# Patient Record
Sex: FEMALE | Race: WHITE | NOT HISPANIC OR LATINO | Employment: FULL TIME | URBAN - METROPOLITAN AREA
[De-identification: names, ages, dates, MRNs, and addresses within clinical notes are randomized per-mention and may not be internally consistent; named-entity substitution may affect disease eponyms.]

---

## 2017-01-03 ENCOUNTER — TRANSCRIBE ORDERS (OUTPATIENT)
Dept: ADMINISTRATIVE | Facility: HOSPITAL | Age: 57
End: 2017-01-03

## 2017-01-03 ENCOUNTER — HOSPITAL ENCOUNTER (OUTPATIENT)
Dept: ULTRASOUND IMAGING | Facility: HOSPITAL | Age: 57
Discharge: HOME | End: 2017-01-03
Attending: ORTHOPAEDIC SURGERY
Payer: COMMERCIAL

## 2017-01-03 DIAGNOSIS — M79.661 BILATERAL CALF PAIN: ICD-10-CM

## 2017-01-03 DIAGNOSIS — M79.662 PAIN OF LEFT CALF: ICD-10-CM

## 2017-01-03 DIAGNOSIS — M79.662 BILATERAL CALF PAIN: ICD-10-CM

## 2017-01-03 DIAGNOSIS — M79.661 BILATERAL CALF PAIN: Primary | ICD-10-CM

## 2017-01-03 DIAGNOSIS — M79.662 BILATERAL CALF PAIN: Primary | ICD-10-CM

## 2017-01-03 PROCEDURE — 93971 EXTREMITY STUDY: CPT

## 2017-01-04 ENCOUNTER — GENERIC CONVERSION - ENCOUNTER (OUTPATIENT)
Dept: OTHER | Facility: OTHER | Age: 57
End: 2017-01-04

## 2017-01-04 ENCOUNTER — TRANSCRIBE ORDERS (OUTPATIENT)
Dept: ADMINISTRATIVE | Facility: HOSPITAL | Age: 57
End: 2017-01-04

## 2017-01-04 ENCOUNTER — APPOINTMENT (OUTPATIENT)
Dept: LAB | Facility: HOSPITAL | Age: 57
End: 2017-01-04
Attending: FAMILY MEDICINE
Payer: COMMERCIAL

## 2017-01-04 DIAGNOSIS — R68.89 OTHER GENERAL SYMPTOMS AND SIGNS: ICD-10-CM

## 2017-01-04 LAB
FLUAV AG SPEC QL IA: NEGATIVE
FLUAV AG SPEC QL: NORMAL
FLUBV AG SPEC QL IA: NEGATIVE
FLUBV AG SPEC QL: NORMAL
RSV B RNA SPEC QL NAA+PROBE: NORMAL

## 2017-01-04 PROCEDURE — G0145 SCR C/V CYTO,THINLAYER,RESCR: HCPCS | Performed by: SPECIALIST

## 2017-01-04 PROCEDURE — 87798 DETECT AGENT NOS DNA AMP: CPT

## 2017-01-04 PROCEDURE — 87400 INFLUENZA A/B EACH AG IA: CPT

## 2017-01-05 ENCOUNTER — GENERIC CONVERSION - ENCOUNTER (OUTPATIENT)
Dept: OTHER | Facility: OTHER | Age: 57
End: 2017-01-05

## 2017-01-06 ENCOUNTER — LAB REQUISITION (OUTPATIENT)
Dept: LAB | Facility: HOSPITAL | Age: 57
End: 2017-01-06
Payer: COMMERCIAL

## 2017-01-06 DIAGNOSIS — Z01.419 ENCOUNTER FOR GYNECOLOGICAL EXAMINATION WITHOUT ABNORMAL FINDING: ICD-10-CM

## 2017-01-11 LAB
LAB AP GYN PRIMARY INTERPRETATION: NORMAL
Lab: NORMAL

## 2017-01-13 DIAGNOSIS — Z12.11 ENCOUNTER FOR SCREENING FOR MALIGNANT NEOPLASM OF COLON: ICD-10-CM

## 2017-01-13 DIAGNOSIS — M51.36 OTHER INTERVERTEBRAL DISC DEGENERATION, LUMBAR REGION: ICD-10-CM

## 2017-01-13 DIAGNOSIS — M54.50 LOW BACK PAIN: ICD-10-CM

## 2017-01-13 DIAGNOSIS — Z12.12 ENCOUNTER FOR SCREENING FOR MALIGNANT NEOPLASM OF RECTUM: ICD-10-CM

## 2017-01-13 RX ORDER — LEVOTHYROXINE SODIUM 0.07 MG/1
75 TABLET ORAL DAILY
COMMUNITY
End: 2019-02-19 | Stop reason: SDUPTHER

## 2017-01-13 RX ORDER — IBUPROFEN 400 MG/1
400 TABLET ORAL EVERY 6 HOURS PRN
COMMUNITY
End: 2018-10-19 | Stop reason: ALTCHOICE

## 2017-01-13 NOTE — DISCHARGE INSTRUCTIONS
KNEE ARTHROSCOPY  Post-operative instructions  Dr Santana July    Bandage/Physical Therapy:  Your first physical therapy visit should be 1-2 days after your surgery  Leave bandage on until seen at first PT visit  They will change your dressing, then you can shower and wash over stitches  Stitches need to be kept clean until removed approximately 7-10 days after surgery by physical therapy  If physical therapy will not remove the stitches, they will be removed at your first post-operative visit approximately 2 weeks after surgery  Pain medication:  Regardless of if you have sensation in your knee or not, we recommend taking a pain pill around 4-5 hours after surgery so you can be comfortable overnight  Then continue taking the pain pills as directed every 4-6 hours  You may take no more than 2 pain pills at a time  In between doses of pain medication, it is okay to use anti-inflammatories such as Aleve or Advil  Pain medication can cause constipation  Medications that are over the counter such as Colace or Dulcolax are recommended if you experience this side effect  Restrictions:  No driving while taking pain medication  Avoid deep bending, twisting, squatting, kneeling as those will generally be painful until 2+ weeks after surgery  Use of crutches is not mandatory, however some patients feel better using them for several days after surgery  No athletic activities until discussed at 2 week follow up visit in the office

## 2017-01-14 ENCOUNTER — ANESTHESIA EVENT (OUTPATIENT)
Dept: PERIOP | Facility: HOSPITAL | Age: 57
End: 2017-01-14
Payer: COMMERCIAL

## 2017-01-16 ENCOUNTER — ANESTHESIA (OUTPATIENT)
Dept: PERIOP | Facility: HOSPITAL | Age: 57
End: 2017-01-16
Payer: COMMERCIAL

## 2017-01-16 ENCOUNTER — HOSPITAL ENCOUNTER (OUTPATIENT)
Facility: HOSPITAL | Age: 57
Setting detail: OUTPATIENT SURGERY
Discharge: HOME | End: 2017-01-16
Attending: ORTHOPAEDIC SURGERY | Admitting: ORTHOPAEDIC SURGERY
Payer: COMMERCIAL

## 2017-01-16 VITALS
WEIGHT: 156 LBS | RESPIRATION RATE: 18 BRPM | TEMPERATURE: 97.5 F | HEIGHT: 65 IN | BODY MASS INDEX: 25.99 KG/M2 | DIASTOLIC BLOOD PRESSURE: 71 MMHG | SYSTOLIC BLOOD PRESSURE: 130 MMHG | OXYGEN SATURATION: 98 % | HEART RATE: 69 BPM

## 2017-01-16 RX ORDER — KETOROLAC TROMETHAMINE 30 MG/ML
30 INJECTION, SOLUTION INTRAMUSCULAR; INTRAVENOUS ONCE
Status: COMPLETED | OUTPATIENT
Start: 2017-01-16 | End: 2017-01-16

## 2017-01-16 RX ORDER — ONDANSETRON 2 MG/ML
INJECTION INTRAMUSCULAR; INTRAVENOUS AS NEEDED
Status: DISCONTINUED | OUTPATIENT
Start: 2017-01-16 | End: 2017-01-16 | Stop reason: SURG

## 2017-01-16 RX ORDER — PROPOFOL 10 MG/ML
INJECTION, EMULSION INTRAVENOUS CONTINUOUS PRN
Status: DISCONTINUED | OUTPATIENT
Start: 2017-01-16 | End: 2017-01-16 | Stop reason: SURG

## 2017-01-16 RX ORDER — OXYCODONE HYDROCHLORIDE 5 MG/1
5 TABLET ORAL EVERY 4 HOURS PRN
Status: DISCONTINUED | OUTPATIENT
Start: 2017-01-16 | End: 2017-01-16 | Stop reason: HOSPADM

## 2017-01-16 RX ORDER — ONDANSETRON 2 MG/ML
4 INJECTION INTRAMUSCULAR; INTRAVENOUS ONCE
Status: DISCONTINUED | OUTPATIENT
Start: 2017-01-16 | End: 2017-01-16 | Stop reason: HOSPADM

## 2017-01-16 RX ORDER — SCOLOPAMINE TRANSDERMAL SYSTEM 1 MG/1
1 PATCH, EXTENDED RELEASE TRANSDERMAL
Status: DISCONTINUED | OUTPATIENT
Start: 2017-01-16 | End: 2017-01-16 | Stop reason: HOSPADM

## 2017-01-16 RX ORDER — MORPHINE SULFATE 2 MG/ML
2 INJECTION, SOLUTION INTRAMUSCULAR; INTRAVENOUS ONCE
Status: DISCONTINUED | OUTPATIENT
Start: 2017-01-16 | End: 2017-01-16 | Stop reason: HOSPADM

## 2017-01-16 RX ORDER — MAGNESIUM HYDROXIDE 1200 MG/15ML
LIQUID ORAL AS NEEDED
Status: DISCONTINUED | OUTPATIENT
Start: 2017-01-16 | End: 2017-01-16 | Stop reason: HOSPADM

## 2017-01-16 RX ORDER — MIDAZOLAM HYDROCHLORIDE 1 MG/ML
INJECTION INTRAMUSCULAR; INTRAVENOUS AS NEEDED
Status: DISCONTINUED | OUTPATIENT
Start: 2017-01-16 | End: 2017-01-16 | Stop reason: SURG

## 2017-01-16 RX ORDER — LIDOCAINE HYDROCHLORIDE 10 MG/ML
INJECTION, SOLUTION INFILTRATION; PERINEURAL AS NEEDED
Status: DISCONTINUED | OUTPATIENT
Start: 2017-01-16 | End: 2017-01-16 | Stop reason: HOSPADM

## 2017-01-16 RX ORDER — LIDOCAINE HYDROCHLORIDE AND EPINEPHRINE 20; 5 MG/ML; UG/ML
INJECTION, SOLUTION EPIDURAL; INFILTRATION; INTRACAUDAL; PERINEURAL AS NEEDED
Status: DISCONTINUED | OUTPATIENT
Start: 2017-01-16 | End: 2017-01-16 | Stop reason: SURG

## 2017-01-16 RX ORDER — FENTANYL CITRATE/PF 50 MCG/ML
50 SYRINGE (ML) INJECTION
Status: DISCONTINUED | OUTPATIENT
Start: 2017-01-16 | End: 2017-01-16 | Stop reason: HOSPADM

## 2017-01-16 RX ORDER — ROPIVACAINE HYDROCHLORIDE 5 MG/ML
INJECTION, SOLUTION EPIDURAL; INFILTRATION; PERINEURAL AS NEEDED
Status: DISCONTINUED | OUTPATIENT
Start: 2017-01-16 | End: 2017-01-16 | Stop reason: SURG

## 2017-01-16 RX ORDER — FENTANYL CITRATE 50 UG/ML
INJECTION, SOLUTION INTRAMUSCULAR; INTRAVENOUS AS NEEDED
Status: DISCONTINUED | OUTPATIENT
Start: 2017-01-16 | End: 2017-01-16 | Stop reason: SURG

## 2017-01-16 RX ORDER — DEXAMETHASONE SODIUM PHOSPHATE 4 MG/ML
INJECTION, SOLUTION INTRA-ARTICULAR; INTRALESIONAL; INTRAMUSCULAR; INTRAVENOUS; SOFT TISSUE AS NEEDED
Status: DISCONTINUED | OUTPATIENT
Start: 2017-01-16 | End: 2017-01-16 | Stop reason: SURG

## 2017-01-16 RX ORDER — BUPIVACAINE HYDROCHLORIDE 5 MG/ML
INJECTION, SOLUTION PERINEURAL AS NEEDED
Status: DISCONTINUED | OUTPATIENT
Start: 2017-01-16 | End: 2017-01-16 | Stop reason: HOSPADM

## 2017-01-16 RX ORDER — SODIUM CHLORIDE 9 MG/ML
125 INJECTION, SOLUTION INTRAVENOUS CONTINUOUS
Status: DISCONTINUED | OUTPATIENT
Start: 2017-01-16 | End: 2017-01-16 | Stop reason: HOSPADM

## 2017-01-16 RX ADMIN — KETOROLAC TROMETHAMINE 30 MG: 30 INJECTION, SOLUTION INTRAMUSCULAR at 17:12

## 2017-01-16 RX ADMIN — FENTANYL CITRATE 50 MCG: 50 INJECTION INTRAMUSCULAR; INTRAVENOUS at 15:36

## 2017-01-16 RX ADMIN — SODIUM CHLORIDE: 0.9 INJECTION, SOLUTION INTRAVENOUS at 15:07

## 2017-01-16 RX ADMIN — SODIUM CHLORIDE 125 ML/HR: 0.9 INJECTION, SOLUTION INTRAVENOUS at 12:17

## 2017-01-16 RX ADMIN — CEFAZOLIN SODIUM 1000 MG: 1 SOLUTION INTRAVENOUS at 14:42

## 2017-01-16 RX ADMIN — DEXAMETHASONE SODIUM PHOSPHATE 8 MG: 4 INJECTION, SOLUTION INTRAMUSCULAR; INTRAVENOUS at 14:22

## 2017-01-16 RX ADMIN — MIDAZOLAM HYDROCHLORIDE 2 MG: 1 INJECTION, SOLUTION INTRAMUSCULAR; INTRAVENOUS at 14:09

## 2017-01-16 RX ADMIN — FENTANYL CITRATE 100 MCG: 50 INJECTION, SOLUTION INTRAMUSCULAR; INTRAVENOUS at 14:09

## 2017-01-16 RX ADMIN — SODIUM CHLORIDE 125 ML/HR: 0.9 INJECTION, SOLUTION INTRAVENOUS at 15:47

## 2017-01-16 RX ADMIN — PROPOFOL 120 MCG/KG/MIN: 10 INJECTION, EMULSION INTRAVENOUS at 14:35

## 2017-01-16 RX ADMIN — SCOPALAMINE 1 PATCH: 1 PATCH, EXTENDED RELEASE TRANSDERMAL at 13:09

## 2017-01-16 RX ADMIN — LIDOCAINE HYDROCHLORIDE AND EPINEPHRINE 20 ML: 20; 5 INJECTION, SOLUTION EPIDURAL; INFILTRATION; INTRACAUDAL; PERINEURAL at 14:13

## 2017-01-16 RX ADMIN — ROPIVACAINE HYDROCHLORIDE 30 ML: 5 INJECTION, SOLUTION EPIDURAL; INFILTRATION; PERINEURAL at 14:12

## 2017-01-16 RX ADMIN — ONDANSETRON HYDROCHLORIDE 8 MG: 2 INJECTION, SOLUTION INTRAVENOUS at 14:22

## 2017-01-17 ENCOUNTER — APPOINTMENT (OUTPATIENT)
Dept: PHYSICAL THERAPY | Facility: CLINIC | Age: 57
End: 2017-01-17
Payer: COMMERCIAL

## 2017-01-17 PROCEDURE — 97110 THERAPEUTIC EXERCISES: CPT

## 2017-01-17 PROCEDURE — 97116 GAIT TRAINING THERAPY: CPT

## 2017-01-17 PROCEDURE — 97014 ELECTRIC STIMULATION THERAPY: CPT

## 2017-01-17 PROCEDURE — 97161 PT EVAL LOW COMPLEX 20 MIN: CPT

## 2017-01-19 ENCOUNTER — APPOINTMENT (OUTPATIENT)
Dept: PHYSICAL THERAPY | Facility: CLINIC | Age: 57
End: 2017-01-19
Payer: COMMERCIAL

## 2017-01-19 PROCEDURE — 97014 ELECTRIC STIMULATION THERAPY: CPT

## 2017-01-19 PROCEDURE — 97110 THERAPEUTIC EXERCISES: CPT

## 2017-01-23 ENCOUNTER — APPOINTMENT (OUTPATIENT)
Dept: PHYSICAL THERAPY | Facility: CLINIC | Age: 57
End: 2017-01-23
Payer: COMMERCIAL

## 2017-01-23 ENCOUNTER — HOSPITAL ENCOUNTER (EMERGENCY)
Facility: HOSPITAL | Age: 57
Discharge: HOME | End: 2017-01-23
Attending: EMERGENCY MEDICINE | Admitting: EMERGENCY MEDICINE
Payer: COMMERCIAL

## 2017-01-23 ENCOUNTER — APPOINTMENT (EMERGENCY)
Dept: CT IMAGING | Facility: HOSPITAL | Age: 57
End: 2017-01-23
Payer: COMMERCIAL

## 2017-01-23 VITALS
WEIGHT: 155 LBS | DIASTOLIC BLOOD PRESSURE: 62 MMHG | SYSTOLIC BLOOD PRESSURE: 122 MMHG | BODY MASS INDEX: 25.79 KG/M2 | TEMPERATURE: 97 F | OXYGEN SATURATION: 98 % | RESPIRATION RATE: 16 BRPM | HEART RATE: 75 BPM

## 2017-01-23 DIAGNOSIS — B34.9 VIRAL SYNDROME: ICD-10-CM

## 2017-01-23 DIAGNOSIS — R07.9 CHEST PAIN WITH LOW RISK OF ACUTE CORONARY SYNDROME: Primary | ICD-10-CM

## 2017-01-23 LAB
ANION GAP SERPL CALCULATED.3IONS-SCNC: 12 MMOL/L (ref 4–13)
BASOPHILS # BLD AUTO: 0.04 THOUSANDS/ΜL (ref 0–0.1)
BASOPHILS NFR BLD AUTO: 1 % (ref 0–1)
BUN SERPL-MCNC: 15 MG/DL (ref 5–25)
CALCIUM SERPL-MCNC: 9.4 MG/DL (ref 8.3–10.1)
CHLORIDE SERPL-SCNC: 104 MMOL/L (ref 100–108)
CO2 SERPL-SCNC: 27 MMOL/L (ref 21–32)
CREAT SERPL-MCNC: 0.71 MG/DL (ref 0.6–1.3)
EOSINOPHIL # BLD AUTO: 0.15 THOUSAND/ΜL (ref 0–0.61)
EOSINOPHIL NFR BLD AUTO: 2 % (ref 0–6)
ERYTHROCYTE [DISTWIDTH] IN BLOOD BY AUTOMATED COUNT: 11.5 % (ref 11.6–15.1)
GFR SERPL CREATININE-BSD FRML MDRD: >60 ML/MIN/1.73SQ M
GLUCOSE SERPL-MCNC: 83 MG/DL (ref 65–140)
HCT VFR BLD AUTO: 39.9 % (ref 34.8–46.1)
HGB BLD-MCNC: 13.7 G/DL (ref 11.5–15.4)
HOLD SPECIMEN: NORMAL
HOLD SPECIMEN: YES
LYMPHOCYTES # BLD AUTO: 2.36 THOUSANDS/ΜL (ref 0.6–4.47)
LYMPHOCYTES NFR BLD AUTO: 38 % (ref 14–44)
MCH RBC QN AUTO: 31.4 PG (ref 26.8–34.3)
MCHC RBC AUTO-ENTMCNC: 34.3 G/DL (ref 31.4–37.4)
MCV RBC AUTO: 92 FL (ref 82–98)
MONOCYTES # BLD AUTO: 0.53 THOUSAND/ΜL (ref 0.17–1.22)
MONOCYTES NFR BLD AUTO: 9 % (ref 4–12)
NEUTROPHILS # BLD AUTO: 3.15 THOUSANDS/ΜL (ref 1.85–7.62)
NEUTS SEG NFR BLD AUTO: 50 % (ref 43–75)
PLATELET # BLD AUTO: 381 THOUSANDS/UL (ref 149–390)
PMV BLD AUTO: 9.3 FL (ref 8.9–12.7)
POTASSIUM SERPL-SCNC: 3.6 MMOL/L (ref 3.5–5.3)
RBC # BLD AUTO: 4.36 MILLION/UL (ref 3.81–5.12)
SODIUM SERPL-SCNC: 143 MMOL/L (ref 136–145)
SPECIMEN SOURCE: NORMAL
TROPONIN I BLD-MCNC: 0 NG/ML (ref 0–0.08)
TROPONIN I SERPL-MCNC: <0.02 NG/ML
WBC # BLD AUTO: 6.23 THOUSAND/UL (ref 4.31–10.16)

## 2017-01-23 PROCEDURE — 96374 THER/PROPH/DIAG INJ IV PUSH: CPT

## 2017-01-23 PROCEDURE — 36415 COLL VENOUS BLD VENIPUNCTURE: CPT | Performed by: EMERGENCY MEDICINE

## 2017-01-23 PROCEDURE — 84484 ASSAY OF TROPONIN QUANT: CPT

## 2017-01-23 PROCEDURE — 85025 COMPLETE CBC W/AUTO DIFF WBC: CPT | Performed by: EMERGENCY MEDICINE

## 2017-01-23 PROCEDURE — 93005 ELECTROCARDIOGRAM TRACING: CPT | Performed by: EMERGENCY MEDICINE

## 2017-01-23 PROCEDURE — 99285 EMERGENCY DEPT VISIT HI MDM: CPT

## 2017-01-23 PROCEDURE — 71275 CT ANGIOGRAPHY CHEST: CPT

## 2017-01-23 PROCEDURE — 80048 BASIC METABOLIC PNL TOTAL CA: CPT | Performed by: EMERGENCY MEDICINE

## 2017-01-23 PROCEDURE — 84484 ASSAY OF TROPONIN QUANT: CPT | Performed by: EMERGENCY MEDICINE

## 2017-01-23 RX ORDER — KETOROLAC TROMETHAMINE 30 MG/ML
15 INJECTION, SOLUTION INTRAMUSCULAR; INTRAVENOUS ONCE
Status: COMPLETED | OUTPATIENT
Start: 2017-01-23 | End: 2017-01-23

## 2017-01-23 RX ADMIN — KETOROLAC TROMETHAMINE 15 MG: 30 INJECTION, SOLUTION INTRAMUSCULAR at 20:11

## 2017-01-23 RX ADMIN — IOHEXOL 85 ML: 350 INJECTION, SOLUTION INTRAVENOUS at 20:26

## 2017-01-24 LAB
ATRIAL RATE: 76 BPM
P AXIS: 47 DEGREES
PR INTERVAL: 132 MS
QRS AXIS: 69 DEGREES
QRSD INTERVAL: 86 MS
QT INTERVAL: 402 MS
QTC INTERVAL: 452 MS
T WAVE AXIS: 59 DEGREES
VENTRICULAR RATE: 76 BPM

## 2017-01-26 ENCOUNTER — APPOINTMENT (OUTPATIENT)
Dept: PHYSICAL THERAPY | Facility: CLINIC | Age: 57
End: 2017-01-26
Payer: COMMERCIAL

## 2017-01-26 PROCEDURE — 97014 ELECTRIC STIMULATION THERAPY: CPT

## 2017-01-26 PROCEDURE — 97110 THERAPEUTIC EXERCISES: CPT

## 2017-01-30 ENCOUNTER — ALLSCRIPTS OFFICE VISIT (OUTPATIENT)
Dept: OTHER | Facility: OTHER | Age: 57
End: 2017-01-30

## 2017-01-30 ENCOUNTER — APPOINTMENT (OUTPATIENT)
Dept: PHYSICAL THERAPY | Facility: CLINIC | Age: 57
End: 2017-01-30
Payer: COMMERCIAL

## 2017-01-30 DIAGNOSIS — M51.36 OTHER INTERVERTEBRAL DISC DEGENERATION, LUMBAR REGION: ICD-10-CM

## 2017-01-30 PROCEDURE — 97110 THERAPEUTIC EXERCISES: CPT

## 2017-01-30 PROCEDURE — 97112 NEUROMUSCULAR REEDUCATION: CPT

## 2017-01-30 PROCEDURE — 97162 PT EVAL MOD COMPLEX 30 MIN: CPT

## 2017-01-30 PROCEDURE — 97014 ELECTRIC STIMULATION THERAPY: CPT

## 2017-01-31 ENCOUNTER — APPOINTMENT (OUTPATIENT)
Dept: PHYSICAL THERAPY | Facility: CLINIC | Age: 57
End: 2017-01-31
Payer: COMMERCIAL

## 2017-01-31 PROCEDURE — 97140 MANUAL THERAPY 1/> REGIONS: CPT

## 2017-01-31 PROCEDURE — 97110 THERAPEUTIC EXERCISES: CPT

## 2017-01-31 PROCEDURE — 97014 ELECTRIC STIMULATION THERAPY: CPT

## 2017-02-01 ENCOUNTER — HOSPITAL ENCOUNTER (OUTPATIENT)
Dept: RADIOLOGY | Facility: MEDICAL CENTER | Age: 57
Discharge: HOME | End: 2017-02-01
Payer: COMMERCIAL

## 2017-02-01 ENCOUNTER — TRANSCRIBE ORDERS (OUTPATIENT)
Dept: LAB | Facility: MEDICAL CENTER | Age: 57
End: 2017-02-01

## 2017-02-01 ENCOUNTER — APPOINTMENT (OUTPATIENT)
Dept: PHYSICAL THERAPY | Facility: CLINIC | Age: 57
End: 2017-02-01
Payer: COMMERCIAL

## 2017-02-01 ENCOUNTER — GENERIC CONVERSION - ENCOUNTER (OUTPATIENT)
Dept: OTHER | Facility: OTHER | Age: 57
End: 2017-02-01

## 2017-02-01 DIAGNOSIS — M54.50 LOW BACK PAIN: ICD-10-CM

## 2017-02-01 PROCEDURE — 97110 THERAPEUTIC EXERCISES: CPT

## 2017-02-01 PROCEDURE — 97140 MANUAL THERAPY 1/> REGIONS: CPT

## 2017-02-01 PROCEDURE — 97014 ELECTRIC STIMULATION THERAPY: CPT

## 2017-02-01 PROCEDURE — 72110 X-RAY EXAM L-2 SPINE 4/>VWS: CPT

## 2017-02-02 ENCOUNTER — GENERIC CONVERSION - ENCOUNTER (OUTPATIENT)
Dept: OTHER | Facility: OTHER | Age: 57
End: 2017-02-02

## 2017-02-02 ENCOUNTER — HOSPITAL ENCOUNTER (OUTPATIENT)
Dept: MRI IMAGING | Facility: HOSPITAL | Age: 57
Discharge: HOME | End: 2017-02-02
Attending: FAMILY MEDICINE
Payer: COMMERCIAL

## 2017-02-02 DIAGNOSIS — M54.50 LOW BACK PAIN: ICD-10-CM

## 2017-02-02 PROCEDURE — 72148 MRI LUMBAR SPINE W/O DYE: CPT

## 2017-02-23 ENCOUNTER — GENERIC CONVERSION - ENCOUNTER (OUTPATIENT)
Dept: OTHER | Facility: OTHER | Age: 57
End: 2017-02-23

## 2017-02-28 ENCOUNTER — GENERIC CONVERSION - ENCOUNTER (OUTPATIENT)
Dept: OTHER | Facility: OTHER | Age: 57
End: 2017-02-28

## 2017-05-16 ENCOUNTER — GENERIC CONVERSION - ENCOUNTER (OUTPATIENT)
Dept: OTHER | Facility: OTHER | Age: 57
End: 2017-05-16

## 2017-05-16 ENCOUNTER — TRANSCRIBE ORDERS (OUTPATIENT)
Dept: RADIOLOGY | Facility: MEDICAL CENTER | Age: 57
End: 2017-05-16

## 2017-05-16 ENCOUNTER — HOSPITAL ENCOUNTER (OUTPATIENT)
Dept: RADIOLOGY | Facility: MEDICAL CENTER | Age: 57
Discharge: HOME | End: 2017-05-16
Payer: COMMERCIAL
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
## 2017-05-16 DIAGNOSIS — M79.641 PAIN OF RIGHT HAND: ICD-10-CM

## 2017-05-16 PROCEDURE — 73130 X-RAY EXAM OF HAND: CPT

## 2017-06-27 ENCOUNTER — APPOINTMENT (OUTPATIENT)
Dept: LAB | Facility: MEDICAL CENTER | Age: 57
End: 2017-06-27
Payer: COMMERCIAL

## 2017-06-27 ENCOUNTER — TRANSCRIBE ORDERS (OUTPATIENT)
Dept: LAB | Facility: MEDICAL CENTER | Age: 57
End: 2017-06-27

## 2017-06-27 DIAGNOSIS — E55.9 VITAMIN D DEFICIENCY: ICD-10-CM

## 2017-06-27 DIAGNOSIS — R89.9 UNSPECIFIED ABNORMAL FINDING IN SPECIMENS FROM OTHER ORGANS, SYSTEMS AND TISSUES: ICD-10-CM

## 2017-06-27 DIAGNOSIS — Z13.820 ENCOUNTER FOR SCREENING FOR OSTEOPOROSIS: ICD-10-CM

## 2017-06-27 DIAGNOSIS — R53.83 OTHER FATIGUE: ICD-10-CM

## 2017-06-27 DIAGNOSIS — S83.251A: ICD-10-CM

## 2017-06-27 DIAGNOSIS — L65.9 NONSCARRING HAIR LOSS: ICD-10-CM
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
## 2017-06-27 LAB
25(OH)D3 SERPL-MCNC: 19.7 NG/ML (ref 30–100)
BASOPHILS # BLD AUTO: 0.04 THOUSANDS/ΜL (ref 0–0.1)
BASOPHILS NFR BLD AUTO: 1 % (ref 0–1)
EOSINOPHIL # BLD AUTO: 0.14 THOUSAND/ΜL (ref 0–0.61)
EOSINOPHIL NFR BLD AUTO: 2 % (ref 0–6)
ERYTHROCYTE [DISTWIDTH] IN BLOOD BY AUTOMATED COUNT: 11.9 % (ref 11.6–15.1)
HCT VFR BLD AUTO: 40.9 % (ref 34.8–46.1)
HGB BLD-MCNC: 13.4 G/DL (ref 11.5–15.4)
LYMPHOCYTES # BLD AUTO: 2.41 THOUSANDS/ΜL (ref 0.6–4.47)
LYMPHOCYTES NFR BLD AUTO: 37 % (ref 14–44)
MCH RBC QN AUTO: 30.9 PG (ref 26.8–34.3)
MCHC RBC AUTO-ENTMCNC: 32.8 G/DL (ref 31.4–37.4)
MCV RBC AUTO: 94 FL (ref 82–98)
MONOCYTES # BLD AUTO: 0.42 THOUSAND/ΜL (ref 0.17–1.22)
MONOCYTES NFR BLD AUTO: 6 % (ref 4–12)
NEUTROPHILS # BLD AUTO: 3.51 THOUSANDS/ΜL (ref 1.85–7.62)
NEUTS SEG NFR BLD AUTO: 54 % (ref 43–75)
NRBC BLD AUTO-RTO: 0 /100 WBCS
PLATELET # BLD AUTO: 378 THOUSANDS/UL (ref 149–390)
PMV BLD AUTO: 9.9 FL (ref 8.9–12.7)
RBC # BLD AUTO: 4.34 MILLION/UL (ref 3.81–5.12)
TSH SERPL DL<=0.05 MIU/L-ACNC: 2.77 UIU/ML (ref 0.36–3.74)
WBC # BLD AUTO: 6.54 THOUSAND/UL (ref 4.31–10.16)

## 2017-06-27 PROCEDURE — 36415 COLL VENOUS BLD VENIPUNCTURE: CPT

## 2017-06-27 PROCEDURE — 84443 ASSAY THYROID STIM HORMONE: CPT

## 2017-06-27 PROCEDURE — 85025 COMPLETE CBC W/AUTO DIFF WBC: CPT

## 2017-06-27 PROCEDURE — 82306 VITAMIN D 25 HYDROXY: CPT

## 2017-06-27 PROCEDURE — 86618 LYME DISEASE ANTIBODY: CPT

## 2017-06-28 ENCOUNTER — GENERIC CONVERSION - ENCOUNTER (OUTPATIENT)
Dept: OTHER | Facility: OTHER | Age: 57
End: 2017-06-28

## 2017-06-28 LAB
B BURGDOR IGG SER IA-ACNC: 0.1
B BURGDOR IGM SER IA-ACNC: 0.23

## 2017-06-29 ENCOUNTER — ALLSCRIPTS OFFICE VISIT (OUTPATIENT)
Dept: OTHER | Facility: OTHER | Age: 57
End: 2017-06-29

## 2017-07-21 ENCOUNTER — HOSPITAL ENCOUNTER (OUTPATIENT)
Dept: MRI IMAGING | Facility: HOSPITAL | Age: 57
Discharge: HOME | End: 2017-07-21
Attending: FAMILY MEDICINE
Payer: COMMERCIAL

## 2017-07-21 ENCOUNTER — GENERIC CONVERSION - ENCOUNTER (OUTPATIENT)
Dept: OTHER | Facility: OTHER | Age: 57
End: 2017-07-21

## 2017-07-21 DIAGNOSIS — S83.251A: ICD-10-CM

## 2017-07-21 PROCEDURE — 73721 MRI JNT OF LWR EXTRE W/O DYE: CPT

## 2017-08-02 DIAGNOSIS — R25.2 CRAMP AND SPASM: ICD-10-CM

## 2017-08-02 DIAGNOSIS — Z02.89 ENCOUNTER FOR OTHER ADMINISTRATIVE EXAMINATIONS: ICD-10-CM

## 2017-08-05 ENCOUNTER — APPOINTMENT (OUTPATIENT)
Dept: LAB | Facility: HOSPITAL | Age: 57
End: 2017-08-05
Payer: COMMERCIAL

## 2017-08-05 ENCOUNTER — TRANSCRIBE ORDERS (OUTPATIENT)
Dept: ADMINISTRATIVE | Facility: HOSPITAL | Age: 57
End: 2017-08-05

## 2017-08-05 DIAGNOSIS — Z00.8 HEALTH EXAMINATION IN POPULATION SURVEY: ICD-10-CM

## 2017-08-05 DIAGNOSIS — Z00.8 HEALTH EXAMINATION IN POPULATION SURVEY: Primary | ICD-10-CM

## 2017-08-05 LAB
CHOLEST SERPL-MCNC: 205 MG/DL (ref 50–200)
EST. AVERAGE GLUCOSE BLD GHB EST-MCNC: 134 MG/DL
HBA1C MFR BLD: 6.3 % (ref 4.2–6.3)
HDLC SERPL-MCNC: 72 MG/DL (ref 40–60)
LDLC SERPL CALC-MCNC: 116 MG/DL (ref 0–100)
TRIGL SERPL-MCNC: 84 MG/DL

## 2017-08-05 PROCEDURE — 83036 HEMOGLOBIN GLYCOSYLATED A1C: CPT

## 2017-08-05 PROCEDURE — 36415 COLL VENOUS BLD VENIPUNCTURE: CPT

## 2017-08-05 PROCEDURE — 80061 LIPID PANEL: CPT

## 2017-08-08 ENCOUNTER — GENERIC CONVERSION - ENCOUNTER (OUTPATIENT)
Dept: OTHER | Facility: OTHER | Age: 57
End: 2017-08-08

## 2017-08-16 ENCOUNTER — GENERIC CONVERSION - ENCOUNTER (OUTPATIENT)
Dept: OTHER | Facility: OTHER | Age: 57
End: 2017-08-16

## 2017-10-03 ENCOUNTER — APPOINTMENT (OUTPATIENT)
Dept: RADIOLOGY | Facility: MEDICAL CENTER | Age: 57
End: 2017-10-03
Payer: COMMERCIAL

## 2017-10-03 ENCOUNTER — TRANSCRIBE ORDERS (OUTPATIENT)
Dept: RADIOLOGY | Facility: MEDICAL CENTER | Age: 57
End: 2017-10-03

## 2017-10-03 DIAGNOSIS — R05.9 COUGH: ICD-10-CM

## 2017-10-03 DIAGNOSIS — R68.89 OTHER GENERAL SYMPTOMS AND SIGNS: ICD-10-CM

## 2017-10-03 DIAGNOSIS — M25.50 PAIN IN JOINT: ICD-10-CM

## 2017-10-03 PROCEDURE — 71020 HB CHEST X-RAY 2VW FRONTAL&LATL: CPT

## 2017-10-04 ENCOUNTER — GENERIC CONVERSION - ENCOUNTER (OUTPATIENT)
Dept: OTHER | Facility: OTHER | Age: 57
End: 2017-10-04

## 2017-10-05 ENCOUNTER — APPOINTMENT (OUTPATIENT)
Dept: LAB | Facility: MEDICAL CENTER | Age: 57
End: 2017-10-05
Payer: COMMERCIAL

## 2017-10-05 ENCOUNTER — TRANSCRIBE ORDERS (OUTPATIENT)
Dept: RADIOLOGY | Facility: MEDICAL CENTER | Age: 57
End: 2017-10-05

## 2017-10-05 DIAGNOSIS — R05.9 COUGH: ICD-10-CM

## 2017-10-05 LAB
BASOPHILS # BLD AUTO: 0.03 THOUSANDS/ΜL (ref 0–0.1)
BASOPHILS NFR BLD AUTO: 0 % (ref 0–1)
EOSINOPHIL # BLD AUTO: 0.21 THOUSAND/ΜL (ref 0–0.61)
EOSINOPHIL NFR BLD AUTO: 3 % (ref 0–6)
ERYTHROCYTE [DISTWIDTH] IN BLOOD BY AUTOMATED COUNT: 11.9 % (ref 11.6–15.1)
HCT VFR BLD AUTO: 35.9 % (ref 34.8–46.1)
HGB BLD-MCNC: 12.4 G/DL (ref 11.5–15.4)
LYMPHOCYTES # BLD AUTO: 3.15 THOUSANDS/ΜL (ref 0.6–4.47)
LYMPHOCYTES NFR BLD AUTO: 41 % (ref 14–44)
MCH RBC QN AUTO: 31.5 PG (ref 26.8–34.3)
MCHC RBC AUTO-ENTMCNC: 34.5 G/DL (ref 31.4–37.4)
MCV RBC AUTO: 91 FL (ref 82–98)
MONOCYTES # BLD AUTO: 0.47 THOUSAND/ΜL (ref 0.17–1.22)
MONOCYTES NFR BLD AUTO: 6 % (ref 4–12)
NEUTROPHILS # BLD AUTO: 3.74 THOUSANDS/ΜL (ref 1.85–7.62)
NEUTS SEG NFR BLD AUTO: 50 % (ref 43–75)
NRBC BLD AUTO-RTO: 0 /100 WBCS
PLATELET # BLD AUTO: 399 THOUSANDS/UL (ref 149–390)
PMV BLD AUTO: 9.8 FL (ref 8.9–12.7)
RBC # BLD AUTO: 3.94 MILLION/UL (ref 3.81–5.12)
WBC # BLD AUTO: 7.63 THOUSAND/UL (ref 4.31–10.16)

## 2017-10-05 PROCEDURE — 87070 CULTURE OTHR SPECIMN AEROBIC: CPT

## 2017-10-05 PROCEDURE — 36415 COLL VENOUS BLD VENIPUNCTURE: CPT

## 2017-10-05 PROCEDURE — 86738 MYCOPLASMA ANTIBODY: CPT

## 2017-10-05 PROCEDURE — 85025 COMPLETE CBC W/AUTO DIFF WBC: CPT

## 2017-10-06 ENCOUNTER — GENERIC CONVERSION - ENCOUNTER (OUTPATIENT)
Dept: OTHER | Facility: OTHER | Age: 57
End: 2017-10-06

## 2017-10-06 ENCOUNTER — APPOINTMENT (OUTPATIENT)
Dept: LAB | Facility: MEDICAL CENTER | Age: 57
End: 2017-10-06
Payer: COMMERCIAL

## 2017-10-06 ENCOUNTER — TRANSCRIBE ORDERS (OUTPATIENT)
Dept: LAB | Facility: MEDICAL CENTER | Age: 57
End: 2017-10-06

## 2017-10-06 DIAGNOSIS — R05.9 COUGH: Primary | ICD-10-CM

## 2017-10-06 DIAGNOSIS — R05.9 COUGH: ICD-10-CM

## 2017-10-06 LAB
M PNEUMO IGG SER IA-ACNC: 238 U/ML (ref 0–99)
M PNEUMO IGM SER IA-ACNC: <770 U/ML (ref 0–769)

## 2017-10-06 PROCEDURE — 87801 DETECT AGNT MULT DNA AMPLI: CPT

## 2017-10-07 LAB
B PARAPERT DNA SPEC QL NAA+PROBE: NOT DETECTED
B PERT DNA SPEC QL NAA+PROBE: NOT DETECTED
BACTERIA THROAT CULT: NORMAL

## 2017-10-09 ENCOUNTER — GENERIC CONVERSION - ENCOUNTER (OUTPATIENT)
Dept: OTHER | Facility: OTHER | Age: 57
End: 2017-10-09

## 2017-10-13 ENCOUNTER — GENERIC CONVERSION - ENCOUNTER (OUTPATIENT)
Dept: OTHER | Facility: OTHER | Age: 57
End: 2017-10-13

## 2017-10-19 ENCOUNTER — GENERIC CONVERSION - ENCOUNTER (OUTPATIENT)
Dept: OTHER | Facility: OTHER | Age: 57
End: 2017-10-19

## 2017-10-20 ENCOUNTER — ALLSCRIPTS OFFICE VISIT (OUTPATIENT)
Dept: OTHER | Facility: OTHER | Age: 57
End: 2017-10-20

## 2017-10-28 DIAGNOSIS — E55.9 VITAMIN D DEFICIENCY: ICD-10-CM

## 2018-01-10 NOTE — RESULT NOTES
Verified Results  * MRI KNEE LEFT  WO CONTRAST [id number] 10:51AM Arabella Commander Order Number: [id number]    - Patient Instructions: To schedule this appointment, please contact Central Scheduling at [phone]   Order Number: [id number]    - Patient Instructions: To schedule this appointment, please contact Central Scheduling at [phone]  Test Name Result Flag Reference   MRI KNEE LEFT  WO CONTRAST (Report)     This is a summary report  The complete report is available in the patient's medical record  If you cannot access the medical record, please contact the sending organization for a detailed fax or copy  MRI LEFT KNEE     INDICATION: Left knee pain  COMPARISON: Left knee plain films from 10/27/2016  TECHNIQUE:  MR sequences were obtained of the left knee including: Localizer, axial T2 fat sat, coronal T1/T2 fat sat, sagittal PD/T2 fat sat  Images were acquired on a 1 5 Michaela unit  Gadolinium was not used  FINDINGS:     SUBCUTANEOUS TISSUES: Normal     JOINT EFFUSION: None  BAKER'S CYST: None  MENISCI: There is a moderate size complex tear of the medial meniscus posterior horn and posterior meniscal root (series 3 images 6 through 9 ) The lateral meniscus is intact  CRUCIATE LIGAMENTS: Intact  EXTENSOR APPARATUS: Intact  COLLATERAL LIGAMENTS: Intact  ARTICULAR SURFACES: There is mild patellofemoral compartment osteoarthritis with diffuse partial thickness cartilage loss  BONE MARROW: Normal signal without fracture  MUSCULATURE: Intact  IMPRESSION:     There is a moderate size complex tear of the medial meniscus posterior horn and posterior meniscal root (series 3 images 6 through 9 )     Mild patellofemoral compartment osteoarthritis         Workstation performed: [id number]     Signed by:   Brenda Mcfarland MD   11/10/16

## 2018-01-10 NOTE — MISCELLANEOUS
Signatures   Electronically signed by :  Jeri Hay, ; Feb 28 2017  3:19PM EST                       (Author)

## 2018-01-11 NOTE — RESULT NOTES
Verified Results  (1) B  PERTUSSIS/ PARAPERTUSSIS BY PCR [id number] 07:23AM Valentino Somerset     Test Name Result Flag Reference   B  Parapertussis, PCR Not Detected  Not Detected   B parapertussis-Detection Limit 20 CFU/mL  A Negative result does not rule out the presence of PCR inhibitors in the specimen or Bordetella DNA concentrations below the level of detection of the assay  Test performed at [place], [place]  This test was developed and its performance characteristics determined by [facility] (\[facility]\"")  It has not been cleared by the [place]  Food and Drug Administration (FDA), however, the FDA has determined that such clearance or approval is not necessary  This test is used for clinical purposes and it should not be regarded as investigational or research  \[facility]\"" is certified under Clinical Laboratory Improvement Act of 1988 (CLIA-88) as qualified to perform high complexity clinical laboratory testing  B  Pertussis,PCR Not Detected  Not Detected   B  pertussis - Detection limit 2 CFU/mL  A Negative result does not rule out the presence of PCR inhibitors in the specimen or Bordetella DNA concentrations below the level of detection of the assay       (1) THROAT CULTURE (CULTURE, UPPER RESPIRATORY) 05Oct2017 02:30PM Ariela Basurto Order Number: [id number]     Test Name Result Flag Reference   CLINICAL REPORT (Report)     Test:        Throat culture  Specimen Type:   Throat  Specimen Date:   10/5/2017 2:30 PM  Result Date:    10/7/2017 8:31 AM  Result Status:   Final result  Resulting Lab:   [place] [id number]            Tel: [phone]      CULTURE                                       ------------------                                   Negative for beta-hemolytic Streptococcus     (1) MYCOPLASMA PNEUMONIAE AB, IGG/IGM [id number] 02:14PM Valentino Somerset    Order Number: [id number] Test Name Result Flag Reference   MYC PNEU AB,  U/mL H 0 - 99   Negative:           <100                               Indeterminate: 100 - 320                               Positive:           >320  The reference interval established is intended as a  baseline only  Values >100 may indicate a recent  infection with Mycoplasma pneumoniae and need to be  confirmed either by a positive IgM result and/or an  additional specimen drawn 2-4 weeks later showing a  significant increase in antibody levels  MYC PNEU AB, IGM <770 U/mL  0 - 769   Negative            <770  Clinically significant amount of M  pneumoniae antibody  not detected  Low Positive   770 - 12  M  pneumoniae specific IgM presumptively detected  It  is recommended that another sample be collected 1-2  weeks later to assure reactivity  Positive            >950  Highly significant amount of M  pneumoniae specific  IgM antibody detected      Performed at:  [facility] [employer] [facility] [address]  [id number]  : Pau Gaines MD, Phone:  [phone]

## 2018-01-12 VITALS
SYSTOLIC BLOOD PRESSURE: 118 MMHG | BODY MASS INDEX: 27.49 KG/M2 | HEIGHT: 65 IN | DIASTOLIC BLOOD PRESSURE: 68 MMHG | WEIGHT: 165 LBS

## 2018-01-12 VITALS
WEIGHT: 169 LBS | HEIGHT: 65 IN | BODY MASS INDEX: 28.16 KG/M2 | DIASTOLIC BLOOD PRESSURE: 78 MMHG | SYSTOLIC BLOOD PRESSURE: 132 MMHG

## 2018-01-12 NOTE — RESULT NOTES
Verified Results  * MRI KNEE RIGHT  WO CONTRAST [id number] 07:48AM Arabella Commander Order Number: [id number]    - Patient Instructions: To schedule this appointment, please contact Central Scheduling at [phone]  Test Name Result Flag Reference   MRI KNEE RIGHT  WO CONTRAST (Report)     This is a summary report  The complete report is available in the patient's medical record  If you cannot access the medical record, please contact the sending organization for a detailed fax or copy  MRI RIGHT KNEE     INDICATION: [id number]: Bucket-handle tear of lateral meniscus, current injury, right knee, initial encounter  History taken directly from the electronic ordering system  Posterior and lateral right knee pain after injury  Decreased range of motion  COMPARISON: MRI dated 7/6/2016 and plain film dated 7/6/2016  TECHNIQUE:  MR sequences were obtained of the right knee including: Localizer, axial T2 fat sat, coronal T1/T2 fat sat, sagittal PD/T2 fat sat  Images were acquired on a 1 5 Michaela unit  Gadolinium was not used  FINDINGS:     SUBCUTANEOUS TISSUES: Normal     JOINT EFFUSION: None  BAKER'S CYST: None  MENISCI: Redemonstration of nondisplaced undersurface posterior horn medial meniscus tear best seen on series 3 images 7 and 8  Further increase in T2 signal abnormality suggests worsening of the tear  There is small para labral cyst formation noted    dorsally best seen on series 3 image 8  The lateral meniscus remains intact  CRUCIATE LIGAMENTS: Intact  EXTENSOR APPARATUS: Intact  COLLATERAL LIGAMENTS: Intact  ARTICULAR SURFACES: Grade III chondromalacia patella at the median ridge  There is also patella alisia configuration and increased lateral patellar tilt with grade 2 lateral facet cartilage loss  Findings suggest tracking abnormality  BONE MARROW: Normal      MUSCULATURE: Intact  IMPRESSION:   1   Posterior horn medial meniscus tear with para meniscal cyst formation  No flipped fragment as above  2  Patellar chondromalacia, increased lateral patellar tilt and patella alisia configuration suggestive of chronic patellofemoral tracking abnormality         Workstation performed: [id number]     Signed by:   Mireya Baker MD   7/21/17

## 2018-01-12 NOTE — RESULT NOTES
Verified Results  * XR CHEST PA & LATERAL [id number] 12:15PM Jeana Argueta Order Number: [id number]     Test Name Result Flag Reference   XR CHEST PA & LATERAL (Report)     CHEST      INDICATION: Cough, fever, sore throat  COMPARISON: Chest radiographs dated 7/24/2014  VIEWS: Frontal and lateral projections     IMAGES: 2     FINDINGS:        Cardiomediastinal silhouette appears unremarkable  The lungs are clear  No pneumothorax or pleural effusion  Prior cholecystectomy  Bones are normal        IMPRESSION:     No acute cardiopulmonary findings  Specifically, no pneumonic consolidation         Workstation performed: [id number]     Signed by:   Munira Quintanilla MD   10/4/17

## 2018-01-12 NOTE — CONSULTS
Chief Complaint  Chief Complaint Free Text Note Form: VOICE/REF BY DR Debora Barbour      History of Present Illness  HPI: Natasha Lanier is a 56 YOF presents for evaluation of hoarsness  Reports 3 weeks ago she became ill with sore throat, fever and cough  She was evaluated by PCP  CXR with no significant findings  Mycoplasma pneumoniae tested positive  Treated with abx, steroid taper and inhaler  States ongoing dysphonia  Smokes 2 cigarettes per day  Denies hemoptysis, otalgia, and any neck lesions or masses  Review of Systems  Complete ENT ROS [facility]:   Eyes: No complaints of itching, excessive tearing or vision changes  Ears: No complaints of hearing loss, discharge, imbalance, recent ear infections, or tinnitus  Nose: No nasal obstruction, no discharge or runniness, no bleeding, no dryness, no sneezing and no loss of smell  Mouth: No sores in mouth, no altered taste, no dental problems  Throat: hoarseness  Neck: No neck soreness, no neck pain, no neck lumps or swelling  Genitourinary: No complaints of dysuria, flank pain or frequent urination  Cardiovascular: No complaints of chest pain or palpitations  Respiratory: No complaints of shortness of breath, cough or wheezing  Gastrointestinal: No complaints of heartburn, nausea/vomiting, or constipation  Neurological: No complaints of headache, convulsions or memory loss  ROS Reviewed:   ROS reviewed  Active Problems    1  Abnormal laboratory test (796 4) (R89 9)   2  Ache in joint (719 40) (M25 50)   3  Acute bronchitis (466 0) (J20 9)   4  Acute midline low back pain without sciatica (724 2) (M54 5)   5  Arm pain (729 5) (M79 603)   6  Arthritis (716 90) (M19 90)   7  Bucket-handle tear of lateral meniscus of right knee as current injury, initial encounter   (836 1) (S83 251A)   8  Cervical cancer screening (V76 2) (Z12 4)   9  Cough (786 2) (R05)   10   Degeneration of lumbosacral intervertebral disc with acute herniation (722 52,722 10) (M51 36,M51 27)   11  Dental abscess (522 5) (K04 7)   12  Depression screening (V79 0) (Z13 89)   13  Exogenous obesity (278 00) (E66 9)   14  Fatigue (780 79) (R53 83)   15  Flu-like symptoms (780 99) (R68 89)   16  Hair loss (704 00) (L65 9)   17  Hypertension (401 9) (I10)   18  Hypothyroidism (244 9) (E03 9)   19  Hypothyroidism (244 9) (E03 9)   20  Internal derangement of shoulder (719 81) (M24 819)   21  Left knee pain (719 46) (M25 562)   22  Leg cramping (729 82) (R25 2)   23  Low vitamin D level (268 9) (E55 9)   24  Lumbar pain with radiation down right leg (724 2) (M54 5)   25  Lump, breast (611 72) (N63 0)   26  Meniscus degeneration, right (717 5) (M23 306)   27  Multiple joint pain (719 49) (M25 50)   28  Need for prophylactic vaccination and inoculation against influenza (V04 81) (Z23)   29  Pain of right hand (729 5) (M79 641)   30  Physical examination of employee (V70 5) (Z02 89)   31  Rupture of hamstring tendon, right, subsequent encounter (V58 89,843 8) (S76 311D)   32  Screening for colorectal cancer (V76 51) (Z12 11,Z12 12)   33  Screening for osteoporosis (V82 81) (Z13 820)   34  Tear of lateral meniscus of left knee, current, unspecified tear type, initial encounter    (836 1) (S83 282A)   35  Thrombocytosis (238 71) (D47 3)   36  Tick bite (919 4,E906 4) (W57 XXXA)   37  Vitamin D deficiency (268 9) (E55 9)   38  Wrist pain (719 43) (M25 539)    Past Medical History    1  History of Acute upper respiratory infection (465 9) (J06 9)   2  History of Adult general medical exam (V70 9) (Z00 00)   3  History of Back pain (724 5) (M54 9)   4  History of Chest pain (786 50) (R07 9)   5  History of Conjunctivitis (372 30) (H10 9)   6  History of Dentalgia (525 9) (K08 89)   7  History of Dizziness (780 4) (R42)   8  History of Dysfunctional uterine bleeding (626 8) (N93 8)   9  History of Encounter for routine pelvic examination (V72 31) (Z01 419)   10   History of Encounter for screening mammogram for malignant neoplasm of breast    (V76 12) (Z12 31)   11  History of Endometriosis (617 9) (N80 9)   12  History of fever (V13 89) (Z87 898)   13  History of migraine (V12 49) (Z86 69)   14  History of Influenza (487 1) (J11 1)   15  History of Lower back pain (724 2) (M54 5)   16  History of Migraine headache (346 90) (G43 909)   17  History of Pain, joint, shoulder (719 41) (M25 519)   18  History of Post-traumatic headache (339 20) (G44 309)   19  History of Shoulder Pain Elicited By Motion   20  History of Tension type headache (339 10) (G44 209)   21  History of Urinary tract infection (599 0) (N39 0)   22  History of Vaginitis (616 10) (N76 0)   23  History of Wound Infection (958 3)  Past Medical History Reviewed: The past medical history was reviewed and updated today  Surgical History    1  History of Appendectomy   2  History of Breast Surgery Lumpectomy   3  History of  Section   4  History of Cholecystectomy   5  History of Dilation And Curettage   6  History of Drainage Of Ovarian Cyst(S)   7  History of Hernia Repair   8  History of Hysteroscopy With Endometrial Ablation   9  History of Neuroplasty Decompression Median Nerve At Carpal Tunnel   10  History of Shoulder Surgery   11  History of Tubal Ligation  Surgical History Reviewed: The surgical history was reviewed and updated today  Family History    1  Family history of Rheumatoid Arthritis    2  Family history of Asthma (V17 5)   3  Family history of Heart Disease (V17 49)    4  Family history of Stroke Syndrome (V17 1)  Family History Reviewed: The family history was reviewed and updated today  Social History    · Current some day smoker (305 1) (F17 200)   · No living will   · Sun Protection   · Uses Safety Equipment - Seatbelts  Social History Reviewed: The social history was reviewed and updated today  The social history was reviewed and is unchanged  Current Meds   1   LevoFLOXacin 750 MG Oral Tablet; take 1 tablet daily for 5 days MDD:5 TDD:5;   Therapy: 04WOU7957 to (Last Rx:03Oct2017)  Requested for: 74EEP5866 Ordered   2  Levothyroxine Sodium 75 MCG Oral Tablet; Take 1 tablet daily; Therapy: 13HHL7666 to (Last Rx:09Lsn8199)  Requested for: 19STW0431 Ordered   3  Medrol 4 MG Oral Tablet Therapy Pack; take as directed; Therapy: 60HLQ1394 to (Last Rx:05Oct2017)  Requested for: 69MTM4585 Ordered   4  RaNITidine HCl - 150 MG Oral Tablet; take one tablet by mouth twice daily; Therapy: 63WPC3112 to (Evaluate:37Rws3744)  Requested for: 75KYJ4424; Last   Rx:30Jan2017 Ordered   5  Zithromax Z-Donta 250 MG Oral Tablet; take as directed; Therapy: 18LSS6619 to (Last Rx:09Oct2017)  Requested for: 09Oct2017 Ordered    Allergies    1  Betadine SOLN    Vitals  Signs   Recorded: 38RQJ0226 11:05AM   Heart Rate: 69  Systolic: 628, LUE, Sitting  Diastolic: 78, LUE, Sitting  Height: 5 ft 5 in  Weight: 171 lb 1 oz  BMI Calculated: 28 47  BSA Calculated: 1 85  O2 Saturation: 98    Physical Exam    Constitutional:   General appearance: Well developed, well nourished  Ability to communicate: Voice normal  Speech normal    Head and Face:   Head and face: Head normocephalic, atraumatic with no lesions or palpable masses  Palpation of the face for sinus tenderness: No sinus tenderness  Submandibular glands and parotid glands: non tender, no masses  Eyes:   Test of Ocular Motility: Gaze normal  No nystagmus  Ears:   Otoscopic Examination: Tympanic membranes intact and normal in appearance, no retraction of tympanic membranes observed, no serous effusion observed, no evidence of tympanosclerosis  Hearing: Normal    Nose:   External auditory canals: No cerumen impaction noted, no drainage observed, no edema noted in EAC, no exostoses present, no osteoma present, no tenderness noted  External Inspection of Nose: No deformities observed, no deviation of bone structure, no skin lesion present, no swelling present  Nares are symmetric, no deviation of caudal portion of septum  Mouth: Inspection of Lips, Teeth, Gums: Lips normal in color, moist, no cracks or lesions  No loose teeth, no missing teeth  Gingiva: no bleeding observed, no inflammation present  Hard Palate: no asymmetry observed, no torus present  Soft palate normal with no ulcers noted  Throat:   Examination of Oropharynx: Oral Mucosa: no masses, lesions, leukoplakia, or scarring  Normal Lidya's ducts, pink and moist, no discoloration noted  Floor of mouth: normal Warthin's ducts, no lesions, ulcerations, leukoplakia or torus mandibularis  Tonsils: no hypertrophy or ulcerations noted  Tongue: normal mobility, surfaces without fissures, leukoplakia, ulceration or masses, not enlarged, no pallor noted, no white patches present  Inspect Pharyngeal Walls/Pyriform Sinus: No edema of posterior pharyngeal walls observed  Neck:   Examination of Neck: No decreased range of motion, trachea midline  Examination of Thyroid: Normal size, non-tender, no palpable masses  Pulmonary:   Respiratory effort: Normal respiratory rate and rhythm, no increased work of breathing  Cardiovascular:   Inspection of Peripheral vascular system by observation: Normal    Lymphatic:   Palpation of Lymph Nodes: Neck: No generalized lymphadenopathy  Neurological/Psychiatric:   Cranial nerves II-VII grossly intact  Oriented to person, place, and time  Cooperative, in no acute distress  Procedure  Procedure: Flexible fiberoptic laryngoscopy    Indications: Evaluation of the upper aerodigestive tract    Procedure in detail: After informed verbal consent was obtained the nose was anesthetized with topical lidocaine and phenylephrine  After adequate time for anesthesia the upper aerodigestive tract was evaluated using the endoscope including the nasopharynx, oropharynx, hypopharynx, and larynx with the below listed findings   Scope was placed in the right and left nasal cavity  The nasal cavity was evaluated as well  The scope was removed  The patient tolerated the procedure well and discharged in stable condition  Findings: No significant mucopurulence or polyposis in bilateral nasal cavities  No lesions or masses of the nasopharynx, oropharynx, hypopharynx, or subglottis  Bilateral vocal cords are full mobile  Moderate erythema of true and false vocal cords  Cobblestoning present at posterior hypopharynx  White patches on larynx  No significant lesions or masses of the base of tongue, epiglottis, piriform sinuses, pharyngeal walls, or glossopharyngeal folds  Assessment    1  History of Cholecystectomy   2  History of Shoulder Surgery   3  History of Appendectomy   4  History of  Section   5  Current some day smoker (305 1) (F17 200)   6  LPRD (laryngopharyngeal reflux disease) (478 79) (K21 9)   7  Candida laryngitis (112 89) (B37 89)    Plan    1  Fluconazole 100 MG Oral Tablet; TAKE 2 TABLETS ON THE FIRST DAY, THEN 1   TABLET DAILY ON DAYS 2 THRU 10   2  Omeprazole 40 MG Oral Capsule Delayed Release; TAKE 40 MG Daily Please take   30 minutes prior to largest meal of day    Discussion/Summary  Discussion Summary:   Laryngopharyngeal reflux: We discussed the ongoing findings of laryngopharyngeal reflux  We discussed the management of laryngopharyngeal reflux with PPI taken 30 minutes before the evening meal, elevation the head of bed, diet modifications, weight loss, and other lifestyle changes to assist with decreasing laryngopharyngeal reflux  Patient will return in 2-3 months for reevaluation with further fiberoptic laryngoscopy  Cigarette smoking: We discussed extensively the ongoing cigarette smoking and discussed quit date  We discussed options for quitting including medications and support structures  Greater than 3 minutes was taken to discuss this  We discussed ongoing management of candida laryngitis   Prescribe Diflucan re eval in 2-3 months or sooner if no improvement  Follow up in 2 weeks or sooner with any concerns        Signatures   Electronically signed by : FANNY Seals ; Oct 20 2017  4:36PM EST                       (Author)

## 2018-01-13 VITALS
BODY MASS INDEX: 28.5 KG/M2 | SYSTOLIC BLOOD PRESSURE: 126 MMHG | DIASTOLIC BLOOD PRESSURE: 78 MMHG | OXYGEN SATURATION: 98 % | HEART RATE: 69 BPM | HEIGHT: 65 IN | WEIGHT: 171.06 LBS

## 2018-01-13 NOTE — RESULT NOTES
Verified Results  (1) CBC/PLT/DIFF 05Oct2017 02:14PM Stephania Collazo Order Number: [id number]     Test Name Result Flag Reference   WBC COUNT 7 63 Thousand/uL  4 31-10 16   RBC COUNT 3 94 Million/uL  3 81-5 12   HEMOGLOBIN 12 4 g/dL  11 5-15 4   HEMATOCRIT 35 9 %  34 8-46  1   MCV 91 fL  82-98   MCH 31 5 pg  26 8-34 3   MCHC 34 5 g/dL  31 4-37 4   RDW 11 9 %  11 6-15 1   MPV 9 8 fL  8 9-12 7   PLATELET COUNT 725 Thousands/uL H 149-390   nRBC AUTOMATED 0 /100 WBCs     NEUTROPHILS RELATIVE PERCENT 50 %  43-75   LYMPHOCYTES RELATIVE PERCENT 41 %  14-44   MONOCYTES RELATIVE PERCENT 6 %  4-12   EOSINOPHILS RELATIVE PERCENT 3 %  0-6   BASOPHILS RELATIVE PERCENT 0 %  0-1   NEUTROPHILS ABSOLUTE COUNT 3 74 Thousands/? ??L  1 85-7 62   LYMPHOCYTES ABSOLUTE COUNT 3 15 Thousands/? ??L  0 60-4 47   MONOCYTES ABSOLUTE COUNT 0 47 Thousand/? ??L  0 17-1 22   EOSINOPHILS ABSOLUTE COUNT 0 21 Thousand/? ??L  0 00-0 61   BASOPHILS ABSOLUTE COUNT 0 03 Thousands/? ??L  0 00-0 10

## 2018-01-13 NOTE — RESULT NOTES
Verified Results  (1) TSH [id number] 09:57AM [facility] Order Number: [id number]     Test Name Result Flag Reference   TSH 2 770 uIU/mL  0 358-3 740   Patients undergoing fluorescein dye angiography may retain small amounts of fluorescein in the body for 48-72 hours post procedure  Samples containing fluorescein can produce falsely depressed TSH values  If the patient had this procedure,a specimen should be resubmitted post fluorescein clearance  The recommended reference ranges for TSH during pregnancy are as follows:  First trimester 0 1 to 2 5 uIU/mL  Second trimester  0 2 to 3 0 uIU/mL  Third trimester 0 3 to 3 0 uIU/m     (1) CBC/PLT/DIFF 27Jun2017 09:57AM [facility] Order Number: [id number]     Test Name Result Flag Reference   WBC COUNT 6 54 Thousand/uL  4 31-10 16   RBC COUNT 4 34 Million/uL  3 81-5 12   HEMOGLOBIN 13 4 g/dL  11 5-15 4   HEMATOCRIT 40 9 %  34 8-46  1   MCV 94 fL  82-98   MCH 30 9 pg  26 8-34 3   MCHC 32 8 g/dL  31 4-37 4   RDW 11 9 %  11 6-15 1   MPV 9 9 fL  8 9-12 7   PLATELET COUNT 001 Thousands/uL  149-390   nRBC AUTOMATED 0 /100 WBCs     NEUTROPHILS RELATIVE PERCENT 54 %  43-75   LYMPHOCYTES RELATIVE PERCENT 37 %  14-44   MONOCYTES RELATIVE PERCENT 6 %  4-12   EOSINOPHILS RELATIVE PERCENT 2 %  0-6   BASOPHILS RELATIVE PERCENT 1 %  0-1   NEUTROPHILS ABSOLUTE COUNT 3 51 Thousands/? ??L  1 85-7 62   LYMPHOCYTES ABSOLUTE COUNT 2 41 Thousands/? ??L  0 60-4 47   MONOCYTES ABSOLUTE COUNT 0 42 Thousand/? ??L  0 17-1 22   EOSINOPHILS ABSOLUTE COUNT 0 14 Thousand/? ??L  0 00-0 61   BASOPHILS ABSOLUTE COUNT 0 04 Thousands/? ??L  0 00-0 10     (1) VITAMIN D 25-HYDROXY 27Jun2017 09:57AM [facility] Order Number: [id number]     Test Name Result Flag Reference   VIT D 25-HYDROX 19 7 ng/mL L 30 0-100 0   This assay is a certified procedure of the CDC Vitamin D Standardization Certification Program (VDSCP)     Deficiency <20ng/ml   Insufficiency 20-30ng/ml   Sufficient  ng/ml     *Patients undergoing fluorescein dye angiography may retain small amounts of fluorescein in the body for 48-72 hours post procedure  Samples containing fluorescein can produce falsely elevated Vitamin D values  If the patient had this procedure, a specimen should be resubmitted post fluorescein clearance

## 2018-01-14 VITALS — SYSTOLIC BLOOD PRESSURE: 130 MMHG | DIASTOLIC BLOOD PRESSURE: 78 MMHG

## 2018-01-14 NOTE — RESULT NOTES
Verified Results  * XR SPINE LUMBAR MINIMUM 4 VIEWS NON INJURY [id number] 10:55AM [facility] Order Number: [id number]     Test Name Result Flag Reference   XR SPINE LUMBAR MINIMUM 4 VIEWS (Report)     LUMBAR SPINE     INDICATION: Lower back pain  COMPARISON: February 9, 2013     VIEWS: AP, lateral and bilateral oblique projections; 4 images     FINDINGS:     Mild thoracolumbar levoscoliosis  There is no radiographic evidence of acute fracture or destructive osseous lesion  Small anterolateral marginal osteophyte formation is noted throughout the visualized thoracolumbar spine  Surgical clips are noted in the right upper quadrant of the abdomen  IMPRESSION:     Mild degenerative changes  Workstation performed: [id number]     Signed by:   Rhona Severance Gerldine Gemma, MD   2/1/17

## 2018-01-14 NOTE — RESULT NOTES
Verified Results  (1) CBC/PLT/DIFF 07Oct2016 09:53AM Akbar Crawford     Test Name Result Flag Reference   WBC COUNT 6 79 Thousand/uL  4 31-10 16   RBC COUNT 4 16 Million/uL  3 81-5 12   HEMOGLOBIN 13 0 g/dL  11 5-15 4   HEMATOCRIT 38 7 %  34 8-46  1   MCV 93 fL  82-98   MCH 31 3 pg  26 8-34 3   MCHC 33 6 g/dL  31 4-37 4   RDW 12 1 %  11 6-15 1   MPV 9 8 fL  8 9-12 7   PLATELET COUNT 024 Thousands/uL H 149-390   nRBC AUTOMATED 0 /100 WBCs     NEUTROPHILS RELATIVE PERCENT 59 %  43-75   LYMPHOCYTES RELATIVE PERCENT 33 %  14-44   MONOCYTES RELATIVE PERCENT 6 %  4-12   EOSINOPHILS RELATIVE PERCENT 2 %  0-6   BASOPHILS RELATIVE PERCENT 0 %  0-1   NEUTROPHILS ABSOLUTE COUNT 3 97 Thousands/?L  1 85-7 62   LYMPHOCYTES ABSOLUTE COUNT 2 24 Thousands/?L  0 60-4 47   MONOCYTES ABSOLUTE COUNT 0 43 Thousand/?L  0 17-1 22   EOSINOPHILS ABSOLUTE COUNT 0 11 Thousand/?L  0 00-0 61   BASOPHILS ABSOLUTE COUNT 0 03 Thousands/?L  0 00-0 10   - Patient Instructions: This bloodwork is non-fasting  Please drink two glasses of water morning of bloodwork  - Patient Instructions: This bloodwork is non-fasting  Please drink two glasses of water morning of bloodwork  (1) COMPREHENSIVE METABOLIC PANEL [id number] 14:40XT Akbar Crawford     Test Name Result Flag Reference   GLUCOSE,RANDM 96 mg/dL     If the patient is fasting, the ADA then defines impaired fasting glucose as > 100 mg/dL and diabetes as > or equal to 123 mg/dL     SODIUM 137 mmol/L  136-145   POTASSIUM 4 0 mmol/L  3 5-5 3   CHLORIDE 102 mmol/L  100-108   CARBON DIOXIDE 30 mmol/L  21-32   ANION GAP (CALC) 5 mmol/L  4-13   BLOOD UREA NITROGEN 14 mg/dL  5-25   CREATININE 0 74 mg/dL  0 60-1 30   Standardized to IDMS reference method   CALCIUM 9 1 mg/dL  8 3-10 1   BILI, TOTAL 0 21 mg/dL  0 20-1 00   ALK PHOSPHATAS 76 U/L     ALT (SGPT) 24 U/L  12-78   AST(SGOT) 9 U/L  5-45   ALBUMIN 4 2 g/dL  3 5-5 0   TOTAL PROTEIN 7 4 g/dL  6 4-8 2   eGFR Non-      >60 0 ml/min/1 73sq m   - Patient Instructions: This bloodwork is non-fasting  Please drink two glasses of water morning of bloodwork  National Kidney Disease Education Program recommendations are as follows:  GFR calculation is accurate only with a steady state creatinine  Chronic Kidney disease less than 60 ml/min/1 73 sq  meters  Kidney failure less than 15 ml/min/1 73 sq  meters  (1) SED RATE 07Oct2016 09:53AM [facility]     Test Name Result Flag Reference   SED RATE 13 mm/hour  0-20     (1) TSH 07Oct2016 09:53AM [facility]     Test Name Result Flag Reference   TSH 1 540 uIU/mL  0 358-3 740   - Patient Instructions: This bloodwork is non-fasting  Please drink two glasses of water morning of bloodwork  - Patient Instructions: This bloodwork is non-fasting  Please drink two glasses of water morning of bloodwork  Patients undergoing fluorescein dye angiography may retain small amounts of fluorescein in the body for 48-72 hours post procedure  Samples containing fluorescein can produce falsely depressed TSH values  If the patient had this procedure,a specimen should be resubmitted post fluorescein clearance  The recommended reference ranges for TSH during pregnancy are as follows:  First trimester 0 1 to 2 5 uIU/mL  Second trimester  0 2 to 3 0 uIU/mL  Third trimester 0 3 to 3 0 uIU/m     (1) URIC ACID 07Oct2016 09:53AM [facility]     Test Name Result Flag Reference   URIC ACID 2 7 mg/dL  2 0-6 8   Specimen collection should occur prior to Metamizole administration due to the potential for falsely depressed results  - Patient Instructions: This bloodwork is non-fasting  Please drink two glasses of water morning of bloodwork       (1) URINALYSIS w URINE C/S REFLEX (will reflex a microscopy if leukocytes, occult blood, or nitrites are not within normal limits) 07Oct2016 09:53AM [facility]     Test Name Result Flag Reference   COLOR Yellow     CLARITY Clear     PH UA 7 0  4 5-8 0   LEUKOCYTE ESTERASE UA Negative  Negative   NITRITE UA Negative  Negative   PROTEIN UA Negative mg/dl  Negative   GLUCOSE UA Negative mg/dl  Negative   KETONES UA Negative mg/dl  Negative   UROBILINOGEN UA 0 2 E U /dl  0 2, 1 0 E U /dl   BILIRUBIN UA Negative  Negative   BLOOD UA Negative  Negative   SPECIFIC GRAVITY UA 1 017  1 003-1 030     (1) RHEUMATOID FACTOR SCREEN 07Oct2016 09:53AM [facility] Order Number: [id number]     Test Name Result Flag Reference   RHEUMATOID FACTOR Negative  Negative     (1) BETTY SCREEN W/REFLEX TO TITER/PATTERN 07Oct2016 09:53AM [facility] Order Number: [id number]     Test Name Result Flag Reference   BETTY SCREEN   Negative  Negative

## 2018-01-15 NOTE — RESULT NOTES
Verified Results  MAMMO DIAGNOSTIC BILATERAL W CAD [id number] 09:03AM Karina Sep     Test Name Result Flag Reference   MAMMO DIAGNOSTIC BILATERAL W CAD (Report)     Patient History:   No known family history of cancer  Benign lumpectomy of the right breast, February 19, 2009  Patient is a former smoker  Patient's BMI is 29 1  Reason for exam: clinical finding  Mammo Diagnostic Bilateral W CAD: April 6, 2016 - Check In #:    [de-identified]   Bilateral MLO, CC, and XCCL view(s) were taken  ML, spot    compression CC, and spot compression MLO view(s) were taken of    the right breast      Technologist: CHUYITA Kurtz (CHUYITA)(M)   Prior study comparison: May 28, 2014, bilateral digital    diagnostic mammogram, performed at [facility]  February 9, 2013, bilateral digital screening    mammogram, performed at [facility]     August 24, 2010, bilateral digital screening mammogram, performed   at [facility]  January 8, 2009, right    breast DIGTL UNILAT MAMM/CAD, performed at [facility]      There are scattered fibroglandular densities  Parenchymal    pattern is stable  No masses or architectural distortion    identified  No suspicious microcalcifications are seen  Skin    and nipple structures are unremarkable  US Breast Right Limited: April 6, 2016 - Check In #: [de-identified]   Technologist: Fanta Dennis RDMS   Targeted ultrasound of the right breast demonstrates no evidence    of hypoechoic mass or architectural distortion to suggest    malignancy  ASSESSMENT: BiRad:1 - Negative (Overall)     Recommendation:   Routine screening mammogram in 1 year     Analyzed by CAD     Transcription Location: [address]   Signing Station: [id number]     Risk Value(s):   Tyrer-Cuzick 10 Year: 4 669%, Tyrer-Cuzick Lifetime: 15 937%,    Myriad Table: 1 5%, TERRY 5 Year: 1 1%, NCI Lifetime: 8 1%

## 2018-01-15 NOTE — RESULT NOTES
Verified Results  XR WRIST 2 VIEW RIGHT [id number] 03:28PM Rosalina Lincoln     Test Name Result Flag Reference   XR WRIST 2 VW RIGHT (Report)     RIGHT WRIST     INDICATION:  Right wrist pain  COMPARISON: None     VIEWS: PA and lateral; 2 images     FINDINGS:     There is no acute fracture or dislocation  No degenerative changes  No lytic or blastic lesions are seen  Soft tissues are unremarkable  IMPRESSION:     No acute osseous abnormality         Workstation performed: [id number]     Signed by:   Summer Hernandez MD   3/2/16

## 2018-01-16 NOTE — RESULT NOTES
Verified Results  (1) LYME ANTIBODY PROFILE [facility] TO WESTERN BLOT [id number] 09:36AM Arch Emperor   TW Order Number: [id number]     Test Name Result Flag Reference   LYME IGG 0 10  0 00-0 79   NEGATIVE(0 00-0 79)-Absence of detectable Borrelia IgG Antibodies  A negative result does not exclude the possibility of Borrelia infection  If early Lyme disease is suspected,a second sample should be collected & tested 4 weeks after initial testing  LYME IGM 0 23  0 00-0 79   NEGATIVE (0 00-0 79)-Absence of detectable Borrelia IgM antibodies  A negative result does not exclude the possibility of Borrelia infection  If early lyme disease is suspected, a second sample should be collected & tested 4 weeks after initial testing  Plan  Low vitamin D level    · Vitamin D (Ergocalciferol) 87585 UNIT Oral Capsule; ONE TABLET weekly and  then start vitamin d3 2,000 units daily   · (1) VITAMIN D 25-HYDROXY; Status:Active;  Requested [facility]:[id number];

## 2018-01-16 NOTE — RESULT NOTES
Verified Results  (1) LYME ANTIBODY PROFILE [facility] TO WESTERN BLOT [id number] 07:42AM Aguila Degree   TW Order Number: [id number]     Test Name Result Flag Reference   LYME IGG 0 08  0 00-0 79   NEGATIVE(0 00-0 79)-Absence of detectable Borrelia IgG Antibodies  A negative result does not exclude the possibility of Borrelia infection  If early Lyme disease is suspected,a second sample should be collected & tested 4 weeks after initial testing  LYME IGM 0 30  0 00-0 79   NEGATIVE (0 00-0 79)-Absence of detectable Borrelia IgM antibodies  A negative result does not exclude the possibility of Borrelia infection  If early lyme disease is suspected, a second sample should be collected & tested 4 weeks after initial testing

## 2018-01-16 NOTE — RESULT NOTES
Verified Results  * MRI LUMBAR SPINE WO CONTRAST [id number] 09:59AM Antoine Mcfarland   TW Order Number: [id number]    - Patient Instructions: To schedule this appointment, please contact Central Scheduling at [phone]  Test Name Result Flag Reference   MRI LUMBAR SPINE WO CONTRAST (Report)     MRI LUMBAR SPINE WITHOUT CONTRAST     INDICATION: Severe low back pain  COMPARISON: MRI performed on 7/2/2009  TECHNIQUE: Sagittal T1, sagittal T2, sagittal inversion recovery, axial T1 and axial T2, coronal T2       IMAGE QUALITY: Diagnostic     FINDINGS:     ALIGNMENT: The alignment is normal without significant listhesis  MARROW SIGNAL: Marrow signal is within normal limits without signs of an infiltrative process  No signs of acute fracture or significant marrow edema pattern  Vertebral body heights are maintained  DISTAL CORD AND CONUS: Normal size and signal within the distal cord and conus  The conus ends at the L1 level  PARASPINAL SOFT TISSUES: Paraspinal soft tissues are unremarkable  SACRUM: Normal signal within the sacrum  No evidence of insufficiency or stress fracture  LOWER THORACIC DISC SPACES:    T12-L1: Circumferential disc bulge  No significant spinal canal stenosis  No right and no left neural foraminal stenosis  LUMBAR DISC SPACES:        L1-L2: No significant spinal canal stenosis  No right and no left neural foraminal stenosis  L2-L3: Circumferential disc bulge eccentric to the left  Bilateral facet arthropathy  Mild spinal canal stenosis  No right and no left neural foraminal stenosis  L3-L4: Circumferential disc bulge eccentric to the left  Suggestion of a left foraminal protrusion  Mild spinal canal stenosis  No right and mild left neural foraminal stenosis  L4-L5: Circumferential disc bulge and bilateral facet arthropathy and ligamentum flavum thickening  No significant spinal canal stenosis  No right and no left neural foraminal stenosis  L5-S1: Bilateral facet arthropathy  No significant spinal canal stenosis  No right and no left neural foraminal stenosis  IMPRESSION:     Lumbar spondylosis most significant at L3-L4 with mild spinal canal stenosis and mild left-sided neural foraminal stenosis  Degenerative changes mildly progressed when compared to 7/2/2009         Workstation performed: [id number]     Signed by:   Rod Kang MD   2/2/17

## 2018-01-17 NOTE — RESULT NOTES
Verified Results  (1) LYME ANTIBODY PROFILE W/REFLEX TO WESTERN BLOT [id number] 09:53AM Jefferson Law    Order Number: [id number]     Test Name Result Flag Reference   LYME IGG 0 08  0 00-0 79   NEGATIVE(0 00-0 79)-Absence of detectable Borrelia IgG Antibodies  A negative result does not exclude the possibility of Borrelia infection  If early Lyme disease is suspected,a second sample should be collected & tested 4 weeks after initial testing  LYME IGM 0 23  0 00-0 79   NEGATIVE (0 00-0 79)-Absence of detectable Borrelia IgM antibodies  A negative result does not exclude the possibility of Borrelia infection  If early lyme disease is suspected, a second sample should be collected & tested 4 weeks after initial testing

## 2018-01-17 NOTE — RESULT NOTES
Verified Results  (1) RAPID INFLUENZA SCREEN with reflex to PCR [id number] 09:53AM [facility] Order Number: [id number]     Test Name Result Flag Reference   RAPID INFLUENZA A AGN Negative  Negative, Indeterminate   RAPID INFLUENZA B AGN Negative  Negative, Indeterminate   RAPID INFLUENZA A AGN Negative  Negative, Indeterminate   RAPID INFLUENZA B AGN Negative  Negative, Indeterminate

## 2018-01-17 NOTE — RESULT NOTES
Verified Results  * XR HAND 3+ VIEW RIGHT [id number] 10:06AM Leata Lennox Order Number: [id number]   Performing Comments: RIGHT HAND     Test Name Result Flag Reference   XR HAND 3+ VW RIGHT (Report)     RIGHT HAND     INDICATION: Right hand injury while raking  Pain and swelling right hand between the 1st and 2nd metacarpal      COMPARISON: Right wrist March 2, 2016  Right thumb February 27, 2007     VIEWS: PA, lateral and oblique      IMAGES: 3     For the purposes of institution wide universal language the following terms will apply: (thumb=1st digit/finger, index finger=2nd digit/finger, long finger=3rd digit/finger, ring=4th digit/finger and small finger=5th digit/finger)     FINDINGS:     There is no acute fracture or dislocation  No degenerative changes  No lytic or blastic lesions are seen  Soft tissues are unremarkable  IMPRESSION:     No acute osseous abnormality  Workstation performed: [id number]     Signed by:   Marie Wagoner MD   5/16/17

## 2018-01-17 NOTE — RESULT NOTES
Verified Results  * MRI KNEE RIGHT  WO CONTRAST [id number] 01:49PM Emily Proper Order Number: [id number]    Order Number: [id number]   TW Order Number: [id number]     Test Name Result Flag Reference   MRI KNEE RIGHT  WO CONTRAST (Report)     This is a summary report  The complete report is available in the patient's medical record  If you cannot access the medical record, please contact the sending organization for a detailed fax or copy  MRI RIGHT KNEE     INDICATION: Knee pain and swelling following twisting injury  COMPARISON: Right knee radiograph 7/6/2016     TECHNIQUE:  MR sequences were obtained of the right knee including: Localizer, axial T2 fat sat, coronal T1/T2 fat sat, sagittal PD/T2 fat sat  Images were acquired on a 1 5 Michaela unit  Gadolinium was not used  FINDINGS:     SUBCUTANEOUS TISSUES: Normal     JOINT EFFUSION: There is a small joint effusion  BAKER'S CYST: None  MENISCI: There is intermediate proton density signal in the posterior horn of the medial meniscus contacting the undersurface (series 4 image 12 through 14)  No flipped or displaced meniscal fragment  The lateral meniscus is intact  CRUCIATE LIGAMENTS: Intact  EXTENSOR APPARATUS: Intact  COLLATERAL LIGAMENTS: Intact  ARTICULAR SURFACES: Intact  BONE MARROW: Normal signal without fracture  MUSCULATURE: Intact  IMPRESSION:   1  Horizontal tear of the posterior horn medial meniscus contacting the undersurface (series 4 image 12 through 14)  No evidence of flipped or displaced meniscal fragment  2  No ligamentous injury  3  Small joint effusion  Workstation performed: [id number]     Signed by:   Ruben Ferguson MD   7/6/16     * XR KNEE 4+ VIEW RIGHT [id number] 09:27AM Emily Proper Order Number: [id number]     Test Name Result Flag Reference   XR KNEE 4+ VW RIGHT (Report)     RIGHT KNEE     INDICATION: Right knee pain  Injured while exercising  COMPARISON: April 21, 2010     VIEWS: AP, lateral and obliques ; 4 images     FINDINGS:     There is no acute fracture or dislocation  There is no joint effusion  No degenerative changes  No lytic or blastic lesions are seen  Soft tissues are unremarkable  IMPRESSION:     No acute osseous abnormality  Workstation performed: [id number]     Signed by:   Austin Christian MD   7/6/16

## 2018-01-19 ENCOUNTER — GENERIC CONVERSION - ENCOUNTER (OUTPATIENT)
Dept: OTHER | Facility: OTHER | Age: 58
End: 2018-01-19

## 2018-01-19 ENCOUNTER — APPOINTMENT (OUTPATIENT)
Dept: LAB | Facility: HOSPITAL | Age: 58
End: 2018-01-19
Attending: FAMILY MEDICINE
Payer: COMMERCIAL

## 2018-01-19 ENCOUNTER — TRANSCRIBE ORDERS (OUTPATIENT)
Dept: ADMINISTRATIVE | Facility: HOSPITAL | Age: 58
End: 2018-01-19

## 2018-01-19 ENCOUNTER — HOSPITAL ENCOUNTER (OUTPATIENT)
Dept: CT IMAGING | Facility: HOSPITAL | Age: 58
Discharge: HOME | End: 2018-01-19
Attending: FAMILY MEDICINE
Payer: COMMERCIAL

## 2018-01-19 DIAGNOSIS — R10.31 RIGHT LOWER QUADRANT PAIN: ICD-10-CM

## 2018-01-19 DIAGNOSIS — R53.83 OTHER FATIGUE: ICD-10-CM

## 2018-01-19 DIAGNOSIS — M25.50 PAIN IN JOINT: ICD-10-CM

## 2018-01-19 LAB
BACTERIA UR QL AUTO: ABNORMAL /HPF
BASOPHILS # BLD AUTO: 0.02 THOUSANDS/ΜL (ref 0–0.1)
BASOPHILS NFR BLD AUTO: 0 % (ref 0–1)
BILIRUB UR QL STRIP: NEGATIVE
CLARITY UR: NORMAL
COLOR UR: YELLOW
EOSINOPHIL # BLD AUTO: 0.1 THOUSAND/ΜL (ref 0–0.61)
EOSINOPHIL NFR BLD AUTO: 1 % (ref 0–6)
ERYTHROCYTE [DISTWIDTH] IN BLOOD BY AUTOMATED COUNT: 11.7 % (ref 11.6–15.1)
GLUCOSE UR STRIP-MCNC: NEGATIVE MG/DL
HCT VFR BLD AUTO: 40.3 % (ref 34.8–46.1)
HGB BLD-MCNC: 13.8 G/DL (ref 11.5–15.4)
HGB UR QL STRIP.AUTO: NORMAL
KETONES UR STRIP-MCNC: NEGATIVE MG/DL
LEUKOCYTE ESTERASE UR QL STRIP: NEGATIVE
LYMPHOCYTES # BLD AUTO: 2.45 THOUSANDS/ΜL (ref 0.6–4.47)
LYMPHOCYTES NFR BLD AUTO: 33 % (ref 14–44)
MCH RBC QN AUTO: 31.7 PG (ref 26.8–34.3)
MCHC RBC AUTO-ENTMCNC: 34.2 G/DL (ref 31.4–37.4)
MCV RBC AUTO: 92 FL (ref 82–98)
MONOCYTES # BLD AUTO: 0.39 THOUSAND/ΜL (ref 0.17–1.22)
MONOCYTES NFR BLD AUTO: 5 % (ref 4–12)
NEUTROPHILS # BLD AUTO: 4.56 THOUSANDS/ΜL (ref 1.85–7.62)
NEUTS SEG NFR BLD AUTO: 61 % (ref 43–75)
NITRITE UR QL STRIP: NEGATIVE
NON-SQ EPI CELLS URNS QL MICRO: ABNORMAL /HPF
PH UR STRIP.AUTO: 5.5 [PH] (ref 4.5–8)
PLATELET # BLD AUTO: 360 THOUSANDS/UL (ref 149–390)
PMV BLD AUTO: 9.3 FL (ref 8.9–12.7)
PROT UR STRIP-MCNC: NEGATIVE MG/DL
RBC # BLD AUTO: 4.36 MILLION/UL (ref 3.81–5.12)
RBC #/AREA URNS AUTO: ABNORMAL /HPF
SP GR UR STRIP.AUTO: 1.02 (ref 1–1.03)
UROBILINOGEN UR QL STRIP.AUTO: 0.2 E.U./DL
WBC # BLD AUTO: 7.52 THOUSAND/UL (ref 4.31–10.16)
WBC #/AREA URNS AUTO: ABNORMAL /HPF

## 2018-01-19 PROCEDURE — 74177 CT ABD & PELVIS W/CONTRAST: CPT

## 2018-01-19 PROCEDURE — 81001 URINALYSIS AUTO W/SCOPE: CPT

## 2018-01-19 PROCEDURE — 85025 COMPLETE CBC W/AUTO DIFF WBC: CPT

## 2018-01-19 PROCEDURE — 36415 COLL VENOUS BLD VENIPUNCTURE: CPT

## 2018-01-19 RX ADMIN — IOHEXOL 100 ML: 350 INJECTION, SOLUTION INTRAVENOUS at 12:16

## 2018-01-22 ENCOUNTER — ALLSCRIPTS OFFICE VISIT (OUTPATIENT)
Dept: OTHER | Facility: OTHER | Age: 58
End: 2018-01-22

## 2018-01-23 NOTE — CONSULTS
Assessment   1  Acute right lower quadrant pain (389 91,757 95) (R10 31)    Plan   Acute right lower quadrant pain    · Follow-up PRN Evaluation and Treatment  Follow-up  Status: Complete  Done:    70VQE1840 04:30PM   Ordered; For: Acute right lower quadrant pain; Ordered By: Mina Herrera Performed:  Due: 25HPT2671    Discussion/Summary   Discussion Summary:    I had a long discussion with the patient about the risks and benefits of potential surgical repair of the eventration her umbilicus  On physical exam I could not delineate a clear hernia orifice, and at this time I do not think surgical repair of the weakness of the abdominal wall will alleviate her symptoms  I did recommend that she see GI to rule out the possibility of a acute on chronic colitis  After that evaluation of the patient still continues to have abdominal pain began discussed the risks and benefits of surgical repair, but the repair would most likely require a plastic closure with mesh and possible skin flaps  Chief Complaint   Chief Complaint Free Text Note Form: consult per dr Osmin Moser, abdominal pain  History of Present Illness   HPI: Hx of abdominal pain in mid epigastrum  Associated with watery diarrhea  Better over the last 3 days with bland diet  Review of Systems   Complete-Female:      Constitutional: feeling poorly  Eyes: No complaints of eye pain, no red eyes, no eyesight problems, no discharge, no dry eyes, no itching of eyes  ENT: no complaints of earache, no loss of hearing, no nose bleeds, no nasal discharge, no sore throat, no hoarseness  Cardiovascular: No complaints of slow heart rate, no fast heart rate, no chest pain, no palpitations, no leg claudication, no lower extremity edema  Respiratory: No complaints of shortness of breath, no wheezing, no cough, no SOB on exertion, no orthopnea, no PND  Gastrointestinal: abdominal pain-- and-- diarrhea        Genitourinary: No complaints of dysuria, no incontinence, no pelvic pain, no dysmenorrhea, no vaginal discharge or bleeding  Musculoskeletal: No complaints of arthralgias, no myalgias, no joint swelling or stiffness, no limb pain or swelling  Integumentary: No complaints of skin rash or lesions, no itching, no skin wounds, no breast pain or lump  Neurological: No complaints of headache, no confusion, no convulsions, no numbness, no dizziness or fainting, no tingling, no limb weakness, no difficulty walking  Psychiatric: Not suicidal, no sleep disturbance, no anxiety or depression, no change in personality, no emotional problems  Active Problems   1  Abnormal laboratory test (796 4) (R89 9)   2  Ache in joint (719 40) (M25 50)   3  Acute bronchitis (466 0) (J20 9)   4  Acute midline low back pain without sciatica (724 2) (M54 5)   5  Acute right lower quadrant pain (789 03,338 19) (R10 31)   6  Arm pain (729 5) (M79 603)   7  Arthritis (716 90) (M19 90)   8  Bucket-handle tear of lateral meniscus of right knee as current injury, initial encounter     (836 1) (S83 251A)   9  Candida laryngitis (112 89) (B37 89)   10  Cervical cancer screening (V76 2) (Z12 4)   11  Cough (786 2) (R05)   12  Degeneration of lumbosacral intervertebral disc with acute herniation (722 52,722 10)      (M51 36,M51 27)   13  Dental abscess (522 5) (K04 7)   14  Depression screening (V79 0) (Z13 89)   15  Exogenous obesity (278 00) (E66 9)   16  Fatigue (780 79) (R53 83)   17  Flu-like symptoms (780 99) (R68 89)   18  Hair loss (704 00) (L65 9)   19  Hypertension (401 9) (I10)   20  Hypothyroidism (244 9) (E03 9)   21  Hypothyroidism (244 9) (E03 9)   22  Internal derangement of shoulder (719 81) (M24 819)   23  Left knee pain (719 46) (M25 562)   24  Leg cramping (729 82) (R25 2)   25  Low vitamin D level (268 9) (E55 9)   26  LPRD (laryngopharyngeal reflux disease) (478 79) (K21 9)   27   Lumbar pain with radiation down right leg (724 2) (M54 5) 28  Lump, breast (611 72) (N63 0)   29  Meniscus degeneration, right (717 5) (M23 306)   30  Multiple joint pain (719 49) (M25 50)   31  Need for influenza vaccination (V04 81) (Z23)   32  Need for prophylactic vaccination and inoculation against influenza (V04 81) (Z23)   33  Pain of right hand (729 5) (M79 641)   34  Physical examination of employee (V70 5) (Z02 89)   35  Rupture of hamstring tendon, right, subsequent encounter (V58 89,843 8) (S76 311D)   36  Screening for colorectal cancer (V76 51) (Z12 11,Z12 12)   37  Screening for osteoporosis (V82 81) (Z13 820)   38  Tear of lateral meniscus of left knee, current, unspecified tear type, initial encounter      (836 1) (S83 282A)   39  Thrombocytosis (238 71) (D47 3)   40  Tick bite (919 4,E906 4) (W57 XXXA)   41  Vitamin D deficiency (268 9) (E55 9)   42  Wrist pain (719 43) (M25 539)    Past Medical History   1  History of Acute upper respiratory infection (465 9) (J06 9)   2  History of Adult general medical exam (V70 9) (Z00 00)   3  History of Back pain (724 5) (M54 9)   4  History of Chest pain (786 50) (R07 9)   5  History of Conjunctivitis (372 30) (H10 9)   6  History of Dentalgia (525 9) (K08 89)   7  History of Dizziness (780 4) (R42)   8  History of Dysfunctional uterine bleeding (626 8) (N93 8)   9  History of Encounter for routine pelvic examination (V72 31) (Z01 419)   10  History of Encounter for screening mammogram for malignant neoplasm of breast      (V76 12) (Z12 31)   11  History of Endometriosis (617 9) (N80 9)   12  History of fever (V13 89) (Z87 898)   13  History of migraine (V12 49) (Z86 69)   14  History of Influenza (487 1) (J11 1)   15  History of Lower back pain (724 2) (M54 5)   16  History of Migraine headache (346 90) (G43 909)   17  History of Pain, joint, shoulder (719 41) (M25 519)   18  History of Post-traumatic headache (339 20) (G44 309)   19  History of Shoulder Pain Elicited By Motion   20   History of Tension type headache (339 10) (G44 209)   21  History of Urinary tract infection (599 0) (N39 0)   22  History of Vaginitis (616 10) (N76 0)   23  History of Wound Infection (958 3)  Active Problems And Past Medical History Reviewed: The active problems and past medical history were reviewed and updated today  Surgical History   1  History of Appendectomy   2  History of Breast Surgery Lumpectomy   3  History of  Section   4  History of Cholecystectomy   5  History of Dilation And Curettage   6  History of Drainage Of Ovarian Cyst(S)   7  History of Hernia Repair   8  History of Hysteroscopy With Endometrial Ablation   9  History of Neuroplasty Decompression Median Nerve At Carpal Tunnel   10  History of Shoulder Surgery   11  History of Tubal Ligation  Surgical History Reviewed: The surgical history was reviewed and updated today  Family History   Mother    1  Family history of Rheumatoid Arthritis  Father    2  Family history of Asthma (V17 5)   3  Family history of Heart Disease (V17 49)  Maternal Grandmother    4  Family history of Stroke Syndrome (V17 1)  Family History Reviewed: The family history was reviewed and updated today  Social History    · Current some day smoker (305 1) (F17 200)   · No living will   · Sun Protection   · Uses Safety Equipment - Seatbelts  Social History Reviewed: The social history was reviewed and updated today  The social history was reviewed and is unchanged  Current Meds    1  Levothyroxine Sodium 75 MCG Oral Tablet; Take 1 tablet daily; Therapy: 82WBD2568 to (Last Rx:41Vyq0871)  Requested for: 57LJB6263 Ordered   2  Omeprazole 40 MG Oral Capsule Delayed Release; TAKE 40 MG Daily Please take 30     minutes prior to largest meal of day; Therapy: 64JAE2417 to (Irma Spotted)  Requested for: 52TDI9631; Last     Rx:44Xuv3151 Ordered   3  RaNITidine HCl - 150 MG Oral Tablet; take one tablet by mouth twice daily;      Therapy: 87VCU9210 to (Evaluate:48Vtc5490)  Requested for: 49XTI8730; Last     Rx:30Jan2017 Ordered  Medication List Reviewed: The medication list was reviewed and updated today  Allergies   1  Betadine SOLN   2  Doxycycline Hyclate CAPS    Vitals   Vital Signs    Recorded: 43FIL1405 02:47PM   Temperature 98 6 F   Heart Rate 69   Systolic 464   Diastolic 70   Height 5 ft 5 in   Weight 172 lb    BMI Calculated 28 62   BSA Calculated 1 86   O2 Saturation 98     Physical Exam        Constitutional      General appearance: No acute distress, well appearing and well nourished  Ears, Nose, Mouth, and Throat      External inspection of ears and nose: Normal        Oropharynx: Normal with no erythema, edema, exudate or lesions  Abdomen Surgical scars around umbilicus  No appreciable abdominal wall defect, but abdominal wall is thin at area of previous surgical repair  Results/Data   Diagnostic Studies Reviewed:      CT Scan Review Eventration without herniation at umbilicus  Possible colitis        Signatures    Electronically signed by : FANNY Robertson ; Jan 22 2018  4:32PM EST                       (Review)

## 2018-02-20 ENCOUNTER — ANESTHESIA EVENT (OUTPATIENT)
Dept: PERIOP | Facility: HOSPITAL | Age: 58
End: 2018-02-20
Payer: COMMERCIAL

## 2018-02-20 NOTE — ANESTHESIA PREPROCEDURE EVALUATION
Review of Systems/Medical History  Patient summary reviewed  Chart reviewed  History of anesthetic complications PONV    Cardiovascular  EKG reviewed, Exercise tolerance: good,     Pulmonary  Smoker cigarette smoker  ,        GI/Hepatic            Endo/Other  History of thyroid disease , hypothyroidism,      GYN       Hematology   Musculoskeletal       Neurology   Psychology           Physical Exam    Airway    Mallampati score: I  TM Distance: >3 FB  Neck ROM: full     Dental   No notable dental hx     Cardiovascular  Cardiovascular exam normal    Pulmonary  Pulmonary exam normal     Other Findings        Anesthesia Plan  ASA Score- 2     Anesthesia Type- IV sedation with anesthesia with ASA Monitors  Additional Monitors:   Airway Plan: LMA  Plan Factors-Patient not instructed to abstain from smoking on day of procedure  Patient smoked on day of surgery  Induction- intravenous  Postoperative Plan-     Informed Consent- Anesthetic plan and risks discussed with patient  I personally reviewed this patient with the CRNA  Discussed and agreed on the Anesthesia Plan with the CRNA  Iveth Kraus

## 2018-02-21 ENCOUNTER — ANESTHESIA (OUTPATIENT)
Dept: PERIOP | Facility: HOSPITAL | Age: 58
End: 2018-02-21
Payer: COMMERCIAL

## 2018-02-21 ENCOUNTER — HOSPITAL ENCOUNTER (OUTPATIENT)
Facility: HOSPITAL | Age: 58
Setting detail: OUTPATIENT SURGERY
Discharge: HOME | End: 2018-02-21
Attending: ORTHOPAEDIC SURGERY | Admitting: ORTHOPAEDIC SURGERY
Payer: COMMERCIAL

## 2018-02-21 VITALS
DIASTOLIC BLOOD PRESSURE: 71 MMHG | HEART RATE: 65 BPM | RESPIRATION RATE: 20 BRPM | WEIGHT: 171 LBS | BODY MASS INDEX: 28.49 KG/M2 | SYSTOLIC BLOOD PRESSURE: 111 MMHG | TEMPERATURE: 98 F | OXYGEN SATURATION: 99 % | HEIGHT: 65 IN

## 2018-02-21 PROBLEM — S83.271A COMPLEX TEAR OF LATERAL MENISCUS OF RIGHT KNEE: Chronic | Status: ACTIVE | Noted: 2018-02-21

## 2018-02-21 RX ORDER — METOCLOPRAMIDE HYDROCHLORIDE 5 MG/ML
INJECTION INTRAMUSCULAR; INTRAVENOUS AS NEEDED
Status: DISCONTINUED | OUTPATIENT
Start: 2018-02-21 | End: 2018-02-21 | Stop reason: SURG

## 2018-02-21 RX ORDER — LIDOCAINE HYDROCHLORIDE 10 MG/ML
INJECTION, SOLUTION INFILTRATION; PERINEURAL AS NEEDED
Status: DISCONTINUED | OUTPATIENT
Start: 2018-02-21 | End: 2018-02-21 | Stop reason: HOSPADM

## 2018-02-21 RX ORDER — FENTANYL CITRATE 50 UG/ML
INJECTION, SOLUTION INTRAMUSCULAR; INTRAVENOUS AS NEEDED
Status: DISCONTINUED | OUTPATIENT
Start: 2018-02-21 | End: 2018-02-21 | Stop reason: SURG

## 2018-02-21 RX ORDER — BUPIVACAINE HYDROCHLORIDE 5 MG/ML
INJECTION, SOLUTION PERINEURAL AS NEEDED
Status: DISCONTINUED | OUTPATIENT
Start: 2018-02-21 | End: 2018-02-21 | Stop reason: HOSPADM

## 2018-02-21 RX ORDER — GLYCOPYRROLATE 0.2 MG/ML
INJECTION INTRAMUSCULAR; INTRAVENOUS AS NEEDED
Status: DISCONTINUED | OUTPATIENT
Start: 2018-02-21 | End: 2018-02-21 | Stop reason: SURG

## 2018-02-21 RX ORDER — MIDAZOLAM HYDROCHLORIDE 1 MG/ML
INJECTION INTRAMUSCULAR; INTRAVENOUS AS NEEDED
Status: DISCONTINUED | OUTPATIENT
Start: 2018-02-21 | End: 2018-02-21 | Stop reason: SURG

## 2018-02-21 RX ORDER — LIDOCAINE HYDROCHLORIDE 10 MG/ML
INJECTION, SOLUTION INFILTRATION; PERINEURAL AS NEEDED
Status: DISCONTINUED | OUTPATIENT
Start: 2018-02-21 | End: 2018-02-21 | Stop reason: SURG

## 2018-02-21 RX ORDER — PROPOFOL 10 MG/ML
INJECTION, EMULSION INTRAVENOUS AS NEEDED
Status: DISCONTINUED | OUTPATIENT
Start: 2018-02-21 | End: 2018-02-21 | Stop reason: SURG

## 2018-02-21 RX ORDER — FENTANYL CITRATE/PF 50 MCG/ML
25 SYRINGE (ML) INJECTION
Status: DISCONTINUED | OUTPATIENT
Start: 2018-02-21 | End: 2018-02-21 | Stop reason: HOSPADM

## 2018-02-21 RX ORDER — KETOROLAC TROMETHAMINE 30 MG/ML
INJECTION, SOLUTION INTRAMUSCULAR; INTRAVENOUS AS NEEDED
Status: DISCONTINUED | OUTPATIENT
Start: 2018-02-21 | End: 2018-02-21 | Stop reason: SURG

## 2018-02-21 RX ORDER — SODIUM CHLORIDE, SODIUM LACTATE, POTASSIUM CHLORIDE, CALCIUM CHLORIDE 600; 310; 30; 20 MG/100ML; MG/100ML; MG/100ML; MG/100ML
125 INJECTION, SOLUTION INTRAVENOUS CONTINUOUS
Status: DISCONTINUED | OUTPATIENT
Start: 2018-02-21 | End: 2018-02-21 | Stop reason: HOSPADM

## 2018-02-21 RX ORDER — ONDANSETRON 2 MG/ML
INJECTION INTRAMUSCULAR; INTRAVENOUS AS NEEDED
Status: DISCONTINUED | OUTPATIENT
Start: 2018-02-21 | End: 2018-02-21 | Stop reason: SURG

## 2018-02-21 RX ORDER — BUPIVACAINE HYDROCHLORIDE AND EPINEPHRINE 5; 5 MG/ML; UG/ML
INJECTION, SOLUTION PERINEURAL AS NEEDED
Status: DISCONTINUED | OUTPATIENT
Start: 2018-02-21 | End: 2018-02-21 | Stop reason: HOSPADM

## 2018-02-21 RX ORDER — ONDANSETRON 2 MG/ML
4 INJECTION INTRAMUSCULAR; INTRAVENOUS ONCE AS NEEDED
Status: DISCONTINUED | OUTPATIENT
Start: 2018-02-21 | End: 2018-02-21 | Stop reason: HOSPADM

## 2018-02-21 RX ORDER — MAGNESIUM HYDROXIDE 1200 MG/15ML
LIQUID ORAL AS NEEDED
Status: DISCONTINUED | OUTPATIENT
Start: 2018-02-21 | End: 2018-02-21 | Stop reason: HOSPADM

## 2018-02-21 RX ORDER — OXYCODONE HYDROCHLORIDE 5 MG/1
5 TABLET ORAL EVERY 4 HOURS PRN
Status: DISCONTINUED | OUTPATIENT
Start: 2018-02-21 | End: 2018-02-21 | Stop reason: HOSPADM

## 2018-02-21 RX ADMIN — DEXAMETHASONE SODIUM PHOSPHATE 10 MG: 10 INJECTION INTRAMUSCULAR; INTRAVENOUS at 13:34

## 2018-02-21 RX ADMIN — LIDOCAINE HYDROCHLORIDE 50 MG: 10 INJECTION, SOLUTION INFILTRATION; PERINEURAL at 13:28

## 2018-02-21 RX ADMIN — CEFAZOLIN SODIUM 2000 MG: 2 SOLUTION INTRAVENOUS at 13:28

## 2018-02-21 RX ADMIN — MIDAZOLAM HYDROCHLORIDE 2 MG: 1 INJECTION, SOLUTION INTRAMUSCULAR; INTRAVENOUS at 13:24

## 2018-02-21 RX ADMIN — METOCLOPRAMIDE HYDROCHLORIDE 10 MG: 5 INJECTION INTRAMUSCULAR; INTRAVENOUS at 13:20

## 2018-02-21 RX ADMIN — PROPOFOL 200 MG: 10 INJECTION, EMULSION INTRAVENOUS at 13:28

## 2018-02-21 RX ADMIN — GLYCOPYRROLATE 0.2 MG: 0.2 INJECTION, SOLUTION INTRAMUSCULAR; INTRAVENOUS at 13:20

## 2018-02-21 RX ADMIN — KETOROLAC TROMETHAMINE 30 MG: 30 INJECTION, SOLUTION INTRAMUSCULAR at 13:50

## 2018-02-21 RX ADMIN — FENTANYL CITRATE 25 MCG: 50 INJECTION, SOLUTION INTRAMUSCULAR; INTRAVENOUS at 13:45

## 2018-02-21 RX ADMIN — FENTANYL CITRATE 25 MCG: 50 INJECTION, SOLUTION INTRAMUSCULAR; INTRAVENOUS at 13:40

## 2018-02-21 RX ADMIN — ONDANSETRON HYDROCHLORIDE 4 MG: 2 INJECTION, SOLUTION INTRAVENOUS at 13:57

## 2018-02-21 RX ADMIN — SODIUM CHLORIDE, SODIUM LACTATE, POTASSIUM CHLORIDE, AND CALCIUM CHLORIDE: .6; .31; .03; .02 INJECTION, SOLUTION INTRAVENOUS at 13:03

## 2018-02-21 NOTE — DISCHARGE SUMMARY
Discharge Summary - Chico Claire 66 y o  female MRN: [id number]    Unit/Bed#: [facility] Encounter: [id number]    Admission Date: 2/21/2018     Admitting Diagnosis: Complex tear of lateral meniscus of right knee as current injury [S83 271A]    HPI: 65 yo female with recalcitrant right knee pain    Procedures Performed:RIGHT knee arthroscopy, partial medial menisectomy, partial lateral menisectomy, removal loose body, debridement patellar Cm  Hospital Course: uneventful    Significant Findings, Care, Treatment and Services Provided: As above  Complications: none    Discharge Diagnosis:RIGHT knee medial and lateral meniscus tearing, loose body  Resolved Problems  Date Reviewed: 1/23/2017    None          Condition at Discharge: good     Discharge instructions/Information to patient and family:   See after visit summary for information provided to patient and family  Provisions for Follow-Up Care:  See after visit summary for information related to follow-up care and any pertinent home health orders  Disposition: Home    Planned Readmission: No    Discharge Statement   I spent 10 minutes discharging the patient  This time was spent on the day of discharge  I had direct contact with the patient on the day of discharge  Additional documentation is required if more than 30 minutes were spent on discharge  Discharge Medications:  See after visit summary for reconciled discharge medications provided to patient and family

## 2018-02-21 NOTE — DISCHARGE INSTRUCTIONS
Meniscus Tear   WHAT YOU NEED TO KNOW:   A meniscus tear is a tear in the cartilage of your knee  The meniscus is a piece of cartilage (strong tissue) between your thighbone and shinbone  The meniscus helps to cushion your knee joint and keep it stable  DISCHARGE INSTRUCTIONS:   Call 911 for any of the following:   · Your arm or leg feels warm, tender, and painful  It may look swollen and red  Return to the emergency department if:   · You cannot move your knee at all  Contact your healthcare provider if:   · Your symptoms do not improve with treatment  · You have questions or concerns about your condition or care  Medicines:   · NSAIDs , such as ibuprofen, help decrease swelling, pain, and fever  This medicine is available with or without a doctor's order  NSAIDs can cause stomach bleeding or kidney problems in certain people  If you take blood thinner medicine, always ask if NSAIDs are safe for you  Always read the medicine label and follow directions  Do not give these medicines to children under 10months of age without direction from your child's healthcare provider  · Take your medicine as directed  Contact your healthcare provider if you think your medicine is not helping or if you have side effects  Tell him of her if you are allergic to any medicine  Keep a list of the medicines, vitamins, and herbs you take  Include the amounts, and when and why you take them  Bring the list or the pill bottles to follow-up visits  Carry your medicine list with you in case of an emergency  Follow up with your healthcare provider as directed:  Write down your questions so you remember to ask them during your visits  Self-care:   · Rest  your knee  Avoid activities that make the swelling or pain worse  You may need to avoid putting weight on your leg while you have pain  Your healthcare provider may recommend that you use crutches      · Apply ice  on your knee for 15 to 20 minutes every hour or as directed  Use an ice pack, or put crushed ice in a plastic bag  Cover it with a towel  Ice helps prevent tissue damage and decreases swelling and pain  · Compress  your knee with an elastic bandage, air cast, medical boot, or splint to reduce swelling  Ask your healthcare provider which compression device to use, and how tight it should be  · Elevate  your knee above the level of your heart as often as you can  This will help decrease swelling and pain  Prop your knee on pillows or blankets to keep it elevated comfortably  © 2017 [employer] Information is for End User's use only and may not be sold, redistributed or otherwise used for commercial purposes  All illustrations and images included in CareNotes® are the copyrighted property of A D A M [employer] Inc  or Alex Torre  The above information is an  only  It is not intended as medical advice for individual conditions or treatments  Talk to your doctor, nurse or pharmacist before following any medical regimen to see if it is safe and effective for you  Postop pain medication as prescribed preoperatively  WBAT operative leg  Icing PRN  Elevate RLE  PT POD #1 or in next several days  Dressing change at first PT visit

## 2018-02-21 NOTE — ANESTHESIA POSTPROCEDURE EVALUATION
Post-Op Assessment Note      CV Status:  Stable    Mental Status:  Somnolent    Hydration Status:  Stable    PONV Controlled:  None    Airway Patency:  Patent    Post Op Vitals Reviewed: Yes          Staff: CRNA           BP   114/61   Temp   98 3   Pulse  66   Resp   18   SpO2   100

## 2018-02-21 NOTE — INTERIM OP NOTE
ARTHROSCOPY KNEE; PARTIAL LATERAL MENISCECTOMY; PARTIAL MEDIAL MENISCECTOMY; POSSIBLE DEBRIDEMENT; removal of loose bodies  Postoperative Note  PATIENT NAME: Eve Richard  : 1960  MRN: [id number]  [facility]    Surgery Date: 2018    Preop Diagnosis:  Complex tear of lateral meniscus of right knee as current injury [S83 271A]    Post-Op Diagnosis Codes:     * Complex tear of lateral meniscus of right knee as current injury      * Complex tear of medial meniscus of right knee      * Chondromalacia, patella, right [M22 41]    Procedure(s) (LRB):  ARTHROSCOPY KNEE; PARTIAL LATERAL MENISCECTOMY; PARTIAL MEDIAL MENISCECTOMY; POSSIBLE DEBRIDEMENT; removal of loose bodies (Right)    Surgeon(s) and Role:     * Arabella Gregory MD - Primary    Specimens:  * No specimens in log *    Estimated Blood Loss:   Minimal    Anesthesia Type:   IV Sedation with Anesthesia     Findings:   Complex medial and lateral meniscal tears  Grade 3 chondromalacia patella  Grade 2 chondromalacia medial femoral condyle and medial tibial plateau  Intra-articular loose body    Complications:   None    SIGNATURE: Arabella Gregory MD   DATE: 2018   TIME: 2:05 PM

## 2018-02-21 NOTE — OP NOTE
OPERATIVE REPORT  PATIENT NAME: Gisell Bansal    :  1960  MRN: [id number]  Pt Location: [facility]OM 01    SURGERY DATE: 2018    Surgeon(s) and Role:     Jessenia Garcia MD - Primary      Preoperative diagnosis:  Right knee medial meniscus tear and lateral meniscus tear  Postoperative diagnosis:  Right knee complex medial meniscus tear  Complex lateral meniscus tear  Grade 3 chondromalacia patella  Grade 2 chondromalacia medial femoral condyle and medial tibial plateau  Intra-articular cartilage loose bodies  Procedure:  Right knee operative arthroscopy with partial medial and lateral meniscectomy  Debridement chondromalacia patella  Removal of intra-articular loose bodies  Surgeon:  Jessenia Garcia MD    Assistant:  Josy Soliz Pac     Anesthesia:  Local with LMA general    Complications:  None    History:  Right knee pain with mechanical symptoms  MRI showing meniscal tearing  Operative note:  Patient was identified in the preoperative area  The operative site was marked confirmed  She is brought to the operating room placed supine on the table  Placed under LMA general   Intravenous antibiotics were given  Well-padded tourniquet and leg perez were applied to the proximal aspect of the right thigh  Knee was superficially prepped  Portal sites were injected with 10 cc of lidocaine  40 cc of Marcaine with epinephrine was placed intra-articular  The bottom details and dropped to 90° in the right lower extremity was prepped and draped in the usual sterile fashion  Surgical time-out was performed  Utilizing a 6  Blade, a 3 portal arthroscopy technique was utilized  Superolateral outflow, anterolateral scope, and anteromedial working portals  Standard pump at 35 mm Hg was utilized  Ligamentum mucosa was resected for visualization  Cruciate ligaments were intact   Medial aspect showed grade 2 chondromalacia on the medial femoral condyle and medial tibial plateau, roughly 25% weight-bearing surface  Probing showed a large radial tear midbody to posterior horn medial meniscus  This was resected back to stable border using hand instruments and a shaver  Attention was turned laterally  Partial tear/fraying anterior horn lateral meniscus  This was trimmed with a shaver  No instability of the body and posterior horn  Mild grade 2 chondromalacia on the lateral tibial plateau  Scope was taken into the medial and lateral gutters  Several cartilage loose bodies noted laterally  These were removed using the shaver on suction  The arthroscope was then brought up into the patellofemoral joint  Grade 3 changes central to lateral patellar facet  This was debrided with shaver  The three compartments were swept and no further pathology was noted  Scope and instruments were removed  Portals were closed using simple nylon stitches  10 cc of Marcaine was infiltrated at the portal sites  Sterile dressing Xeroform 4x4s ABD Webril and Ace wrap was placed on the knee  Patient was taken out of the tourniquet and leg perez and taken to recovery room in stable condition  Throughout the case assisted by Ofe Draper  Her presence during case decreased the operative time and potential operative morbidity  She assisted in preop the patient's setup, postoperative patient transferring closure as well as leg stabilization  Postoperatively is for the patient, reviewed findings in the case for postop physical therapy office followup      SIGNATURE: Dary Carballo MD  DATE: February 21, 2018  TIME: 2:09 PM

## 2018-02-22 NOTE — PROGRESS NOTES
PT Evaluation     Today's date: 18  Patient name: Chico Claire  : 1960  MRN: [id number]  Referring provider: Alexandra Coleman MD  Dx: No diagnosis found  Assessment    Assessment details:   CURRENT FUNCTIONAL STATUS    Standing/ADL tolerance 15 minutes with crutches  Walking tolerance 100 feet NWB with crutches  Ascends stairs step by step/descends stairs step by step with crutches  Difficulty arising from sitting: Moderate  Unable to drive  Work Status: Off    SHORT TERM GOALS (2 WEEKS)    Increase knee AROM and PROM 10-20 degrees  Increase knee strength 3-5 lbs in all weak areas  Girth MP: 43 cm  Decrease pain to 0-2/10  Standing/ADL tolerance 60 minutes without an assistive device  Walking tolerance 1/4 mile without an assistive device  Ascends/descends reciprocally without an assistive device  Difficulty arising from sitting: None  Able to drive  Work Status: TBD    LONG TERM GOALS (DISCHARGE)    Knee AROM: 0-135 deg  Knee PROM: 0-140 deg  Knee Strength: E=62 lbs, F=46 lbs  Girth MP: 42 cm  Decrease pain to 0-1/10  Standing/ADL tolerance 2 hours without an assistive device  Walking tolerance 1 mile without an assistive device  Ascends/descends reciprocally without an assistive device  Difficulty arising from sitting: None  Able to drive  Work Status: Full Duty    Goals  See assessment details above  Plan  Planned modality interventions: cryotherapy and electrical stimulation/Russian stimulation  Planned therapy interventions: gait training, therapeutic exercise and manual therapy  Frequency: 2x week  Duration in weeks: 4  Plan details: Chico Claire is a 66y o  year old female presenting to PT with pain, decreased range of motion, decreased strength, and decreased tolerance to activity  This patient would benefit from skilled PT services to address these issues and to maximize function   A home exercise program was provided and all questions were answered  Thank you for the referral         Subjective Evaluation    History of Present Illness  Mechanism of injury: CC: Patient complains of right knee pain, swelling, stiffness, and weakness, and clicking  She also notes coldness of her right ankle, and redness in her right forearm where her IV was for surgery  HPI: The patient's right knee pain began one year ago  She feels that it  may have been due to falling and tearing her hamstring  Testing revealed a medial and lateral meniscal tear  She had a medial and lateral meniscectomy, and a loose body removed on 18  She is walking NWB with 2 crutches  She is off of work as a     Pain  Current pain ratin  At best pain ratin  At worst pain ratin    Social Support  Steps to enter house: yes  Stairs in house: no   Lives in: [facility]  Lives with: alone    Employment status: not working  Patient Goals  Patient goals for therapy: decreased pain, decreased edema, increased motion, increased strength and return to work        Objective     Observations     Additional Observation Details  Patient ambulates NWB with 2 crutches  Upon removal of the bandage 3 portal sites were identified without bleeding or discharge  The knee is swollen and warm to touch  The right ankle is cold to touch, but her dorsalis pedis and posterior tibial pulses were good  There is no calf redness, warmth, or tenderness  Negative Carlo's sign  Redness is noted in the right forearm where her IV was inserted  General Comments     Knee Comments  CURRENT OBJECTIVE MEASUREMENTS    Knee AROM: -30-90 deg  Knee PROM: -10-95 deg  Knee Strength: E=20 lbs, F=12 lbs  Girth MP: 43 5 cm               Precautions: None    Daily Treatment Diary     Manual          PROM RK        Hamstring Stretch RK        Calf Stretch RK                     Exercise Diary          QS 10x        SLR 10x        SAQ         Heel slides with Strap 10x Gluteal Sets 10x        Ankle Pumps 10x        T-Band Press         T-Band Hamstring         T-Band TKE         LAQ         Mini Squats         Steps Forward         Steps Lateral         PYR HAM: S  P , C         PYR QUAD: S   P , C         Leg Press: S         XO TKE         Hack Squat         SLS         NuStep: S , A         Bike: S         Treadmill         Gait training WBAT on levels and stairs with 2 crutches RK            Modalities          CP/IFC 15'

## 2018-02-23 ENCOUNTER — EVALUATION (OUTPATIENT)
Dept: PHYSICAL THERAPY | Facility: CLINIC | Age: 58
End: 2018-02-23
Payer: COMMERCIAL

## 2018-02-23 DIAGNOSIS — S83.271D COMPLEX TEAR OF LATERAL MENISCUS OF RIGHT KNEE AS CURRENT INJURY, SUBSEQUENT ENCOUNTER: Primary | ICD-10-CM

## 2018-02-23 PROCEDURE — 97116 GAIT TRAINING THERAPY: CPT | Performed by: PHYSICAL THERAPIST

## 2018-02-23 PROCEDURE — G8979 MOBILITY GOAL STATUS: HCPCS | Performed by: PHYSICAL THERAPIST

## 2018-02-23 PROCEDURE — G8978 MOBILITY CURRENT STATUS: HCPCS | Performed by: PHYSICAL THERAPIST

## 2018-02-23 PROCEDURE — 97014 ELECTRIC STIMULATION THERAPY: CPT | Performed by: PHYSICAL THERAPIST

## 2018-02-23 PROCEDURE — 97110 THERAPEUTIC EXERCISES: CPT | Performed by: PHYSICAL THERAPIST

## 2018-02-23 PROCEDURE — 97140 MANUAL THERAPY 1/> REGIONS: CPT | Performed by: PHYSICAL THERAPIST

## 2018-02-23 PROCEDURE — 97161 PT EVAL LOW COMPLEX 20 MIN: CPT | Performed by: PHYSICAL THERAPIST

## 2018-02-23 NOTE — LETTER
2018    [address] [id number]    Patient: Kitty Fernandez   YOB: 1960   Date of Visit: 2018     Encounter Diagnosis     ICD-10-CM    1  Complex tear of lateral meniscus of right knee as current injury, subsequent encounter S83 271D        Dear Dr Bliss Needs:    Please review the attached Plan of Care from [facility] recent visit  Please verify that you agree therapy should continue by signing the attached document and sending it back to our office  If you have any questions or concerns, please don't hesitate to call  Sincerely,    Colletta Colt, PT      Referring Provider:      I certify that I have read the below Plan of Care and certify the need for these services furnished under this plan of treatment while under my care  [address]  [facility]: [phone]          PT Evaluation     Today's date: 18  Patient name: Kitty Fernandez  : 1960  MRN: [id number]  Referring provider: Opal Fontana MD  Dx: No diagnosis found  Assessment    Assessment details:   CURRENT FUNCTIONAL STATUS    Standing/ADL tolerance 15 minutes with crutches  Walking tolerance 100 feet NWB with crutches  Ascends stairs step by step/descends stairs step by step with crutches  Difficulty arising from sitting: Moderate  Unable to drive  Work Status: Off    SHORT TERM GOALS (2 WEEKS)    Increase knee AROM and PROM 10-20 degrees  Increase knee strength 3-5 lbs in all weak areas  Girth MP: 43 cm  Decrease pain to 0-2/10  Standing/ADL tolerance 60 minutes without an assistive device  Walking tolerance 1/4 mile without an assistive device  Ascends/descends reciprocally without an assistive device  Difficulty arising from sitting: None  Able to drive    Work Status: TBD    LONG TERM GOALS (DISCHARGE)    Knee AROM: 0-135 deg   Knee PROM: 0-140 deg  Knee Strength: E=62 lbs, F=46 lbs  Girth MP: 42 cm  Decrease pain to 0-1/10  Standing/ADL tolerance 2 hours without an assistive device  Walking tolerance 1 mile without an assistive device  Ascends/descends reciprocally without an assistive device  Difficulty arising from sitting: None  Able to drive  Work Status: Full Duty    Goals  See assessment details above  Plan  Planned modality interventions: cryotherapy and electrical stimulation/Russian stimulation  Planned therapy interventions: gait training, therapeutic exercise and manual therapy  Frequency: 2x week  Duration in weeks: 4  Plan details: Willow Alonso is a 66y o  year old female presenting to PT with pain, decreased range of motion, decreased strength, and decreased tolerance to activity  This patient would benefit from skilled PT services to address these issues and to maximize function  A home exercise program was provided and all questions were answered  Thank you for the referral         Subjective Evaluation    History of Present Illness  Mechanism of injury: CC: Patient complains of right knee pain, swelling, stiffness, and weakness, and clicking  She also notes coldness of her right ankle, and redness in her right forearm where her IV was for surgery  HPI: The patient's right knee pain began one year ago  She feels that it  may have been due to falling and tearing her hamstring  Testing revealed a medial and lateral meniscal tear  She had a medial and lateral meniscectomy, and a loose body removed on 18  She is walking NWB with 2 crutches   She is off of work as a     Pain  Current pain ratin  At best pain ratin  At worst pain ratin    Social Support  Steps to enter house: yes  Stairs in house: no   L[facility] in: [facility]  Lives with: alone    Employment status: not working  Patient Goals  Patient goals for therapy: decreased pain, decreased edema, increased motion, increased strength and return to work        Objective     Observations     Additional Observation Details  Patient ambulates NWB with 2 crutches  Upon removal of the bandage 3 portal sites were identified without bleeding or discharge  The knee is swollen and warm to touch  The right ankle is cold to touch, but her dorsalis pedis and posterior tibial pulses were good  There is no calf redness, warmth, or tenderness  Negative Carlo's sign  Redness is noted in the right forearm where her IV was inserted  General Comments     Knee Comments  CURRENT OBJECTIVE MEASUREMENTS    Knee AROM: -30-90 deg  Knee PROM: -10-95 deg  Knee Strength: E=20 lbs, F=12 lbs  Girth MP: 43 5 cm               Precautions: None    Daily Treatment Diary     Manual  2/23        PROM RK        Hamstring Stretch RK        Calf Stretch RK                     Exercise Diary          QS 10x        SLR 10x        SAQ         Heel slides with Strap 10x        Gluteal Sets 10x        Ankle Pumps 10x        T-Band Press         T-Band Hamstring         T-Band TKE         LAQ         Mini Squats         Steps Forward         Steps Lateral         PYR HAM: S  P , C         PYR QUAD: S   P , C         Leg Press: S         XO TKE         Hack Squat         SLS         NuStep: S , A         Bike: S         Treadmill         Gait training WBAT on levels and stairs with 2 crutches RK            Modalities          CP/IFC 15'

## 2018-02-26 ENCOUNTER — APPOINTMENT (OUTPATIENT)
Dept: PHYSICAL THERAPY | Facility: CLINIC | Age: 58
End: 2018-02-26
Payer: COMMERCIAL

## 2018-02-26 ENCOUNTER — TRANSCRIBE ORDERS (OUTPATIENT)
Dept: PHYSICAL THERAPY | Facility: CLINIC | Age: 58
End: 2018-02-26

## 2018-02-26 DIAGNOSIS — S83.271D COMPLEX TEAR OF LATERAL MENISCUS OF RIGHT KNEE AS CURRENT INJURY, SUBSEQUENT ENCOUNTER: Primary | ICD-10-CM

## 2018-02-27 ENCOUNTER — OFFICE VISIT (OUTPATIENT)
Dept: PHYSICAL THERAPY | Facility: CLINIC | Age: 58
End: 2018-02-27
Payer: COMMERCIAL

## 2018-02-27 DIAGNOSIS — S83.271D COMPLEX TEAR OF LATERAL MENISCUS OF RIGHT KNEE AS CURRENT INJURY, SUBSEQUENT ENCOUNTER: ICD-10-CM

## 2018-02-27 PROCEDURE — 97140 MANUAL THERAPY 1/> REGIONS: CPT | Performed by: PHYSICAL THERAPIST

## 2018-02-27 PROCEDURE — 97014 ELECTRIC STIMULATION THERAPY: CPT | Performed by: PHYSICAL THERAPIST

## 2018-02-27 PROCEDURE — 97110 THERAPEUTIC EXERCISES: CPT | Performed by: PHYSICAL THERAPIST

## 2018-02-27 NOTE — PROGRESS NOTES
Daily Note     Today's date: 2018  Patient name: Alejandra Fong  : 1960  MRN: [id number]  Referring provider: Isidro Patricio MD  Dx: No diagnosis found  Subjective: Pain levels are between 2-4/10  The knee will pop at times  She saw the surgeon's nurse about the redness in her wrist and she was told to watch it  It has improved since then  Objective: Girht MP=41 5 cm  AROM=- degrees  Patient ambulates without an AD  Anterior knee mildly warm to palpation  Ankle mildly cool to touch  Assessment: Progressed exercises as per protocol  Inconsistent popping was noted during some exercises  Mil soreness was present afterward, relieved with CP/IFC  Plan: Progress treament per protocol         Precautions: None     Daily Treatment Diary      20xManual             PROM RK  RK           Hamstring Stretch RK             Calf Stretch RK                                   Exercise Diary                QS 10x  20x           SLR 10x  1# 20x           SAQ    1# 20x           Heel slides with Strap 10x  1# 20x           Gluteal Sets 10x             Ankle Pumps 10x             T-Band Press               T-Band Hamstring               T-Band TKE    L 3 2/10           LAQ               Mini Squats               Steps Forward               Steps Lateral               PYR HAM: S  P , C               PYR QUAD: S   P , C               Leg Press: S 8  P 2 2/10             XO TKE               Hack Squat               SLS               NuStep: S7 , A9  L 4 7'             Bike: S               Treadmill               Gait training WBAT on levels and stairs with 2 crutches RK                   Modalities                CP/IFC 13'  15'

## 2018-03-01 ENCOUNTER — APPOINTMENT (OUTPATIENT)
Dept: PHYSICAL THERAPY | Facility: CLINIC | Age: 58
End: 2018-03-01
Payer: COMMERCIAL

## 2018-03-02 ENCOUNTER — OFFICE VISIT (OUTPATIENT)
Dept: PHYSICAL THERAPY | Facility: CLINIC | Age: 58
End: 2018-03-02
Payer: COMMERCIAL

## 2018-03-02 DIAGNOSIS — S83.271D COMPLEX TEAR OF LATERAL MENISCUS OF RIGHT KNEE AS CURRENT INJURY, SUBSEQUENT ENCOUNTER: Primary | ICD-10-CM

## 2018-03-02 PROCEDURE — 97140 MANUAL THERAPY 1/> REGIONS: CPT | Performed by: PHYSICAL THERAPIST

## 2018-03-02 PROCEDURE — 97110 THERAPEUTIC EXERCISES: CPT | Performed by: PHYSICAL THERAPIST

## 2018-03-02 PROCEDURE — 97014 ELECTRIC STIMULATION THERAPY: CPT | Performed by: PHYSICAL THERAPIST

## 2018-03-02 NOTE — PROGRESS NOTES
Daily Note     Today's date: 3/2/2018  Patient name: Antionette Humphrey  : 1960  MRN: [id number]  Referring provider: Car Ray MD  Dx:   Encounter Diagnosis     ICD-10-CM    1  Complex tear of lateral meniscus of right knee as current injury, subsequent encounter S80 325D                   Subjective: Knee gets stiff if she sits too long  Has mild pain on the inside of the knee at times  Objective: See treatment diary below  Sutures removed without bleedng noted  Assessment: Tolerated treatment well  Patient would benefit from continued PT      Plan: Progress treatment as tolerated        Precautions: None     Daily Treatment Diary      20xManual    3/2         PROM RK  RK  LF         Hamstring Stretch RK             Calf Stretch RK                                   Exercise Diary                QS 10x  20x  heel 20x         SLR 10x  1# 20x  1# 20x         SAQ    1# 20x  1# 20x         Heel slides with Strap 10x  1# 20x  1# 20x         Gluteal Sets 10x             Ankle Pumps 10x             T-Band Press               T-Band Hamstring      L 2 2/10         T-Band TKE    L 3 2/10  L 2 2/10         LAQ      2/10         Mini Squats               Steps Forward               Steps Lateral               PYR HAM: S  P , C               PYR QUAD: S   P , C               Leg Press: S 8  P 2 2/10    P 2 2/10         XO TKE               Hack Squat               SLS               NuStep: S7 , A9    L 4 7'  L 4 7'         Bike: S               Treadmill               Gait training WBAT on levels and stairs with 2 crutches RK                   Modalities                CP/IFC 13'  15'  15'

## 2018-03-06 ENCOUNTER — OFFICE VISIT (OUTPATIENT)
Dept: PHYSICAL THERAPY | Facility: CLINIC | Age: 58
End: 2018-03-06
Payer: COMMERCIAL

## 2018-03-06 DIAGNOSIS — S83.271D COMPLEX TEAR OF LATERAL MENISCUS OF RIGHT KNEE AS CURRENT INJURY, SUBSEQUENT ENCOUNTER: Primary | ICD-10-CM

## 2018-03-06 PROCEDURE — 97140 MANUAL THERAPY 1/> REGIONS: CPT

## 2018-03-06 PROCEDURE — 97110 THERAPEUTIC EXERCISES: CPT

## 2018-03-06 PROCEDURE — 97014 ELECTRIC STIMULATION THERAPY: CPT

## 2018-03-06 NOTE — PROGRESS NOTES
Daily Note     Today's date: 3/6/2018  Patient name: Soni Quiñonez  : 1960  MRN: [id number]  Referring provider: Aura Sharp MD  Dx:   Encounter Diagnosis     ICD-10-CM    1  Complex tear of lateral meniscus of right knee as current injury, subsequent encounter Z06 684D                   Subjective: Patient reports increased soreness post last session  Objective: See treatment diary below      Assessment: Tolerated treatment well  Patient exhibited good technique with therapeutic exercises      Plan: Continue per plan of care       Precautions: None     Daily Treatment Diary      20xManual    3/2  3/6       PROM RK  RK  LF  LF       Hamstring Stretch RK             Calf Stretch RK                                   Exercise Diary                QS 10x  20x  heel 20x  heel 30X       SLR 10x  1# 20x  1# 20x  1# 20X       SAQ    1# 20x  1# 20x  1# 30X       Heel slides with Strap 10x  1# 20x  1# 20x  1# 30X       Gluteal Sets 10x             Ankle Pumps 10x             T-Band Press               T-Band Hamstring      L 2 2/10  L 2 30X       T-Band TKE    L 3 2/10  L 2 2/10  L 2 30X       LAQ      2/10  1# 10X       Mini Squats               Steps Forward               Steps Lateral        6" 10X       PYR HAM: S  P , C               PYR QUAD: S   P , C               Leg Press: S 8  P 2 2/10    P 2 2/10  P 2 3/10       XO TKE               Hack Squat               SLS               NuStep: S7 , A9    L 4 7'  L 4 7'  L 4 15'       Bike: S               Treadmill               Gait training WBAT on levels and stairs with 2 crutches RK                   Modalities                CP/IFC 13'  15'  15'  15'

## 2018-03-07 NOTE — CONSULTS
Plan    1  Fluconazole 100 MG Oral Tablet; TAKE 2 TABLETS ON THE FIRST DAY, THEN 1   TABLET DAILY ON DAYS 2 THRU 10   2  Omeprazole 40 MG Oral Capsule Delayed Release; TAKE 40 MG Daily Please take   30 minutes prior to largest meal of day    Assessment    1  History of Cholecystectomy   2  History of Shoulder Surgery   3  History of Appendectomy   4  History of  Section   5  Current some day smoker (305 1) (F17 200)   6  LPRD (laryngopharyngeal reflux disease) (478 79) (K21 9)   7  Candida laryngitis (112 89) (B37 89)    Chief Complaint  Chief Complaint Free Text Note Form: VOICE/REF BY DR Rhoda Vogel      History of Present Illness  HPI: Shahana Reyez is a 56 YOF presents for evaluation of hoarsness  Reports 3 weeks ago she became ill with sore throat, fever and cough  She was evaluated by PCP  CXR with no significant findings  Mycoplasma pneumoniae tested positive  Treated with abx, steroid taper and inhaler  States ongoing dysphonia  Smokes 2 cigarettes per day  Denies hemoptysis, otalgia, and any neck lesions or masses  Review of Systems  Complete ENT ROS [facility]:   Eyes: No complaints of itching, excessive tearing or vision changes  Ears: No complaints of hearing loss, discharge, imbalance, recent ear infections, or tinnitus  Nose: No nasal obstruction, no discharge or runniness, no bleeding, no dryness, no sneezing and no loss of smell  Mouth: No sores in mouth, no altered taste, no dental problems  Throat: hoarseness  Neck: No neck soreness, no neck pain, no neck lumps or swelling  Genitourinary: No complaints of dysuria, flank pain or frequent urination  Cardiovascular: No complaints of chest pain or palpitations  Respiratory: No complaints of shortness of breath, cough or wheezing  Gastrointestinal: No complaints of heartburn, nausea/vomiting, or constipation  Neurological: No complaints of headache, convulsions or memory loss  ROS Reviewed:   ROS reviewed        Active Problems 1  Abnormal laboratory test (796 4) (R89 9)   2  Ache in joint (719 40) (M25 50)   3  Acute bronchitis (466 0) (J20 9)   4  Acute midline low back pain without sciatica (724 2) (M54 5)   5  Arm pain (729 5) (M79 603)   6  Arthritis (716 90) (M19 90)   7  Bucket-handle tear of lateral meniscus of right knee as current injury, initial encounter   (836 1) (S83 251A)   8  Cervical cancer screening (V76 2) (Z12 4)   9  Cough (786 2) (R05)   10  Degeneration of lumbosacral intervertebral disc with acute herniation (722 52,722 10)    (M51 36,M51 27)   11  Dental abscess (522 5) (K04 7)   12  Depression screening (V79 0) (Z13 89)   13  Exogenous obesity (278 00) (E66 9)   14  Fatigue (780 79) (R53 83)   15  Flu-like symptoms (780 99) (R68 89)   16  Hair loss (704 00) (L65 9)   17  Hypertension (401 9) (I10)   18  Hypothyroidism (244 9) (E03 9)   19  Hypothyroidism (244 9) (E03 9)   20  Internal derangement of shoulder (719 81) (M24 819)   21  Left knee pain (719 46) (M25 562)   22  Leg cramping (729 82) (R25 2)   23  Low vitamin D level (268 9) (E55 9)   24  Lumbar pain with radiation down right leg (724 2) (M54 5)   25  Lump, breast (611 72) (N63 0)   26  Meniscus degeneration, right (717 5) (M23 306)   27  Multiple joint pain (719 49) (M25 50)   28  Need for prophylactic vaccination and inoculation against influenza (V04 81) (Z23)   29  Pain of right hand (729 5) (M79 641)   30  Physical examination of employee (V70 5) (Z02 89)   31  Rupture of hamstring tendon, right, subsequent encounter (V58 89,843 8) (S76 311D)   32  Screening for colorectal cancer (V76 51) (Z12 11,Z12 12)   33  Screening for osteoporosis (V82 81) (Z13 820)   34  Tear of lateral meniscus of left knee, current, unspecified tear type, initial encounter    (836 1) (S83 282A)   35  Thrombocytosis (238 71) (D47 3)   36  Tick bite (919 4,E906 4) (W57 XXXA)   37  Vitamin D deficiency (268 9) (E55 9)   38   Wrist pain (719 43) (M25 539)    Past Medical History    1  History of Acute upper respiratory infection (465 9) (J06 9)   2  History of Adult general medical exam (V70 9) (Z00 00)   3  History of Back pain (724 5) (M54 9)   4  History of Chest pain (786 50) (R07 9)   5  History of Conjunctivitis (372 30) (H10 9)   6  History of Dentalgia (525 9) (K08 89)   7  History of Dizziness (780 4) (R42)   8  History of Dysfunctional uterine bleeding (626 8) (N93 8)   9  History of Encounter for routine pelvic examination (V72 31) (Z01 419)   10  History of Encounter for screening mammogram for malignant neoplasm of breast    (V76 12) (Z12 31)   11  History of Endometriosis (617 9) (N80 9)   12  History of fever (V13 89) (Z87 898)   13  History of migraine (V12 49) (Z86 69)   14  History of Influenza (487 1) (J11 1)   15  History of Lower back pain (724 2) (M54 5)   16  History of Migraine headache (346 90) (G43 909)   17  History of Pain, joint, shoulder (719 41) (M25 519)   18  History of Post-traumatic headache (339 20) (G44 309)   19  History of Shoulder Pain Elicited By Motion   20  History of Tension type headache (339 10) (G44 209)   21  History of Urinary tract infection (599 0) (N39 0)   22  History of Vaginitis (616 10) (N76 0)   23  History of Wound Infection (958 3)  Past Medical History Reviewed: The past medical history was reviewed and updated today  Surgical History    1  History of Appendectomy   2  History of Breast Surgery Lumpectomy   3  History of  Section   4  History of Cholecystectomy   5  History of Dilation And Curettage   6  History of Drainage Of Ovarian Cyst(S)   7  History of Hernia Repair   8  History of Hysteroscopy With Endometrial Ablation   9  History of Neuroplasty Decompression Median Nerve At Carpal Tunnel   10  History of Shoulder Surgery   11  History of Tubal Ligation  Surgical History Reviewed: The surgical history was reviewed and updated today  Family History  Mother    1   Family history of Rheumatoid Arthritis  Father    2  Family history of Asthma (V17 5)   3  Family history of Heart Disease (V17 49)  Maternal Grandmother    4  Family history of Stroke Syndrome (V17 1)  Family History Reviewed: The family history was reviewed and updated today  Social History    · Current some day smoker (305 1) (F17 200)   · No living will   · Sun Protection   · Uses Safety Equipment - Seatbelts  Social History Reviewed: The social history was reviewed and updated today  The social history was reviewed and is unchanged  Current Meds   1  LevoFLOXacin 750 MG Oral Tablet; take 1 tablet daily for 5 days MDD:5 TDD:5;   Therapy: 33IQF3377 to (Last Rx:03Oct2017)  Requested for: 51OOU9847 Ordered   2  Levothyroxine Sodium 75 MCG Oral Tablet; Take 1 tablet daily; Therapy: 38MYX8919 to (Last Rx:58Cjb3122)  Requested for: 48CUU7606 Ordered   3  Medrol 4 MG Oral Tablet Therapy Pack; take as directed; Therapy: 31MAB1877 to (Last Rx:05Oct2017)  Requested for: 51HLH1119 Ordered   4  RaNITidine HCl - 150 MG Oral Tablet; take one tablet by mouth twice daily; Therapy: 93ZBS5518 to (Evaluate:17Xtr2513)  Requested for: 03PAS7623; Last   Rx:30Jan2017 Ordered   5  Zithromax Z-Donta 250 MG Oral Tablet; take as directed; Therapy: 58WCM9738 to (Last Rx:09Oct2017)  Requested for: 09Oct2017 Ordered    Allergies    1  Betadine SOLN    Vitals  Signs   Recorded: 90TNL9680 11:05AM   Heart Rate: 69  Systolic: 179, LUE, Sitting  Diastolic: 78, LUE, Sitting  Height: 5 ft 5 in  Weight: 171 lb 1 oz  BMI Calculated: 28 47  BSA Calculated: 1 85  O2 Saturation: 98    Physical Exam    Constitutional:   General appearance: Well developed, well nourished  Ability to communicate: Voice normal  Speech normal    Head and Face:   Head and face: Head normocephalic, atraumatic with no lesions or palpable masses  Palpation of the face for sinus tenderness: No sinus tenderness  Submandibular glands and parotid glands: non tender, no masses  Eyes:   Test of Ocular Motility: Gaze normal  No nystagmus  Ears:   Otoscopic Examination: Tympanic membranes intact and normal in appearance, no retraction of tympanic membranes observed, no serous effusion observed, no evidence of tympanosclerosis  Hearing: Normal    Nose:   External auditory canals: No cerumen impaction noted, no drainage observed, no edema noted in EAC, no exostoses present, no osteoma present, no tenderness noted  External Inspection of Nose: No deformities observed, no deviation of bone structure, no skin lesion present, no swelling present  Nares are symmetric, no deviation of caudal portion of septum  Mouth: Inspection of Lips, Teeth, Gums: Lips normal in color, moist, no cracks or lesions  No loose teeth, no missing teeth  Gingiva: no bleeding observed, no inflammation present  Hard Palate: no asymmetry observed, no torus present  Soft palate normal with no ulcers noted  Throat:   Examination of Oropharynx: Oral Mucosa: no masses, lesions, leukoplakia, or scarring  Normal Lidya's ducts, pink and moist, no discoloration noted  Floor of mouth: normal Warthin's ducts, no lesions, ulcerations, leukoplakia or torus mandibularis  Tonsils: no hypertrophy or ulcerations noted  Tongue: normal mobility, surfaces without fissures, leukoplakia, ulceration or masses, not enlarged, no pallor noted, no white patches present  Inspect Pharyngeal Walls/Pyriform Sinus: No edema of posterior pharyngeal walls observed  Neck:   Examination of Neck: No decreased range of motion, trachea midline  Examination of Thyroid: Normal size, non-tender, no palpable masses  Pulmonary:   Respiratory effort: Normal respiratory rate and rhythm, no increased work of breathing  Cardiovascular:   Inspection of Peripheral vascular system by observation: Normal    Lymphatic:   Palpation of Lymph Nodes: Neck: No generalized lymphadenopathy     Neurological/Psychiatric:   Cranial nerves II-VII grossly intact  Oriented to person, place, and time  Cooperative, in no acute distress  Procedure  Procedure: Flexible fiberoptic laryngoscopy    Indications: Evaluation of the upper aerodigestive tract    Procedure in detail: After informed verbal consent was obtained the nose was anesthetized with topical lidocaine and phenylephrine  After adequate time for anesthesia the upper aerodigestive tract was evaluated using the endoscope including the nasopharynx, oropharynx, hypopharynx, and larynx with the below listed findings  Scope was placed in the right and left nasal cavity  The nasal cavity was evaluated as well  The scope was removed  The patient tolerated the procedure well and discharged in stable condition  Findings: No significant mucopurulence or polyposis in bilateral nasal cavities  No lesions or masses of the nasopharynx, oropharynx, hypopharynx, or subglottis  Bilateral vocal cords are full mobile  Moderate erythema of true and false vocal cords  Cobblestoning present at posterior hypopharynx  White patches on larynx  No significant lesions or masses of the base of tongue, epiglottis, piriform sinuses, pharyngeal walls, or glossopharyngeal folds  Discussion/Summary  Discussion Summary:   Laryngopharyngeal reflux: We discussed the ongoing findings of laryngopharyngeal reflux  We discussed the management of laryngopharyngeal reflux with PPI taken 30 minutes before the evening meal, elevation the head of bed, diet modifications, weight loss, and other lifestyle changes to assist with decreasing laryngopharyngeal reflux  Patient will return in 2-3 months for reevaluation with further fiberoptic laryngoscopy  Cigarette smoking: We discussed extensively the ongoing cigarette smoking and discussed quit date  We discussed options for quitting including medications and support structures  Greater than 3 minutes was taken to discuss this  We discussed ongoing management of candida laryngitis  Prescribe Diflucan re eval in 2-3 months or sooner if no improvement  Follow up in 2 weeks or sooner with any concerns        Signatures   Electronically signed by : FANNY Castillo ; Oct 20 2017  4:36PM EST                       (Author)

## 2018-03-08 ENCOUNTER — OFFICE VISIT (OUTPATIENT)
Dept: PHYSICAL THERAPY | Facility: CLINIC | Age: 58
End: 2018-03-08
Payer: COMMERCIAL

## 2018-03-08 DIAGNOSIS — S83.271D COMPLEX TEAR OF LATERAL MENISCUS OF RIGHT KNEE AS CURRENT INJURY, SUBSEQUENT ENCOUNTER: Primary | ICD-10-CM

## 2018-03-08 PROCEDURE — 97014 ELECTRIC STIMULATION THERAPY: CPT

## 2018-03-08 PROCEDURE — 97110 THERAPEUTIC EXERCISES: CPT

## 2018-03-08 PROCEDURE — 97140 MANUAL THERAPY 1/> REGIONS: CPT

## 2018-03-08 NOTE — PROGRESS NOTES
Daily Note     Today's date: 3/8/2018  Patient name: Pat Byrd  : 1960  MRN: [id number]  Referring provider: Elda Alpers, MD  Dx:   Encounter Diagnosis     ICD-10-CM    1  Complex tear of lateral meniscus of right knee as current injury, subsequent encounter S86 130D                   Subjective: Patient reports having tightness with nodules of swelling above & below the knee  She has F/U with MD today  Objective: See treatment diary below      Assessment: Tolerated treatment well  Patient exhibited good technique with therapeutic exercises      Plan: Continue per plan of care       Precautions: None     Daily Treatment Diary      20xManual    3/2  3/6  3/8     PROM RK  RK  LF  LF  LF     Hamstring Stretch RK             Calf Stretch RK                                   Exercise Diary                QS 10x  20x  heel 20x  heel 30X  heel 30X     SLR 10x  1# 20x  1# 20x  1# 20X  1# 30X     SAQ    1# 20x  1# 20x  1# 30X  1# 30X     Heel slides with Strap 10x  1# 20x  1# 20x  1# 30X  1# 30X     Gluteal Sets 10x             Ankle Pumps 10x             T-Band Press               T-Band Hamstring      L 2 2/10  L 2 30X  L 3 3X10     T-Band TKE    L 3 2/10  L 2 2/10  L 2 30X  L 3 3X10     LAQ      2/10  1# 10X  1# 3X10     Mini Squats               Steps Forward               Steps Lateral        6" 10X  6" 10X     PYR HAM: S  P , C               PYR QUAD: S   P , C               Leg Press: S 8  P 2 2/10    P 2 2/10  P 2 3/10  P 2 3/10     XO TKE               Hack Squat               SLS               NuStep: S7 , A9    L 4 7'  L 4 7'  L 4 15'  L 4 15'     Bike: S               Treadmill               Gait training WBAT on levels and stairs with 2 crutches RK                   Modalities                CP/IFC 13'  15'  15'  15'  10'

## 2018-03-15 NOTE — PROGRESS NOTES
PT Discharge    Today's date: 3/15/2018  Patient name: Alexandria Hdz  : 1960  MRN: [id number]  Referring provider: Colten Cam MD  Dx:   Encounter Diagnosis     ICD-10-CM    1  Complex tear of lateral meniscus of right knee as current injury, subsequent encounter S83 271D        Start Time: 945  Stop Time: 1050  Total time in clinic (min): 65 minutes      Plan  Plan details: DC from PT at this time  Subjective Evaluation    History of Present Illness  Mechanism of injury: Subjective: The patient progressed steadily in rehab, reporting decreased pain and being able to walk without an assistive device  She was last seen on 3/8/15, and was to then see her surgeon and possibly return to work  She has not contacted our office for treatment since that time  Objective     General Comments     Knee Comments  Because of self discharge, updated findings are not available for this report

## 2018-05-09 DIAGNOSIS — B37.3 VAGINAL CANDIDIASIS: Primary | ICD-10-CM

## 2018-05-09 RX ORDER — FLUCONAZOLE 150 MG/1
150 TABLET ORAL ONCE
Qty: 2 TABLET | Refills: 0 | Status: SHIPPED | OUTPATIENT
Start: 2018-05-09 | End: 2018-05-09

## 2018-07-02 ENCOUNTER — OFFICE VISIT (OUTPATIENT)
Dept: URGENT CARE | Facility: CLINIC | Age: 58
End: 2018-07-02
Payer: COMMERCIAL

## 2018-07-02 VITALS
RESPIRATION RATE: 18 BRPM | OXYGEN SATURATION: 98 % | HEART RATE: 74 BPM | SYSTOLIC BLOOD PRESSURE: 140 MMHG | DIASTOLIC BLOOD PRESSURE: 81 MMHG | TEMPERATURE: 98.8 F

## 2018-07-02 DIAGNOSIS — L03.116 CELLULITIS OF LEFT LOWER LEG: Primary | ICD-10-CM

## 2018-07-02 PROCEDURE — 99213 OFFICE O/P EST LOW 20 MIN: CPT | Performed by: FAMILY MEDICINE

## 2018-07-02 PROCEDURE — S9088 SERVICES PROVIDED IN URGENT: HCPCS | Performed by: FAMILY MEDICINE

## 2018-07-02 RX ORDER — CEPHALEXIN 500 MG/1
500 CAPSULE ORAL 4 TIMES DAILY
Qty: 28 CAPSULE | Refills: 0 | Status: SHIPPED | OUTPATIENT
Start: 2018-07-02 | End: 2018-07-09

## 2018-07-03 NOTE — PROGRESS NOTES
[facility] - Patient Visit Note  Grupo Koenig 66 y o  female MRN: [id number]      Assessment / Plan:   Diagnosis ICD-10-CM Associated Orders   1  Cellulitis of left lower leg L03  116        Reason For Visit / Chief Complaint  Chief Complaint   Patient presents with    Rash     Pt c/o a rash on her lower left leg and also swelling in her leg    Maribell Hassan Discussion:  Patient feels that her tetanus may be up-to-date she was suggested to enroll in my chart and to discuss her tetanus immunization with her primary care tomorrow  She may need additional immunization  She is also supposed to be taking Synthroid and not taking it at this time  She will be seeking primary care review of the cellulitis tomorrow at work  HPI:  Grupo Koenig is a 66 y o  female Patient           This Patient Presents with her  stating that she was doing fine all day at work at 4 o'clock after work she came took her socks down and found that there was a reddened area on her left lateral lower leg  This became itchy and hurtful and has reddened up and is now swollen in the area without a central loculation with mild pruritus  Patient has been taking Benadryl with mild relief  She was in a tanning black earlier today  She is allergic to 2 antibiotics refer mycin and doxycycline along with Betadine  She is not allergic to penicillin  Problem List     Complex tear of lateral meniscus of right knee (Chronic)           ALLERGIES:  Allergies as of 07/02/2018 - Reviewed 02/23/2018   Allergen Reaction Noted    Erythromycin Chest Pain 07/02/2018    Betadine [povidone iodine] Other (See Comments) 01/13/2017    Doxycycline  11/08/2017       The following portions of the patient's history were reviewed and updated as appropriate:   Allergies, current medications, past family history, past medical history, past social history, past surgical history, and the problem list     Historical Information   Past Medical History:   Diagnosis Date    Disease of thyroid gland     hypothyroidism    PONV (postoperative nausea and vomiting)      Past Surgical History:   Procedure Laterality Date    APPENDECTOMY      ARTHROSCOPY KNEE Right 2018    Procedure: ARTHROSCOPY KNEE; PARTIAL LATERAL MENISCECTOMY; PARTIAL MEDIAL MENISCECTOMY; POSSIBLE DEBRIDEMENT; removal of loose bodies;  Surgeon: Robert Boyd MD;  Location: [facility];  Service: Orthopedics    BICEPS TENDON REPAIR Right     CARPAL TUNNEL RELEASE       SECTION      three     CHOLECYSTECTOMY      EXPLORATORY LAPAROTOMY      KNEE ARTHROSCOPY W/ MENISCAL REPAIR Left     MI KNEE SCOPE,MED/LAT MENISECTOMY Left 2017    Procedure: ARTHROSCOPY KNEE PARTIAL MEDIAL MENISECTOMY ;  Surgeon: Robert Boyd MD;  Location: [facility];  Service: Orthopedics    SHOULDER SURGERY Bilateral     TUBAL LIGATION      UMBILICAL HERNIA REPAIR      with mesh     Social History   History   Alcohol Use    Yes     Comment: occassional      History   Drug Use No     History   Smoking Status    Former Smoker   Smokeless Tobacco    Never Used     Comment: infrequently smokes     No family history on file            MEDS:    Current Outpatient Prescriptions:     ibuprofen (MOTRIN) 400 mg tablet, Take 400 mg by mouth every 6 (six) hours as needed for mild pain, Disp: , Rfl:     levothyroxine 75 mcg tablet, Take 75 mcg by mouth daily, Disp: , Rfl:     Nutritional Supplements (VITAMIN D BOOSTER PO), Take 1,000 Units by mouth daily, Disp: , Rfl:     FACILITY ADMINISTERED MEDS:        REVIEW OF SYSTEMS    GENERAL: NEGATIVE for:  Generalized Fatigue                             Chills                              Fever                             Myalgias     OPTHALMIC: NEGATIVE for:  Diplopia                            Scotomata                            Visual Changes                            Blurred Vision     ENT:  EARS NEGATIVE for:  Hearing Difficulty                            Tinnitus                            Vertigo                            Dizziness                            Ear Pain                            Ear Drainage               NOSE NEGATIVE for:  Nasal Congestion                            Nasal Discharge                            Sinus Pain / Pressure               THROAT NEGATIVE for:  Sore Throat / Throat Pain                            Difficulty Swallowing     RESPIRATORY: NEGATIVE for:  Cough                            Wheezing                            Sputum Production                            Sob / Tachypnea                            Hemoptysis     CARDIOVASCULAR: NEGATIVE for:  Chest Pain                             SOB (cardiac Related)                             Dyspnea on Exertion                             Orthopnea                             PND                             Leg Edema                             Palpitations                               Irregularities/rythym                       CURRENT VITALS:   Blood Pressure: 140/81 (07/02/18 1954)  Pulse: 74 (07/02/18 1954)  Temperature: 98 8 °F (37 1 °C) (07/02/18 1954)  Respirations: 18 (07/02/18 1954)  SpO2: 98 % (07/02/18 1954)  /81   Pulse 74   Temp 98 8 °F (37 1 °C)   Resp 18   SpO2 98%       PHYSICAL EXAM:         General Appearance:    Alert, cooperative, no apparent distress, appears stated age     Oriented x3    Head:    Normocephalic, without obvious abnormality, atraumatic   Eyes:      EOM's intact,      LEO,        conjunctiva/corneas clear,          fundi not visualized well   Ears:     Normal external ear canals     Tm right side  Normal     Tm left side    Normal       Nose:   Nares normal externally, septum midline,     mucosa normal,     No anterior drainage         Sinuses   with out   tenderness to palpation / percussion     Throat: Lips, mucosa, and tongue normal       Anterior pharynx   Normal      Posterior pharynx   Normal        No exudate obvious       Neck:   Supple, symmetrical, trachea midline and moveable    Normal thyroid click present    No carotid bruits appreciated        Lymphatics:     Adenopathy in anterior cervical chain  Normal      Adenopathy in posterior cervical chain   Normal     Lungs:     Clear to auscultation bilaterally    No rales    No ronchi    No wheeze     Heart[de-identified]    Regular rate and rhythm, S1 and S2 normal,     No S3, S4, audible    No murmurs, rubs          Skin:   Examination of the left lower leg shows a small reddened area out of which a large area of cellulitis is emanating without a target lesion per se on the left lateral calf  The area is warm  There is no Homans sign  There is no loculated area  No central necrosis is noted either  There is only mild edema of the left lower extremity at the ankle noted  Follow up at primary care in 2 or 3 days, or sooner if needed OR pesent to local Emergency Room if symptoms are worsening  Portions of the record may have been created with voice recognition software   Occasional wrong word or "sound a like" substitutions may have occurred due to the inherent limitations of voice recognition software   Read the chart carefully and recognize, using context, where substitutions have occurred

## 2018-07-09 ENCOUNTER — APPOINTMENT (OUTPATIENT)
Dept: RADIOLOGY | Facility: MEDICAL CENTER | Age: 58
End: 2018-07-09
Payer: COMMERCIAL

## 2018-07-09 ENCOUNTER — TRANSCRIBE ORDERS (OUTPATIENT)
Dept: RADIOLOGY | Facility: MEDICAL CENTER | Age: 58
End: 2018-07-09

## 2018-07-09 DIAGNOSIS — M25.522 LEFT ELBOW PAIN: ICD-10-CM

## 2018-07-09 DIAGNOSIS — M25.50 ARTHRALGIA, UNSPECIFIED JOINT: Primary | ICD-10-CM

## 2018-07-09 DIAGNOSIS — M79.671 RIGHT FOOT PAIN: ICD-10-CM

## 2018-07-09 DIAGNOSIS — M25.522 LEFT ELBOW PAIN: Primary | ICD-10-CM

## 2018-07-09 DIAGNOSIS — R79.89 ELEVATED PLATELET COUNT: Primary | ICD-10-CM

## 2018-07-09 PROCEDURE — 73630 X-RAY EXAM OF FOOT: CPT

## 2018-07-09 PROCEDURE — 73070 X-RAY EXAM OF ELBOW: CPT

## 2018-07-10 ENCOUNTER — TRANSCRIBE ORDERS (OUTPATIENT)
Dept: LAB | Facility: MEDICAL CENTER | Age: 58
End: 2018-07-10

## 2018-07-10 ENCOUNTER — LAB (OUTPATIENT)
Dept: LAB | Facility: MEDICAL CENTER | Age: 58
End: 2018-07-10
Payer: COMMERCIAL

## 2018-07-10 DIAGNOSIS — M25.50 ARTHRALGIA, UNSPECIFIED JOINT: ICD-10-CM

## 2018-07-10 DIAGNOSIS — E03.9 HYPOTHYROIDISM, UNSPECIFIED TYPE: ICD-10-CM

## 2018-07-10 DIAGNOSIS — E03.9 HYPOTHYROIDISM, UNSPECIFIED TYPE: Primary | ICD-10-CM

## 2018-07-10 DIAGNOSIS — R79.89 ELEVATED PLATELET COUNT: ICD-10-CM

## 2018-07-10 LAB
BASOPHILS # BLD AUTO: 0.05 THOUSANDS/ΜL (ref 0–0.1)
BASOPHILS NFR BLD AUTO: 1 % (ref 0–1)
EOSINOPHIL # BLD AUTO: 0.16 THOUSAND/ΜL (ref 0–0.61)
EOSINOPHIL NFR BLD AUTO: 3 % (ref 0–6)
ERYTHROCYTE [DISTWIDTH] IN BLOOD BY AUTOMATED COUNT: 11.8 % (ref 11.6–15.1)
ERYTHROCYTE [SEDIMENTATION RATE] IN BLOOD: 11 MM/HOUR (ref 0–20)
HCT VFR BLD AUTO: 40 % (ref 34.8–46.1)
HGB BLD-MCNC: 13 G/DL (ref 11.5–15.4)
IMM GRANULOCYTES # BLD AUTO: 0.02 THOUSAND/UL (ref 0–0.2)
IMM GRANULOCYTES NFR BLD AUTO: 0 % (ref 0–2)
LYMPHOCYTES # BLD AUTO: 2.38 THOUSANDS/ΜL (ref 0.6–4.47)
LYMPHOCYTES NFR BLD AUTO: 38 % (ref 14–44)
MCH RBC QN AUTO: 31.3 PG (ref 26.8–34.3)
MCHC RBC AUTO-ENTMCNC: 32.5 G/DL (ref 31.4–37.4)
MCV RBC AUTO: 96 FL (ref 82–98)
MONOCYTES # BLD AUTO: 0.44 THOUSAND/ΜL (ref 0.17–1.22)
MONOCYTES NFR BLD AUTO: 7 % (ref 4–12)
NEUTROPHILS # BLD AUTO: 3.21 THOUSANDS/ΜL (ref 1.85–7.62)
NEUTS SEG NFR BLD AUTO: 51 % (ref 43–75)
NRBC BLD AUTO-RTO: 0 /100 WBCS
PLATELET # BLD AUTO: 341 THOUSANDS/UL (ref 149–390)
PMV BLD AUTO: 11 FL (ref 8.9–12.7)
RBC # BLD AUTO: 4.16 MILLION/UL (ref 3.81–5.12)
TSH SERPL DL<=0.05 MIU/L-ACNC: 7.14 UIU/ML
WBC # BLD AUTO: 6.26 THOUSAND/UL (ref 4.31–10.16)

## 2018-07-10 PROCEDURE — 86430 RHEUMATOID FACTOR TEST QUAL: CPT

## 2018-07-10 PROCEDURE — 85652 RBC SED RATE AUTOMATED: CPT | Performed by: FAMILY MEDICINE

## 2018-07-10 PROCEDURE — 86038 ANTINUCLEAR ANTIBODIES: CPT

## 2018-07-10 PROCEDURE — 86618 LYME DISEASE ANTIBODY: CPT | Performed by: FAMILY MEDICINE

## 2018-07-10 PROCEDURE — 85025 COMPLETE CBC W/AUTO DIFF WBC: CPT

## 2018-07-10 PROCEDURE — 84443 ASSAY THYROID STIM HORMONE: CPT

## 2018-07-10 PROCEDURE — 36415 COLL VENOUS BLD VENIPUNCTURE: CPT | Performed by: FAMILY MEDICINE

## 2018-07-10 NOTE — PROGRESS NOTES
Please call the patient regarding her abnormal result   Heel spur in right foot no other fractures no fractures

## 2018-07-10 NOTE — PROGRESS NOTES
Please call the patient regarding her abnormal result   No fractures of elbow but there is some swelling of the olecranon bursa so she has olecranon bursitis probably from bumping elbow possibly from resting elbow on desk while working try not to rest the elbow on desk it could consider wearing a an elbow protective pad which can be purchased at the drug store

## 2018-07-11 DIAGNOSIS — R79.89 ABNORMAL THYROID BLOOD TEST: Primary | ICD-10-CM

## 2018-07-11 LAB
B BURGDOR IGG SER IA-ACNC: 0.12
B BURGDOR IGM SER IA-ACNC: 0.17
RHEUMATOID FACT SER QL LA: NEGATIVE

## 2018-07-11 NOTE — PROGRESS NOTES
Call patient to notify normal results  Rheumatoid arthritis test is negative it is possible that all of her arthritic complaints were due to the fact that her thyroid was low

## 2018-07-11 NOTE — PROGRESS NOTES
Please call the patient regarding her abnormal result   Thyroid level is low I do not believe it repeat the test I think it is a lab error

## 2018-07-13 LAB — RYE IGE QN: NEGATIVE

## 2018-08-18 ENCOUNTER — APPOINTMENT (OUTPATIENT)
Dept: LAB | Facility: MEDICAL CENTER | Age: 58
End: 2018-08-18
Payer: COMMERCIAL

## 2018-08-18 ENCOUNTER — TRANSCRIBE ORDERS (OUTPATIENT)
Dept: LAB | Facility: MEDICAL CENTER | Age: 58
End: 2018-08-18

## 2018-08-18 DIAGNOSIS — Z00.8 HEALTH EXAMINATION IN POPULATION SURVEY: Primary | ICD-10-CM

## 2018-08-18 DIAGNOSIS — Z00.8 HEALTH EXAMINATION IN POPULATION SURVEY: ICD-10-CM

## 2018-08-18 LAB
CHOLEST SERPL-MCNC: 181 MG/DL (ref 50–200)
EST. AVERAGE GLUCOSE BLD GHB EST-MCNC: 128 MG/DL
HBA1C MFR BLD: 6.1 % (ref 4.2–6.3)
HDLC SERPL-MCNC: 51 MG/DL (ref 40–60)
LDLC SERPL CALC-MCNC: 112 MG/DL (ref 0–100)
NONHDLC SERPL-MCNC: 130 MG/DL
TRIGL SERPL-MCNC: 89 MG/DL

## 2018-08-18 PROCEDURE — 36415 COLL VENOUS BLD VENIPUNCTURE: CPT

## 2018-08-18 PROCEDURE — 80061 LIPID PANEL: CPT

## 2018-08-18 PROCEDURE — 83036 HEMOGLOBIN GLYCOSYLATED A1C: CPT

## 2018-08-27 ENCOUNTER — TELEPHONE (OUTPATIENT)
Dept: FAMILY MEDICINE CLINIC | Facility: CLINIC | Age: 58
End: 2018-08-27

## 2018-08-27 VITALS — BODY MASS INDEX: 29.49 KG/M2 | WEIGHT: 177.2 LBS | SYSTOLIC BLOOD PRESSURE: 124 MMHG | DIASTOLIC BLOOD PRESSURE: 66 MMHG

## 2018-10-12 DIAGNOSIS — J20.9 ACUTE TRACHEOBRONCHITIS: Primary | ICD-10-CM

## 2018-10-12 RX ORDER — AZITHROMYCIN 250 MG/1
TABLET, FILM COATED ORAL
Qty: 6 TABLET | Refills: 0 | Status: SHIPPED | OUTPATIENT
Start: 2018-10-12 | End: 2018-10-16

## 2018-10-19 ENCOUNTER — LAB (OUTPATIENT)
Dept: LAB | Facility: MEDICAL CENTER | Age: 58
End: 2018-10-19
Payer: COMMERCIAL

## 2018-10-19 ENCOUNTER — OFFICE VISIT (OUTPATIENT)
Dept: FAMILY MEDICINE CLINIC | Facility: CLINIC | Age: 58
End: 2018-10-19
Payer: COMMERCIAL

## 2018-10-19 ENCOUNTER — APPOINTMENT (OUTPATIENT)
Dept: RADIOLOGY | Facility: MEDICAL CENTER | Age: 58
End: 2018-10-19
Payer: COMMERCIAL

## 2018-10-19 VITALS
SYSTOLIC BLOOD PRESSURE: 148 MMHG | HEIGHT: 65 IN | DIASTOLIC BLOOD PRESSURE: 86 MMHG | WEIGHT: 171 LBS | BODY MASS INDEX: 28.49 KG/M2

## 2018-10-19 DIAGNOSIS — R79.89 ABNORMAL THYROID BLOOD TEST: ICD-10-CM

## 2018-10-19 DIAGNOSIS — J18.9 PNEUMONIA DUE TO INFECTIOUS ORGANISM, UNSPECIFIED LATERALITY, UNSPECIFIED PART OF LUNG: Primary | ICD-10-CM

## 2018-10-19 DIAGNOSIS — J18.9 PNEUMONIA DUE TO INFECTIOUS ORGANISM, UNSPECIFIED LATERALITY, UNSPECIFIED PART OF LUNG: ICD-10-CM

## 2018-10-19 LAB
BASOPHILS # BLD AUTO: 0.05 THOUSANDS/ΜL (ref 0–0.1)
BASOPHILS NFR BLD AUTO: 1 % (ref 0–1)
EOSINOPHIL # BLD AUTO: 0.12 THOUSAND/ΜL (ref 0–0.61)
EOSINOPHIL NFR BLD AUTO: 2 % (ref 0–6)
ERYTHROCYTE [DISTWIDTH] IN BLOOD BY AUTOMATED COUNT: 11.5 % (ref 11.6–15.1)
HCT VFR BLD AUTO: 39.3 % (ref 34.8–46.1)
HGB BLD-MCNC: 12.8 G/DL (ref 11.5–15.4)
IMM GRANULOCYTES # BLD AUTO: 0.02 THOUSAND/UL (ref 0–0.2)
IMM GRANULOCYTES NFR BLD AUTO: 0 % (ref 0–2)
LYMPHOCYTES # BLD AUTO: 2.86 THOUSANDS/ΜL (ref 0.6–4.47)
LYMPHOCYTES NFR BLD AUTO: 38 % (ref 14–44)
MCH RBC QN AUTO: 30.9 PG (ref 26.8–34.3)
MCHC RBC AUTO-ENTMCNC: 32.6 G/DL (ref 31.4–37.4)
MCV RBC AUTO: 95 FL (ref 82–98)
MONOCYTES # BLD AUTO: 0.42 THOUSAND/ΜL (ref 0.17–1.22)
MONOCYTES NFR BLD AUTO: 6 % (ref 4–12)
NEUTROPHILS # BLD AUTO: 4.12 THOUSANDS/ΜL (ref 1.85–7.62)
NEUTS SEG NFR BLD AUTO: 53 % (ref 43–75)
NRBC BLD AUTO-RTO: 0 /100 WBCS
PLATELET # BLD AUTO: 383 THOUSANDS/UL (ref 149–390)
PMV BLD AUTO: 9.9 FL (ref 8.9–12.7)
RBC # BLD AUTO: 4.14 MILLION/UL (ref 3.81–5.12)
TSH SERPL DL<=0.05 MIU/L-ACNC: 2.78 UIU/ML
WBC # BLD AUTO: 7.59 THOUSAND/UL (ref 4.31–10.16)

## 2018-10-19 PROCEDURE — 71046 X-RAY EXAM CHEST 2 VIEWS: CPT

## 2018-10-19 PROCEDURE — 36415 COLL VENOUS BLD VENIPUNCTURE: CPT | Performed by: FAMILY MEDICINE

## 2018-10-19 PROCEDURE — 85025 COMPLETE CBC W/AUTO DIFF WBC: CPT | Performed by: FAMILY MEDICINE

## 2018-10-19 PROCEDURE — 86738 MYCOPLASMA ANTIBODY: CPT | Performed by: FAMILY MEDICINE

## 2018-10-19 PROCEDURE — 84443 ASSAY THYROID STIM HORMONE: CPT

## 2018-10-19 PROCEDURE — 1036F TOBACCO NON-USER: CPT | Performed by: FAMILY MEDICINE

## 2018-10-19 PROCEDURE — 99213 OFFICE O/P EST LOW 20 MIN: CPT | Performed by: FAMILY MEDICINE

## 2018-10-19 PROCEDURE — 3008F BODY MASS INDEX DOCD: CPT | Performed by: FAMILY MEDICINE

## 2018-10-19 RX ORDER — LEVOFLOXACIN 750 MG/1
750 TABLET ORAL EVERY 24 HOURS
Qty: 7 TABLET | Refills: 0 | Status: SHIPPED | OUTPATIENT
Start: 2018-10-19 | End: 2018-10-26

## 2018-10-19 RX ORDER — BENZONATATE 100 MG/1
100 CAPSULE ORAL 3 TIMES DAILY PRN
Qty: 20 CAPSULE | Refills: 0 | Status: SHIPPED | OUTPATIENT
Start: 2018-10-19 | End: 2019-01-03

## 2018-10-19 NOTE — PROGRESS NOTES
Assessment/Plan:  Chest x-ray ordered of mycoplasma ordered CBC ordered start Levaquin empirically    No problem-specific Assessment & Plan notes found for this encounter  Diagnoses and all orders for this visit:    Pneumonia due to infectious organism, unspecified laterality, unspecified part of lung          Subjective:      Patient ID: Soni Quiñonez is a 66 y o  female  One-week history cough congestion expectoration of mucus wheezing and tactile fremitus        The following portions of the patient's history were reviewed and updated as appropriate:   She  has a past medical history of Disease of thyroid gland and PONV (postoperative nausea and vomiting)  She   Patient Active Problem List    Diagnosis Date Noted    Complex tear of lateral meniscus of right knee 2018     She  has a past surgical history that includes Shoulder surgery (Bilateral); Biceps tendon repair (Right); Carpal tunnel release;  section; Exploratory laparotomy; Cholecystectomy; Appendectomy; Umbilical hernia repair; Tubal ligation; Knee arthroscopy w/ meniscal repair (Left); pr knee scope,med/lat menisectomy (Left, 2017); and ARTHROSCOPY KNEE (Right, 2018)  Her family history is not on file  She  reports that she has quit smoking  She has never used smokeless tobacco  She reports that she drinks alcohol  She reports that she does not use drugs  Current Outpatient Prescriptions   Medication Sig Dispense Refill    levothyroxine 75 mcg tablet Take 75 mcg by mouth daily       No current facility-administered medications for this visit        Current Outpatient Prescriptions on File Prior to Visit   Medication Sig    levothyroxine 75 mcg tablet Take 75 mcg by mouth daily    [DISCONTINUED] ibuprofen (MOTRIN) 400 mg tablet Take 400 mg by mouth every 6 (six) hours as needed for mild pain    [DISCONTINUED] Nutritional Supplements (VITAMIN D BOOSTER PO) Take 1,000 Units by mouth daily     No current facility-administered medications on file prior to visit  She is allergic to erythromycin; betadine [povidone iodine]; and doxycycline       Review of Systems   Constitutional: Positive for activity change and fatigue  Negative for appetite change, diaphoresis and fever  HENT: Negative  Eyes: Negative  Respiratory: Positive for cough and shortness of breath  Negative for apnea, chest tightness and wheezing  Cardiovascular: Negative for chest pain, palpitations and leg swelling  Gastrointestinal: Negative for abdominal distention, abdominal pain, anal bleeding, constipation, diarrhea, nausea and vomiting  Endocrine: Negative for cold intolerance, heat intolerance, polydipsia, polyphagia and polyuria  Genitourinary: Negative for difficulty urinating, dysuria, flank pain, hematuria and urgency  Musculoskeletal: Negative for arthralgias, back pain, gait problem, joint swelling and myalgias  Skin: Negative for color change, rash and wound  Allergic/Immunologic: Negative for environmental allergies, food allergies and immunocompromised state  Neurological: Negative for dizziness, seizures, syncope, speech difficulty, numbness and headaches  Hematological: Negative for adenopathy  Does not bruise/bleed easily  Psychiatric/Behavioral: Negative for agitation, behavioral problems, hallucinations, sleep disturbance and suicidal ideas  Objective:      /86 (BP Location: Left arm, Patient Position: Sitting, Cuff Size: Standard)   Ht 5' 5" (1 651 m)   Wt 77 6 kg (171 lb)   BMI 28 46 kg/m²          Physical Exam   Constitutional: She is oriented to person, place, and time  She appears well-developed and well-nourished  No distress  HENT:   Head: Normocephalic  Right Ear: External ear normal    Left Ear: External ear normal    Nose: Nose normal    Mouth/Throat: Oropharynx is clear and moist    Eyes: Pupils are equal, round, and reactive to light   Conjunctivae and EOM are normal  Right eye exhibits no discharge  Left eye exhibits no discharge  No scleral icterus  Neck: Normal range of motion  No tracheal deviation present  No thyromegaly present  Cardiovascular: Normal rate, regular rhythm and normal heart sounds  Exam reveals no gallop and no friction rub  No murmur heard  Pulmonary/Chest: Effort normal  No respiratory distress  She has wheezes  She has rales  Abdominal: Soft  Bowel sounds are normal  She exhibits no mass  There is no tenderness  There is no guarding  Musculoskeletal: She exhibits no edema or deformity  Lymphadenopathy:     She has no cervical adenopathy  Neurological: She is alert and oriented to person, place, and time  No cranial nerve deficit  Skin: Skin is warm and dry  No rash noted  She is not diaphoretic  No erythema  Psychiatric: She has a normal mood and affect   Thought content normal

## 2018-10-22 LAB
M PNEUMO IGG SER IA-ACNC: 339 U/ML (ref 0–99)
M PNEUMO IGM SER IA-ACNC: <770 U/ML (ref 0–769)

## 2018-11-19 DIAGNOSIS — Z23 NEED FOR INFLUENZA VACCINATION: Primary | ICD-10-CM

## 2018-11-19 PROCEDURE — 90682 RIV4 VACC RECOMBINANT DNA IM: CPT

## 2018-11-19 PROCEDURE — 90471 IMMUNIZATION ADMIN: CPT

## 2018-12-20 DIAGNOSIS — Z12.31 ENCOUNTER FOR SCREENING MAMMOGRAM FOR BREAST CANCER: Primary | ICD-10-CM

## 2019-01-02 ENCOUNTER — LAB (OUTPATIENT)
Dept: LAB | Facility: MEDICAL CENTER | Age: 59
End: 2019-01-02
Payer: COMMERCIAL

## 2019-01-02 ENCOUNTER — OFFICE VISIT (OUTPATIENT)
Dept: FAMILY MEDICINE CLINIC | Facility: CLINIC | Age: 59
End: 2019-01-02
Payer: COMMERCIAL

## 2019-01-02 ENCOUNTER — APPOINTMENT (OUTPATIENT)
Dept: RADIOLOGY | Facility: MEDICAL CENTER | Age: 59
End: 2019-01-02
Payer: COMMERCIAL

## 2019-01-02 VITALS
WEIGHT: 184 LBS | BODY MASS INDEX: 30.66 KG/M2 | HEIGHT: 65 IN | SYSTOLIC BLOOD PRESSURE: 126 MMHG | TEMPERATURE: 103.4 F | DIASTOLIC BLOOD PRESSURE: 68 MMHG

## 2019-01-02 DIAGNOSIS — J18.9 PNEUMONIA OF BOTH LUNGS DUE TO INFECTIOUS ORGANISM, UNSPECIFIED PART OF LUNG: ICD-10-CM

## 2019-01-02 DIAGNOSIS — Z12.12 SCREENING FOR COLORECTAL CANCER: Primary | ICD-10-CM

## 2019-01-02 DIAGNOSIS — Z12.11 SCREENING FOR COLORECTAL CANCER: Primary | ICD-10-CM

## 2019-01-02 LAB
BASOPHILS # BLD MANUAL: 0.05 THOUSAND/UL (ref 0–0.1)
BASOPHILS NFR MAR MANUAL: 2 % (ref 0–1)
EOSINOPHIL # BLD MANUAL: 0 THOUSAND/UL (ref 0–0.4)
EOSINOPHIL NFR BLD MANUAL: 0 % (ref 0–6)
ERYTHROCYTE [DISTWIDTH] IN BLOOD BY AUTOMATED COUNT: 11.3 % (ref 11.6–15.1)
FLUAV AG SPEC QL IA: NEGATIVE
FLUAV AG SPEC QL: NORMAL
FLUBV AG SPEC QL IA: NEGATIVE
FLUBV AG SPEC QL: NORMAL
HCT VFR BLD AUTO: 39 % (ref 34.8–46.1)
HGB BLD-MCNC: 12.8 G/DL (ref 11.5–15.4)
LYMPHOCYTES # BLD AUTO: 0.95 THOUSAND/UL (ref 0.6–4.47)
LYMPHOCYTES # BLD AUTO: 35 % (ref 14–44)
MCH RBC QN AUTO: 30.8 PG (ref 26.8–34.3)
MCHC RBC AUTO-ENTMCNC: 32.8 G/DL (ref 31.4–37.4)
MCV RBC AUTO: 94 FL (ref 82–98)
MONOCYTES # BLD AUTO: 0.14 THOUSAND/UL (ref 0–1.22)
MONOCYTES NFR BLD: 5 % (ref 4–12)
NEUTROPHILS # BLD MANUAL: 1.57 THOUSAND/UL (ref 1.85–7.62)
NEUTS BAND NFR BLD MANUAL: 11 % (ref 0–8)
NEUTS SEG NFR BLD AUTO: 47 % (ref 43–75)
NRBC BLD AUTO-RTO: 0 /100 WBCS
PLATELET # BLD AUTO: 136 THOUSANDS/UL (ref 149–390)
PLATELET BLD QL SMEAR: ABNORMAL
PMV BLD AUTO: 11 FL (ref 8.9–12.7)
RBC # BLD AUTO: 4.15 MILLION/UL (ref 3.81–5.12)
RBC MORPH BLD: NORMAL
RSV B RNA SPEC QL NAA+PROBE: NORMAL
WBC # BLD AUTO: 2.71 THOUSAND/UL (ref 4.31–10.16)

## 2019-01-02 PROCEDURE — 85007 BL SMEAR W/DIFF WBC COUNT: CPT

## 2019-01-02 PROCEDURE — 71046 X-RAY EXAM CHEST 2 VIEWS: CPT

## 2019-01-02 PROCEDURE — 99213 OFFICE O/P EST LOW 20 MIN: CPT | Performed by: FAMILY MEDICINE

## 2019-01-02 PROCEDURE — 85027 COMPLETE CBC AUTOMATED: CPT

## 2019-01-02 PROCEDURE — 36415 COLL VENOUS BLD VENIPUNCTURE: CPT

## 2019-01-02 PROCEDURE — 87631 RESP VIRUS 3-5 TARGETS: CPT | Performed by: FAMILY MEDICINE

## 2019-01-02 RX ORDER — AZITHROMYCIN 250 MG/1
TABLET, FILM COATED ORAL
Qty: 6 TABLET | Refills: 0 | Status: SHIPPED | OUTPATIENT
Start: 2019-01-02 | End: 2019-01-06

## 2019-01-02 RX ORDER — CEFUROXIME AXETIL 500 MG/1
500 TABLET ORAL EVERY 12 HOURS SCHEDULED
Qty: 20 TABLET | Refills: 0 | Status: SHIPPED | OUTPATIENT
Start: 2019-01-02 | End: 2019-01-12

## 2019-01-02 NOTE — PROGRESS NOTES
Please call the patient regarding her abnormal result    Repeat CBC in morning white blood count is low usually that means a virus but I want to make sure does not go any lower

## 2019-01-02 NOTE — PROGRESS NOTES
Assessment/Plan:  Patient will have a chest x-ray done CBC rapid flu and a mycoplasma titer    No problem-specific Assessment & Plan notes found for this encounter  Diagnoses and all orders for this visit:    Screening for colorectal cancer  -     Occult Blood, Fecal Immunochemical; Future    Pneumonia of both lungs due to infectious organism, unspecified part of lung  -     XR chest pa & lateral; Future  -     CBC and differential; Future  -     Rapid Influenza Screen with Reflex PCR  -     Mycoplasma pneumoniae PCR; Future  -     azithromycin (ZITHROMAX) 250 mg tablet; Take 2 tablets on 1st day then 1 tablet daily for 4 days  -     cefuroxime (CEFTIN) 500 mg tablet; Take 1 tablet (500 mg total) by mouth every 12 (twelve) hours for 10 days          Subjective:      Patient ID: Durenda Cowden is a 66 y o  female  About a five-day history of cough congestion now has fever and chills of persistent cough lung sounds absolutely normal abnormal with rhonchi in all lung fields        The following portions of the patient's history were reviewed and updated as appropriate:   She  has a past medical history of Disease of thyroid gland and PONV (postoperative nausea and vomiting)  She   Patient Active Problem List    Diagnosis Date Noted    Complex tear of lateral meniscus of right knee 2018    Hypothyroidism 2014     She  has a past surgical history that includes Shoulder surgery (Bilateral); Biceps tendon repair (Right); Carpal tunnel release;  section; Exploratory laparotomy; Cholecystectomy; Appendectomy; Umbilical hernia repair; Tubal ligation; Knee arthroscopy w/ meniscal repair (Left); pr knee scope,med/lat menisectomy (Left, 2017); and ARTHROSCOPY KNEE (Right, 2018)  Her family history is not on file  She  reports that she has quit smoking  She has never used smokeless tobacco  She reports that she drinks alcohol  She reports that she does not use drugs    Current Outpatient Prescriptions   Medication Sig Dispense Refill    levothyroxine 75 mcg tablet Take 75 mcg by mouth daily      azithromycin (ZITHROMAX) 250 mg tablet Take 2 tablets on 1st day then 1 tablet daily for 4 days 6 tablet 0    benzonatate (TESSALON PERLES) 100 mg capsule Take 1 capsule (100 mg total) by mouth 3 (three) times a day as needed for cough (Patient not taking: Reported on 1/2/2019 ) 20 capsule 0    cefuroxime (CEFTIN) 500 mg tablet Take 1 tablet (500 mg total) by mouth every 12 (twelve) hours for 10 days 20 tablet 0     No current facility-administered medications for this visit  Current Outpatient Prescriptions on File Prior to Visit   Medication Sig    levothyroxine 75 mcg tablet Take 75 mcg by mouth daily    benzonatate (TESSALON PERLES) 100 mg capsule Take 1 capsule (100 mg total) by mouth 3 (three) times a day as needed for cough (Patient not taking: Reported on 1/2/2019 )     No current facility-administered medications on file prior to visit  She is allergic to erythromycin; betadine [povidone iodine]; and doxycycline       Review of Systems   Constitutional: Negative for activity change, appetite change, diaphoresis, fatigue and fever  HENT: Negative  Eyes: Negative  Respiratory: Positive for cough, chest tightness and wheezing  Negative for apnea and shortness of breath  Cardiovascular: Negative for chest pain, palpitations and leg swelling  Gastrointestinal: Negative for abdominal distention, abdominal pain, anal bleeding, constipation, diarrhea, nausea and vomiting  Endocrine: Negative for cold intolerance, heat intolerance, polydipsia, polyphagia and polyuria  Genitourinary: Negative for difficulty urinating, dysuria, flank pain, hematuria and urgency  Musculoskeletal: Negative for arthralgias, back pain, gait problem, joint swelling and myalgias  Skin: Negative for color change, rash and wound     Allergic/Immunologic: Negative for environmental allergies, food allergies and immunocompromised state  Neurological: Negative for dizziness, seizures, syncope, speech difficulty, numbness and headaches  Hematological: Negative for adenopathy  Does not bruise/bleed easily  Psychiatric/Behavioral: Negative for agitation, behavioral problems, hallucinations, sleep disturbance and suicidal ideas  Objective:      /68 (BP Location: Left arm, Patient Position: Sitting, Cuff Size: Standard)   Temp (!) 103 4 °F (39 7 °C) (Tympanic) Comment (Src): Right Ear  Ht 5' 5" (1 651 m)   Wt 83 5 kg (184 lb)   BMI 30 62 kg/m²          Physical Exam   Constitutional: She is oriented to person, place, and time  She appears well-developed and well-nourished  No distress  HENT:   Head: Normocephalic  Right Ear: External ear normal    Left Ear: External ear normal    Nose: Nose normal    Mouth/Throat: Oropharynx is clear and moist    Eyes: Pupils are equal, round, and reactive to light  Conjunctivae and EOM are normal  Right eye exhibits no discharge  Left eye exhibits no discharge  No scleral icterus  Neck: Normal range of motion  No tracheal deviation present  No thyromegaly present  Cardiovascular: Normal rate, regular rhythm and normal heart sounds  Exam reveals no gallop and no friction rub  No murmur heard  Pulmonary/Chest: Effort normal  No respiratory distress  She has wheezes  Rhonchi all lung fields   Abdominal: Soft  Bowel sounds are normal  She exhibits no mass  There is no tenderness  There is no guarding  Musculoskeletal: She exhibits no edema or deformity  Lymphadenopathy:     She has no cervical adenopathy  Neurological: She is alert and oriented to person, place, and time  No cranial nerve deficit  Skin: Skin is warm and dry  No rash noted  She is not diaphoretic  No erythema  Psychiatric: She has a normal mood and affect   Thought content normal

## 2019-01-03 ENCOUNTER — HOSPITAL ENCOUNTER (EMERGENCY)
Facility: HOSPITAL | Age: 59
Discharge: HOME | End: 2019-01-03
Attending: EMERGENCY MEDICINE | Admitting: EMERGENCY MEDICINE
Payer: COMMERCIAL

## 2019-01-03 ENCOUNTER — LAB (OUTPATIENT)
Dept: LAB | Facility: MEDICAL CENTER | Age: 59
End: 2019-01-03
Payer: COMMERCIAL

## 2019-01-03 VITALS
OXYGEN SATURATION: 95 % | RESPIRATION RATE: 18 BRPM | DIASTOLIC BLOOD PRESSURE: 73 MMHG | TEMPERATURE: 97.1 F | BODY MASS INDEX: 30.63 KG/M2 | HEART RATE: 74 BPM | WEIGHT: 184.08 LBS | SYSTOLIC BLOOD PRESSURE: 151 MMHG

## 2019-01-03 DIAGNOSIS — D72.819 LEUKOPENIA, UNSPECIFIED TYPE: ICD-10-CM

## 2019-01-03 DIAGNOSIS — B34.9 VIRAL SYNDROME: Primary | ICD-10-CM

## 2019-01-03 DIAGNOSIS — R05.9 COUGH WITH FEVER: ICD-10-CM

## 2019-01-03 DIAGNOSIS — R50.9 COUGH WITH FEVER: ICD-10-CM

## 2019-01-03 DIAGNOSIS — D72.819 LEUKOPENIA, UNSPECIFIED TYPE: Primary | ICD-10-CM

## 2019-01-03 LAB
BASOPHILS # BLD MANUAL: 0 THOUSAND/UL (ref 0–0.1)
BASOPHILS NFR MAR MANUAL: 0 % (ref 0–1)
EOSINOPHIL # BLD MANUAL: 0 THOUSAND/UL (ref 0–0.4)
EOSINOPHIL NFR BLD MANUAL: 0 % (ref 0–6)
ERYTHROCYTE [DISTWIDTH] IN BLOOD BY AUTOMATED COUNT: 11.3 % (ref 11.6–15.1)
HCT VFR BLD AUTO: 39.1 % (ref 34.8–46.1)
HGB BLD-MCNC: 13 G/DL (ref 11.5–15.4)
LYMPHOCYTES # BLD AUTO: 0.85 THOUSAND/UL (ref 0.6–4.47)
LYMPHOCYTES # BLD AUTO: 41 % (ref 14–44)
MCH RBC QN AUTO: 31 PG (ref 26.8–34.3)
MCHC RBC AUTO-ENTMCNC: 33.2 G/DL (ref 31.4–37.4)
MCV RBC AUTO: 93 FL (ref 82–98)
MONOCYTES # BLD AUTO: 0.08 THOUSAND/UL (ref 0–1.22)
MONOCYTES NFR BLD: 4 % (ref 4–12)
NEUTROPHILS # BLD MANUAL: 1.14 THOUSAND/UL (ref 1.85–7.62)
NEUTS BAND NFR BLD MANUAL: 23 % (ref 0–8)
NEUTS SEG NFR BLD AUTO: 32 % (ref 43–75)
NRBC BLD AUTO-RTO: 0 /100 WBCS
PLATELET # BLD AUTO: 93 THOUSANDS/UL (ref 149–390)
PLATELET BLD QL SMEAR: ABNORMAL
PMV BLD AUTO: 12.1 FL (ref 8.9–12.7)
POIKILOCYTOSIS BLD QL SMEAR: PRESENT
POLYCHROMASIA BLD QL SMEAR: PRESENT
RBC # BLD AUTO: 4.19 MILLION/UL (ref 3.81–5.12)
RBC MORPH BLD: PRESENT
WBC # BLD AUTO: 2.08 THOUSAND/UL (ref 4.31–10.16)

## 2019-01-03 PROCEDURE — 36415 COLL VENOUS BLD VENIPUNCTURE: CPT

## 2019-01-03 PROCEDURE — 85007 BL SMEAR W/DIFF WBC COUNT: CPT

## 2019-01-03 PROCEDURE — 99283 EMERGENCY DEPT VISIT LOW MDM: CPT

## 2019-01-03 PROCEDURE — 85027 COMPLETE CBC AUTOMATED: CPT

## 2019-01-03 NOTE — ED PROVIDER NOTES
History  Chief Complaint   Patient presents with    Fever - 9 weeks to 74 years     pt having fevers off and on for 4 days with dry cough       Positional changes worsen her cough  Non productive    Has had various testing past 1- 2 days including CXR  FLU , CBC     Started on 2 Abx last night including Azithro and Cefur    Several years ago , had neutropenai  Was followed by her Heme/ Onc doctor who 'observed" this and was going to do bone biopsy but stopped any further thought after her WBC vcame back to normal     She doesn't have an appetite for food but she is drinking "plenty of fluids  In fact my urine is clear "        History provided by:  Patient and spouse   used: No    Fever - 9 weeks to 74 years   Temp source:  Oral  Severity:  Moderate (102 F)  Onset quality:  Gradual  Duration:  4 days  Timing:  Constant  Progression:  Unchanged  Chronicity:  New  Relieved by:  Acetaminophen and ibuprofen  Worsened by:  Nothing  Ineffective treatments:  Acetaminophen and ibuprofen  Associated symptoms: cough and myalgias    Associated symptoms: no chest pain, no chills, no confusion, no congestion, no diarrhea, no dysuria, no ear pain, no headaches, no nausea, no rash, no rhinorrhea, no somnolence, no sore throat and no vomiting    Risk factors: no contaminated food, no contaminated water, no hx of cancer, no immunosuppression, no occupational exposure, no recent sickness, no recent travel and no sick contacts        Prior to Admission Medications   Prescriptions Last Dose Informant Patient Reported? Taking?    azithromycin (ZITHROMAX) 250 mg tablet   No No   Sig: Take 2 tablets on 1st day then 1 tablet daily for 4 days   cefuroxime (CEFTIN) 500 mg tablet   No No   Sig: Take 1 tablet (500 mg total) by mouth every 12 (twelve) hours for 10 days   levothyroxine 75 mcg tablet   Yes No   Sig: Take 75 mcg by mouth daily      Facility-Administered Medications: None       Past Medical History:   Diagnosis Date    Disease of thyroid gland     hypothyroidism    PONV (postoperative nausea and vomiting)        Past Surgical History:   Procedure Laterality Date    APPENDECTOMY      ARTHROSCOPY KNEE Right 2018    Procedure: ARTHROSCOPY KNEE; PARTIAL LATERAL MENISCECTOMY; PARTIAL MEDIAL MENISCECTOMY; POSSIBLE DEBRIDEMENT; removal of loose bodies;  Surgeon: Jessenia Garcia MD;  Location: [facility];  Service: Orthopedics    BICEPS TENDON REPAIR Right     CARPAL TUNNEL RELEASE       SECTION      three     CHOLECYSTECTOMY      EXPLORATORY LAPAROTOMY      KNEE ARTHROSCOPY W/ MENISCAL REPAIR Left     NH KNEE SCOPE,MED/LAT MENISECTOMY Left 2017    Procedure: ARTHROSCOPY KNEE PARTIAL MEDIAL MENISECTOMY ;  Surgeon: Jessenia Garcia MD;  Location: [facility];  Service: Orthopedics    SHOULDER SURGERY Bilateral     TUBAL LIGATION      UMBILICAL HERNIA REPAIR      with mesh       History reviewed  No pertinent family history  I have reviewed and agree with the history as documented  Social History   Substance Use Topics    Smoking status: Former Smoker    Smokeless tobacco: Never Used      Comment: infrequently smokes    Alcohol use Yes      Comment: occassional         Review of Systems   Constitutional: Positive for fever  Negative for chills  HENT: Negative  Negative for congestion, ear pain, rhinorrhea and sore throat  Eyes: Negative  Respiratory: Positive for cough  Cardiovascular: Negative  Negative for chest pain  Gastrointestinal: Negative  Negative for diarrhea, nausea and vomiting  Endocrine: Negative  Genitourinary: Negative  Negative for dysuria  Musculoskeletal: Positive for myalgias  Skin: Negative  Negative for rash  Allergic/Immunologic: Negative  Neurological: Negative  Negative for headaches  Hematological: Negative  Psychiatric/Behavioral: Negative  Negative for confusion         Physical Exam  Physical Exam   Constitutional: She is oriented to person, place, and time  She appears well-developed and well-nourished  No distress  A very pleasant individual; nontoxic   HENT:   Head: Normocephalic and atraumatic  Right Ear: External ear normal    Left Ear: External ear normal    Nose: Nose normal    Mouth/Throat: Oropharynx is clear and moist    Eyes: Pupils are equal, round, and reactive to light  Conjunctivae and EOM are normal    Neck: Normal range of motion  Neck supple  No JVD present  No tracheal deviation present  No thyromegaly present  Cardiovascular: Normal rate, regular rhythm, normal heart sounds and intact distal pulses  Exam reveals no gallop and no friction rub  No murmur heard  Pulmonary/Chest: Effort normal and breath sounds normal  No stridor  No respiratory distress  She has no wheezes  She has no rales  She exhibits no tenderness  Abdominal: Soft  Bowel sounds are normal  She exhibits no distension and no mass  There is no tenderness  There is no rebound and no guarding  No hernia  Genitourinary: Rectal exam shows guaiac negative stool  Musculoskeletal: Normal range of motion  She exhibits no edema, tenderness or deformity  Lymphadenopathy:     She has no cervical adenopathy  Neurological: She is alert and oriented to person, place, and time  She displays normal reflexes  No cranial nerve deficit or sensory deficit  She exhibits normal muscle tone  Coordination normal    Skin: Skin is warm and dry  Capillary refill takes less than 2 seconds  No rash noted  She is not diaphoretic  No erythema  No pallor  Psychiatric: She has a normal mood and affect  Her behavior is normal  Judgment and thought content normal    Nursing note and vitals reviewed        Vital Signs  ED Triage Vitals [01/03/19 1751]   Temperature Pulse Respirations Blood Pressure SpO2   (!) 97 1 °F (36 2 °C) 74 18 151/73 95 %      Temp Source Heart Rate Source Patient Position - Orthostatic VS BP Location FiO2 (%)   Temporal Left Sitting Left arm --      Pain Score       2           Vitals:    01/03/19 1751   BP: 151/73   Pulse: 74   Patient Position - Orthostatic VS: Sitting       Visual Acuity      ED Medications  Medications - No data to display    Diagnostic Studies  Results Reviewed     None                 No orders to display              Procedures  Procedures       Phone Contacts  ED Phone Contact    ED Course  ED Course as of Jan 03 1912   Thu Jan 03, 2019   1837 Pt declines Blood Cx                                  MDM  Number of Diagnoses or Management Options  Viral syndrome:      Amount and/or Complexity of Data Reviewed  Review and summarize past medical records: yes    Patient Progress  Patient progress: stable    CritCare Time    Disposition  Final diagnoses:   Viral syndrome     Time reflects when diagnosis was documented in both MDM as applicable and the Disposition within this note     Time User Action Codes Description Comment    1/3/2019  6:48 PM Norvel Curling Add [B34 9] Viral syndrome       ED Disposition     ED Disposition Condition Comment    Discharge  Randi Mathews discharge to home/self care  Condition at discharge: Stable        Follow-up Information     Follow up With Specialties Details Why Contact Info    your Hematologist/ Oncologist  In 1 day            Patient's Medications   Discharge Prescriptions    No medications on file     No discharge procedures on file      ED Provider  Electronically Signed by           Aaliyah Mcclelland MD  01/03/19 86

## 2019-01-03 NOTE — PROGRESS NOTES
Please call the patient regarding her abnormal result    Call find out did she go to the hospital last night because if she did not she needs a repeat blood count this morning to be done at the hospital stat

## 2019-01-03 NOTE — ED NOTES
Was seen at the doctors today and yesterday  Had a CBC drawn both days and had a CXR done  States she has had 104 degree temp for three days, 97 1 for us today  Took 2 Motrin before she came to the ED       Ondina Moreno RN  01/03/19 3309

## 2019-01-03 NOTE — DISCHARGE INSTRUCTIONS
Viral Syndrome   WHAT YOU NEED TO KNOW:   Viral syndrome is a term used for a viral infection that has no clear cause  Viruses are spread easily from person to person through the air and on shared items  DISCHARGE INSTRUCTIONS:   Call 911 for the following:   · You have a seizure  · You cannot be woken  · You have chest pain or trouble breathing  Seek care immediately if:   · You have a stiff neck, a bad headache, and sensitivity to light  · You feel weak, dizzy, or confused  · You stop urinating or urinate a lot less than normal      · You cough up blood or thick, yellow or green, mucus  · You have severe abdominal pain or your abdomen is larger than usual   Contact your healthcare provider if:   · Your symptoms do not get better with treatment, or get worse, after 3 days  · You have a rash or ear pain  · You have burning when you urinate  · You have questions or concerns about your condition or care  Medicines: You may  need any of the following:  · Acetaminophen  decreases pain and fever  It is available without a doctor's order  Ask how much medicine to take and how often to take it  Follow directions  Acetaminophen can cause liver damage if not taken correctly  · NSAIDs , such as ibuprofen, help decrease swelling, pain, and fever  NSAIDs can cause stomach bleeding or kidney problems in certain people  If you take blood thinner medicine, always ask your healthcare provider if NSAIDs are safe for you  Always read the medicine label and follow directions  · Cold medicine  helps decrease swelling, control a cough, and relieve chest or nasal congestion  · Saline nasal spray  helps decrease nasal congestion  · Take your medicine as directed  Contact your healthcare provider if you think your medicine is not helping or if you have side effects  Tell him of her if you are allergic to any medicine  Keep a list of the medicines, vitamins, and herbs you take   Include the amounts, and when and why you take them  Bring the list or the pill bottles to follow-up visits  Carry your medicine list with you in case of an emergency  Manage your symptoms:   · Drink liquids as directed  to prevent dehydration  Ask how much liquid to drink each day and which liquids are best for you  Ask if you should drink an oral rehydration solution (ORS)  An ORS has the right amounts of water, salts, and sugar you need to replace body fluids  This may help prevent dehydration caused by vomiting or diarrhea  Do not drink liquids with caffeine  Drinks with caffeine can make dehydration worse  · Get plenty of rest  to help your body heal  Take naps throughout the day  Ask your healthcare provider when you can return to work and your normal activities  · Use a cool mist humidifier  to help you breathe easier if you have nasal or chest congestion  Ask your healthcare provider how to use a cool mist humidifier  · Eat honey or use cough drops  to help decrease throat discomfort  Ask your healthcare provider how much honey you should eat each day  Cough drops are available without a doctor's order  Follow directions for taking cough drops  · Do not smoke and stay away from others who smoke  Nicotine and other chemicals in cigarettes and cigars can cause lung damage  Smoking can also delay healing  Ask your healthcare provider for information if you currently smoke and need help to quit  E-cigarettes or smokeless tobacco still contain nicotine  Talk to your healthcare provider before you use these products  · Wash your hands frequently  to prevent the spread of germs to others  Use soap and water  Use gel hand  when soap and water are not available  Wash your hands after you use the bathroom, cough, or sneeze  Wash your hands before you prepare or eat food    Follow up with your healthcare provider as directed:  Write down your questions so you remember to ask them during your visits  © 2017 [facility] Information is for End User's use only and may not be sold, redistributed or otherwise used for commercial purposes  All illustrations and images included in CareNotes® are the copyrighted property of A D A M [employer]  or [employer]  The above information is an  only  It is not intended as medical advice for individual conditions or treatments  Talk to your doctor, nurse or pharmacist before following any medical regimen to see if it is safe and effective for you

## 2019-01-04 DIAGNOSIS — D69.6 THROMBOCYTOPENIA (HCC): ICD-10-CM

## 2019-01-04 DIAGNOSIS — D72.819 LEUKOPENIA, UNSPECIFIED TYPE: Primary | ICD-10-CM

## 2019-01-04 NOTE — PROGRESS NOTES
Please call the patient regarding her abnormal result    Patient's white blood count has fallen to 2000 platelets are down to 93,000 patient needs to be hospitalized immediately I will speak personally to the patient if he get her on the phone

## 2019-01-05 ENCOUNTER — APPOINTMENT (OUTPATIENT)
Dept: LAB | Facility: HOSPITAL | Age: 59
End: 2019-01-05
Attending: FAMILY MEDICINE
Payer: COMMERCIAL

## 2019-01-05 DIAGNOSIS — D69.6 THROMBOCYTOPENIA (HCC): Primary | ICD-10-CM

## 2019-01-05 DIAGNOSIS — I95.0 IDIOPATHIC HYPOTENSION: ICD-10-CM

## 2019-01-05 LAB
ANION GAP SERPL CALCULATED.3IONS-SCNC: 9 MMOL/L (ref 4–13)
ANISOCYTOSIS BLD QL SMEAR: PRESENT
BASOPHILS # BLD MANUAL: 0 THOUSAND/UL (ref 0–0.1)
BASOPHILS NFR MAR MANUAL: 0 % (ref 0–1)
BUN SERPL-MCNC: 11 MG/DL (ref 5–25)
CALCIUM SERPL-MCNC: 9 MG/DL (ref 8.3–10.1)
CHLORIDE SERPL-SCNC: 103 MMOL/L (ref 100–108)
CO2 SERPL-SCNC: 27 MMOL/L (ref 21–32)
CREAT SERPL-MCNC: 0.86 MG/DL (ref 0.6–1.3)
EOSINOPHIL # BLD MANUAL: 0 THOUSAND/UL (ref 0–0.4)
EOSINOPHIL NFR BLD MANUAL: 0 % (ref 0–6)
ERYTHROCYTE [DISTWIDTH] IN BLOOD BY AUTOMATED COUNT: 11.3 % (ref 11.6–15.1)
GFR SERPL CREATININE-BSD FRML MDRD: 76 ML/MIN/1.73SQ M
GLUCOSE SERPL-MCNC: 128 MG/DL (ref 65–140)
HCT VFR BLD AUTO: 37.5 % (ref 34.8–46.1)
HGB BLD-MCNC: 12.7 G/DL (ref 11.5–15.4)
LYMPHOCYTES # BLD AUTO: 2.46 THOUSAND/UL (ref 0.6–4.47)
LYMPHOCYTES # BLD AUTO: 53 % (ref 14–44)
MCH RBC QN AUTO: 31.2 PG (ref 26.8–34.3)
MCHC RBC AUTO-ENTMCNC: 33.9 G/DL (ref 31.4–37.4)
MCV RBC AUTO: 92 FL (ref 82–98)
MONOCYTES # BLD AUTO: 0.23 THOUSAND/UL (ref 0–1.22)
MONOCYTES NFR BLD: 5 % (ref 4–12)
NEUTROPHILS # BLD MANUAL: 0.65 THOUSAND/UL (ref 1.85–7.62)
NEUTS SEG NFR BLD AUTO: 14 % (ref 43–75)
NRBC BLD AUTO-RTO: 0 /100 WBCS
PLATELET # BLD AUTO: 125 THOUSANDS/UL (ref 149–390)
PLATELET BLD QL SMEAR: ABNORMAL
PMV BLD AUTO: 10.5 FL (ref 8.9–12.7)
POTASSIUM SERPL-SCNC: 3.7 MMOL/L (ref 3.5–5.3)
RBC # BLD AUTO: 4.07 MILLION/UL (ref 3.81–5.12)
SODIUM SERPL-SCNC: 139 MMOL/L (ref 136–145)
TOTAL CELLS COUNTED SPEC: 100
VARIANT LYMPHS # BLD AUTO: 28 %
WBC # BLD AUTO: 4.65 THOUSAND/UL (ref 4.31–10.16)

## 2019-01-05 PROCEDURE — 85027 COMPLETE CBC AUTOMATED: CPT | Performed by: FAMILY MEDICINE

## 2019-01-05 PROCEDURE — 36415 COLL VENOUS BLD VENIPUNCTURE: CPT | Performed by: FAMILY MEDICINE

## 2019-01-05 PROCEDURE — 85007 BL SMEAR W/DIFF WBC COUNT: CPT | Performed by: FAMILY MEDICINE

## 2019-01-05 PROCEDURE — 80048 BASIC METABOLIC PNL TOTAL CA: CPT | Performed by: FAMILY MEDICINE

## 2019-01-08 ENCOUNTER — LAB (OUTPATIENT)
Dept: LAB | Facility: MEDICAL CENTER | Age: 59
End: 2019-01-08
Payer: COMMERCIAL

## 2019-01-08 DIAGNOSIS — D72.819 LEUKOPENIA, UNSPECIFIED TYPE: ICD-10-CM

## 2019-01-08 DIAGNOSIS — D69.6 THROMBOCYTOPENIA (HCC): ICD-10-CM

## 2019-01-08 LAB
BASOPHILS # BLD MANUAL: 0.07 THOUSAND/UL (ref 0–0.1)
BASOPHILS NFR MAR MANUAL: 1 % (ref 0–1)
EOSINOPHIL # BLD MANUAL: 0.07 THOUSAND/UL (ref 0–0.4)
EOSINOPHIL NFR BLD MANUAL: 1 % (ref 0–6)
ERYTHROCYTE [DISTWIDTH] IN BLOOD BY AUTOMATED COUNT: 11.5 % (ref 11.6–15.1)
HCT VFR BLD AUTO: 36.9 % (ref 34.8–46.1)
HGB BLD-MCNC: 12 G/DL (ref 11.5–15.4)
LYMPHOCYTES # BLD AUTO: 5.44 THOUSAND/UL (ref 0.6–4.47)
LYMPHOCYTES # BLD AUTO: 73 % (ref 14–44)
MCH RBC QN AUTO: 30.5 PG (ref 26.8–34.3)
MCHC RBC AUTO-ENTMCNC: 32.5 G/DL (ref 31.4–37.4)
MCV RBC AUTO: 94 FL (ref 82–98)
MONOCYTES # BLD AUTO: 0.3 THOUSAND/UL (ref 0–1.22)
MONOCYTES NFR BLD: 4 % (ref 4–12)
NEUTROPHILS # BLD MANUAL: 1.34 THOUSAND/UL (ref 1.85–7.62)
NEUTS SEG NFR BLD AUTO: 18 % (ref 43–75)
NRBC BLD AUTO-RTO: 0 /100 WBCS
PLATELET # BLD AUTO: 298 THOUSANDS/UL (ref 149–390)
PLATELET BLD QL SMEAR: ADEQUATE
PMV BLD AUTO: 10.6 FL (ref 8.9–12.7)
RBC # BLD AUTO: 3.93 MILLION/UL (ref 3.81–5.12)
RBC MORPH BLD: NORMAL
VARIANT LYMPHS # BLD AUTO: 3 %
WBC # BLD AUTO: 7.45 THOUSAND/UL (ref 4.31–10.16)

## 2019-01-08 PROCEDURE — 85007 BL SMEAR W/DIFF WBC COUNT: CPT

## 2019-01-08 PROCEDURE — 85027 COMPLETE CBC AUTOMATED: CPT

## 2019-01-08 PROCEDURE — 36415 COLL VENOUS BLD VENIPUNCTURE: CPT

## 2019-01-08 NOTE — PROGRESS NOTES
Please call the patient regarding her abnormal result    Differential on the CBC does show a predominance of lymphocytes most likely due to viral infection repeat CBC in 3 weeks

## 2019-01-21 ENCOUNTER — LAB (OUTPATIENT)
Dept: LAB | Facility: HOSPITAL | Age: 59
End: 2019-01-21
Attending: FAMILY MEDICINE
Payer: COMMERCIAL

## 2019-01-21 DIAGNOSIS — Z09 FOLLOW-UP EXAMINATION: Primary | ICD-10-CM

## 2019-01-21 DIAGNOSIS — Z09 FOLLOW-UP EXAMINATION: ICD-10-CM

## 2019-01-21 DIAGNOSIS — R89.9 ABNORMAL LABORATORY TEST: ICD-10-CM

## 2019-01-21 DIAGNOSIS — J11.1 INFLUENZA: ICD-10-CM

## 2019-01-21 DIAGNOSIS — J11.1 INFLUENZA: Primary | ICD-10-CM

## 2019-01-21 LAB
BASOPHILS # BLD AUTO: 0.04 THOUSANDS/ΜL (ref 0–0.1)
BASOPHILS NFR BLD AUTO: 1 % (ref 0–1)
EOSINOPHIL # BLD AUTO: 0.03 THOUSAND/ΜL (ref 0–0.61)
EOSINOPHIL NFR BLD AUTO: 1 % (ref 0–6)
ERYTHROCYTE [DISTWIDTH] IN BLOOD BY AUTOMATED COUNT: 11.6 % (ref 11.6–15.1)
FLUAV AG SPEC QL IA: NEGATIVE
FLUBV AG SPEC QL IA: NEGATIVE
HCT VFR BLD AUTO: 37.5 % (ref 34.8–46.1)
HGB BLD-MCNC: 12.3 G/DL (ref 11.5–15.4)
IMM GRANULOCYTES # BLD AUTO: 0.02 THOUSAND/UL (ref 0–0.2)
IMM GRANULOCYTES NFR BLD AUTO: 0 % (ref 0–2)
LYMPHOCYTES # BLD AUTO: 2.28 THOUSANDS/ΜL (ref 0.6–4.47)
LYMPHOCYTES NFR BLD AUTO: 40 % (ref 14–44)
MCH RBC QN AUTO: 31 PG (ref 26.8–34.3)
MCHC RBC AUTO-ENTMCNC: 32.8 G/DL (ref 31.4–37.4)
MCV RBC AUTO: 95 FL (ref 82–98)
MONOCYTES # BLD AUTO: 0.31 THOUSAND/ΜL (ref 0.17–1.22)
MONOCYTES NFR BLD AUTO: 5 % (ref 4–12)
NEUTROPHILS # BLD AUTO: 3.02 THOUSANDS/ΜL (ref 1.85–7.62)
NEUTS SEG NFR BLD AUTO: 53 % (ref 43–75)
NRBC BLD AUTO-RTO: 0 /100 WBCS
PLATELET # BLD AUTO: 240 THOUSANDS/UL (ref 149–390)
PMV BLD AUTO: 8.9 FL (ref 8.9–12.7)
RBC # BLD AUTO: 3.97 MILLION/UL (ref 3.81–5.12)
WBC # BLD AUTO: 5.7 THOUSAND/UL (ref 4.31–10.16)

## 2019-01-21 PROCEDURE — 85025 COMPLETE CBC W/AUTO DIFF WBC: CPT

## 2019-01-21 PROCEDURE — 36415 COLL VENOUS BLD VENIPUNCTURE: CPT

## 2019-01-21 PROCEDURE — 87631 RESP VIRUS 3-5 TARGETS: CPT

## 2019-01-21 RX ORDER — OSELTAMIVIR PHOSPHATE 75 MG/1
75 CAPSULE ORAL 2 TIMES DAILY
Qty: 10 CAPSULE | Refills: 0 | Status: SHIPPED | OUTPATIENT
Start: 2019-01-21 | End: 2019-01-26

## 2019-01-22 LAB
FLUAV AG SPEC QL: NORMAL
FLUBV AG SPEC QL: NORMAL
RSV B RNA SPEC QL NAA+PROBE: NORMAL

## 2019-01-23 ENCOUNTER — LAB (OUTPATIENT)
Dept: LAB | Facility: MEDICAL CENTER | Age: 59
End: 2019-01-23
Payer: COMMERCIAL

## 2019-01-23 DIAGNOSIS — R50.9 FEVER, UNSPECIFIED FEVER CAUSE: Primary | ICD-10-CM

## 2019-01-23 DIAGNOSIS — R50.9 FEVER, UNSPECIFIED FEVER CAUSE: ICD-10-CM

## 2019-01-23 LAB
BACTERIA UR QL AUTO: ABNORMAL /HPF
BILIRUB UR QL STRIP: NEGATIVE
CLARITY UR: CLEAR
COLOR UR: YELLOW
GLUCOSE UR STRIP-MCNC: NEGATIVE MG/DL
HGB UR QL STRIP.AUTO: ABNORMAL
HYALINE CASTS #/AREA URNS LPF: ABNORMAL /LPF
KETONES UR STRIP-MCNC: NEGATIVE MG/DL
LEUKOCYTE ESTERASE UR QL STRIP: NEGATIVE
NITRITE UR QL STRIP: NEGATIVE
NON-SQ EPI CELLS URNS QL MICRO: ABNORMAL /HPF
PH UR STRIP.AUTO: 6 [PH] (ref 4.5–8)
PROT UR STRIP-MCNC: NEGATIVE MG/DL
RBC #/AREA URNS AUTO: ABNORMAL /HPF
SP GR UR STRIP.AUTO: 1.02 (ref 1–1.03)
UROBILINOGEN UR QL STRIP.AUTO: 0.2 E.U./DL
WBC #/AREA URNS AUTO: ABNORMAL /HPF

## 2019-01-23 PROCEDURE — 81001 URINALYSIS AUTO W/SCOPE: CPT

## 2019-01-23 PROCEDURE — 87086 URINE CULTURE/COLONY COUNT: CPT

## 2019-01-23 PROCEDURE — 36415 COLL VENOUS BLD VENIPUNCTURE: CPT

## 2019-01-23 PROCEDURE — 86618 LYME DISEASE ANTIBODY: CPT

## 2019-01-24 ENCOUNTER — HOSPITAL ENCOUNTER (EMERGENCY)
Facility: HOSPITAL | Age: 59
Discharge: HOME | End: 2019-01-24
Attending: EMERGENCY MEDICINE
Payer: COMMERCIAL

## 2019-01-24 ENCOUNTER — APPOINTMENT (EMERGENCY)
Dept: ULTRASOUND IMAGING | Facility: HOSPITAL | Age: 59
End: 2019-01-24
Payer: COMMERCIAL

## 2019-01-24 ENCOUNTER — APPOINTMENT (EMERGENCY)
Dept: CT IMAGING | Facility: HOSPITAL | Age: 59
End: 2019-01-24
Payer: COMMERCIAL

## 2019-01-24 ENCOUNTER — OFFICE VISIT (OUTPATIENT)
Dept: FAMILY MEDICINE CLINIC | Facility: CLINIC | Age: 59
End: 2019-01-24
Payer: COMMERCIAL

## 2019-01-24 VITALS
DIASTOLIC BLOOD PRESSURE: 76 MMHG | WEIGHT: 173 LBS | BODY MASS INDEX: 28.82 KG/M2 | TEMPERATURE: 100.9 F | SYSTOLIC BLOOD PRESSURE: 146 MMHG | OXYGEN SATURATION: 95 % | HEART RATE: 97 BPM | HEIGHT: 65 IN

## 2019-01-24 VITALS
SYSTOLIC BLOOD PRESSURE: 104 MMHG | TEMPERATURE: 100.6 F | OXYGEN SATURATION: 96 % | BODY MASS INDEX: 28.76 KG/M2 | WEIGHT: 172.62 LBS | HEIGHT: 65 IN | RESPIRATION RATE: 20 BRPM | DIASTOLIC BLOOD PRESSURE: 58 MMHG | HEART RATE: 75 BPM

## 2019-01-24 DIAGNOSIS — Z87.891 QUIT SMOKING: ICD-10-CM

## 2019-01-24 DIAGNOSIS — R50.9 RECURRENT FEVER, CAUSE UNKNOWN: ICD-10-CM

## 2019-01-24 DIAGNOSIS — R10.30 LOWER ABDOMINAL PAIN: ICD-10-CM

## 2019-01-24 DIAGNOSIS — R31.9 HEMATURIA, UNSPECIFIED TYPE: Primary | ICD-10-CM

## 2019-01-24 DIAGNOSIS — R07.89 STABBING CHEST PAIN: Primary | ICD-10-CM

## 2019-01-24 PROBLEM — Z90.49 HX OF APPENDECTOMY: Status: ACTIVE | Noted: 2019-01-24

## 2019-01-24 PROBLEM — S92.309A CLOSED FRACTURE OF METATARSAL BONE: Status: ACTIVE | Noted: 2019-01-24

## 2019-01-24 PROBLEM — M79.609 PAIN IN LIMB: Status: ACTIVE | Noted: 2019-01-24

## 2019-01-24 PROBLEM — M25.569 KNEE PAIN: Status: ACTIVE | Noted: 2019-01-24

## 2019-01-24 PROBLEM — Z78.0 MENOPAUSE: Status: ACTIVE | Noted: 2019-01-24

## 2019-01-24 PROBLEM — M22.40 CHONDROMALACIA PATELLAE: Status: ACTIVE | Noted: 2019-01-24

## 2019-01-24 PROBLEM — M25.519 SHOULDER PAIN: Status: ACTIVE | Noted: 2019-01-24

## 2019-01-24 PROBLEM — M75.120 FULL THICKNESS ROTATOR CUFF TEAR: Status: ACTIVE | Noted: 2019-01-24

## 2019-01-24 PROBLEM — S46.819A STRAIN OF SUPRASPINATUS MUSCLE OR TENDON: Status: ACTIVE | Noted: 2019-01-24

## 2019-01-24 PROBLEM — Z90.49 HX OF CHOLECYSTECTOMY: Status: ACTIVE | Noted: 2019-01-24

## 2019-01-24 LAB
ALBUMIN SERPL BCP-MCNC: 3.5 G/DL (ref 3.5–5)
ALP SERPL-CCNC: 66 U/L (ref 46–116)
ALT SERPL W P-5'-P-CCNC: 30 U/L (ref 12–78)
ANION GAP SERPL CALCULATED.3IONS-SCNC: 11 MMOL/L (ref 4–13)
APTT PPP: 34 SECONDS (ref 26–38)
AST SERPL W P-5'-P-CCNC: 19 U/L (ref 5–45)
ATRIAL RATE: 74 BPM
BACTERIA UR CULT: NORMAL
BACTERIA UR QL AUTO: ABNORMAL /HPF
BASOPHILS # BLD MANUAL: 0 THOUSAND/UL (ref 0–0.1)
BASOPHILS NFR MAR MANUAL: 0 % (ref 0–1)
BILIRUB SERPL-MCNC: 0.4 MG/DL (ref 0.2–1)
BILIRUB UR QL STRIP: NEGATIVE
BUN SERPL-MCNC: 10 MG/DL (ref 5–25)
CALCIUM SERPL-MCNC: 8.8 MG/DL (ref 8.3–10.1)
CAOX CRY URNS QL MICRO: ABNORMAL /HPF
CHLORIDE SERPL-SCNC: 103 MMOL/L (ref 100–108)
CLARITY UR: ABNORMAL
CO2 SERPL-SCNC: 27 MMOL/L (ref 21–32)
COLOR UR: ABNORMAL
CREAT SERPL-MCNC: 0.85 MG/DL (ref 0.6–1.3)
EOSINOPHIL # BLD MANUAL: 0 THOUSAND/UL (ref 0–0.4)
EOSINOPHIL NFR BLD MANUAL: 0 % (ref 0–6)
ERYTHROCYTE [DISTWIDTH] IN BLOOD BY AUTOMATED COUNT: 11.5 % (ref 11.6–15.1)
EXT PREG TEST URINE: NEGATIVE
GFR SERPL CREATININE-BSD FRML MDRD: 77 ML/MIN/1.73SQ M
GLUCOSE SERPL-MCNC: 95 MG/DL (ref 65–140)
GLUCOSE UR STRIP-MCNC: NEGATIVE MG/DL
HCT VFR BLD AUTO: 35.9 % (ref 34.8–46.1)
HGB BLD-MCNC: 11.8 G/DL (ref 11.5–15.4)
HGB UR QL STRIP.AUTO: ABNORMAL
INR PPP: 1.11 (ref 0.86–1.17)
KETONES UR STRIP-MCNC: ABNORMAL MG/DL
LACTATE SERPL-SCNC: 0.6 MMOL/L (ref 0.5–2)
LEUKOCYTE ESTERASE UR QL STRIP: NEGATIVE
LG PLATELETS BLD QL SMEAR: PRESENT
LIPASE SERPL-CCNC: 154 U/L (ref 73–393)
LYMPHOCYTES # BLD AUTO: 2.38 THOUSAND/UL (ref 0.6–4.47)
LYMPHOCYTES # BLD AUTO: 51 % (ref 14–44)
MCH RBC QN AUTO: 30.6 PG (ref 26.8–34.3)
MCHC RBC AUTO-ENTMCNC: 32.9 G/DL (ref 31.4–37.4)
MCV RBC AUTO: 93 FL (ref 82–98)
MONOCYTES # BLD AUTO: 0.19 THOUSAND/UL (ref 0–1.22)
MONOCYTES NFR BLD: 4 % (ref 4–12)
MUCOUS THREADS UR QL AUTO: ABNORMAL
NEUTROPHILS # BLD MANUAL: 1.86 THOUSAND/UL (ref 1.85–7.62)
NEUTS SEG NFR BLD AUTO: 40 % (ref 43–75)
NITRITE UR QL STRIP: NEGATIVE
NON-SQ EPI CELLS URNS QL MICRO: ABNORMAL /HPF
NRBC BLD AUTO-RTO: 0 /100 WBCS
P AXIS: 34 DEGREES
PH UR STRIP.AUTO: 5.5 [PH] (ref 4.5–8)
PLATELET # BLD AUTO: 146 THOUSANDS/UL (ref 149–390)
PLATELET BLD QL SMEAR: ABNORMAL
PMV BLD AUTO: 10.2 FL (ref 8.9–12.7)
POTASSIUM SERPL-SCNC: 3.6 MMOL/L (ref 3.5–5.3)
PR INTERVAL: 140 MS
PROT SERPL-MCNC: 6.9 G/DL (ref 6.4–8.2)
PROT UR STRIP-MCNC: NEGATIVE MG/DL
PROTHROMBIN TIME: 13.8 SECONDS (ref 11.8–14.2)
QRS AXIS: 54 DEGREES
QRSD INTERVAL: 88 MS
QT INTERVAL: 396 MS
QTC INTERVAL: 439 MS
RBC # BLD AUTO: 3.86 MILLION/UL (ref 3.81–5.12)
RBC #/AREA URNS AUTO: ABNORMAL /HPF
SODIUM SERPL-SCNC: 141 MMOL/L (ref 136–145)
SP GR UR STRIP.AUTO: >=1.03 (ref 1–1.03)
T WAVE AXIS: 31 DEGREES
TOTAL CELLS COUNTED SPEC: 100
TROPONIN I SERPL-MCNC: <0.02 NG/ML
TSH SERPL DL<=0.05 MIU/L-ACNC: 6.25 UIU/ML (ref 0.36–3.74)
UROBILINOGEN UR QL STRIP.AUTO: 0.2 E.U./DL
VARIANT LYMPHS # BLD AUTO: 5 %
VENTRICULAR RATE: 74 BPM
WBC # BLD AUTO: 4.66 THOUSAND/UL (ref 4.31–10.16)
WBC #/AREA URNS AUTO: ABNORMAL /HPF

## 2019-01-24 PROCEDURE — 85610 PROTHROMBIN TIME: CPT | Performed by: EMERGENCY MEDICINE

## 2019-01-24 PROCEDURE — 99284 EMERGENCY DEPT VISIT MOD MDM: CPT

## 2019-01-24 PROCEDURE — 93010 ELECTROCARDIOGRAM REPORT: CPT | Performed by: INTERNAL MEDICINE

## 2019-01-24 PROCEDURE — 83690 ASSAY OF LIPASE: CPT | Performed by: EMERGENCY MEDICINE

## 2019-01-24 PROCEDURE — 87040 BLOOD CULTURE FOR BACTERIA: CPT | Performed by: EMERGENCY MEDICINE

## 2019-01-24 PROCEDURE — 99204 OFFICE O/P NEW MOD 45 MIN: CPT | Performed by: NURSE PRACTITIONER

## 2019-01-24 PROCEDURE — 71275 CT ANGIOGRAPHY CHEST: CPT

## 2019-01-24 PROCEDURE — 80053 COMPREHEN METABOLIC PANEL: CPT | Performed by: EMERGENCY MEDICINE

## 2019-01-24 PROCEDURE — 96374 THER/PROPH/DIAG INJ IV PUSH: CPT

## 2019-01-24 PROCEDURE — 36415 COLL VENOUS BLD VENIPUNCTURE: CPT | Performed by: EMERGENCY MEDICINE

## 2019-01-24 PROCEDURE — 85007 BL SMEAR W/DIFF WBC COUNT: CPT | Performed by: EMERGENCY MEDICINE

## 2019-01-24 PROCEDURE — 3008F BODY MASS INDEX DOCD: CPT | Performed by: NURSE PRACTITIONER

## 2019-01-24 PROCEDURE — 74177 CT ABD & PELVIS W/CONTRAST: CPT

## 2019-01-24 PROCEDURE — 85027 COMPLETE CBC AUTOMATED: CPT | Performed by: EMERGENCY MEDICINE

## 2019-01-24 PROCEDURE — 84484 ASSAY OF TROPONIN QUANT: CPT | Performed by: EMERGENCY MEDICINE

## 2019-01-24 PROCEDURE — 81001 URINALYSIS AUTO W/SCOPE: CPT | Performed by: EMERGENCY MEDICINE

## 2019-01-24 PROCEDURE — 93005 ELECTROCARDIOGRAM TRACING: CPT

## 2019-01-24 PROCEDURE — 76856 US EXAM PELVIC COMPLETE: CPT

## 2019-01-24 PROCEDURE — 85730 THROMBOPLASTIN TIME PARTIAL: CPT | Performed by: EMERGENCY MEDICINE

## 2019-01-24 PROCEDURE — 96375 TX/PRO/DX INJ NEW DRUG ADDON: CPT

## 2019-01-24 PROCEDURE — 81025 URINE PREGNANCY TEST: CPT | Performed by: EMERGENCY MEDICINE

## 2019-01-24 PROCEDURE — 76830 TRANSVAGINAL US NON-OB: CPT

## 2019-01-24 PROCEDURE — 83605 ASSAY OF LACTIC ACID: CPT | Performed by: EMERGENCY MEDICINE

## 2019-01-24 PROCEDURE — 96361 HYDRATE IV INFUSION ADD-ON: CPT

## 2019-01-24 PROCEDURE — 84443 ASSAY THYROID STIM HORMONE: CPT | Performed by: EMERGENCY MEDICINE

## 2019-01-24 RX ORDER — IBUPROFEN 800 MG/1
TABLET ORAL EVERY 6 HOURS PRN
COMMUNITY
End: 2019-12-31 | Stop reason: ALTCHOICE

## 2019-01-24 RX ORDER — ACETAMINOPHEN 325 MG/1
650 TABLET ORAL ONCE
Status: COMPLETED | OUTPATIENT
Start: 2019-01-24 | End: 2019-01-24

## 2019-01-24 RX ORDER — MORPHINE SULFATE 4 MG/ML
4 INJECTION, SOLUTION INTRAMUSCULAR; INTRAVENOUS ONCE
Status: COMPLETED | OUTPATIENT
Start: 2019-01-24 | End: 2019-01-24

## 2019-01-24 RX ORDER — ONDANSETRON 2 MG/ML
4 INJECTION INTRAMUSCULAR; INTRAVENOUS ONCE
Status: COMPLETED | OUTPATIENT
Start: 2019-01-24 | End: 2019-01-24

## 2019-01-24 RX ADMIN — SODIUM CHLORIDE 1000 ML: 0.9 INJECTION, SOLUTION INTRAVENOUS at 21:08

## 2019-01-24 RX ADMIN — IOHEXOL 100 ML: 350 INJECTION, SOLUTION INTRAVENOUS at 20:56

## 2019-01-24 RX ADMIN — ACETAMINOPHEN 650 MG: 325 TABLET, FILM COATED ORAL at 21:06

## 2019-01-24 RX ADMIN — MORPHINE SULFATE 4 MG: 4 INJECTION INTRAVENOUS at 22:20

## 2019-01-24 RX ADMIN — ONDANSETRON HYDROCHLORIDE 4 MG: 2 SOLUTION INTRAMUSCULAR; INTRAVENOUS at 22:20

## 2019-01-24 NOTE — PROGRESS NOTES
Please call the patient regarding her abnormal result    Small amount of microscopic blood in the urine but no white blood cells and no bacteria to suggest infection patient should see Dr Chaz Perez to be evaluated for microscopic hematuria

## 2019-01-24 NOTE — PROGRESS NOTES
Assessment/Plan:    No problem-specific Assessment & Plan notes found for this encounter  Diagnoses and all orders for this visit:    Stabbing chest pain  Comments:  Pt has cough and 8/10 pain with inspiration  Orders:  -     CTA chest pe study; Future    Lower abdominal pain  Comments:  Abdomen extremely tender to touch, generalized rebound tenderness noted, pt sent to [facility] ED    Recurrent fever, cause unknown  Comments:  Pt has intermittent fevers today 100 9 over the past month  Orders:  -     CTA chest pe study; Future    Quit smoking  Comments:  Pt reports she quit smoking 1 mos ago  Other orders  -     ibuprofen (MOTRIN) 800 mg tablet; Take by mouth every 6 (six) hours as needed for mild pain  -     CBC and differential; Future  -     Comprehensive metabolic panel  -     XR chest pa & lateral; Future          Subjective:      Patient ID: Kristen Spann is a 66 y o  female  66 yr old female arrives to [facility] care  Pt presents with fever 100 9 which has reoccurred since Jan 1, 2019  Pt reports she has been consuming a lot of Ibuprofen over the last several weeks due to recurrent fevers and developed onset of generalized abdominal pain today along with return of fever  Pt abdomen acutely tender to touch, pt has extensive hx to include appendectomy, cholecystectomy , 3 csections and a bladder tuck  Review of last few weeks of labs appears like pt had viral syndrome but she did not have the abdominal pain at that time  Pt denies nausea, vomiting or diarrhea but of note blood in urine  Urine culture is pending, however I feel that UTI is unlikely as pt does not have frequency, burning or flank tenderness  Pt appears in acute distress during exam, has multiple odd c/o of swelling of bilateral knees last week during episode of recurrent fever  I am sending pt to [facility] ED for further evaluation           The following portions of the patient's history were reviewed and updated as appropriate: She  has a past medical history of Disease of thyroid gland and PONV (postoperative nausea and vomiting)  She   Patient Active Problem List    Diagnosis Date Noted    Chondromalacia patellae 2019    Closed fracture of metatarsal bone 2019    Full thickness rotator cuff tear 2019    Knee pain 2019    Pain in limb 2019    Shoulder pain 2019    Strain of supraspinatus muscle or tendon 2019    Recurrent fever, cause unknown 2019    Lower abdominal pain 2019    Hx of cholecystectomy 2019    Hx of appendectomy 2019    Menopause 2019    Quit smoking 2019    Complex tear of lateral meniscus of right knee 2018    Hypothyroidism 2014    Closed fracture of phalanx of foot 2008    Disorder of bursae of shoulder region 2008    Shoulder joint pain 2008     She  has a past surgical history that includes Shoulder surgery (Bilateral); Biceps tendon repair (Right); Carpal tunnel release;  section; Exploratory laparotomy; Cholecystectomy; Appendectomy; Umbilical hernia repair; Tubal ligation; Knee arthroscopy w/ meniscal repair (Left); pr knee scope,med/lat menisectomy (Left, 2017); and ARTHROSCOPY KNEE (Right, 2018)  Her family history includes Mental illness in her father; Stroke in her mother; Substance Abuse in her brother  She  reports that she has quit smoking  She has never used smokeless tobacco  She reports that she drinks alcohol  She reports that she does not use drugs    Current Outpatient Prescriptions   Medication Sig Dispense Refill    ibuprofen (MOTRIN) 800 mg tablet Take by mouth every 6 (six) hours as needed for mild pain      levothyroxine 75 mcg tablet Take 75 mcg by mouth daily      oseltamivir (TAMIFLU) 75 mg capsule Take 1 capsule (75 mg total) by mouth 2 (two) times a day for 5 days (Patient not taking: Reported on 2019 ) 10 capsule 0     No current facility-administered medications for this visit  Current Outpatient Prescriptions on File Prior to Visit   Medication Sig    levothyroxine 75 mcg tablet Take 75 mcg by mouth daily    oseltamivir (TAMIFLU) 75 mg capsule Take 1 capsule (75 mg total) by mouth 2 (two) times a day for 5 days (Patient not taking: Reported on 1/24/2019 )     No current facility-administered medications on file prior to visit  She is allergic to erythromycin; betadine [povidone iodine]; doxycycline; and medical tape       Review of Systems   Constitutional: Positive for fatigue and fever  Respiratory: Positive for cough, chest tightness and shortness of breath  Stabbing chest pain with breathing   Cardiovascular: Positive for chest pain  Gastrointestinal: Positive for abdominal pain  Endocrine: Negative  Genitourinary: Negative for difficulty urinating, dyspareunia, flank pain and frequency  Musculoskeletal: Positive for joint swelling  Allergic/Immunologic: Negative  Neurological: Negative  Psychiatric/Behavioral: Negative  Objective:      /76   Pulse 97   Temp (!) 100 9 °F (38 3 °C) (Tympanic)   Ht 5' 5" (1 651 m)   Wt 78 5 kg (173 lb)   SpO2 95%   BMI 28 79 kg/m²          Physical Exam   Constitutional: She appears ill  She appears distressed  Cardiovascular: Normal rate and regular rhythm  Pulmonary/Chest: Breath sounds normal  She has no decreased breath sounds  Pt wincing with pain with inspiration, coughing   Abdominal: There is generalized tenderness  There is rebound         Pt very tender, c/o frequent use of Ibuprofen over the past several weeks, pos rebound over midabdomen   Psychiatric:   Pt visibly upset distressed during interview

## 2019-01-25 ENCOUNTER — TELEPHONE (OUTPATIENT)
Dept: UROLOGY | Facility: MEDICAL CENTER | Age: 59
End: 2019-01-25

## 2019-01-25 PROBLEM — R10.9 ABDOMINAL PAIN: Status: ACTIVE | Noted: 2019-01-25

## 2019-01-25 PROBLEM — Z12.11 COLON CANCER SCREENING: Status: ACTIVE | Noted: 2019-01-25

## 2019-01-25 NOTE — TELEPHONE ENCOUNTER
Complaint/Diagnosis: Hematuria    Insurance:[employer]     History of Cancer:no    Previous urologist:Dr Sandro Staley     Outside testing/where:epic    If yes,what kind:epic    Records requested/where:Pt to obtain if available yrs?     Preferred location:[place]

## 2019-01-25 NOTE — ED PROVIDER NOTES
History  Chief Complaint   Patient presents with    Abdominal Pain     pt c/o intermittent fevers since monday taking motrin with some relief and c/o abdominal pain since yesterday at 1200  denies n/v  pt seen monday by pcp    Fever - 9 weeks to 76 years     65-year-old female presents for evaluation of fever and abdominal pain along with chest pain shortness of breath  Patient states she has been intermittently getting fevers since new year's  Patient had blood work performed on January 21, 2019 which demonstrated normal white blood cell count 5 7 along with a negative influenza and RSV screen  Patient states over the course last several days she has developed diffuse abdominal pain  History provided by:  Patient  Chest Pain   Pain location:  Substernal area  Pain quality: aching and burning    Timing:  Constant  Progression:  Waxing and waning  Context: breathing    Relieved by:  Nothing  Worsened by:  Nothing tried  Ineffective treatments:  None tried  Associated symptoms: abdominal pain and cough    Associated symptoms: no AICD problem, no altered mental status, no anorexia, no anxiety, no back pain, no claudication, no dizziness and no headache    Risk factors: no aortic disease, no birth control, no coronary artery disease and no diabetes mellitus        Prior to Admission Medications   Prescriptions Last Dose Informant Patient Reported?  Taking?   ibuprofen (MOTRIN) 800 mg tablet   Yes Yes   Sig: Take by mouth every 6 (six) hours as needed for mild pain   levothyroxine 75 mcg tablet 1/24/2019 at Unknown time  Yes Yes   Sig: Take 75 mcg by mouth daily   oseltamivir (TAMIFLU) 75 mg capsule Not Taking at Unknown time  No No   Sig: Take 1 capsule (75 mg total) by mouth 2 (two) times a day for 5 days   Patient not taking: Reported on 1/24/2019       Facility-Administered Medications: None       Past Medical History:   Diagnosis Date    Disease of thyroid gland     hypothyroidism    PONV (postoperative nausea and vomiting)        Past Surgical History:   Procedure Laterality Date    APPENDECTOMY      ARTHROSCOPY KNEE Right 2018    Procedure: ARTHROSCOPY KNEE; PARTIAL LATERAL MENISCECTOMY; PARTIAL MEDIAL MENISCECTOMY; POSSIBLE DEBRIDEMENT; removal of loose bodies;  Surgeon: Jacinto Jenkins MD;  Location: [facility];  Service: Orthopedics    BICEPS TENDON REPAIR Right     CARPAL TUNNEL RELEASE       SECTION      three     CHOLECYSTECTOMY      EXPLORATORY LAPAROTOMY      KNEE ARTHROSCOPY W/ MENISCAL REPAIR Left     AL KNEE SCOPE,MED/LAT MENISECTOMY Left 2017    Procedure: ARTHROSCOPY KNEE PARTIAL MEDIAL MENISECTOMY ;  Surgeon: Jacinto Jenkins MD;  Location: [facility];  Service: Orthopedics    SHOULDER SURGERY Bilateral     TUBAL LIGATION      UMBILICAL HERNIA REPAIR      with mesh       Family History   Problem Relation Age of Onset    Stroke Mother     Mental illness Father     Substance Abuse Brother      I have reviewed and agree with the history as documented  Social History   Substance Use Topics    Smoking status: Former Smoker    Smokeless tobacco: Never Used      Comment: infrequently smokes    Alcohol use Yes      Comment: occassional         Review of Systems   Constitutional: Negative for activity change, appetite change and chills  HENT: Negative for congestion, dental problem and drooling  Eyes: Negative for pain, discharge and itching  Respiratory: Positive for cough  Cardiovascular: Positive for chest pain  Negative for claudication  Gastrointestinal: Positive for abdominal pain  Negative for anorexia  Endocrine: Negative for cold intolerance, heat intolerance and polydipsia  Genitourinary: Negative for difficulty urinating, dyspareunia and dysuria  Musculoskeletal: Negative for back pain  Skin: Negative for color change  Allergic/Immunologic: Negative for environmental allergies and food allergies     Neurological: Negative for dizziness, facial asymmetry and headaches  Hematological: Negative for adenopathy  Psychiatric/Behavioral: Negative for agitation, behavioral problems and confusion  All other systems reviewed and are negative  Physical Exam  Physical Exam   Constitutional: She appears well-developed and well-nourished  HENT:   Head: Normocephalic  Right Ear: External ear normal    Left Ear: External ear normal    Mouth/Throat: Oropharynx is clear and moist    Eyes: Pupils are equal, round, and reactive to light  Right eye exhibits no discharge  Left eye exhibits no discharge  Neck: Normal range of motion  No JVD present  No tracheal deviation present  No thyromegaly present  Cardiovascular: Normal rate, regular rhythm and normal heart sounds  Pulmonary/Chest: No respiratory distress  She has no wheezes  She has no rales  Abdominal: Bowel sounds are normal  There is generalized tenderness  Musculoskeletal: She exhibits no edema or deformity  Neurological: She is alert  She displays normal reflexes  No cranial nerve deficit  Coordination normal    Skin: Skin is warm  Capillary refill takes less than 2 seconds  No erythema  Psychiatric: She has a normal mood and affect  Her behavior is normal  Thought content normal    Vitals reviewed        Vital Signs  ED Triage Vitals [01/24/19 1854]   Temperature Pulse Respirations Blood Pressure SpO2   (!) 100 6 °F (38 1 °C) 87 20 118/86 96 %      Temp Source Heart Rate Source Patient Position - Orthostatic VS BP Location FiO2 (%)   Temporal Monitor Sitting Right arm --      Pain Score       Worst Possible Pain           Vitals:    01/24/19 1854 01/24/19 2115   BP: 118/86 104/58   Pulse: 87 75   Patient Position - Orthostatic VS: Sitting Sitting       Visual Acuity      ED Medications  Medications   sodium chloride 0 9 % bolus 1,000 mL (0 mL Intravenous Stopped 1/24/19 2220)   acetaminophen (TYLENOL) tablet 650 mg (650 mg Oral Given 1/24/19 2106) iohexol (OMNIPAQUE) 350 MG/ML injection (MULTI-DOSE) 100 mL (100 mL Intravenous Given 1/24/19 2056)   ondansetron (ZOFRAN) injection 4 mg (4 mg Intravenous Given 1/24/19 2220)   morphine (PF) 4 mg/mL injection 4 mg (4 mg Intravenous Given 1/24/19 2220)       Diagnostic Studies  Results Reviewed     Procedure Component Value Units Date/Time    TSH [[id number]]  (Abnormal) Collected:  01/24/19 1953    Lab Status:  Final result Specimen:  Blood from Arm, Left Updated:  01/24/19 2214     TSH 3RD GENERATON 6 252 (H) uIU/mL     Narrative:         Patients undergoing fluorescein dye angiography may retain small amounts of fluorescein in the body for 48-72 hours post procedure  Samples containing fluorescein can produce falsely depressed TSH values  If the patient had this procedure,a specimen should be resubmitted post fluorescein clearance  The recommended reference ranges for TSH during pregnancy are as follows:  First trimester 0 1 to 2 5 uIU/mL  Second trimester  0 2 to 3 0 uIU/mL  Third trimester 0 3 to 3 0 uIU/m      CBC and differential [[id number]]  (Abnormal) Collected:  01/24/19 1953    Lab Status:  Final result Specimen:  Blood from Arm, Left Updated:  01/24/19 2040     WBC 4 66 Thousand/uL      RBC 3 86 Million/uL      Hemoglobin 11 8 g/dL      Hematocrit 35 9 %      MCV 93 fL      MCH 30 6 pg      MCHC 32 9 g/dL      RDW 11 5 (L) %      MPV 10 2 fL      Platelets 568 (L) Thousands/uL      nRBC 0 /100 WBCs     POCT pregnancy, urine [[id number]]  (Normal) Resulted:  01/24/19 2037    Lab Status:  Final result Updated:  01/24/19 2038     EXT PREG TEST UR (Ref: Negative) NEGATIVE    Lactic acid, plasma [[id number]]  (Normal) Collected:  01/24/19 1953    Lab Status:  Final result Specimen:  Blood from Arm, Left Updated:  01/24/19 2024     LACTIC ACID 0 6 mmol/L     Narrative:         Result may be elevated if tourniquet was used during collection      Troponin I [[id number]]  (Normal) Collected:  01/24/19 1953 Lab Status:  Final result Specimen:  Blood from Arm, Left Updated:  01/24/19 2024     Troponin I <0 02 ng/mL     Protime-INR [[id number]]  (Normal) Collected:  01/24/19 1953    Lab Status:  Final result Specimen:  Blood from Arm, Left Updated:  01/24/19 2023     Protime 13 8 seconds      INR 1 11    APTT [[id number]]  (Normal) Collected:  01/24/19 1953    Lab Status:  Final result Specimen:  Blood from Arm, Left Updated:  01/24/19 2023     PTT 34 seconds     Comprehensive metabolic panel [[id number]] Collected:  01/24/19 1953    Lab Status:  Final result Specimen:  Blood from Arm, Left Updated:  01/24/19 2022     Sodium 141 mmol/L      Potassium 3 6 mmol/L      Chloride 103 mmol/L      CO2 27 mmol/L      ANION GAP 11 mmol/L      BUN 10 mg/dL      Creatinine 0 85 mg/dL      Glucose 95 mg/dL      Calcium 8 8 mg/dL      AST 19 U/L      ALT 30 U/L      Alkaline Phosphatase 66 U/L      Total Protein 6 9 g/dL      Albumin 3 5 g/dL      Total Bilirubin 0 40 mg/dL      eGFR 77 ml/min/1 73sq m     Narrative:         National Kidney Disease Education Program recommendations are as follows:  GFR calculation is accurate only with a steady state creatinine  Chronic Kidney disease less than 60 ml/min/1 73 sq  meters  Kidney failure less than 15 ml/min/1 73 sq  meters      Lipase [[id number]]  (Normal) Collected:  01/24/19 1953    Lab Status:  Final result Specimen:  Blood from Arm, Left Updated:  01/24/19 2022     Lipase 154 u/L     Urine Microscopic [[id number]]  (Abnormal) Collected:  01/24/19 1935    Lab Status:  Final result Specimen:  Urine from Urine, Clean Catch Updated:  01/24/19 2020     RBC, UA 4-10 (A) /hpf      WBC, UA 0-1 (A) /hpf      Epithelial Cells Occasional /hpf      Bacteria, UA Occasional /hpf      Ca Oxalate Astrid, UA Moderate (A) /hpf      MUCUS THREADS Occasional (A)    UA w Reflex to Microscopic w Reflex to Culture [[id number]]  (Abnormal) Collected:  01/24/19 1935    Lab Status:  Final result Specimen:  Urine from Urine, Clean Catch Updated:  01/24/19 2009     Color, UA Linda     Clarity, UA Slightly Cloudy     Specific Gravity, UA >=1 030     pH, UA 5 5     Leukocytes, UA Negative     Nitrite, UA Negative     Protein, UA Negative mg/dl      Glucose, UA Negative mg/dl      Ketones, UA Trace (A) mg/dl      Urobilinogen, UA 0 2 E U /dl      Bilirubin, UA Negative     Blood, UA Small (A)    Blood culture #2 [[id number]] Collected:  01/24/19 1953    Lab Status: In process Specimen:  Blood from Arm, Left Updated:  01/24/19 2006    Blood culture #1 [[id number]] Collected:  01/24/19 1953    Lab Status: In process Specimen:  Blood from Arm, Right Updated:  01/24/19 2006                 US pelvis complete w transvaginal   Final Result by Jiles Bumpers, MD (01/24 2211)       No evidence of torsion      Heterogeneous appearance of the uterus, which may be seen with adenomyosis  Limited evaluation of the left ovary  Workstation performed: [id number]         PE Study with CT Abdomen and Pelvis with contrast   Final Result by Heber Colvin MD (01/24 2106)      1  No PE  2   No acute inflammatory or infectious process throughout the abdomen and pelvis  Workstation performed: [id number]                    Procedures  Procedures       Phone Contacts  ED Phone Contact    ED Course                               MDM  Number of Diagnoses or Management Options  Diagnosis management comments: Differential diagnosis 1  Pulmonary embolism 2  Pneumonia 3  Colitis 4  Pancreatitis 5  Appendicitis 6  UTI    CTA chest on pelvis check labs            Amount and/or Complexity of Data Reviewed  Clinical lab tests: reviewed  Tests in the radiology section of CPT®: reviewed  Tests in the medicine section of CPT®: reviewed    Risk of Complications, Morbidity, and/or Mortality  Presenting problems: high  Diagnostic procedures: high  Management options: high      CritCare Time    Disposition  Final diagnoses: Lower abdominal pain     Time reflects when diagnosis was documented in both MDM as applicable and the Disposition within this note     Time User Action Codes Description Comment    1/24/2019  9:51 PM Lulyjose miguel Lanier Add [R50 9] Fever     1/24/2019  9:52 PM Luly Yolande Add [R10 9] Abdominal pain     1/24/2019  9:52 PM Flores Porteous Add [R10 30] Lower abdominal pain     1/24/2019  9:52 PM Flores Porteous Remove [R10 9] Abdominal pain     1/24/2019  9:52 PM Flores Porteous Remove [R50 9] Fever     1/24/2019  9:52 PM Flores Porteous Modify [R10 30] Lower abdominal pain       ED Disposition     ED Disposition Condition Comment    Discharge  Randi Mathews discharge to home/self care  Condition at discharge: Good        Follow-up Information    None         Patient's Medications   Discharge Prescriptions    No medications on file     No discharge procedures on file      ED Provider  Electronically Signed by           Cirilo Landa DO  01/24/19 3178

## 2019-01-25 NOTE — TELEPHONE ENCOUNTER
Aidee  Please offer appointments on 01/29/2019, Dr Teresa Lee has openings in the afternoon and the same for 01/31/2019, opening in the afternoon,  Thanks

## 2019-01-25 NOTE — ED PROCEDURE NOTE
PROCEDURE  ECG 12 Lead Documentation  Date/Time: 1/24/2019 7:45 PM  Performed by: Patricia Brooks  Authorized by: Patricia Brooks     Indications / Diagnosis:  Chest pain   ECG reviewed by me, the ED Provider: yes    Patient location:  ED  Previous ECG:     Previous ECG:  Unavailable  Interpretation:     Interpretation: non-specific    Rate:     ECG rate:  74    ECG rate assessment: normal    Rhythm:     Rhythm: sinus rhythm           Deisy Tyler DO  01/24/19 1945

## 2019-01-25 NOTE — TELEPHONE ENCOUNTER
Pt was calling in regards to referral to schedule appointment then mentioned she presented to ER please review for npt appointment she works [employer] [phone]

## 2019-01-25 NOTE — DISCHARGE INSTRUCTIONS
Abdominal Pain, Ambulatory Care   GENERAL INFORMATION:   Abdominal pain  can be dull, achy, or sharp  You may have pain in one area of your abdomen, or in your entire abdomen  Your pain may be caused by constipation, food sensitivity or poisoning, infection, or a blockage  Abdominal pain can also be caused by a hernia, appendicitis, or an ulcer  The cause of your abdominal pain may be unknown  Seek immediate care for the following symptoms:   · New chest pain or shortness of breath    · Pulsing pain in your upper abdomen or lower back that suddenly becomes constant    · Pain in the right lower abdominal area that worsens with movement    · Fever over 100 4°F (38°C) or shaking chills    · Vomiting and you cannot keep food or fluids down    · Pain does not improve or gets worse over the next 8 to 12 hours    · Blood in your vomit or bowel movements, or they look black and tarry    · Skin or the whites of your eyes turn yellow    · Large amount of vaginal bleeding that is not your monthly period  Treatment for abdominal pain  may include medicine to calm your stomach, prevent vomiting, or decrease pain  Follow up with your healthcare provider as directed:  Write down your questions so you remember to ask them during your visits  CARE AGREEMENT:   You have the right to help plan your care  Learn about your health condition and how it may be treated  Discuss treatment options with your caregivers to decide what care you want to receive  You always have the right to refuse treatment  The above information is an  only  It is not intended as medical advice for individual conditions or treatments  Talk to your doctor, nurse or pharmacist before following any medical regimen to see if it is safe and effective for you  © 2014 [address] is for End User's use only and may not be sold, redistributed or otherwise used for commercial purposes   All illustrations and images included in [employer] are the copyrighted property of [employer]  or [employer]  Abdominal Pain   AMBULATORY CARE:   Abdominal pain  can be dull, achy, or sharp  You may have pain in one area of your abdomen, or in your entire abdomen  Your pain may be caused by a condition such as constipation, food sensitivity or poisoning, infection, or a blockage  Abdominal pain can also be from a hernia, appendicitis, or an ulcer  Liver, gallbladder, or kidney conditions can also cause abdominal pain  The cause of your abdominal pain may be unknown  Seek care immediately if:   · You have new chest pain or shortness of breath  · You have pulsing pain in your upper abdomen or lower back that suddenly becomes constant  · Your pain is in the right lower abdominal area and worsens with movement  · You have a fever over 100 4°F (38°C) or shaking chills  · You are vomiting and cannot keep food or liquids down  · Your pain does not improve or gets worse over the next 8 to 12 hours  · You see blood in your vomit or bowel movements, or they look black and tarry  · Your skin or the whites of your eyes turn yellow  · You are a woman and have a large amount of vaginal bleeding that is not your monthly period  Contact your healthcare provider if:   · You have pain in your lower back  · You are a man and have pain in your testicles  · You have pain when you urinate  · You have questions or concerns about your condition or care  Treatment for abdominal pain  may include medicine to calm your stomach, prevent vomiting, or decrease pain  Follow up with your healthcare provider as directed:  Write down your questions so you remember to ask them during your visits  © 2017 [address] Information is for End User's use only and may not be sold, redistributed or otherwise used for commercial purposes   All illustrations and images included in CareNotes® are the copyrighted property of [employer]  or [employer]  The above information is an  only  It is not intended as medical advice for individual conditions or treatments  Talk to your doctor, nurse or pharmacist before following any medical regimen to see if it is safe and effective for you

## 2019-01-26 LAB
B BURGDOR IGG SER IA-ACNC: 0.16
B BURGDOR IGM SER IA-ACNC: 0.31

## 2019-01-30 LAB
BACTERIA BLD CULT: NORMAL
BACTERIA BLD CULT: NORMAL

## 2019-01-30 NOTE — TELEPHONE ENCOUNTER
Left message for patient rescheduled her appointment to 02/04/2019 at 315 in the [place] office  Asked patient to contact office to confirm appointment

## 2019-02-04 ENCOUNTER — OFFICE VISIT (OUTPATIENT)
Dept: UROLOGY | Facility: CLINIC | Age: 59
End: 2019-02-04
Payer: COMMERCIAL

## 2019-02-04 VITALS — SYSTOLIC BLOOD PRESSURE: 120 MMHG | HEART RATE: 60 BPM | DIASTOLIC BLOOD PRESSURE: 70 MMHG

## 2019-02-04 DIAGNOSIS — R31.29 MICROHEMATURIA: Primary | ICD-10-CM

## 2019-02-04 LAB
SL AMB  POCT GLUCOSE, UA: NORMAL
SL AMB LEUKOCYTE ESTERASE,UA: NORMAL
SL AMB POCT BILIRUBIN,UA: NORMAL
SL AMB POCT BLOOD,UA: NORMAL
SL AMB POCT CLARITY,UA: CLEAR
SL AMB POCT COLOR,UA: YELLOW
SL AMB POCT KETONES,UA: NORMAL
SL AMB POCT NITRITE,UA: NORMAL
SL AMB POCT PH,UA: 5
SL AMB POCT SPECIFIC GRAVITY,UA: 1.01
SL AMB POCT URINE PROTEIN: NORMAL
SL AMB POCT UROBILINOGEN: NORMAL

## 2019-02-04 PROCEDURE — 99243 OFF/OP CNSLTJ NEW/EST LOW 30: CPT | Performed by: UROLOGY

## 2019-02-04 PROCEDURE — 81002 URINALYSIS NONAUTO W/O SCOPE: CPT | Performed by: UROLOGY

## 2019-02-04 NOTE — H&P (VIEW-ONLY)
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  She has a long history of often on again cigarette smoking  She has currently quit  Her  does smoke copious amounts  She also uses hair dye regularly  She has never seen gross hematuria but recently underwent 2 episodes of high fevers that prompted emergency evaluation  Patient's urine was tested and showed microscopic hematuria but no signs of infection  She underwent CT scans which did not show any stone disease or obstruction of the kidneys  Her symptoms have since resolved  She presents for discussion      Past Medical History:     Past Medical History:   Diagnosis Date    Disease of thyroid gland     hypothyroidism    PONV (postoperative nausea and vomiting)        PAST SURGICAL HISTORY:     Past Surgical History:   Procedure Laterality Date    APPENDECTOMY      ARTHROSCOPY KNEE Right 2018    Procedure: ARTHROSCOPY KNEE; PARTIAL LATERAL MENISCECTOMY; PARTIAL MEDIAL MENISCECTOMY; POSSIBLE DEBRIDEMENT; removal of loose bodies;  Surgeon: Bakari Liang MD;  Location: [facility];  Service: Orthopedics    BICEPS TENDON REPAIR Right     CARPAL TUNNEL RELEASE       SECTION      three     CHOLECYSTECTOMY      EXPLORATORY LAPAROTOMY      KNEE ARTHROSCOPY W/ MENISCAL REPAIR Left     CA KNEE SCOPE,MED/LAT MENISECTOMY Left 2017    Procedure: ARTHROSCOPY KNEE PARTIAL MEDIAL MENISECTOMY ;  Surgeon: Bakari Liang MD;  Location: [facility];  Service: Orthopedics    SHOULDER SURGERY Bilateral     TUBAL LIGATION      UMBILICAL HERNIA REPAIR      with mesh       CURRENT MEDICATIONS:     Current Outpatient Prescriptions   Medication Sig Dispense Refill    ibuprofen (MOTRIN) 800 mg tablet Take by mouth every 6 (six) hours as needed for mild pain      levothyroxine 75 mcg tablet Take 75 mcg by mouth daily       No current facility-administered medications for this visit  ALLERGIES:     Allergies   Allergen Reactions    Erythromycin Chest Pain    Betadine [Povidone Iodine] Other (See Comments)     Burns skin, blisters    Doxycycline     Medical Tape        SOCIAL HISTORY:     Social History     Social History    Marital status: /Civil Union     Spouse name: N/A    Number of children: N/A    Years of education: N/A     Occupational History      Dr Tyra Meza Smoking status: Former Smoker    Smokeless tobacco: Never Used      Comment: infrequently smokes    Alcohol use Yes      Comment: occassional     Drug use: No    Sexual activity: Yes     Partners: Male     Other Topics Concern    None     Social History Narrative    None       SOCIAL HISTORY:     Family History   Problem Relation Age of Onset    Stroke Mother     Mental illness Father     Substance Abuse Brother        REVIEW OF SYSTEMS:     Review of Systems   Constitutional: Positive for chills and fever  Respiratory: Negative  Gastrointestinal: Negative  Genitourinary: Negative for hematuria  Musculoskeletal: Negative  Skin: Negative  Psychiatric/Behavioral: Negative  PHYSICAL EXAM:     /70   Pulse 60     General:  Healthy appearing male in no acute distress  They have a normal affect  There is not appear to be any gross neurologic defects or abnormalities  HEENT:  Normocephalic, atraumatic  Neck is supple without any palpable lymphadenopathy  Cardiovascular:  Patient has normal palpable distal radial pulses  There is no significant peripheral edema  No JVD is noted  Respiratory:  Patient has unlabored respirations  There is no audible wheeze or rhonchi  Abdomen:  Abdomen with healed surgical scars  Abdomen is soft and nontender  There is no tympany  Inguinal and umbilical hernia are not appreciated  Musculoskeletal:  Patient does not have significant CVA tenderness in the  flank with palpation or percussion  They full range of motion in all 4 extremities  Strength in all 4 extremities appears congruent  Patient is able to ambulate without assistance or difficulty  Dermatologic:  Patient has no skin abnormalities or rashes  LABS:     CBC:   Lab Results   Component Value Date    WBC 4 66 2019    HGB 11 8 2019    HCT 35 9 2019    MCV 93 2019     (L) 2019       BMP:   Lab Results   Component Value Date    GLUCOSE 92 2015    CALCIUM 8 8 2019     2015    K 3 6 2019    CO2 27 2019     2019    BUN 10 2019    CREATININE 0 85 2019 1935 Flag     RBC, UA None Seen, 0-5 /hpf 4-10   A    WBC, UA None Seen, 0-5, 5-55, 5-65 /hpf 0-1   A    Epithelial Cells None Seen, Occasional /hpf Occasional     Bacteria, UA None Seen, Occasional /hpf Occasional     Ca Oxalate Astrid, UA None Seen /hpf Moderate   A    MUCUS THREADS None Seen Occasional   A       Specimen Collected: 19 19:35 Last Resulted: 19         Urine Culture 30,000-39,000 cfu/ml     Mixed Contaminants X3         Specimen Collected: 19 14:08 Last Resulted: 19           IMAGIN/24/19  CT PULMONARY ANGIOGRAM OF THE CHEST AND CT ABDOMEN AND PELVIS WITH INTRAVENOUS CONTRAST     INDICATION:   Pleuritic chest pain abdominal pain      COMPARISON:  2018     TECHNIQUE:  CT examination of the chest, abdomen and pelvis was performed  Thin section CT angiographic technique was used in the chest in order to evaluate for pulmonary embolus and coronal 3D MIP postprocessing was performed on the acquisition   scanner   Axial, sagittal, and coronal 2D reformatted images were created from the source data and submitted for interpretation      Radiation dose length product (DLP) for this visit:   This examination, like all CT scans performed in the [facility], was performed utilizing techniques to minimize radiation dose exposure, including the use of iterative reconstruction   and automated exposure control      IV Contrast:  Enteric Contrast:  Enteric contrast was not administered      FINDINGS:     CHEST     PULMONARY ARTERIAL TREE:  No pulmonary embolus is seen      LUNGS:  Streaky atelectasis and scarring noted at the lung bases which otherwise appear clear      PLEURA:  Unremarkable      HEART/AORTA:  Unremarkable for patient's age      MEDIASTINUM AND LINO:  Unremarkable      CHEST WALL AND LOWER NECK:   Unremarkable      ABDOMEN     LIVER/BILIARY TREE:  Unremarkable      GALLBLADDER:  Surgically absent      SPLEEN:  Unremarkable      PANCREAS:  Unremarkable      ADRENAL GLANDS:  Unremarkable      KIDNEYS/URETERS:  Unremarkable  No hydronephrosis      STOMACH AND BOWEL:  Unremarkable      APPENDIX:  No findings to suggest appendicitis      ABDOMINOPELVIC CAVITY:  No ascites or free intraperitoneal air  No lymphadenopathy      VESSELS:  Unremarkable for patient's age      PELVIS     REPRODUCTIVE ORGANS:  Unremarkable for patient's age      URINARY BLADDER:  Unremarkable      ABDOMINAL WALL/INGUINAL REGIONS:  Unremarkable      OSSEOUS STRUCTURES:  No acute fracture or destructive osseous lesion      IMPRESSION:     1  No PE  2   No acute inflammatory or infectious process throughout the abdomen and pelvis      PROCEDURE:     Recent Results (from the past 2 hour(s))   POCT urine dip    Collection Time: 02/04/19  3:24 PM   Result Value Ref Range    LEUKOCYTE ESTERASE,UA -     NITRITE,UA -     SL AMB POCT UROBILINOGEN -     POCT URINE PROTEIN -      PH,UA 5 0     BLOOD,UA -     SPECIFIC GRAVITY,UA 1 015     KETONES,UA -     BILIRUBIN,UA -     GLUCOSE, UA -      COLOR,UA yellow     CLARITY,UA clear         ASSESSMENT:     66 y o  female with microscopic hematuria    PLAN:       The patient has a long history of hair dye use and is an intermittent smoker  Her  is also a smoker  Given the findings of microscopic hematuria on 2 subsequent exams, I have recommended we move forward with complete testing including CT urogram and cystoscopy  Patient did have this intervening episode of fevers and chills which may have contributed however she did not have any urinary symptoms at that time  She has agreed to proceed and we will see her back in the near future to review her CT scan and perform cystoscopy

## 2019-02-04 NOTE — PROGRESS NOTES
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  She has a long history of often on again cigarette smoking  She has currently quit  Her  does smoke copious amounts  She also uses hair dye regularly  She has never seen gross hematuria but recently underwent 2 episodes of high fevers that prompted emergency evaluation  Patient's urine was tested and showed microscopic hematuria but no signs of infection  She underwent CT scans which did not show any stone disease or obstruction of the kidneys  Her symptoms have since resolved  She presents for discussion      Past Medical History:     Past Medical History:   Diagnosis Date    Disease of thyroid gland     hypothyroidism    PONV (postoperative nausea and vomiting)        PAST SURGICAL HISTORY:     Past Surgical History:   Procedure Laterality Date    APPENDECTOMY      ARTHROSCOPY KNEE Right 2018    Procedure: ARTHROSCOPY KNEE; PARTIAL LATERAL MENISCECTOMY; PARTIAL MEDIAL MENISCECTOMY; POSSIBLE DEBRIDEMENT; removal of loose bodies;  Surgeon: Ray Lafleur MD;  Location: [facility];  Service: Orthopedics    BICEPS TENDON REPAIR Right     CARPAL TUNNEL RELEASE       SECTION      three     CHOLECYSTECTOMY      EXPLORATORY LAPAROTOMY      KNEE ARTHROSCOPY W/ MENISCAL REPAIR Left     DE KNEE SCOPE,MED/LAT MENISECTOMY Left 2017    Procedure: ARTHROSCOPY KNEE PARTIAL MEDIAL MENISECTOMY ;  Surgeon: Ray Lafleur MD;  Location[facility];  Service: Orthopedics    SHOULDER SURGERY Bilateral     TUBAL LIGATION      UMBILICAL HERNIA REPAIR      with mesh       CURRENT MEDICATIONS:     Current Outpatient Prescriptions   Medication Sig Dispense Refill    ibuprofen (MOTRIN) 800 mg tablet Take by mouth every 6 (six) hours as needed for mild pain      levothyroxine 75 mcg tablet Take 75 mcg by mouth daily       No current facility-administered medications for this visit  ALLERGIES:     Allergies   Allergen Reactions    Erythromycin Chest Pain    Betadine [Povidone Iodine] Other (See Comments)     Burns skin, blisters    Doxycycline     Medical Tape        SOCIAL HISTORY:     Social History     Social History    Marital status: /Civil Union     Spouse name: N/A    Number of children: N/A    Years of education: N/A     Occupational History      Dr Polina Boyd Smoking status: Former Smoker    Smokeless tobacco: Never Used      Comment: infrequently smokes    Alcohol use Yes      Comment: occassional     Drug use: No    Sexual activity: Yes     Partners: Male     Other Topics Concern    None     Social History Narrative    None       SOCIAL HISTORY:     Family History   Problem Relation Age of Onset    Stroke Mother     Mental illness Father     Substance Abuse Brother        REVIEW OF SYSTEMS:     Review of Systems   Constitutional: Positive for chills and fever  Respiratory: Negative  Gastrointestinal: Negative  Genitourinary: Negative for hematuria  Musculoskeletal: Negative  Skin: Negative  Psychiatric/Behavioral: Negative  PHYSICAL EXAM:     /70   Pulse 60     General:  Healthy appearing male in no acute distress  They have a normal affect  There is not appear to be any gross neurologic defects or abnormalities  HEENT:  Normocephalic, atraumatic  Neck is supple without any palpable lymphadenopathy  Cardiovascular:  Patient has normal palpable distal radial pulses  There is no significant peripheral edema  No JVD is noted  Respiratory:  Patient has unlabored respirations  There is no audible wheeze or rhonchi  Abdomen:  Abdomen with healed surgical scars  Abdomen is soft and nontender  There is no tympany  Inguinal and umbilical hernia are not appreciated  Musculoskeletal:  Patient does not have significant CVA tenderness in the  flank with palpation or percussion  They full range of motion in all 4 extremities  Strength in all 4 extremities appears congruent  Patient is able to ambulate without assistance or difficulty  Dermatologic:  Patient has no skin abnormalities or rashes  LABS:     CBC:   Lab Results   Component Value Date    WBC 4 66 2019    HGB 11 8 2019    HCT 35 9 2019    MCV 93 2019     (L) 2019       BMP:   Lab Results   Component Value Date    GLUCOSE 92 2015    CALCIUM 8 8 2019     2015    K 3 6 2019    CO2 27 2019     2019    BUN 10 2019    CREATININE 0 85 2019 1935 Flag     RBC, UA None Seen, 0-5 /hpf 4-10   A    WBC, UA None Seen, 0-5, 5-55, 5-65 /hpf 0-1   A    Epithelial Cells None Seen, Occasional /hpf Occasional     Bacteria, UA None Seen, Occasional /hpf Occasional     Ca Oxalate Astrid, UA None Seen /hpf Moderate   A    MUCUS THREADS None Seen Occasional   A       Specimen Collected: 19 19:35 Last Resulted: 19         Urine Culture 30,000-39,000 cfu/ml     Mixed Contaminants X3         Specimen Collected: 19 14:08 Last Resulted: 19           IMAGIN/24/19  CT PULMONARY ANGIOGRAM OF THE CHEST AND CT ABDOMEN AND PELVIS WITH INTRAVENOUS CONTRAST     INDICATION:   Pleuritic chest pain abdominal pain      COMPARISON:  2018     TECHNIQUE:  CT examination of the chest, abdomen and pelvis was performed  Thin section CT angiographic technique was used in the chest in order to evaluate for pulmonary embolus and coronal 3D MIP postprocessing was performed on the acquisition   scanner   Axial, sagittal, and coronal 2D reformatted images were created from the source data and submitted for interpretation      Radiation dose length product (DLP) for this visit:   This examination, like all CT scans performed in the [facility], was performed utilizing techniques to minimize radiation dose exposure, including the use of iterative reconstruction   and automated exposure control      IV Contrast:  Enteric Contrast:  Enteric contrast was not administered      FINDINGS:     CHEST     PULMONARY ARTERIAL TREE:  No pulmonary embolus is seen      LUNGS:  Streaky atelectasis and scarring noted at the lung bases which otherwise appear clear      PLEURA:  Unremarkable      HEART/AORTA:  Unremarkable for patient's age      MEDIASTINUM AND LINO:  Unremarkable      CHEST WALL AND LOWER NECK:   Unremarkable      ABDOMEN     LIVER/BILIARY TREE:  Unremarkable      GALLBLADDER:  Surgically absent      SPLEEN:  Unremarkable      PANCREAS:  Unremarkable      ADRENAL GLANDS:  Unremarkable      KIDNEYS/URETERS:  Unremarkable  No hydronephrosis      STOMACH AND BOWEL:  Unremarkable      APPENDIX:  No findings to suggest appendicitis      ABDOMINOPELVIC CAVITY:  No ascites or free intraperitoneal air  No lymphadenopathy      VESSELS:  Unremarkable for patient's age      PELVIS     REPRODUCTIVE ORGANS:  Unremarkable for patient's age      URINARY BLADDER:  Unremarkable      ABDOMINAL WALL/INGUINAL REGIONS:  Unremarkable      OSSEOUS STRUCTURES:  No acute fracture or destructive osseous lesion      IMPRESSION:     1  No PE  2   No acute inflammatory or infectious process throughout the abdomen and pelvis      PROCEDURE:     Recent Results (from the past 2 hour(s))   POCT urine dip    Collection Time: 02/04/19  3:24 PM   Result Value Ref Range    LEUKOCYTE ESTERASE,UA -     NITRITE,UA -     SL AMB POCT UROBILINOGEN -     POCT URINE PROTEIN -      PH,UA 5 0     BLOOD,UA -     SPECIFIC GRAVITY,UA 1 015     KETONES,UA -     BILIRUBIN,UA -     GLUCOSE, UA -      COLOR,UA yellow     CLARITY,UA clear         ASSESSMENT:     66 y o  female with microscopic hematuria    PLAN:       The patient has a long history of hair dye use and is an intermittent smoker  Her  is also a smoker  Given the findings of microscopic hematuria on 2 subsequent exams, I have recommended we move forward with complete testing including CT urogram and cystoscopy  Patient did have this intervening episode of fevers and chills which may have contributed however she did not have any urinary symptoms at that time  She has agreed to proceed and we will see her back in the near future to review her CT scan and perform cystoscopy

## 2019-02-04 NOTE — PATIENT INSTRUCTIONS
Hematuria   WHAT YOU NEED TO KNOW:   What is hematuria? Hematuria is blood in your urine  Your urine may be bright red to dark brown  What other signs and symptoms might I have with hematuria? · Fever     · Nausea and vomiting     · Pain or bruising on your lower back or sides     · Pain when you urinate     · More urination than usual, or the need to urinate right away  What causes hematuria? Ask your healthcare provider for more information about these and other causes of hematuria:  · Urinary tract infection    · Kidney or bladder stones    · Swollen prostate    · Kidney disease    · Abdomen or pelvic injury    · Kidney, bladder, or prostate cancer    · Intense exercise  How is hematuria diagnosed? Your healthcare provider will ask when you first saw a change in the color of your urine  Tell him about any medical conditions or medicines you take  Some medicines can damage your kidneys or increase your risk for bleeding  You may need any of the following:  · Blood and urine tests  may show infection and how well your kidneys are working  · An x-ray, ultrasound, or CT  may show the cause of your hematuria  You may be given contrast liquid to help your urinary tract show up better in the pictures  Tell the healthcare provider if you have ever had an allergic reaction to contrast liquid  How is hematuria treated? Hematuria may go away without treatment  You may need medicines to treat an infection  Treatment depends on the cause of your hematuria  Ask your healthcare provider for more information about the treatment you may need  How can I manage my symptoms? Drink liquids as directed  You may need to drink extra liquids to help flush the blood from your body through your urine  Water is the best liquid to drink  Ask how much liquid to drink each day and which liquids are best for you  When should I seek immediate care? · You have blood in your urine after a new injury, such as a fall       · You are urinating very small amounts or not at all  · You feel like you cannot empty your bladder  · You have severe back or side pain that does not go away with treatment  When should I contact my healthcare provider? · You have a fever that gets worse or does not go away with treatment  · You cannot keep liquids or medicines down  · Your urine gets darker, even after you drink extra liquids  · You have questions or concerns about your condition, treatment, or care  CARE AGREEMENT:   You have the right to help plan your care  Learn about your health condition and how it may be treated  Discuss treatment options with your caregivers to decide what care you want to receive  You always have the right to refuse treatment  The above information is an  only  It is not intended as medical advice for individual conditions or treatments  Talk to your doctor, nurse or pharmacist before following any medical regimen to see if it is safe and effective for you  © 2017 [employer] Information is for End User's use only and may not be sold, redistributed or otherwise used for commercial purposes  All illustrations and images included in CareNotes® are the copyrighted property of [employer]  or [employer]

## 2019-02-15 ENCOUNTER — ANESTHESIA (OUTPATIENT)
Dept: GASTROENTEROLOGY | Facility: HOSPITAL | Age: 59
End: 2019-02-15
Payer: COMMERCIAL

## 2019-02-15 ENCOUNTER — HOSPITAL ENCOUNTER (OUTPATIENT)
Facility: HOSPITAL | Age: 59
Setting detail: OUTPATIENT SURGERY
Discharge: HOME | End: 2019-02-15
Attending: COLON & RECTAL SURGERY | Admitting: COLON & RECTAL SURGERY
Payer: COMMERCIAL

## 2019-02-15 ENCOUNTER — ANESTHESIA EVENT (OUTPATIENT)
Dept: GASTROENTEROLOGY | Facility: HOSPITAL | Age: 59
End: 2019-02-15
Payer: COMMERCIAL

## 2019-02-15 VITALS
TEMPERATURE: 97.4 F | RESPIRATION RATE: 16 BRPM | OXYGEN SATURATION: 100 % | DIASTOLIC BLOOD PRESSURE: 89 MMHG | WEIGHT: 172 LBS | SYSTOLIC BLOOD PRESSURE: 128 MMHG | HEART RATE: 59 BPM | BODY MASS INDEX: 28.66 KG/M2 | HEIGHT: 65 IN

## 2019-02-15 PROCEDURE — G0121 COLON CA SCRN NOT HI RSK IND: HCPCS | Performed by: COLON & RECTAL SURGERY

## 2019-02-15 PROCEDURE — 99213 OFFICE O/P EST LOW 20 MIN: CPT | Performed by: COLON & RECTAL SURGERY

## 2019-02-15 RX ORDER — PROPOFOL 10 MG/ML
INJECTION, EMULSION INTRAVENOUS AS NEEDED
Status: DISCONTINUED | OUTPATIENT
Start: 2019-02-15 | End: 2019-02-15 | Stop reason: SURG

## 2019-02-15 RX ORDER — MELATONIN
1000 DAILY
COMMUNITY
End: 2019-12-31 | Stop reason: ALTCHOICE

## 2019-02-15 RX ORDER — SODIUM CHLORIDE 9 MG/ML
125 INJECTION, SOLUTION INTRAVENOUS CONTINUOUS
Status: DISCONTINUED | OUTPATIENT
Start: 2019-02-15 | End: 2019-02-15 | Stop reason: HOSPADM

## 2019-02-15 RX ORDER — LIDOCAINE HYDROCHLORIDE 10 MG/ML
INJECTION, SOLUTION INFILTRATION; PERINEURAL AS NEEDED
Status: DISCONTINUED | OUTPATIENT
Start: 2019-02-15 | End: 2019-02-15 | Stop reason: SURG

## 2019-02-15 RX ADMIN — PROPOFOL 40 MG: 10 INJECTION, EMULSION INTRAVENOUS at 09:01

## 2019-02-15 RX ADMIN — SODIUM CHLORIDE: 0.9 INJECTION, SOLUTION INTRAVENOUS at 08:29

## 2019-02-15 RX ADMIN — PROPOFOL 40 MG: 10 INJECTION, EMULSION INTRAVENOUS at 08:46

## 2019-02-15 RX ADMIN — PROPOFOL 30 MG: 10 INJECTION, EMULSION INTRAVENOUS at 08:54

## 2019-02-15 RX ADMIN — PROPOFOL 110 MG: 10 INJECTION, EMULSION INTRAVENOUS at 08:42

## 2019-02-15 RX ADMIN — LIDOCAINE HYDROCHLORIDE 50 MG: 10 INJECTION, SOLUTION INFILTRATION; PERINEURAL at 08:42

## 2019-02-15 RX ADMIN — PROPOFOL 50 MG: 10 INJECTION, EMULSION INTRAVENOUS at 08:50

## 2019-02-15 RX ADMIN — PROPOFOL 30 MG: 10 INJECTION, EMULSION INTRAVENOUS at 08:57

## 2019-02-15 NOTE — DISCHARGE INSTRUCTIONS
OPERATIVE REPORT  PATIENT NAME: Gail Son    :  1960  MRN: [id number]  Pt Location: [facility] 01    SURGERY DATE: 2/15/2019    Surgeon(s) and Role:     * Rocío Henderson MD - Primary    Operative Findings:  -Normal terminal ileum intubation 10-15cm, normal visualized colon, normal retroflexion rectum  Recommendations:  -High fiber diet/increased hydration, 20-30grams fiber per day, increased fruits/vegetables/psyllium(metamucil or konsyl 1 tbsp 1-2x/day)  -Recall colonoscopy 10 years    Preop Diagnosis:  Colon cancer screening [Z12 11]  Abdominal pain, unspecified abdominal location [R10 9]    Post-Op Diagnosis Codes:     * Colon cancer screening [Z12 11]     * Abdominal pain, unspecified abdominal location [R10 9]  Normal colonoscopy    Procedure(s) (LRB):  COLONOSCOPY (N/A)    Estimated Blood Loss:   Minimal    Anesthesia Type:   IV Sedation with Anesthesia    Operative Indications:  Colon cancer screening [Z12 11]  Abdominal pain, unspecified abdominal location [W10 7]    Complications:   None    Procedure and Technique:  After appropriate identification, patient was placed in left lateral decubitus position, timeout was taken per protocol and after mac sedation was induced digital rectal examination revealed normal rectal examination  Colonoscope was introduced and advanced without difficulty to cecum identified by appendiceal orifice and ileocecal valve, photo taken  Scope was then removed with careful mucosal evaluation and visualization, retroflexion rectum  Prep was adequate with moderate amounts of suctionable fluid  Cecal intubation time 3 minutes withdrawal time 17 minutes       I was present for the entire procedure    Patient Disposition:  PACU     SIGNATURE: Rocío Henderson MD  DATE: February 15, 2019  TIME: 9:04 AM

## 2019-02-15 NOTE — ANESTHESIA POSTPROCEDURE EVALUATION
Post-Op Assessment Note    CV Status:  Stable    Pain management: adequate     Mental Status:  Alert and awake   Hydration Status:  Euvolemic   PONV Controlled:  Controlled   Airway Patency:  Patent   Post Op Vitals Reviewed: Yes      Staff: CRNA           /71 (02/15/19 0909)    Temp     Pulse 68 (02/15/19 0909)   Resp 16 (02/15/19 0909)    SpO2 97 % (02/15/19 0909)

## 2019-02-15 NOTE — ANESTHESIA PREPROCEDURE EVALUATION
Review of Systems/Medical History  Patient summary reviewed  Chart reviewed      Cardiovascular  Exercise tolerance (METS): >4,     Pulmonary  Negative pulmonary ROS        GI/Hepatic       Negative  ROS        Endo/Other  History of thyroid disease , hypothyroidism,      GYN       Hematology  Negative hematology ROS      Musculoskeletal  Negative musculoskeletal ROS        Neurology  Negative neurology ROS      Psychology         Lab Results   Component Value Date    WBC 4 66 01/24/2019    HGB 11 8 01/24/2019    HCT 35 9 01/24/2019    MCV 93 01/24/2019     (L) 01/24/2019     Lab Results   Component Value Date     01/14/2015    K 3 6 01/24/2019    CO2 27 01/24/2019     01/24/2019    BUN 10 01/24/2019    CREATININE 0 85 01/24/2019     Lab Results   Component Value Date    INR 1 11 01/24/2019    PROTIME 13 8 01/24/2019     Lab Results   Component Value Date    PTT 34 01/24/2019       Lab Results   Component Value Date    GLUCOSE 92 01/14/2015    GLUCOSE 86 06/20/2014       Lab Results   Component Value Date    HGBA1C 6 1 08/18/2018           Physical Exam    Airway    Mallampati score: I  TM Distance: >3 FB  Neck ROM: full     Dental       Cardiovascular  Rhythm: regular, Rate: normal,     Pulmonary      Other Findings        Anesthesia Plan  ASA Score- 1     Anesthesia Type- IV sedation with anesthesia with ASA Monitors  Additional Monitors:   Airway Plan:         Plan Factors-    Induction- intravenous  Postoperative Plan-     Informed Consent- Anesthetic plan and risks discussed with patient  I personally reviewed this patient with the CRNA  Discussed and agreed on the Anesthesia Plan with the IRVING Alicea

## 2019-02-15 NOTE — OP NOTE
OPERATIVE REPORT  PATIENT NAME: Jillian Jackson    :  1960  MRN: [id number]  Pt Location: [facility] ROOM 01    SURGERY DATE: 2/15/2019    Surgeon(s) and Role:     * Bryson Lind MD - Primary    Operative Findings:  -Normal terminal ileum intubation 10-15cm, normal visualized colon, normal retroflexion rectum  Recommendations:  -High fiber diet/increased hydration, 20-30grams fiber per day, increased fruits/vegetables/psyllium(metamucil or konsyl 1 tbsp 1-2x/day)  -Recall colonoscopy 10 years    Preop Diagnosis:  Colon cancer screening [Z12 11]  Abdominal pain, unspecified abdominal location [R10 9]    Post-Op Diagnosis Codes:     * Colon cancer screening [Z12 11]     * Abdominal pain, unspecified abdominal location [R10 9]  Normal colonoscopy    Procedure(s) (LRB):  COLONOSCOPY (N/A)    Estimated Blood Loss:   Minimal    Anesthesia Type:   IV Sedation with Anesthesia    Operative Indications:  Colon cancer screening [Z12 11]  Abdominal pain, unspecified abdominal location [E43 0]    Complications:   None    Procedure and Technique:  After appropriate identification, patient was placed in left lateral decubitus position, timeout was taken per protocol and after mac sedation was induced digital rectal examination revealed normal rectal examination  Colonoscope was introduced and advanced without difficulty to cecum identified by appendiceal orifice and ileocecal valve, photo taken  Scope was then removed with careful mucosal evaluation and visualization, retroflexion rectum  Prep was adequate with moderate amounts of suctionable fluid  Cecal intubation time 3 minutes withdrawal time 17 minutes       I was present for the entire procedure    Patient Disposition:  PACU     SIGNATURE: Bryson Lind MD  DATE: February 15, 2019  TIME: 9:04 AM

## 2019-02-19 DIAGNOSIS — E06.3 HYPOTHYROIDISM DUE TO HASHIMOTO'S THYROIDITIS: Primary | ICD-10-CM

## 2019-02-19 DIAGNOSIS — E03.8 HYPOTHYROIDISM DUE TO HASHIMOTO'S THYROIDITIS: Primary | ICD-10-CM

## 2019-02-19 RX ORDER — LEVOTHYROXINE SODIUM 0.07 MG/1
75 TABLET ORAL DAILY
Qty: 30 TABLET | Refills: 5 | Status: SHIPPED | OUTPATIENT
Start: 2019-02-19 | End: 2019-04-11 | Stop reason: SDUPTHER

## 2019-02-23 ENCOUNTER — HOSPITAL ENCOUNTER (OUTPATIENT)
Dept: CT IMAGING | Facility: HOSPITAL | Age: 59
Discharge: HOME | End: 2019-02-23
Attending: UROLOGY
Payer: COMMERCIAL

## 2019-02-23 DIAGNOSIS — R31.29 MICROHEMATURIA: ICD-10-CM

## 2019-02-23 PROCEDURE — 74178 CT ABD&PLV WO CNTR FLWD CNTR: CPT

## 2019-02-23 RX ADMIN — IOHEXOL 100 ML: 350 INJECTION, SOLUTION INTRAVENOUS at 07:54

## 2019-02-25 NOTE — RESULT NOTES
Bed: ED18  Expected date: 2/24/19  Expected time: 9:57 PM  Means of arrival:   Comments:  T1   Verified Results  * XR KNEE 3 VW LEFT NON INJURY 27Oct2016 12:25PM Ricki Rogers    Order Number: [id number]     Test Name Result Flag Reference   XR KNEE 3 VW LEFT (Report)     LEFT KNEE     INDICATION: Lateral knee pain     COMPARISON: None     VIEWS: 3; 3 images     FINDINGS:     There is no acute fracture or dislocation  There is no joint effusion  No degenerative changes  No lytic or blastic lesions are seen  Soft tissues are unremarkable         IMPRESSION:     Unremarkable left knee exam       Workstation performed: [id number]     Signed by:   Anita Lowry MD   10/28/16

## 2019-02-27 ENCOUNTER — TELEPHONE (OUTPATIENT)
Dept: UROLOGY | Facility: CLINIC | Age: 59
End: 2019-02-27

## 2019-02-27 NOTE — TELEPHONE ENCOUNTER
----- Message from Namrata Lewis MD sent at 2/27/2019 12:23 PM EST -----  Please let the patient know that her CT urogram is reassuring  I did recommend cystoscopy although I do not see that the patient is scheduled  Can we please make sure that this is arranged?

## 2019-02-27 NOTE — TELEPHONE ENCOUNTER
Left message on patient's cell number per  communication consent her CT imaging is reassuring and needs to schedule cystoscopy appointment

## 2019-03-04 DIAGNOSIS — L02.91 ABSCESS: Primary | ICD-10-CM

## 2019-03-04 RX ORDER — CEPHALEXIN 500 MG/1
500 CAPSULE ORAL EVERY 8 HOURS SCHEDULED
Qty: 21 CAPSULE | Refills: 0 | Status: SHIPPED | OUTPATIENT
Start: 2019-03-04 | End: 2019-03-11

## 2019-03-04 NOTE — TELEPHONE ENCOUNTER
Left another message for patient to please contact office to schedule cystoscopy with Dr Manny Benson at the [facility] – [place], [place] office

## 2019-03-08 DIAGNOSIS — Z12.39 SCREENING FOR BREAST CANCER: Primary | ICD-10-CM

## 2019-03-12 NOTE — TELEPHONE ENCOUNTER
Dr Samira Jon  Patient has not scheduled her cystoscopy despite 2 phone calls to remind her    Please advise  Thanks

## 2019-03-12 NOTE — TELEPHONE ENCOUNTER
Please send a certified letter and let her know to contact us if and when she is interested in additional work up or evaluation

## 2019-03-28 ENCOUNTER — HOSPITAL ENCOUNTER (OUTPATIENT)
Dept: MAMMOGRAPHY | Facility: HOSPITAL | Age: 59
Discharge: HOME | End: 2019-03-28
Attending: FAMILY MEDICINE
Payer: COMMERCIAL

## 2019-03-28 VITALS — WEIGHT: 172 LBS | HEIGHT: 65 IN | BODY MASS INDEX: 28.66 KG/M2

## 2019-03-28 DIAGNOSIS — Z12.39 SCREENING FOR BREAST CANCER: ICD-10-CM

## 2019-03-28 PROCEDURE — 77067 SCR MAMMO BI INCL CAD: CPT

## 2019-03-28 PROCEDURE — 77063 BREAST TOMOSYNTHESIS BI: CPT

## 2019-04-02 ENCOUNTER — TELEPHONE (OUTPATIENT)
Dept: FAMILY MEDICINE CLINIC | Facility: CLINIC | Age: 59
End: 2019-04-02

## 2019-04-04 DIAGNOSIS — E03.9 ACQUIRED HYPOTHYROIDISM: Primary | ICD-10-CM

## 2019-04-05 ENCOUNTER — TRANSCRIBE ORDERS (OUTPATIENT)
Dept: LAB | Facility: MEDICAL CENTER | Age: 59
End: 2019-04-05

## 2019-04-05 ENCOUNTER — APPOINTMENT (OUTPATIENT)
Dept: LAB | Facility: MEDICAL CENTER | Age: 59
End: 2019-04-05
Payer: COMMERCIAL

## 2019-04-05 LAB — TSH SERPL DL<=0.05 MIU/L-ACNC: 0.97 UIU/ML (ref 0.36–3.74)

## 2019-04-05 PROCEDURE — 84443 ASSAY THYROID STIM HORMONE: CPT | Performed by: FAMILY MEDICINE

## 2019-04-05 PROCEDURE — 36415 COLL VENOUS BLD VENIPUNCTURE: CPT | Performed by: FAMILY MEDICINE

## 2019-04-11 DIAGNOSIS — E03.8 HYPOTHYROIDISM DUE TO HASHIMOTO'S THYROIDITIS: ICD-10-CM

## 2019-04-11 DIAGNOSIS — R00.2 PALPITATIONS: Primary | ICD-10-CM

## 2019-04-11 DIAGNOSIS — E06.3 HYPOTHYROIDISM DUE TO HASHIMOTO'S THYROIDITIS: ICD-10-CM

## 2019-04-11 RX ORDER — LEVOTHYROXINE SODIUM 0.07 MG/1
75 TABLET ORAL DAILY
Qty: 30 TABLET | Refills: 5 | Status: SHIPPED | OUTPATIENT
Start: 2019-04-11 | End: 2019-12-31 | Stop reason: ALTCHOICE

## 2019-04-18 ENCOUNTER — HOSPITAL ENCOUNTER (OUTPATIENT)
Dept: NON INVASIVE DIAGNOSTICS | Facility: HOSPITAL | Age: 59
Discharge: HOME | End: 2019-04-18
Attending: FAMILY MEDICINE
Payer: COMMERCIAL

## 2019-04-18 DIAGNOSIS — R00.2 PALPITATIONS: ICD-10-CM

## 2019-04-18 PROCEDURE — 93225 XTRNL ECG REC<48 HRS REC: CPT

## 2019-04-18 PROCEDURE — 93226 XTRNL ECG REC<48 HR SCAN A/R: CPT

## 2019-04-19 ENCOUNTER — TELEPHONE (OUTPATIENT)
Dept: FAMILY MEDICINE CLINIC | Facility: CLINIC | Age: 59
End: 2019-04-19

## 2019-04-19 DIAGNOSIS — R00.2 PALPITATIONS: Primary | ICD-10-CM

## 2019-04-23 ENCOUNTER — TELEPHONE (OUTPATIENT)
Dept: FAMILY MEDICINE CLINIC | Facility: CLINIC | Age: 59
End: 2019-04-23

## 2019-04-23 DIAGNOSIS — R00.0 TACHYCARDIA, UNSPECIFIED: ICD-10-CM

## 2019-04-23 DIAGNOSIS — R00.2 PALPITATIONS: Primary | ICD-10-CM

## 2019-04-23 DIAGNOSIS — R00.2 HEART PALPITATIONS: Primary | ICD-10-CM

## 2019-04-23 PROCEDURE — 93227 XTRNL ECG REC<48 HR R&I: CPT | Performed by: INTERNAL MEDICINE

## 2019-04-23 RX ORDER — ATENOLOL 25 MG/1
25 TABLET ORAL DAILY
Qty: 30 TABLET | Refills: 5 | Status: SHIPPED | OUTPATIENT
Start: 2019-04-23 | End: 2019-12-31 | Stop reason: ALTCHOICE

## 2019-05-17 ENCOUNTER — OFFICE VISIT (OUTPATIENT)
Dept: CARDIOLOGY CLINIC | Facility: HOSPITAL | Age: 59
End: 2019-05-17
Payer: COMMERCIAL

## 2019-05-17 VITALS
BODY MASS INDEX: 29.26 KG/M2 | HEIGHT: 65 IN | SYSTOLIC BLOOD PRESSURE: 112 MMHG | HEART RATE: 74 BPM | DIASTOLIC BLOOD PRESSURE: 78 MMHG | WEIGHT: 175.6 LBS

## 2019-05-17 DIAGNOSIS — R07.9 CHEST PAIN, UNSPECIFIED TYPE: Primary | ICD-10-CM

## 2019-05-17 DIAGNOSIS — R00.0 TACHYCARDIA, UNSPECIFIED: ICD-10-CM

## 2019-05-17 DIAGNOSIS — R00.2 HEART PALPITATIONS: ICD-10-CM

## 2019-05-17 PROCEDURE — 93000 ELECTROCARDIOGRAM COMPLETE: CPT | Performed by: INTERNAL MEDICINE

## 2019-05-17 PROCEDURE — 99214 OFFICE O/P EST MOD 30 MIN: CPT | Performed by: INTERNAL MEDICINE

## 2019-06-07 ENCOUNTER — EVENT RECORDER/EXTENDED HOLTER (OUTPATIENT)
Dept: CARDIOLOGY CLINIC | Facility: CLINIC | Age: 59
End: 2019-06-07

## 2019-07-02 DIAGNOSIS — N30.00 ACUTE CYSTITIS WITHOUT HEMATURIA: Primary | ICD-10-CM

## 2019-07-02 RX ORDER — SULFAMETHOXAZOLE AND TRIMETHOPRIM 800; 160 MG/1; MG/1
1 TABLET ORAL EVERY 12 HOURS SCHEDULED
Qty: 20 TABLET | Refills: 0 | Status: SHIPPED | OUTPATIENT
Start: 2019-07-02 | End: 2019-07-12

## 2019-07-03 ENCOUNTER — TELEPHONE (OUTPATIENT)
Dept: FAMILY MEDICINE CLINIC | Facility: CLINIC | Age: 59
End: 2019-07-03

## 2019-07-03 DIAGNOSIS — H44.001 EYE INFECTION, RIGHT: Primary | ICD-10-CM

## 2019-07-03 RX ORDER — TOBRAMYCIN 3 MG/ML
2 SOLUTION/ DROPS OPHTHALMIC
Qty: 1 BOTTLE | Refills: 0 | Status: SHIPPED | OUTPATIENT
Start: 2019-07-03 | End: 2019-12-31 | Stop reason: ALTCHOICE

## 2019-07-03 NOTE — TELEPHONE ENCOUNTER
I will send her a script for tobramycin eye drops  Make appointment if symptoms do not improve or worsen

## 2019-07-26 DIAGNOSIS — E03.9 HYPOTHYROIDISM, UNSPECIFIED TYPE: Primary | ICD-10-CM

## 2019-07-29 ENCOUNTER — LAB (OUTPATIENT)
Dept: LAB | Facility: MEDICAL CENTER | Age: 59
End: 2019-07-29
Payer: COMMERCIAL

## 2019-07-29 ENCOUNTER — TRANSCRIBE ORDERS (OUTPATIENT)
Dept: LAB | Facility: MEDICAL CENTER | Age: 59
End: 2019-07-29

## 2019-07-29 DIAGNOSIS — E03.9 HYPOTHYROIDISM, UNSPECIFIED TYPE: ICD-10-CM

## 2019-07-29 LAB — TSH SERPL DL<=0.05 MIU/L-ACNC: 2.9 UIU/ML (ref 0.36–3.74)

## 2019-07-29 PROCEDURE — 36415 COLL VENOUS BLD VENIPUNCTURE: CPT

## 2019-07-29 PROCEDURE — 84443 ASSAY THYROID STIM HORMONE: CPT

## 2019-09-24 DIAGNOSIS — E03.9 HYPOTHYROIDISM, UNSPECIFIED TYPE: Primary | ICD-10-CM

## 2019-11-06 ENCOUNTER — OFFICE VISIT (OUTPATIENT)
Dept: FAMILY MEDICINE CLINIC | Facility: CLINIC | Age: 59
End: 2019-11-06
Payer: COMMERCIAL

## 2019-11-06 ENCOUNTER — APPOINTMENT (OUTPATIENT)
Dept: RADIOLOGY | Facility: MEDICAL CENTER | Age: 59
End: 2019-11-06
Payer: COMMERCIAL

## 2019-11-06 VITALS
WEIGHT: 175 LBS | DIASTOLIC BLOOD PRESSURE: 84 MMHG | BODY MASS INDEX: 29.16 KG/M2 | SYSTOLIC BLOOD PRESSURE: 124 MMHG | TEMPERATURE: 98.5 F | HEIGHT: 65 IN

## 2019-11-06 DIAGNOSIS — R50.9 COUGH WITH FEVER: Primary | ICD-10-CM

## 2019-11-06 DIAGNOSIS — R05.9 COUGH WITH FEVER: Primary | ICD-10-CM

## 2019-11-06 DIAGNOSIS — B37.3 MONILIAL VAGINITIS: ICD-10-CM

## 2019-11-06 DIAGNOSIS — J22 ACUTE LOWER RESPIRATORY TRACT INFECTION: Primary | ICD-10-CM

## 2019-11-06 DIAGNOSIS — J22 ACUTE LOWER RESPIRATORY TRACT INFECTION: ICD-10-CM

## 2019-11-06 PROCEDURE — 3008F BODY MASS INDEX DOCD: CPT | Performed by: FAMILY MEDICINE

## 2019-11-06 PROCEDURE — 87631 RESP VIRUS 3-5 TARGETS: CPT | Performed by: FAMILY MEDICINE

## 2019-11-06 PROCEDURE — 99212 OFFICE O/P EST SF 10 MIN: CPT | Performed by: FAMILY MEDICINE

## 2019-11-06 PROCEDURE — 71046 X-RAY EXAM CHEST 2 VIEWS: CPT

## 2019-11-06 RX ORDER — ALBUTEROL SULFATE 2.5 MG/3ML
2.5 SOLUTION RESPIRATORY (INHALATION) ONCE
Status: CANCELLED | OUTPATIENT
Start: 2019-11-06

## 2019-11-06 RX ORDER — AMOXICILLIN AND CLAVULANATE POTASSIUM 875; 125 MG/1; MG/1
1 TABLET, FILM COATED ORAL EVERY 12 HOURS SCHEDULED
Qty: 20 TABLET | Refills: 0 | Status: SHIPPED | OUTPATIENT
Start: 2019-11-06 | End: 2019-11-16

## 2019-11-06 RX ORDER — FLUCONAZOLE 150 MG/1
TABLET ORAL
Qty: 2 TABLET | Refills: 0 | Status: SHIPPED | OUTPATIENT
Start: 2019-11-06 | End: 2019-11-12

## 2019-11-06 RX ORDER — ALBUTEROL SULFATE 2.5 MG/3ML
2.5 SOLUTION RESPIRATORY (INHALATION) ONCE
Status: COMPLETED | OUTPATIENT
Start: 2019-11-06 | End: 2019-11-06

## 2019-11-06 RX ADMIN — ALBUTEROL SULFATE 2.5 MG: 2.5 SOLUTION RESPIRATORY (INHALATION) at 15:29

## 2019-11-06 NOTE — PROGRESS NOTES
Assessment/Plan:  Patient will be started on Augmentin 875 mg twice daily also will receive Diflucan for possible vaginal moniliasis she will receive a chest x-ray I suspect right lower lobe pneumonia she will also have an influenza nasal smear done since she has not been immunized    Problem List Items Addressed This Visit     None      Visit Diagnoses     Acute lower respiratory tract infection    -  Primary           Diagnoses and all orders for this visit:    Acute lower respiratory tract infection        No problem-specific Assessment & Plan notes found for this encounter  Subjective:      Patient ID: Derick Blue is a 64 y o  female  One-week history cough low-grade fever congestion and regionally thought to be viral not getting any better expectorating some purulence mucus      The following portions of the patient's history were reviewed and updated as appropriate:   She has a past medical history of Abdominal pain, Atelectasis, Cancer ([address] ), Diarrhea, Disease of thyroid gland, GERD (gastroesophageal reflux disease), Kidney stone, and PONV (postoperative nausea and vomiting)  ,  does not have any pertinent problems on file  ,   has a past surgical history that includes Shoulder surgery (Bilateral); Biceps tendon repair (Right); Carpal tunnel release;  section; Exploratory laparotomy; Cholecystectomy; Appendectomy; Umbilical hernia repair; Tubal ligation; Knee arthroscopy w/ meniscal repair (Left); pr knee scope,med/lat menisectomy (Left, 2017); ARTHROSCOPY KNEE (Right, 2018); pr colonoscopy flx dx w/collj spec when pfrmd (N/A, 2/15/2019); and Breast biopsy (Right, 2012)  ,  family history includes Lung cancer in her maternal uncle; Mental illness in her father; Stroke in her mother; Substance Abuse in her brother  ,   reports that she has been smoking  She has been smoking about 0 25 packs per day  She has never used smokeless tobacco  She reports that she drinks alcohol   She reports that she does not use drugs  ,  is allergic to erythromycin; betadine [povidone iodine]; doxycycline; and medical tape     Current Outpatient Medications   Medication Sig Dispense Refill    atenolol (TENORMIN) 25 mg tablet Take 1 tablet (25 mg total) by mouth daily 30 tablet 5    cholecalciferol (VITAMIN D3) 1,000 units tablet Take 1,000 Units by mouth daily      ibuprofen (MOTRIN) 800 mg tablet Take by mouth every 6 (six) hours as needed for mild pain      levothyroxine 75 mcg tablet Take 1 tablet (75 mcg total) by mouth daily 30 tablet 5    tobramycin (TOBREX) 0 3 % SOLN Administer 2 drops to the right eye every 4 (four) hours while awake 1 Bottle 0     No current facility-administered medications for this visit  Review of Systems   Constitutional: Positive for activity change and fatigue  Negative for appetite change, diaphoresis and fever  HENT: Positive for sinus pressure, sinus pain, sneezing and sore throat  Eyes: Negative  Respiratory: Positive for cough, shortness of breath and wheezing  Negative for apnea and chest tightness  Cardiovascular: Negative for chest pain, palpitations and leg swelling  Gastrointestinal: Negative for abdominal distention, abdominal pain, anal bleeding, constipation, diarrhea, nausea and vomiting  Endocrine: Negative for cold intolerance, heat intolerance, polydipsia, polyphagia and polyuria  Genitourinary: Negative for difficulty urinating, dysuria, flank pain, hematuria and urgency  Musculoskeletal: Negative for arthralgias, back pain, gait problem, joint swelling and myalgias  Skin: Negative for color change, rash and wound  Allergic/Immunologic: Negative for environmental allergies, food allergies and immunocompromised state  Neurological: Negative for dizziness, seizures, syncope, speech difficulty, numbness and headaches  Hematological: Negative for adenopathy  Does not bruise/bleed easily     Psychiatric/Behavioral: Negative for agitation, behavioral problems, hallucinations, sleep disturbance and suicidal ideas  Objective:  Vitals:    11/06/19 0722   BP: 124/84   BP Location: Left arm   Patient Position: Sitting   Cuff Size: Standard   Temp: 98 5 °F (36 9 °C)   TempSrc: Tympanic   Weight: 79 4 kg (175 lb)   Height: 5' 5" (1 651 m)     Body mass index is 29 12 kg/m²  Physical Exam   Constitutional: She is oriented to person, place, and time  She appears well-developed and well-nourished  No distress  HENT:   Head: Normocephalic  Right Ear: External ear normal    Left Ear: External ear normal    Mouth/Throat: Oropharyngeal exudate present  Hyperemic nasal mucosa   Eyes: Pupils are equal, round, and reactive to light  Conjunctivae and EOM are normal  Right eye exhibits no discharge  Left eye exhibits no discharge  No scleral icterus  Neck: Normal range of motion  No tracheal deviation present  No thyromegaly present  Cardiovascular: Normal rate, regular rhythm and normal heart sounds  Exam reveals no gallop and no friction rub  No murmur heard  Pulmonary/Chest: Effort normal  No respiratory distress  Consolidating breath sounds right lower lobe   Abdominal: Soft  Bowel sounds are normal  She exhibits no mass  There is no tenderness  There is no guarding  Musculoskeletal: She exhibits no edema or deformity  Lymphadenopathy:     She has no cervical adenopathy  Neurological: She is alert and oriented to person, place, and time  No cranial nerve deficit  Skin: Skin is warm and dry  No rash noted  She is not diaphoretic  No erythema  Psychiatric: She has a normal mood and affect   Thought content normal

## 2019-11-07 LAB
FLUAV RNA NPH QL NAA+PROBE: NORMAL
FLUBV RNA NPH QL NAA+PROBE: NORMAL
RSV RNA NPH QL NAA+PROBE: NORMAL

## 2019-11-08 DIAGNOSIS — R05.3 CHRONIC COUGHING: Primary | ICD-10-CM

## 2019-11-08 RX ORDER — BENZONATATE 100 MG/1
100 CAPSULE ORAL 3 TIMES DAILY PRN
Qty: 20 CAPSULE | Refills: 0 | Status: SHIPPED | OUTPATIENT
Start: 2019-11-08 | End: 2019-12-31 | Stop reason: ALTCHOICE

## 2019-11-12 DIAGNOSIS — Z23 NEED FOR INFLUENZA VACCINATION: Primary | ICD-10-CM

## 2019-11-12 PROCEDURE — 90471 IMMUNIZATION ADMIN: CPT | Performed by: FAMILY MEDICINE

## 2019-11-12 PROCEDURE — 90682 RIV4 VACC RECOMBINANT DNA IM: CPT | Performed by: FAMILY MEDICINE

## 2019-12-31 ENCOUNTER — OFFICE VISIT (OUTPATIENT)
Dept: FAMILY MEDICINE CLINIC | Facility: CLINIC | Age: 59
End: 2019-12-31
Payer: COMMERCIAL

## 2019-12-31 VITALS
DIASTOLIC BLOOD PRESSURE: 80 MMHG | TEMPERATURE: 99 F | SYSTOLIC BLOOD PRESSURE: 140 MMHG | WEIGHT: 175 LBS | HEIGHT: 65 IN | BODY MASS INDEX: 29.16 KG/M2

## 2019-12-31 DIAGNOSIS — R05.3 CHRONIC COUGHING: Primary | ICD-10-CM

## 2019-12-31 DIAGNOSIS — J22 ACUTE LOWER RESPIRATORY TRACT INFECTION: ICD-10-CM

## 2019-12-31 LAB
FLUAV RNA NPH QL NAA+PROBE: ABNORMAL
FLUBV RNA NPH QL NAA+PROBE: DETECTED
RSV RNA NPH QL NAA+PROBE: ABNORMAL

## 2019-12-31 PROCEDURE — 99212 OFFICE O/P EST SF 10 MIN: CPT | Performed by: FAMILY MEDICINE

## 2019-12-31 PROCEDURE — 87631 RESP VIRUS 3-5 TARGETS: CPT | Performed by: FAMILY MEDICINE

## 2019-12-31 PROCEDURE — 3008F BODY MASS INDEX DOCD: CPT | Performed by: FAMILY MEDICINE

## 2019-12-31 RX ORDER — AZITHROMYCIN 250 MG/1
TABLET, FILM COATED ORAL
Qty: 6 TABLET | Refills: 0 | Status: SHIPPED | OUTPATIENT
Start: 2019-12-31 | End: 2020-01-04

## 2019-12-31 NOTE — PROGRESS NOTES
BMI Counseling: Body mass index is 29 12 kg/m²  The BMI is above normal  Nutrition recommendations include decreasing portion sizes, encouraging healthy choices of fruits and vegetables, decreasing fast food intake, consuming healthier snacks, moderation in carbohydrate intake and increasing intake of lean protein  Exercise recommendations include moderate physical activity 150 minutes/week  No pharmacotherapy was ordered  Patient referred to PCP due to patient being overweight  Assessment/Plan:  Patient is doxycycline erythromycin allergy but has tolerated azithromycin in the past    Problem List Items Addressed This Visit     None      Visit Diagnoses     Chronic coughing    -  Primary    Relevant Orders    Influenza A/B and RSV PCR    Acute lower respiratory tract infection        Relevant Orders    Influenza A/B and RSV PCR           Diagnoses and all orders for this visit:    Chronic coughing  -     Influenza A/B and RSV PCR; Future  -     Influenza A/B and RSV PCR    Acute lower respiratory tract infection  -     Influenza A/B and RSV PCR; Future  -     Influenza A/B and RSV PCR        No problem-specific Assessment & Plan notes found for this encounter  Subjective:      Patient ID: Radha Flynn is a 64 y o  female  For day history respiratory symptoms upper and lower chronic cough congestion no fever chills or signs of sepsis      The following portions of the patient's history were reviewed and updated as appropriate:   She has a past medical history of Abdominal pain, Atelectasis, Cancer ([address] ), Diarrhea, Disease of thyroid gland, GERD (gastroesophageal reflux disease), Kidney stone, and PONV (postoperative nausea and vomiting)  ,  does not have any pertinent problems on file  ,   has a past surgical history that includes Shoulder surgery (Bilateral); Biceps tendon repair (Right); Carpal tunnel release;  section; Exploratory laparotomy; Cholecystectomy;  Appendectomy; Umbilical hernia repair; Tubal ligation; Knee arthroscopy w/ meniscal repair (Left); pr knee scope,med/lat menisectomy (Left, 1/16/2017); ARTHROSCOPY KNEE (Right, 2/21/2018); pr colonoscopy flx dx w/collj spec when pfrmd (N/A, 2/15/2019); and Breast biopsy (Right, 01/01/2012)  ,  family history includes Lung cancer in her maternal uncle; Mental illness in her father; Stroke in her mother; Substance Abuse in her brother  ,   reports that she has been smoking  She has been smoking about 0 25 packs per day  She has never used smokeless tobacco  She reports that she drinks alcohol  She reports that she does not use drugs  ,  is allergic to erythromycin; betadine [povidone iodine]; doxycycline; and medical tape     No current outpatient medications on file  No current facility-administered medications for this visit  Review of Systems   Constitutional: Negative for activity change, appetite change, diaphoresis, fatigue and fever  HENT: Positive for sinus pressure, sinus pain and sore throat  Eyes: Negative  Respiratory: Positive for cough and wheezing  Negative for apnea, chest tightness and shortness of breath  Cardiovascular: Negative for chest pain, palpitations and leg swelling  Gastrointestinal: Negative for abdominal distention, abdominal pain, anal bleeding, constipation, diarrhea, nausea and vomiting  Endocrine: Negative for cold intolerance, heat intolerance, polydipsia, polyphagia and polyuria  Genitourinary: Negative for difficulty urinating, dysuria, flank pain, hematuria and urgency  Musculoskeletal: Negative for arthralgias, back pain, gait problem, joint swelling and myalgias  Skin: Negative for color change, rash and wound  Allergic/Immunologic: Negative for environmental allergies, food allergies and immunocompromised state  Neurological: Negative for dizziness, seizures, syncope, speech difficulty, numbness and headaches  Hematological: Negative for adenopathy   Does not bruise/bleed easily  Psychiatric/Behavioral: Negative for agitation, behavioral problems, hallucinations, sleep disturbance and suicidal ideas  Objective:  Vitals:    12/31/19 0711   BP: 140/80   BP Location: Left arm   Patient Position: Sitting   Cuff Size: Standard   Temp: 99 °F (37 2 °C)   TempSrc: Tympanic   Weight: 79 4 kg (175 lb)   Height: 5' 5" (1 651 m)     Body mass index is 29 12 kg/m²  Physical Exam   Constitutional: She is oriented to person, place, and time  She appears well-developed and well-nourished  No distress  HENT:   Head: Normocephalic  Right Ear: External ear normal    Left Ear: External ear normal    Nose: Nose normal    Mouth/Throat: Oropharynx is clear and moist    Eyes: Pupils are equal, round, and reactive to light  Conjunctivae and EOM are normal  Right eye exhibits no discharge  Left eye exhibits no discharge  No scleral icterus  Neck: Normal range of motion  No tracheal deviation present  No thyromegaly present  Cardiovascular: Normal rate, regular rhythm and normal heart sounds  Exam reveals no gallop and no friction rub  No murmur heard  Pulmonary/Chest: Effort normal  No respiratory distress  She has wheezes  Abdominal: Soft  Bowel sounds are normal  She exhibits no mass  There is no tenderness  There is no guarding  Musculoskeletal: She exhibits no edema or deformity  Lymphadenopathy:     She has no cervical adenopathy  Neurological: She is alert and oriented to person, place, and time  No cranial nerve deficit  Skin: Skin is warm and dry  No rash noted  She is not diaphoretic  No erythema  Psychiatric: She has a normal mood and affect   Thought content normal

## 2020-04-09 ENCOUNTER — APPOINTMENT (OUTPATIENT)
Dept: LAB | Facility: MEDICAL CENTER | Age: 60
End: 2020-04-09
Payer: COMMERCIAL

## 2020-04-09 DIAGNOSIS — E03.9 HYPOTHYROIDISM, UNSPECIFIED TYPE: ICD-10-CM

## 2020-04-09 LAB — TSH SERPL DL<=0.05 MIU/L-ACNC: 10.8 UIU/ML (ref 0.36–3.74)

## 2020-04-09 PROCEDURE — 84443 ASSAY THYROID STIM HORMONE: CPT

## 2020-04-09 PROCEDURE — 36415 COLL VENOUS BLD VENIPUNCTURE: CPT

## 2020-04-10 DIAGNOSIS — E03.9 ACQUIRED HYPOTHYROIDISM: Primary | ICD-10-CM

## 2020-04-10 RX ORDER — LEVOTHYROXINE SODIUM 0.03 MG/1
25 TABLET ORAL
Qty: 90 TABLET | Refills: 2 | Status: SHIPPED | OUTPATIENT
Start: 2020-04-10 | End: 2020-04-20 | Stop reason: ALTCHOICE

## 2020-04-20 DIAGNOSIS — E03.9 ACQUIRED HYPOTHYROIDISM: ICD-10-CM

## 2020-04-20 RX ORDER — LEVOTHYROXINE SODIUM 25 MCG
25 TABLET ORAL DAILY
Qty: 30 TABLET | Refills: 2 | Status: SHIPPED | OUTPATIENT
Start: 2020-04-20 | End: 2020-06-01 | Stop reason: SDUPTHER

## 2020-05-21 DIAGNOSIS — E03.9 HYPOTHYROIDISM, UNSPECIFIED TYPE: Primary | ICD-10-CM

## 2020-05-28 ENCOUNTER — LAB REQUISITION (OUTPATIENT)
Dept: LAB | Facility: HOSPITAL | Age: 60
End: 2020-05-28
Payer: COMMERCIAL

## 2020-05-28 DIAGNOSIS — Z01.419 ENCOUNTER FOR GYNECOLOGICAL EXAMINATION (GENERAL) (ROUTINE) WITHOUT ABNORMAL FINDINGS: ICD-10-CM

## 2020-05-28 PROCEDURE — G0145 SCR C/V CYTO,THINLAYER,RESCR: HCPCS | Performed by: SPECIALIST

## 2020-05-28 NOTE — RESULT NOTES
Verified Results  * MRI KNEE LEFT  WO CONTRAST [id number] 12:27PM Armando Coleman Order Number: [id number]    - Patient Instructions: To schedule this appointment, please contact Central Scheduling at [phone]  Test Name Result Flag Reference   MRI KNEE LEFT  WO CONTRAST (Report)     MRI LEFT KNEE     INDICATION: Twisting injury with left knee pain and swelling  COMPARISON: Plain film dated 12/30/2016  MRI dated 11/10/2016     TECHNIQUE:  MR sequences were obtained of the left knee including: Localizer, axial T2 fat sat, coronal T1/T2 fat sat, sagittal PD/T2 fat sat  Images were acquired on a 1 5 Michaela unit  Gadolinium was not used  FINDINGS:     SUBCUTANEOUS TISSUES: Normal     JOINT EFFUSION: Moderate joint effusion identified  BHATTI'S CYST: Tiny Bhatti's cyst      MENISCI: Complex tear through the posterior horn medial meniscus exhibiting moderate extrusion without evidence of flipped fragment  There is also a superior surface anterior horn lateral meniscus tear best appreciated on coronal images, series 6 images   11 and 12  No evidence of flipped fragment or significant extrusion  CRUCIATE LIGAMENTS: Intact  EXTENSOR APPARATUS: Intact  COLLATERAL LIGAMENTS: Intact  ARTICULAR SURFACES: Degenerative change noted with diffuse grade 3 cartilage loss centered at the median ridge of the patella but also involving the medial and lateral facets  BONE MARROW: Normal      MUSCULATURE: Intact  IMPRESSION:   1  Complex tear through the posterior horn medial meniscus exhibiting extrusion but no flipped fragment  2  Superior surface anterior horn lateral meniscus tear without flipped fragment  3  Degenerative change noted including grade 3 patellar cartilage loss and associated joint effusion         Workstation performed: [id number]     Signed by:   Jessica Mcdowell MD   12/30/16
[employer] Safety Interventions

## 2020-05-29 ENCOUNTER — LAB (OUTPATIENT)
Dept: LAB | Facility: MEDICAL CENTER | Age: 60
End: 2020-05-29
Payer: COMMERCIAL

## 2020-05-29 DIAGNOSIS — E03.9 HYPOTHYROIDISM, UNSPECIFIED TYPE: ICD-10-CM

## 2020-05-29 LAB
T4 FREE SERPL-MCNC: 0.98 NG/DL (ref 0.76–1.46)
TSH SERPL DL<=0.05 MIU/L-ACNC: 5.92 UIU/ML (ref 0.36–3.74)

## 2020-05-29 PROCEDURE — 84439 ASSAY OF FREE THYROXINE: CPT

## 2020-05-29 PROCEDURE — 36415 COLL VENOUS BLD VENIPUNCTURE: CPT

## 2020-05-29 PROCEDURE — 84443 ASSAY THYROID STIM HORMONE: CPT

## 2020-06-01 DIAGNOSIS — E03.9 ACQUIRED HYPOTHYROIDISM: ICD-10-CM

## 2020-06-01 RX ORDER — LEVOTHYROXINE SODIUM 25 MCG
37.5 TABLET ORAL DAILY
Qty: 45 TABLET | Refills: 2 | Status: SHIPPED | OUTPATIENT
Start: 2020-06-01 | End: 2020-07-23

## 2020-06-08 LAB
LAB AP GYN PRIMARY INTERPRETATION: NORMAL
Lab: NORMAL

## 2020-06-23 ENCOUNTER — OFFICE VISIT (OUTPATIENT)
Dept: FAMILY MEDICINE CLINIC | Facility: CLINIC | Age: 60
End: 2020-06-23
Payer: COMMERCIAL

## 2020-06-23 VITALS
BODY MASS INDEX: 30.79 KG/M2 | HEART RATE: 61 BPM | SYSTOLIC BLOOD PRESSURE: 134 MMHG | HEIGHT: 65 IN | OXYGEN SATURATION: 96 % | DIASTOLIC BLOOD PRESSURE: 80 MMHG | TEMPERATURE: 97.8 F | WEIGHT: 184.8 LBS

## 2020-06-23 DIAGNOSIS — R60.0 BILATERAL LOWER EXTREMITY EDEMA: ICD-10-CM

## 2020-06-23 DIAGNOSIS — E03.9 HYPOTHYROIDISM, UNSPECIFIED TYPE: ICD-10-CM

## 2020-06-23 DIAGNOSIS — G56.01 CARPAL TUNNEL SYNDROME OF RIGHT WRIST: ICD-10-CM

## 2020-06-23 DIAGNOSIS — R63.5 WEIGHT GAIN: Primary | ICD-10-CM

## 2020-06-23 DIAGNOSIS — M72.2 PLANTAR FASCIA SYNDROME: ICD-10-CM

## 2020-06-23 PROCEDURE — 3008F BODY MASS INDEX DOCD: CPT | Performed by: PHYSICIAN ASSISTANT

## 2020-06-23 PROCEDURE — 3079F DIAST BP 80-89 MM HG: CPT | Performed by: PHYSICIAN ASSISTANT

## 2020-06-23 PROCEDURE — 99214 OFFICE O/P EST MOD 30 MIN: CPT | Performed by: PHYSICIAN ASSISTANT

## 2020-06-23 PROCEDURE — 3075F SYST BP GE 130 - 139MM HG: CPT | Performed by: PHYSICIAN ASSISTANT

## 2020-06-24 DIAGNOSIS — R60.0 BILATERAL LOWER EXTREMITY EDEMA: ICD-10-CM

## 2020-06-24 DIAGNOSIS — D69.6 THROMBOCYTOPENIA (HCC): ICD-10-CM

## 2020-06-24 DIAGNOSIS — R63.5 WEIGHT GAIN: Primary | ICD-10-CM

## 2020-06-24 DIAGNOSIS — E03.9 HYPOTHYROIDISM, UNSPECIFIED TYPE: ICD-10-CM

## 2020-06-25 ENCOUNTER — APPOINTMENT (OUTPATIENT)
Dept: LAB | Facility: MEDICAL CENTER | Age: 60
End: 2020-06-25
Payer: COMMERCIAL

## 2020-06-25 DIAGNOSIS — D69.6 THROMBOCYTOPENIA (HCC): ICD-10-CM

## 2020-06-25 DIAGNOSIS — R60.0 BILATERAL LOWER EXTREMITY EDEMA: ICD-10-CM

## 2020-06-25 DIAGNOSIS — R63.5 WEIGHT GAIN: ICD-10-CM

## 2020-06-25 DIAGNOSIS — E03.9 HYPOTHYROIDISM, UNSPECIFIED TYPE: ICD-10-CM

## 2020-06-25 LAB
ALBUMIN SERPL BCP-MCNC: 4.1 G/DL (ref 3.5–5)
ALP SERPL-CCNC: 64 U/L (ref 46–116)
ALT SERPL W P-5'-P-CCNC: 23 U/L (ref 12–78)
ANION GAP SERPL CALCULATED.3IONS-SCNC: 4 MMOL/L (ref 4–13)
AST SERPL W P-5'-P-CCNC: 12 U/L (ref 5–45)
BASOPHILS # BLD AUTO: 0.04 THOUSANDS/ΜL (ref 0–0.1)
BASOPHILS NFR BLD AUTO: 1 % (ref 0–1)
BILIRUB SERPL-MCNC: 0.34 MG/DL (ref 0.2–1)
BUN SERPL-MCNC: 17 MG/DL (ref 5–25)
CALCIUM SERPL-MCNC: 9.4 MG/DL (ref 8.3–10.1)
CHLORIDE SERPL-SCNC: 110 MMOL/L (ref 100–108)
CO2 SERPL-SCNC: 25 MMOL/L (ref 21–32)
CREAT SERPL-MCNC: 0.79 MG/DL (ref 0.6–1.3)
EOSINOPHIL # BLD AUTO: 0.18 THOUSAND/ΜL (ref 0–0.61)
EOSINOPHIL NFR BLD AUTO: 3 % (ref 0–6)
ERYTHROCYTE [DISTWIDTH] IN BLOOD BY AUTOMATED COUNT: 11.8 % (ref 11.6–15.1)
GFR SERPL CREATININE-BSD FRML MDRD: 83 ML/MIN/1.73SQ M
GLUCOSE SERPL-MCNC: 94 MG/DL (ref 65–140)
HCT VFR BLD AUTO: 39.6 % (ref 34.8–46.1)
HGB BLD-MCNC: 13 G/DL (ref 11.5–15.4)
IMM GRANULOCYTES # BLD AUTO: 0.01 THOUSAND/UL (ref 0–0.2)
IMM GRANULOCYTES NFR BLD AUTO: 0 % (ref 0–2)
LYMPHOCYTES # BLD AUTO: 2.07 THOUSANDS/ΜL (ref 0.6–4.47)
LYMPHOCYTES NFR BLD AUTO: 39 % (ref 14–44)
MCH RBC QN AUTO: 31.2 PG (ref 26.8–34.3)
MCHC RBC AUTO-ENTMCNC: 32.8 G/DL (ref 31.4–37.4)
MCV RBC AUTO: 95 FL (ref 82–98)
MONOCYTES # BLD AUTO: 0.42 THOUSAND/ΜL (ref 0.17–1.22)
MONOCYTES NFR BLD AUTO: 8 % (ref 4–12)
NEUTROPHILS # BLD AUTO: 2.59 THOUSANDS/ΜL (ref 1.85–7.62)
NEUTS SEG NFR BLD AUTO: 49 % (ref 43–75)
NRBC BLD AUTO-RTO: 0 /100 WBCS
PLATELET # BLD AUTO: 344 THOUSANDS/UL (ref 149–390)
PMV BLD AUTO: 10.9 FL (ref 8.9–12.7)
POTASSIUM SERPL-SCNC: 4.4 MMOL/L (ref 3.5–5.3)
PROT SERPL-MCNC: 7.2 G/DL (ref 6.4–8.2)
RBC # BLD AUTO: 4.17 MILLION/UL (ref 3.81–5.12)
SODIUM SERPL-SCNC: 139 MMOL/L (ref 136–145)
WBC # BLD AUTO: 5.31 THOUSAND/UL (ref 4.31–10.16)

## 2020-06-25 PROCEDURE — 36415 COLL VENOUS BLD VENIPUNCTURE: CPT

## 2020-06-25 PROCEDURE — 85025 COMPLETE CBC W/AUTO DIFF WBC: CPT

## 2020-06-25 PROCEDURE — 80053 COMPREHEN METABOLIC PANEL: CPT

## 2020-07-22 ENCOUNTER — APPOINTMENT (OUTPATIENT)
Dept: LAB | Facility: MEDICAL CENTER | Age: 60
End: 2020-07-22
Payer: COMMERCIAL

## 2020-07-22 DIAGNOSIS — E03.9 HYPOTHYROIDISM, UNSPECIFIED TYPE: ICD-10-CM

## 2020-07-22 LAB
T4 FREE SERPL-MCNC: 0.84 NG/DL (ref 0.76–1.46)
TSH SERPL DL<=0.05 MIU/L-ACNC: 6.02 UIU/ML (ref 0.36–3.74)

## 2020-07-22 PROCEDURE — 84439 ASSAY OF FREE THYROXINE: CPT

## 2020-07-22 PROCEDURE — 36415 COLL VENOUS BLD VENIPUNCTURE: CPT

## 2020-07-22 PROCEDURE — 84443 ASSAY THYROID STIM HORMONE: CPT

## 2020-07-23 ENCOUNTER — TELEPHONE (OUTPATIENT)
Dept: FAMILY MEDICINE CLINIC | Facility: CLINIC | Age: 60
End: 2020-07-23

## 2020-07-23 DIAGNOSIS — E03.9 HYPOTHYROIDISM, UNSPECIFIED TYPE: Primary | ICD-10-CM

## 2020-07-23 RX ORDER — LEVOTHYROXINE SODIUM 50 MCG
50 TABLET ORAL DAILY
Qty: 60 TABLET | Refills: 0 | Status: SHIPPED | OUTPATIENT
Start: 2020-07-23 | End: 2020-10-19 | Stop reason: SDUPTHER

## 2020-07-23 NOTE — TELEPHONE ENCOUNTER
She has +1 pitting edema of her ankles  She seen [facility], had lab work, was told to cut back on salt intake, she has been for 1 month and swelling persist and at night her feet hurt from the edema  Any suggestions?

## 2020-07-29 DIAGNOSIS — Z01.89 ENCOUNTER FOR OTHER SPECIFIED SPECIAL EXAMINATIONS: Primary | ICD-10-CM

## 2020-08-12 ENCOUNTER — PREP FOR PROCEDURE (OUTPATIENT)
Dept: OBGYN CLINIC | Facility: OTHER | Age: 60
End: 2020-08-12

## 2020-08-12 DIAGNOSIS — G56.01 CARPAL TUNNEL SYNDROME ON RIGHT: Primary | ICD-10-CM

## 2020-08-18 DIAGNOSIS — Z01.89 ENCOUNTER FOR OTHER SPECIFIED SPECIAL EXAMINATIONS: ICD-10-CM

## 2020-08-18 PROCEDURE — U0003 INFECTIOUS AGENT DETECTION BY NUCLEIC ACID (DNA OR RNA); SEVERE ACUTE RESPIRATORY SYNDROME CORONAVIRUS 2 (SARS-COV-2) (CORONAVIRUS DISEASE [COVID-19]), AMPLIFIED PROBE TECHNIQUE, MAKING USE OF HIGH THROUGHPUT TECHNOLOGIES AS DESCRIBED BY CMS-2020-01-R: HCPCS | Performed by: ORTHOPAEDIC SURGERY

## 2020-08-19 LAB — SARS-COV-2 RNA SPEC QL NAA+PROBE: NOT DETECTED

## 2020-08-19 RX ORDER — CALCIUM CARBONATE 200(500)MG
1 TABLET,CHEWABLE ORAL DAILY
COMMUNITY

## 2020-08-19 NOTE — PRE-PROCEDURE INSTRUCTIONS
Pre-Surgery Instructions:   Medication Instructions    Ca Carbonate-Mag Hydroxide (ROLAIDS PO) Patient was instructed by Physician and understands   calcium carbonate (TUMS) 500 mg chewable tablet Patient was instructed by Physician and understands   SYNTHROID 50 MCG tablet Patient was instructed by Physician and understands  Pt instructed to take synthroid the morning of surgery with a small sip of water  [facility] preop instructions reviewed with pt  Pt will use dial antibacterial soap

## 2020-08-26 ENCOUNTER — ANESTHESIA EVENT (OUTPATIENT)
Dept: PERIOP | Facility: HOSPITAL | Age: 60
End: 2020-08-26
Payer: COMMERCIAL

## 2020-08-27 ENCOUNTER — HOSPITAL ENCOUNTER (OUTPATIENT)
Facility: HOSPITAL | Age: 60
Setting detail: OUTPATIENT SURGERY
Discharge: HOME | End: 2020-08-27
Attending: ORTHOPAEDIC SURGERY | Admitting: ORTHOPAEDIC SURGERY
Payer: COMMERCIAL

## 2020-08-27 ENCOUNTER — ANESTHESIA (OUTPATIENT)
Dept: PERIOP | Facility: HOSPITAL | Age: 60
End: 2020-08-27
Payer: COMMERCIAL

## 2020-08-27 VITALS
DIASTOLIC BLOOD PRESSURE: 83 MMHG | HEART RATE: 60 BPM | HEIGHT: 65 IN | TEMPERATURE: 97.6 F | WEIGHT: 180 LBS | BODY MASS INDEX: 29.99 KG/M2 | RESPIRATION RATE: 18 BRPM | OXYGEN SATURATION: 96 % | SYSTOLIC BLOOD PRESSURE: 123 MMHG

## 2020-08-27 RX ORDER — DEXAMETHASONE SODIUM PHOSPHATE 4 MG/ML
INJECTION, SOLUTION INTRA-ARTICULAR; INTRALESIONAL; INTRAMUSCULAR; INTRAVENOUS; SOFT TISSUE AS NEEDED
Status: DISCONTINUED | OUTPATIENT
Start: 2020-08-27 | End: 2020-08-27

## 2020-08-27 RX ORDER — ONDANSETRON 2 MG/ML
INJECTION INTRAMUSCULAR; INTRAVENOUS AS NEEDED
Status: DISCONTINUED | OUTPATIENT
Start: 2020-08-27 | End: 2020-08-27

## 2020-08-27 RX ORDER — ROPIVACAINE HYDROCHLORIDE 5 MG/ML
INJECTION, SOLUTION EPIDURAL; INFILTRATION; PERINEURAL AS NEEDED
Status: DISCONTINUED | OUTPATIENT
Start: 2020-08-27 | End: 2020-08-27

## 2020-08-27 RX ORDER — HYDROMORPHONE HCL/PF 1 MG/ML
0.5 SYRINGE (ML) INJECTION
Status: DISCONTINUED | OUTPATIENT
Start: 2020-08-27 | End: 2020-08-27 | Stop reason: HOSPADM

## 2020-08-27 RX ORDER — CEFAZOLIN SODIUM 2 G/50ML
SOLUTION INTRAVENOUS AS NEEDED
Status: DISCONTINUED | OUTPATIENT
Start: 2020-08-27 | End: 2020-08-27

## 2020-08-27 RX ORDER — MIDAZOLAM HYDROCHLORIDE 2 MG/ML
SYRUP ORAL AS NEEDED
Status: DISCONTINUED | OUTPATIENT
Start: 2020-08-27 | End: 2020-08-27

## 2020-08-27 RX ORDER — PROPOFOL 10 MG/ML
INJECTION, EMULSION INTRAVENOUS CONTINUOUS PRN
Status: DISCONTINUED | OUTPATIENT
Start: 2020-08-27 | End: 2020-08-27

## 2020-08-27 RX ORDER — FENTANYL CITRATE/PF 50 MCG/ML
50 SYRINGE (ML) INJECTION
Status: DISCONTINUED | OUTPATIENT
Start: 2020-08-27 | End: 2020-08-27 | Stop reason: HOSPADM

## 2020-08-27 RX ORDER — FENTANYL CITRATE 50 UG/ML
INJECTION, SOLUTION INTRAMUSCULAR; INTRAVENOUS AS NEEDED
Status: DISCONTINUED | OUTPATIENT
Start: 2020-08-27 | End: 2020-08-27

## 2020-08-27 RX ORDER — ONDANSETRON 2 MG/ML
4 INJECTION INTRAMUSCULAR; INTRAVENOUS ONCE AS NEEDED
Status: DISCONTINUED | OUTPATIENT
Start: 2020-08-27 | End: 2020-08-27 | Stop reason: HOSPADM

## 2020-08-27 RX ORDER — SODIUM CHLORIDE 9 MG/ML
125 INJECTION, SOLUTION INTRAVENOUS CONTINUOUS
Status: DISCONTINUED | OUTPATIENT
Start: 2020-08-27 | End: 2020-08-27 | Stop reason: HOSPADM

## 2020-08-27 RX ORDER — SCOLOPAMINE TRANSDERMAL SYSTEM 1 MG/1
1 PATCH, EXTENDED RELEASE TRANSDERMAL ONCE AS NEEDED
Status: DISCONTINUED | OUTPATIENT
Start: 2020-08-27 | End: 2020-08-27 | Stop reason: HOSPADM

## 2020-08-27 RX ORDER — PROPOFOL 10 MG/ML
INJECTION, EMULSION INTRAVENOUS AS NEEDED
Status: DISCONTINUED | OUTPATIENT
Start: 2020-08-27 | End: 2020-08-27

## 2020-08-27 RX ORDER — MEPERIDINE HYDROCHLORIDE 25 MG/ML
12.5 INJECTION INTRAMUSCULAR; INTRAVENOUS; SUBCUTANEOUS ONCE AS NEEDED
Status: DISCONTINUED | OUTPATIENT
Start: 2020-08-27 | End: 2020-08-27 | Stop reason: HOSPADM

## 2020-08-27 RX ORDER — MAGNESIUM HYDROXIDE 1200 MG/15ML
LIQUID ORAL AS NEEDED
Status: DISCONTINUED | OUTPATIENT
Start: 2020-08-27 | End: 2020-08-27 | Stop reason: HOSPADM

## 2020-08-27 RX ADMIN — PROPOFOL 150 MG: 10 INJECTION, EMULSION INTRAVENOUS at 07:52

## 2020-08-27 RX ADMIN — SCOPALAMINE 1 PATCH: 1 PATCH, EXTENDED RELEASE TRANSDERMAL at 06:40

## 2020-08-27 RX ADMIN — FENTANYL CITRATE 25 MCG: 50 INJECTION, SOLUTION INTRAMUSCULAR; INTRAVENOUS at 07:58

## 2020-08-27 RX ADMIN — SODIUM CHLORIDE: 0.9 INJECTION, SOLUTION INTRAVENOUS at 08:04

## 2020-08-27 RX ADMIN — MIDAZOLAM HYDROCHLORIDE 2 MG: 2 SYRUP ORAL at 07:20

## 2020-08-27 RX ADMIN — CEFAZOLIN SODIUM 2000 MG: 2 SOLUTION INTRAVENOUS at 07:30

## 2020-08-27 RX ADMIN — DEXAMETHASONE SODIUM PHOSPHATE 4 MG: 4 INJECTION, SOLUTION INTRAMUSCULAR; INTRAVENOUS at 08:05

## 2020-08-27 RX ADMIN — ONDANSETRON 4 MG: 2 INJECTION INTRAMUSCULAR; INTRAVENOUS at 08:05

## 2020-08-27 RX ADMIN — PROPOFOL 100 MCG/KG/MIN: 10 INJECTION, EMULSION INTRAVENOUS at 07:39

## 2020-08-27 RX ADMIN — FENTANYL CITRATE 25 MCG: 50 INJECTION, SOLUTION INTRAMUSCULAR; INTRAVENOUS at 08:22

## 2020-08-27 RX ADMIN — FENTANYL CITRATE 25 MCG: 50 INJECTION, SOLUTION INTRAMUSCULAR; INTRAVENOUS at 07:39

## 2020-08-27 RX ADMIN — SODIUM CHLORIDE 125 ML/HR: 0.9 INJECTION, SOLUTION INTRAVENOUS at 05:57

## 2020-08-27 RX ADMIN — PROPOFOL 50 MG: 10 INJECTION, EMULSION INTRAVENOUS at 07:39

## 2020-08-27 RX ADMIN — ROPIVACAINE HYDROCHLORIDE 30 ML: 5 INJECTION, SOLUTION EPIDURAL; INFILTRATION; PERINEURAL at 07:24

## 2020-08-27 RX ADMIN — FENTANYL CITRATE 25 MCG: 50 INJECTION, SOLUTION INTRAMUSCULAR; INTRAVENOUS at 07:43

## 2020-08-27 NOTE — ANESTHESIA PROCEDURE NOTES
Peripheral Block    Patient location during procedure: holding area  Reason for block: at surgeon's request and post-op pain management  Staffing  Anesthesiologist: Analisa Bautista DO  Performed: anesthesiologist   Preanesthetic Checklist  Completed: patient identified, site marked, surgical consent, pre-op evaluation, timeout performed, IV checked, risks and benefits discussed and monitors and equipment checked  Peripheral Block  Patient position: sitting  Prep: ChloraPrep  Patient monitoring: heart rate, continuous pulse ox and frequent blood pressure checks  Block type: supraclavicular  Laterality: right  Injection technique: single-shot  Procedures: ultrasound guided, Ultrasound guidance required for the procedure to increase accuracy and safety of medication placement and decrease risk of complications   and nerve stimulator  Ultrasound permanent image saved  Needle  Needle type: Stimuplex   Needle gauge: 22 G  Needle length: 5 cm  Needle localization: nerve stimulator and ultrasound guidance  Assessment  Injection assessment: incremental injection, local visualized surrounding nerve on ultrasound and negative aspiration for heme  Paresthesia pain: none  Heart rate change: no  Slow fractionated injection: yes  Post-procedure:  site cleaned  patient tolerated the procedure well with no immediate complications

## 2020-08-27 NOTE — ANESTHESIA PREPROCEDURE EVALUATION
Review of Systems/Medical History  Patient summary reviewed  Chart reviewed      Cardiovascular  Exercise tolerance (METS): >4,     Pulmonary  Negative pulmonary ROS        GI/Hepatic       Negative  ROS        Endo/Other  History of thyroid disease , hypothyroidism,      GYN       Hematology  Negative hematology ROS      Musculoskeletal  Negative musculoskeletal ROS        Neurology  Negative neurology ROS      Psychology         Lab Results   Component Value Date    WBC 5 31 06/25/2020    HGB 13 0 06/25/2020    HCT 39 6 06/25/2020    MCV 95 06/25/2020     06/25/2020     Lab Results   Component Value Date     01/14/2015    K 4 4 06/25/2020    CO2 25 06/25/2020     (H) 06/25/2020    BUN 17 06/25/2020    CREATININE 0 79 06/25/2020     Lab Results   Component Value Date    INR 1 11 01/24/2019    PROTIME 13 8 01/24/2019     Lab Results   Component Value Date    PTT 34 01/24/2019       Lab Results   Component Value Date    GLUCOSE 92 01/14/2015    GLUCOSE 86 06/20/2014       Lab Results   Component Value Date    HGBA1C 6 1 08/18/2018           Physical Exam    Airway    Mallampati score: I  TM Distance: >3 FB  Neck ROM: full     Dental       Cardiovascular  Rhythm: regular, Rate: normal,     Pulmonary      Other Findings        Anesthesia Plan  ASA Score- 1     Anesthesia Type- IV sedation with anesthesia with ASA Monitors  Additional Monitors:   Airway Plan:         Plan Factors-    Induction- intravenous  Postoperative Plan-     Informed Consent- Anesthetic plan and risks discussed with patient  I personally reviewed this patient with the CRNA  Discussed and agreed on the Anesthesia Plan with the IRVING Clay         Procedure:  CARPAL TUNNEL & IN SITU ULNAR NERVE RELEASE, POSS TRANSPOSITION (Right Wrist)    Relevant Problems   ANESTHESIA   (+) PONV (postoperative nausea and vomiting)      CARDIO   (+) Chest pain      ENDO   (+) Hypothyroidism      GI/HEPATIC   (+) GERD (gastroesophageal reflux disease)      MUSCULOSKELETAL   (+) Chondromalacia patellae        Physical Exam    Airway    Mallampati score: II  TM Distance: >3 FB  Neck ROM: full     Dental       Cardiovascular  Rhythm: regular, Rate: normal, Cardiovascular exam normal    Pulmonary  Pulmonary exam normal Breath sounds clear to auscultation,     Other Findings        Anesthesia Plan  ASA Score- 2     Anesthesia Type- general and regional with ASA Monitors  Additional Monitors:   Airway Plan:           Plan Factors-    Chart reviewed  Patient summary reviewed  Patient is not a current smoker  Patient not instructed to abstain from smoking on day of procedure  Patient did not smoke on day of surgery  Induction- intravenous  Postoperative Plan-     Informed Consent- Anesthetic plan and risks discussed with patient and spouse

## 2020-08-27 NOTE — INTERIM OP NOTE
CARPAL TUNNEL & IN SITU ULNAR NERVE RELEASE  Postoperative Note  PATIENT NAME: Risa Houston  : 1960  MRN: [id number]  [facility] OR ROOM 07    Surgery Date: 2020    Preop Diagnosis:  Carpal tunnel syndrome on right [G56 01]    Post-Op Diagnosis Codes:     * Carpal tunnel syndrome on right [G56 01]    Procedure(s) (LRB):  CARPAL TUNNEL & IN SITU ULNAR NERVE RELEASE (Right)    Surgeon(s) and Role:     * Mari Downey MD - Primary    Specimens:  * No specimens in log *    Estimated Blood Loss:   Minimal    Anesthesia Type:   Regional     Findings:    none  Complications:   None    SIGNATURE: Mari Downey MD   DATE: 2020   TIME: 8:25 AM

## 2020-08-31 NOTE — OP NOTE
OPERATIVE REPORT  PATIENT NAME: Alejandra Fong    :  1960  MRN: [id number]  Pt Location: [facility]OM 07    SURGERY DATE: 2020    Surgeon(s) and Role:     * Kathryn Ruggiero MD - Primary    Preop Diagnosis:  Carpal tunnel syndrome on right [G56 01]  Right cubital tunnel syndrome     Post-Op Diagnosis Codes:     * Carpal tunnel syndrome on right [G56 01]  Right cubital tunnel syndrome     Procedure(s) (LRB):  CARPAL TUNNEL & IN SITU ULNAR NERVE RELEASE (Right)    Specimen(s):  * No specimens in log *    Estimated Blood Loss:   Minimal    Drains:  * No LDAs found *    Anesthesia Type:   Regional    Complications:   None    DISPOSITION:  Recovery room in stable condition  OPERATIVE INDICATIONS:  The patient is a 64year-old female with signs and symptoms and nerve studies consistent with carpal and cubital tunnel syndrome  Surgical treatment was offered to the patient  The planned procedure as well as the expected benefits and potential risks of the surgery including, but not limited to infection, bleeding, nerve damage, tendon damage, the need for repeat surgery, incomplete release of symptoms, persistent pain, and pain in the palm were all explained to the patient  It was further explained that if there was a tendency for the ulnar nerve to sublux with elbow flexion after the in-situ neuroplasty was completed, that it may be necessary to proceed with a subcutaneous neuroplasty  She was agreeable to surgery  DETAILS OF PROCEDURE:  The patients right hand and elbow were initialed by me in the preoperative holding area  A regional block was performed on the operative arm by the anesthesia staff  The patient was taken to the operating room and placed on the operating table in the supine position  A proper timeout was performed and the patient was identified as Alejandra Fong    After the administration of IV sedation the upper extremity  was prepped and draped in the standard sterile fashion  The limb was exsanguinated and the sterile tourniquet was inflated to 250 mmHg  A longitudinal incision was made in the palm of the hand in-line with the fourth ray, without crossing the wrist flexion crease  The incision was carried down through the skin and subcutaneous tissue  The palmar fascia was identified and longitudinally divided under direct visualization using a knife  The transverse carpal ligament was identified and longitudinally divided under direct visualization using a knife  The median nerve was identified and bluntly dissected off of the undersurface of the transverse carpal ligament  Blunt dissection was performed over and under the distal extent of the forearm fascia, which was then longitudinally incised under direct visualization using a knife and retinaculotome keeping the median nerve visualized and protected at all times  The distal extent of the forearm fascia was palpated and visually inspected to ensure complete release  The recurrent motor branch of the median nerve was identified and noted to be intact  Blunt dissection was performed distally to ensure the nerve was completely released distally  Next, a longitudinal incision was made over the medial aspect of the elbow just posterior to the medial epicondyle  The incision was carried down through the skin only  Blunt dissection was performed within the subcutaneous tissue  Posterior branches of the medial brachial cutaneous nerve were identified, mobilized, and protected throughout the procedure  The ulnar nerve was identified just posterior to the medial intermuscular septum  A complete in situ ulnar nerve release was performed starting approximately 10 cm to the medial epicondyle and proceeding approximately 5 cm distally  Proximally the fascia between the triceps muscle and the medial intramuscular septum was released  Through the cubital tunnel, Dianas ligament was released   Distally, the fascia of the FCU muscle was split longitudinally  The muscle of the FCU was divided in-line with its fibers and then the fascia overlying the ulnar nerve was released  The elbow was taken through complete range of motion  There was no tendency for the ulnar nerve to sublux anteriorly  The wound was irrigated and then closed using running subcuticular stitch  Benzoin and Streri-Strips were applied  The hand incision was irrigated and closed using interrupted nylon sutures  A sterile dressing was applied  The tourniquet was released  The patient was awakened from anesthesia and then transferred to the recovery room in a stable condition having tolerated this procedure well            SIGNATURE: Connie Llanes MD  DATE: August 31, 2020  TIME: 4:11 PM

## 2020-10-19 DIAGNOSIS — E03.9 HYPOTHYROIDISM, UNSPECIFIED TYPE: ICD-10-CM

## 2020-10-19 RX ORDER — LEVOTHYROXINE SODIUM 50 MCG
50 TABLET ORAL DAILY
Qty: 60 TABLET | Refills: 0 | Status: SHIPPED | OUTPATIENT
Start: 2020-10-19 | End: 2020-11-15 | Stop reason: DRUGHIGH

## 2020-10-25 ENCOUNTER — OFFICE VISIT (OUTPATIENT)
Dept: URGENT CARE | Facility: CLINIC | Age: 60
End: 2020-10-25
Payer: COMMERCIAL

## 2020-10-25 VITALS — TEMPERATURE: 98.1 F | HEART RATE: 63 BPM | OXYGEN SATURATION: 97 % | RESPIRATION RATE: 18 BRPM

## 2020-10-25 DIAGNOSIS — Z20.822 ENCOUNTER FOR SCREENING LABORATORY TESTING FOR COVID-19 VIRUS: Primary | ICD-10-CM

## 2020-10-25 PROCEDURE — U0003 INFECTIOUS AGENT DETECTION BY NUCLEIC ACID (DNA OR RNA); SEVERE ACUTE RESPIRATORY SYNDROME CORONAVIRUS 2 (SARS-COV-2) (CORONAVIRUS DISEASE [COVID-19]), AMPLIFIED PROBE TECHNIQUE, MAKING USE OF HIGH THROUGHPUT TECHNOLOGIES AS DESCRIBED BY CMS-2020-01-R: HCPCS | Performed by: PHYSICIAN ASSISTANT

## 2020-10-25 PROCEDURE — G0382 LEV 3 HOSP TYPE B ED VISIT: HCPCS | Performed by: PHYSICIAN ASSISTANT

## 2020-10-26 LAB — SARS-COV-2 RNA SPEC QL NAA+PROBE: NOT DETECTED

## 2020-11-12 ENCOUNTER — LAB (OUTPATIENT)
Dept: LAB | Facility: MEDICAL CENTER | Age: 60
End: 2020-11-12
Payer: COMMERCIAL

## 2020-11-12 DIAGNOSIS — E03.9 HYPOTHYROIDISM, UNSPECIFIED TYPE: ICD-10-CM

## 2020-11-12 LAB
T4 FREE SERPL-MCNC: 0.9 NG/DL (ref 0.76–1.46)
TSH SERPL DL<=0.05 MIU/L-ACNC: 4.02 UIU/ML (ref 0.36–3.74)

## 2020-11-12 PROCEDURE — 84439 ASSAY OF FREE THYROXINE: CPT

## 2020-11-12 PROCEDURE — 36415 COLL VENOUS BLD VENIPUNCTURE: CPT

## 2020-11-12 PROCEDURE — 84443 ASSAY THYROID STIM HORMONE: CPT

## 2020-11-15 DIAGNOSIS — E03.9 HYPOTHYROIDISM, UNSPECIFIED TYPE: ICD-10-CM

## 2020-11-15 RX ORDER — LEVOTHYROXINE SODIUM 75 MCG
75 TABLET ORAL
Qty: 60 TABLET | Refills: 0 | Status: SHIPPED | OUTPATIENT
Start: 2020-11-15 | End: 2021-01-11 | Stop reason: SDUPTHER

## 2021-01-11 DIAGNOSIS — E03.9 HYPOTHYROIDISM, UNSPECIFIED TYPE: ICD-10-CM

## 2021-01-11 RX ORDER — LEVOTHYROXINE SODIUM 75 MCG
75 TABLET ORAL
Qty: 60 TABLET | Refills: 0 | Status: SHIPPED | OUTPATIENT
Start: 2021-01-11 | End: 2021-02-19 | Stop reason: DRUGHIGH

## 2021-01-22 ENCOUNTER — IMMUNIZATIONS (OUTPATIENT)
Dept: FAMILY MEDICINE CLINIC | Facility: HOSPITAL | Age: 61
End: 2021-01-22

## 2021-01-22 DIAGNOSIS — Z23 ENCOUNTER FOR IMMUNIZATION: Primary | ICD-10-CM

## 2021-01-22 PROCEDURE — 91301 SARS-COV-2 / COVID-19 MRNA VACCINE (MODERNA) 100 MCG: CPT

## 2021-01-22 PROCEDURE — 0011A SARS-COV-2 / COVID-19 MRNA VACCINE (MODERNA) 100 MCG: CPT

## 2021-01-29 ENCOUNTER — APPOINTMENT (EMERGENCY)
Dept: CT IMAGING | Facility: HOSPITAL | Age: 61
End: 2021-01-29
Payer: COMMERCIAL

## 2021-01-29 ENCOUNTER — HOSPITAL ENCOUNTER (EMERGENCY)
Facility: HOSPITAL | Age: 61
Discharge: HOME | End: 2021-01-29
Attending: EMERGENCY MEDICINE | Admitting: EMERGENCY MEDICINE
Payer: COMMERCIAL

## 2021-01-29 VITALS
RESPIRATION RATE: 16 BRPM | DIASTOLIC BLOOD PRESSURE: 79 MMHG | WEIGHT: 188.93 LBS | SYSTOLIC BLOOD PRESSURE: 139 MMHG | OXYGEN SATURATION: 98 % | HEART RATE: 65 BPM | TEMPERATURE: 97.9 F | BODY MASS INDEX: 31.44 KG/M2

## 2021-01-29 DIAGNOSIS — R59.0 AXILLARY LYMPHADENOPATHY: ICD-10-CM

## 2021-01-29 DIAGNOSIS — T88.1XXA POSTIMMUNIZATION REACTION, INITIAL ENCOUNTER: ICD-10-CM

## 2021-01-29 DIAGNOSIS — R51.9 HEADACHE: Primary | ICD-10-CM

## 2021-01-29 LAB
ANION GAP SERPL CALCULATED.3IONS-SCNC: 8 MMOL/L (ref 4–13)
BASOPHILS # BLD AUTO: 0.04 THOUSANDS/ΜL (ref 0–0.1)
BASOPHILS NFR BLD AUTO: 1 % (ref 0–1)
BUN SERPL-MCNC: 14 MG/DL (ref 5–25)
CALCIUM SERPL-MCNC: 9 MG/DL (ref 8.3–10.1)
CHLORIDE SERPL-SCNC: 106 MMOL/L (ref 100–108)
CO2 SERPL-SCNC: 26 MMOL/L (ref 21–32)
CREAT SERPL-MCNC: 0.93 MG/DL (ref 0.6–1.3)
EOSINOPHIL # BLD AUTO: 0.1 THOUSAND/ΜL (ref 0–0.61)
EOSINOPHIL NFR BLD AUTO: 2 % (ref 0–6)
ERYTHROCYTE [DISTWIDTH] IN BLOOD BY AUTOMATED COUNT: 11.2 % (ref 11.6–15.1)
GFR SERPL CREATININE-BSD FRML MDRD: 68 ML/MIN/1.73SQ M
GLUCOSE SERPL-MCNC: 90 MG/DL (ref 65–140)
HCT VFR BLD AUTO: 36.9 % (ref 34.8–46.1)
HGB BLD-MCNC: 12.2 G/DL (ref 11.5–15.4)
IMM GRANULOCYTES # BLD AUTO: 0.01 THOUSAND/UL (ref 0–0.2)
IMM GRANULOCYTES NFR BLD AUTO: 0 % (ref 0–2)
LYMPHOCYTES # BLD AUTO: 1.88 THOUSANDS/ΜL (ref 0.6–4.47)
LYMPHOCYTES NFR BLD AUTO: 44 % (ref 14–44)
MCH RBC QN AUTO: 30.2 PG (ref 26.8–34.3)
MCHC RBC AUTO-ENTMCNC: 33.1 G/DL (ref 31.4–37.4)
MCV RBC AUTO: 91 FL (ref 82–98)
MONOCYTES # BLD AUTO: 0.35 THOUSAND/ΜL (ref 0.17–1.22)
MONOCYTES NFR BLD AUTO: 8 % (ref 4–12)
NEUTROPHILS # BLD AUTO: 1.87 THOUSANDS/ΜL (ref 1.85–7.62)
NEUTS SEG NFR BLD AUTO: 45 % (ref 43–75)
NRBC BLD AUTO-RTO: 0 /100 WBCS
PLATELET # BLD AUTO: 301 THOUSANDS/UL (ref 149–390)
PMV BLD AUTO: 9.1 FL (ref 8.9–12.7)
POTASSIUM SERPL-SCNC: 3.9 MMOL/L (ref 3.5–5.3)
RBC # BLD AUTO: 4.04 MILLION/UL (ref 3.81–5.12)
SODIUM SERPL-SCNC: 140 MMOL/L (ref 136–145)
WBC # BLD AUTO: 4.25 THOUSAND/UL (ref 4.31–10.16)

## 2021-01-29 PROCEDURE — G1004 CDSM NDSC: HCPCS

## 2021-01-29 PROCEDURE — 85025 COMPLETE CBC W/AUTO DIFF WBC: CPT | Performed by: EMERGENCY MEDICINE

## 2021-01-29 PROCEDURE — 70498 CT ANGIOGRAPHY NECK: CPT

## 2021-01-29 PROCEDURE — 99284 EMERGENCY DEPT VISIT MOD MDM: CPT

## 2021-01-29 PROCEDURE — 99284 EMERGENCY DEPT VISIT MOD MDM: CPT | Performed by: EMERGENCY MEDICINE

## 2021-01-29 PROCEDURE — 70496 CT ANGIOGRAPHY HEAD: CPT

## 2021-01-29 PROCEDURE — 36415 COLL VENOUS BLD VENIPUNCTURE: CPT | Performed by: EMERGENCY MEDICINE

## 2021-01-29 PROCEDURE — 80048 BASIC METABOLIC PNL TOTAL CA: CPT | Performed by: EMERGENCY MEDICINE

## 2021-01-29 RX ADMIN — IOHEXOL 85 ML: 350 INJECTION, SOLUTION INTRAVENOUS at 11:46

## 2021-01-29 NOTE — ED PROVIDER NOTES
EMERGENCY DEPARTMENT ENCOUNTER NOTE    This note has been generated using a voice recognition software  There may be typographic, grammatic, or word substitution errors that have escaped editorial review  ? CHIEF COMPLAINT  Chief Complaint   Patient presents with    Arm Swelling     pt states she got her COVID vaccine last week and ever since then she has had swelling under left armpit, headache, and elevated BP  pts PCP advised pt to come to ED       HPI  Filomena Lee is a 64 y o  female with PMH of hypothyroidism, irritable bowel syndrome, cervical cancer, presenting with left axillary lymphadenopathy as well as headaches  Patient received modern a COVID-19 vaccine on Friday, 1 week ago  She had fever the next day  Yesterday, she developed a swollen and painful lymph node in left axilla  Over the past several days, patient has had persistent headaches  These were gradual in onset but quite bothersome and not typical for the patient  No vision changes  No nausea or vomiting  No fevers  Patient's mother and grandmother  of stroke  Patient works in healthcare, given symptoms and family history, Dr Mala Paul recommended that patient be evaluated to ensure there is not a more sinister etiology of patient's headache other than reaction post immunization  Patient has had mammograms previously, no prior concerning lesions  Axillary lymph node appeared earlier today, is painful to touch  Patient has not yet had anything for the symptoms  REVIEW OF SYSTEMS  Constitutional:  Fever 6 days ago after the vaccine, none since then  Eyes: No changes in vision  ENT: No runny nose, no sore throat  CV: No chest pain   Resp: No  shortness of breath or coughing  GI: No abdominal pain  Skin: No new rashes  Neuro: No headaches at baseline, generalize headache at present, no photosensitivity  No focal weakness or numbness    Ten systems were reviewed otherwise were unremarkable    PAST MEDICAL HISTORY  Past Medical History:   Diagnosis Date    Abdominal pain     Atelectasis     Cancer (HCC)     cervical    Disease of thyroid gland     hypothyroidism    GERD (gastroesophageal reflux disease)     Irritable bowel syndrome     Kidney stone     Motion sickness     PONV (postoperative nausea and vomiting)     Wears glasses        SURGICAL HISTORY  Past Surgical History:   Procedure Laterality Date    ACHILLES TENDON REPAIR Right     APPENDECTOMY      ARTHROSCOPY KNEE Right 2018    Procedure: ARTHROSCOPY KNEE; PARTIAL LATERAL MENISCECTOMY; PARTIAL MEDIAL MENISCECTOMY; POSSIBLE DEBRIDEMENT; removal of loose bodies;  Surgeon: Charo Doyle MD;  Location: [facility];  Service: Orthopedics    BICEPS TENDON REPAIR Right     BREAST BIOPSY Right 2012    benign    CARPAL TUNNEL RELEASE Left      SECTION      three     CHOLECYSTECTOMY      COLONOSCOPY      EXPLORATORY LAPAROTOMY      KNEE ARTHROSCOPY W/ MENISCAL REPAIR Left     LAPAROSCOPY      WA COLONOSCOPY FLX DX W/COLLJ SPEC WHEN PFRMD N/A 2/15/2019    Procedure: COLONOSCOPY;  Surgeon: Thelma Cook MD;  [facility]: [facility]; Service: Colorectal    WA KNEE SCOPE,MED/LAT MENISECTOMY Left 2017    Procedure: ARTHROSCOPY KNEE PARTIAL MEDIAL MENISECTOMY ;  Surgeon: Charo Doyle MD;  [facility];  Service: Orthopedics    WA REVISE MEDIAN N/CARPAL TUNNEL SURG Right 2020    Procedure: CARPAL TUNNEL & IN SITU ULNAR NERVE RELEASE;  Surgeon: Donal Perry MD;  [facility];  Service: Orthopedics    SHOULDER SURGERY Bilateral     TUBAL LIGATION      UMBILICAL HERNIA REPAIR      with mesh x2       FAMILY HISTORY  Family History   Problem Relation Age of Onset    Stroke Mother     Mental illness Father     Substance Abuse Brother     Lung cancer Maternal Uncle         62s        CURRENT MEDICATIONS  No current facility-administered medications on file prior to encounter        Current Outpatient Medications on File Prior to Encounter   Medication Sig    Ca Carbonate-Mag Hydroxide (ROLAIDS PO) Take by mouth as needed    calcium carbonate (TUMS) 500 mg chewable tablet Chew 1 tablet daily    Synthroid 75 MCG tablet Take 1 tablet (75 mcg total) by mouth daily in the early morning       ALLERGIES  Allergies   Allergen Reactions    Erythromycin Chest Pain    Betadine [Povidone Iodine] Other (See Comments)     Burns skin, blisters    Doxycycline Abdominal Pain    Medical Tape Other (See Comments)     Redness and peeling       SOCIAL HISTORY  Social History     Socioeconomic History    Marital status: /Civil Union     Spouse name: None    Number of children: None    Years of education: None    Highest education level: None   Occupational History    Occupation:   Dr Bartolo Jackson resource strain: None    Food insecurity     Worry: None     Inability: None    Transportation needs     Medical: None     Non-medical: None   Tobacco Use    Smoking status: Former Smoker     Quit date: 2019     Years since quittin 4    Smokeless tobacco: Never Used   Substance and Sexual Activity    Alcohol use: Yes     Frequency: Monthly or less     Drinks per session: 1 or 2    Drug use: No    Sexual activity: None   Lifestyle    Physical activity     Days per week: None     Minutes per session: None    Stress: None   Relationships    Social connections     Talks on phone: None     Gets together: None     Attends Restorationism service: None     Active member of club or organization: None     Attends meetings of clubs or organizations: None     Relationship status: None    Intimate partner violence     Fear of current or ex partner: None     Emotionally abused: None     Physically abused: None     Forced sexual activity: None   Other Topics Concern    None   Social History Narrative    None       PHYSICAL EXAM    /82 (BP Location: Right arm)   Pulse 69   Temp 97 9 °F (36 6 °C) (Temporal)   Resp 16   Wt 85 7 kg (188 lb 15 oz)   SpO2 97%   BMI 31 44 kg/m²   Vital signs and nursing notes reviewed  CONSTITUTIONAL: female appearing stated age resting in bed, in no acute distress  HEENT: atraumatic, normocephalic  Sclera anicteric, conjunctiva are not injected  Moist oral mucosa  CARDIOVASCULAR/CHEST: RRR, no M/R/G  2+ radial pulses  PULMONARY: Breathing comfortably on RA  Breath sounds are equal and clear to auscultation  ABDOMEN: non-distended  MSK: moves all extremities, no deformities, no peripheral edema  There is palpable tender axillary lymphadenopathy on the left  Left deltoid/shoulder is without edema or erythema or ecchymosis  NEURO: Awake, alert, and oriented x 3  Pupils are 2 mm and symmetric, no anisocoria  Extraocular movements are intact without nystagmus  Face symmetric  Tongue protrudes midline  Moves all extremities spontaneously  Steady narrow-based gait without ataxia  No focal neurologic deficits  SKIN: Warm, appears well-perfused  MENTAL STATUS: Normal affect      ?   LABS AND TESTS    Results Reviewed     Procedure Component Value Units Date/Time    Basic metabolic panel [[id number]] Collected: 01/29/21 1110    Lab Status: Final result Specimen: Blood from Arm, Left Updated: 01/29/21 1122     Sodium 140 mmol/L      Potassium 3 9 mmol/L      Chloride 106 mmol/L      CO2 26 mmol/L      ANION GAP 8 mmol/L      BUN 14 mg/dL      Creatinine 0 93 mg/dL      Glucose 90 mg/dL      Calcium 9 0 mg/dL      eGFR 68 ml/min/1 73sq m     Narrative:      [place] guidelines for Chronic Kidney Disease (CKD):     Stage 1 with normal or high GFR (GFR > 90 mL/min/1 73 square meters)    Stage 2 Mild CKD (GFR = 60-89 mL/min/1 73 square meters)    Stage 3A Moderate CKD (GFR = 45-59 mL/min/1 73 square meters)    Stage 3B Moderate CKD (GFR = 30-44 mL/min/1 73 square meters)    Stage 4 Severe CKD (GFR = 15-29 mL/min/1 73 square meters)   Stage 5 End Stage CKD (GFR <15 mL/min/1 73 square meters)  Note: GFR calculation is accurate only with a steady state creatinine    CBC and differential [[id number]]  (Abnormal) Collected: 01/29/21 1110    Lab Status: Final result Specimen: Blood from Arm, Left Updated: 01/29/21 1113     WBC 4 25 Thousand/uL      RBC 4 04 Million/uL      Hemoglobin 12 2 g/dL      Hematocrit 36 9 %      MCV 91 fL      MCH 30 2 pg      MCHC 33 1 g/dL      RDW 11 2 %      MPV 9 1 fL      Platelets 292 Thousands/uL      nRBC 0 /100 WBCs      Neutrophils Relative 45 %      Immat GRANS % 0 %      Lymphocytes Relative 44 %      Monocytes Relative 8 %      Eosinophils Relative 2 %      Basophils Relative 1 %      Neutrophils Absolute 1 87 Thousands/µL      Immature Grans Absolute 0 01 Thousand/uL      Lymphocytes Absolute 1 88 Thousands/µL      Monocytes Absolute 0 35 Thousand/µL      Eosinophils Absolute 0 10 Thousand/µL      Basophils Absolute 0 04 Thousands/µL           CTA head and neck with and without contrast   Final Result by Mario Ward MD (01/29 1209)      Normal noncontrast head CT  Normal CT angiography of the head and neck  Specifically, no acute CT abnormality to account for the patient's headache and no CT angiographic evidence of intracranial aneurysm  Workstation performed: [id number]             ED COURSE & MEDICAL DECISION MAKING  Procedures             Medications   iohexol (OMNIPAQUE) 350 MG/ML injection (SINGLE-DOSE) 85 mL (85 mL Intravenous Given 1/29/21 66)     64year old female presenting with headaches and left axillary lymphadenopathy after receiving Moderna Covid-19 vaccine 1 week ago  VS reviewed, hypertensive 160/82, WNL otherwise  Overall, by history, lower index of suspicion for malignant hypertension/hypertensive emergency, intracranial pathology/secondary causes of headache  Neuro exam is benign   Anticipate that symptoms are sequelae of immunization 1 week ago although the symptoms are certainly prolonged in duration  Given patient's strong family history of stroke in mother and grandmother (unknown typ of stroke), and following shared decision making, we are going to obtain CT angiography of the head and neck vessels to rule out growing aneurysm as the etiology of patient's symptoms  CTA obtained, as above, thankfully no evidence of SAH or of aneurysm  Recommend good hydration, rest, Tylenol and/or Motrin for headaches and watchful waiting  Recommend Motrin for the tender lymphadenopathy  Anticipate that this is reactive  Patient has had mammograms, lower index of suspicion for malignancy associated LAD  Continue monitoring symptoms  If these are worsen, may require additional work up  Patient discharged to home with recommendations for symptom control, return precautions, and plan for follow up  MDM  Number of Diagnoses or Management Options  Axillary lymphadenopathy: new and does not require workup  Headache: new and requires workup  Postimmunization reaction, initial encounter: new and requires workup     Amount and/or Complexity of Data Reviewed  Tests in the radiology section of CPT®: ordered and reviewed    Risk of Complications, Morbidity, and/or Mortality  Presenting problems: low  Diagnostic procedures: high  Management options: low    Patient Progress  Patient progress: improved      CLINICAL IMPRESSION  Final diagnoses:   Headache   Postimmunization reaction, initial encounter   Axillary lymphadenopathy       DISPOSITION  Time reflects when diagnosis was documented in both MDM as applicable and the Disposition within this note     Time User Action Codes Description Comment    1/29/2021 11:55 AM Ardith Buchanan Add [R51 9] Headache     1/29/2021 11:55 AM Ardith Buchanan Add [T88  1XXA] Postimmunization reaction, initial encounter     1/29/2021 11:55 AM Ardith Buchanan Add [R59 0] Axillary lymphadenopathy       ED Disposition     ED Disposition Condition Date/Time Comment Discharge Stable Fri Jan 29, 2021 11:55 AM Dayami Cat discharge to home/self care              Follow-up Information     Follow up With Specialties Details Why Contact Info Additional Mindy 47, DO Family Medicine Call today Emergency Room Follow-up [address] [facility]  [address]  [facility] [address] [facility] Emergency Department Emergency Medicine Go to  As needed, If symptoms worsen [phone]  [facility] Emergency Department, [address], Zack Morton MD  01/30/21 1241

## 2021-01-29 NOTE — DISCHARGE INSTRUCTIONS
Your axillary lymphadenopathy is likely secondary to immune reaction to the immunization 1 week ago  You may take ibuprofen to help with the pain as well as an anti-inflammatory  Lymphadenopathy should start resolving over the next week  Hydrate well with electrolyte containing fluids  The CT scan of your brain and of the vessels of your head and neck reveal no evidence of bleeding, and no evidence of masses or of aneurysms  FINDINGS:  NONCONTRAST BRAIN  PARENCHYMA:  No intracranial mass, mass effect or midline shift  No CT signs of acute infarction  No acute parenchymal hemorrhage  VENTRICLES AND EXTRA-AXIAL SPACES:  Normal for the patient's age  VISUALIZED ORBITS AND PARANASAL SINUSES:  Unremarkable  CERVICAL VASCULATURE  AORTIC ARCH AND GREAT VESSELS:  Normal aortic arch and great vessel origins  Normal visualized subclavian vessels  RIGHT VERTEBRAL ARTERY CERVICAL SEGMENT:  Normal origin  The vessel is normal in caliber throughout the neck  LEFT VERTEBRAL ARTERY CERVICAL SEGMENT:  Normal origin  The vessel is normal in caliber throughout the neck  RIGHT EXTRACRANIAL CAROTID SEGMENT:  Normal caliber common carotid artery  Normal bifurcation and cervical internal carotid artery  No stenosis or dissection  LEFT EXTRACRANIAL CAROTID SEGMENT:  Normal caliber common carotid artery  Normal bifurcation and cervical internal carotid artery  No stenosis or dissection  NASCET criteria was used to determine the degree of internal carotid artery diameter stenosis  INTRACRANIAL VASCULATURE   INTERNAL CAROTID ARTERIES:  Normal enhancement of the intracranial portions of the internal carotid arteries  Normal ophthalmic artery origins  Normal ICA terminus  ANTERIOR CIRCULATION:  Hypoplastic right A1 segment consistent with anatomic variation  Otherwise normal enhancement of anterior cerebral arteries bilaterally  Normal anterior communicating artery       MIDDLE CEREBRAL ARTERY CIRCULATION:  M1 segment and middle cerebral artery branches demonstrate normal enhancement bilaterally  DISTAL VERTEBRAL ARTERIES:  Normal distal vertebral arteries  Posterior inferior cerebellar artery origins are normal  Normal vertebral basilar junction  BASILAR ARTERY:  Basilar artery is normal in caliber  Normal superior cerebellar arteries  POSTERIOR CEREBRAL ARTERIES: Both posterior cerebral arteries arises from the basilar tip  Both arteries demonstrate normal enhancement  Patent tiny caliber left posterior communicating artery  No enhancement of right posterior communicating artery,   consistent with anatomic variation  DURAL VENOUS SINUSES:  Normal         NON VASCULAR ANATOMY  BONY STRUCTURES:  No acute osseous abnormality  SOFT TISSUES OF THE NECK:  Normal      THORACIC INLET:  Unremarkable  IMPRESSION:     Normal noncontrast head CT  Normal CT angiography of the head and neck  Specifically, no acute CT abnormality to account for the patient's headache and no CT angiographic evidence of intracranial aneurysm                   Workstation performed: [id number]

## 2021-02-17 ENCOUNTER — LAB (OUTPATIENT)
Dept: LAB | Facility: MEDICAL CENTER | Age: 61
End: 2021-02-17
Payer: COMMERCIAL

## 2021-02-17 DIAGNOSIS — E03.9 HYPOTHYROIDISM, UNSPECIFIED TYPE: ICD-10-CM

## 2021-02-17 LAB
T4 FREE SERPL-MCNC: 0.99 NG/DL (ref 0.76–1.46)
TSH SERPL DL<=0.05 MIU/L-ACNC: 4.49 UIU/ML (ref 0.36–3.74)

## 2021-02-17 PROCEDURE — 84439 ASSAY OF FREE THYROXINE: CPT

## 2021-02-17 PROCEDURE — 36415 COLL VENOUS BLD VENIPUNCTURE: CPT

## 2021-02-17 PROCEDURE — 84443 ASSAY THYROID STIM HORMONE: CPT

## 2021-02-19 DIAGNOSIS — E03.9 HYPOTHYROIDISM, UNSPECIFIED TYPE: ICD-10-CM

## 2021-02-19 RX ORDER — LEVOTHYROXINE SODIUM 88 MCG
88 TABLET ORAL DAILY
Qty: 30 TABLET | Refills: 1 | Status: SHIPPED | OUTPATIENT
Start: 2021-02-19 | End: 2021-04-01 | Stop reason: SDUPTHER

## 2021-02-22 ENCOUNTER — TELEPHONE (OUTPATIENT)
Dept: FAMILY MEDICINE CLINIC | Facility: CLINIC | Age: 61
End: 2021-02-22

## 2021-03-18 ENCOUNTER — OFFICE VISIT (OUTPATIENT)
Dept: BARIATRICS | Facility: CLINIC | Age: 61
End: 2021-03-18
Payer: COMMERCIAL

## 2021-03-18 VITALS
HEART RATE: 69 BPM | HEIGHT: 65 IN | DIASTOLIC BLOOD PRESSURE: 86 MMHG | TEMPERATURE: 100 F | BODY MASS INDEX: 30.35 KG/M2 | WEIGHT: 182.2 LBS | SYSTOLIC BLOOD PRESSURE: 132 MMHG

## 2021-03-18 DIAGNOSIS — E03.9 HYPOTHYROIDISM, UNSPECIFIED TYPE: ICD-10-CM

## 2021-03-18 DIAGNOSIS — R73.03 PREDIABETES: ICD-10-CM

## 2021-03-18 DIAGNOSIS — R63.5 ABNORMAL WEIGHT GAIN: Primary | ICD-10-CM

## 2021-03-18 DIAGNOSIS — E66.9 CLASS 1 OBESITY: ICD-10-CM

## 2021-03-18 DIAGNOSIS — K21.9 GERD (GASTROESOPHAGEAL REFLUX DISEASE): ICD-10-CM

## 2021-03-18 PROBLEM — E66.811 CLASS 1 OBESITY: Status: ACTIVE | Noted: 2021-03-18

## 2021-03-18 PROCEDURE — 99204 OFFICE O/P NEW MOD 45 MIN: CPT | Performed by: PHYSICIAN ASSISTANT

## 2021-03-18 RX ORDER — MELATONIN
5000 DAILY
COMMUNITY

## 2021-03-18 NOTE — ASSESSMENT & PLAN NOTE
-update HgbA1c/fasting insulin  -likely to improve with weight loss and dietary changes  -can consider metformin/GLP-1

## 2021-03-18 NOTE — PROGRESS NOTES
Assessment/Plan:    Class 1 obesity  -Discussed options of HealthyCORE-Intensive Lifestyle Intervention Program, Very Low Calorie Diet-VLCD and Conservative Program and the role of weight loss medications   -Initial weight loss goal of 5-10% weight loss for improved health  -Screening labs: reviewed CMP, TSH  Advise checking fasting insulin, HgbA1c and Lipid panel  -Patient is interested in pursuing  Conservative program    Negative Stop-Bang    Hypothyroidism  -on levothyroxine  -TSH elevated, medication being adjusted by PCP    Prediabetes  -update HgbA1c/fasting insulin  -likely to improve with weight loss and dietary changes  -can consider metformin/GLP-1    GERD (gastroesophageal reflux disease)  -may improve with weight loss  -avoid food triggers, large portion sizes    Goals:    Food log (ie ) www myfitnesspal com,sparkpeople  com,loseit com,calorieking  com,etc    No sugary beverages  At least 64oz of water daily  Increase physical activity by 10 minutes daily  Gradually increase physical activity to a goal of 5 days per week for 30 minutes of MODERATE intensity PLUS 2 days per week of FULL BODY resistance training  2751-6008 calories per day  5-10 servings of fruits and vegetables per day      Follow up in approximately 4-6 weeks with Non-Surgical Physician/Advanced Practitioner  Diagnoses and all orders for this visit:    Abnormal weight gain  Comments:  see plan under class 1 obesity  Orders:  -     HEMOGLOBIN A1C W/ EAG ESTIMATION; Future  -     Lipid panel; Future  -     Insulin, fasting; Future    Class 1 obesity  -     HEMOGLOBIN A1C W/ EAG ESTIMATION; Future  -     Lipid panel; Future  -     Insulin, fasting; Future    Hypothyroidism, unspecified type    Prediabetes  -     HEMOGLOBIN A1C W/ EAG ESTIMATION; Future  -     Lipid panel; Future  -     Insulin, fasting; Future    GERD (gastroesophageal reflux disease)    Other orders  -     cholecalciferol (VITAMIN D3) 1,000 units tablet;  Take 5,000 Units by mouth daily          Subjective:   Chief Complaint   Patient presents with    Consult     MWM consult - Stop BANG       Patient ID: Ct Javier  is a 63 y o  female with excess weight/obesity here to pursue weight managment  Past Medical History:   Diagnosis Date    Abdominal pain     Atelectasis     Cancer (HCC)     cervical    Disease of thyroid gland     hypothyroidism    GERD (gastroesophageal reflux disease)     Irritable bowel syndrome     Kidney stone     Motion sickness     PONV (postoperative nausea and vomiting)     Wears glasses          HPI:  Obesity/Excess Weight:  Severity: Mild  Onset:  In adult life  Modifiers: self created diets, keto diets, has been attempting to lose weight for over 6 months  Contributing factors: hypothyroidism, always feels hungry  Associated symptoms: feels uncomfortable, emotionally upsetting    Goals: 145-150 lbs  Highest: 188 lbs    Hydration: ~60 oz of water; 1-2 cups of coffee + [place] vanilla creamer, sweetened green tea 16oz  Exercise: walks daily on lunch break ~20 min, uses treadmill  Occupation: works at [employer]  Sedentary  Sleep: 7-8 hours  ETOH: 3-4 beers per month    Colonoscopy: 2/2019    The following portions of the patient's history were reviewed and updated as appropriate: allergies, current medications, past family history, past medical history, past social history, past surgical history and problem list     Review of Systems   Constitutional: Negative for chills and fever  HENT: Negative for sneezing  Respiratory: Negative for cough and shortness of breath  Cardiovascular: Negative for chest pain and palpitations  Gastrointestinal: Negative for abdominal pain, nausea and vomiting  Genitourinary: Negative for dysuria  Musculoskeletal: Positive for arthralgias (knees, feet)  Skin: Negative for rash  Neurological: Negative for dizziness and headaches     Psychiatric/Behavioral: The patient is not nervous/anxious  Denies depression       Objective:    /86   Pulse 69   Temp 100 °F (37 8 °C)   Ht 5' 4 75" (1 645 m)   Wt 82 6 kg (182 lb 3 2 oz)   BMI 30 55 kg/m²     Physical Exam  Vitals signs and nursing note reviewed  Constitutional:       General: She is not in acute distress  Appearance: She is not toxic-appearing  HENT:      Head: Normocephalic and atraumatic  Eyes:      General: No scleral icterus  Neck:      Musculoskeletal: Normal range of motion  Pulmonary:      Effort: Pulmonary effort is normal  No respiratory distress  Musculoskeletal: Normal range of motion  Neurological:      General: No focal deficit present  Mental Status: She is alert and oriented to person, place, and time  Psychiatric:         Mood and Affect: Mood normal          Thought Content:  Thought content normal          Judgment: Judgment normal

## 2021-03-18 NOTE — ASSESSMENT & PLAN NOTE
-Discussed options of HealthyCORE-Intensive Lifestyle Intervention Program, Very Low Calorie Diet-VLCD and Conservative Program and the role of weight loss medications   -Initial weight loss goal of 5-10% weight loss for improved health  -Screening labs: reviewed CMP, TSH   Advise checking fasting insulin, HgbA1c and Lipid panel  -Patient is interested in pursuing  Conservative program    Negative Stop-Bang

## 2021-03-22 ENCOUNTER — LAB (OUTPATIENT)
Dept: LAB | Facility: MEDICAL CENTER | Age: 61
End: 2021-03-22
Payer: COMMERCIAL

## 2021-03-22 ENCOUNTER — TELEPHONE (OUTPATIENT)
Dept: BARIATRICS | Facility: CLINIC | Age: 61
End: 2021-03-22

## 2021-03-22 ENCOUNTER — TRANSCRIBE ORDERS (OUTPATIENT)
Dept: LAB | Facility: MEDICAL CENTER | Age: 61
End: 2021-03-22

## 2021-03-22 DIAGNOSIS — R63.5 ABNORMAL WEIGHT GAIN: ICD-10-CM

## 2021-03-22 DIAGNOSIS — E66.9 CLASS 1 OBESITY: ICD-10-CM

## 2021-03-22 DIAGNOSIS — R73.03 PREDIABETES: ICD-10-CM

## 2021-03-22 DIAGNOSIS — E03.9 HYPOTHYROIDISM, UNSPECIFIED TYPE: ICD-10-CM

## 2021-03-22 LAB
CHOLEST SERPL-MCNC: 196 MG/DL (ref 50–200)
EST. AVERAGE GLUCOSE BLD GHB EST-MCNC: 128 MG/DL
HBA1C MFR BLD: 6.1 %
HDLC SERPL-MCNC: 54 MG/DL
INSULIN SERPL-ACNC: 11.8 MU/L (ref 3–25)
LDLC SERPL CALC-MCNC: 127 MG/DL (ref 0–100)
NONHDLC SERPL-MCNC: 142 MG/DL
TRIGL SERPL-MCNC: 74 MG/DL
TSH SERPL DL<=0.05 MIU/L-ACNC: 0.93 UIU/ML (ref 0.36–3.74)

## 2021-03-22 PROCEDURE — 36415 COLL VENOUS BLD VENIPUNCTURE: CPT

## 2021-03-22 PROCEDURE — 83525 ASSAY OF INSULIN: CPT

## 2021-03-22 PROCEDURE — 84443 ASSAY THYROID STIM HORMONE: CPT

## 2021-03-22 PROCEDURE — 83036 HEMOGLOBIN GLYCOSYLATED A1C: CPT

## 2021-03-22 PROCEDURE — 80061 LIPID PANEL: CPT

## 2021-03-23 DIAGNOSIS — E03.9 HYPOTHYROIDISM, UNSPECIFIED TYPE: Primary | ICD-10-CM

## 2021-04-01 DIAGNOSIS — E03.9 HYPOTHYROIDISM, UNSPECIFIED TYPE: ICD-10-CM

## 2021-04-01 RX ORDER — LEVOTHYROXINE SODIUM 88 MCG
88 TABLET ORAL DAILY
Qty: 30 TABLET | Refills: 1 | Status: SHIPPED | OUTPATIENT
Start: 2021-04-01 | End: 2021-05-20 | Stop reason: SDUPTHER

## 2021-04-09 ENCOUNTER — IMMUNIZATIONS (OUTPATIENT)
Dept: FAMILY MEDICINE CLINIC | Facility: HOSPITAL | Age: 61
End: 2021-04-09

## 2021-04-09 DIAGNOSIS — Z23 ENCOUNTER FOR IMMUNIZATION: Primary | ICD-10-CM

## 2021-04-09 PROCEDURE — 91301 SARS-COV-2 / COVID-19 MRNA VACCINE (MODERNA) 100 MCG: CPT

## 2021-04-09 PROCEDURE — 0012A SARS-COV-2 / COVID-19 MRNA VACCINE (MODERNA) 100 MCG: CPT

## 2021-05-20 DIAGNOSIS — E03.9 HYPOTHYROIDISM, UNSPECIFIED TYPE: ICD-10-CM

## 2021-05-20 RX ORDER — LEVOTHYROXINE SODIUM 88 MCG
88 TABLET ORAL DAILY
Qty: 90 TABLET | Refills: 1 | Status: SHIPPED | OUTPATIENT
Start: 2021-05-20 | End: 2021-09-22 | Stop reason: SDUPTHER

## 2021-06-07 ENCOUNTER — HOSPITAL ENCOUNTER (OUTPATIENT)
Dept: NON INVASIVE DIAGNOSTICS | Facility: HOSPITAL | Age: 61
Discharge: HOME | End: 2021-06-07
Attending: PHYSICIAN ASSISTANT
Payer: COMMERCIAL

## 2021-06-07 ENCOUNTER — OFFICE VISIT (OUTPATIENT)
Dept: BARIATRICS | Facility: CLINIC | Age: 61
End: 2021-06-07
Payer: COMMERCIAL

## 2021-06-07 VITALS
HEART RATE: 84 BPM | HEIGHT: 64 IN | SYSTOLIC BLOOD PRESSURE: 130 MMHG | DIASTOLIC BLOOD PRESSURE: 82 MMHG | OXYGEN SATURATION: 97 % | WEIGHT: 185 LBS | TEMPERATURE: 98.2 F | BODY MASS INDEX: 31.58 KG/M2

## 2021-06-07 DIAGNOSIS — E66.9 CLASS 1 OBESITY: Primary | ICD-10-CM

## 2021-06-07 DIAGNOSIS — Z79.899 MEDICATION MANAGEMENT: ICD-10-CM

## 2021-06-07 DIAGNOSIS — R73.03 PREDIABETES: ICD-10-CM

## 2021-06-07 DIAGNOSIS — E66.9 CLASS 1 OBESITY: ICD-10-CM

## 2021-06-07 LAB
ATRIAL RATE: 67 BPM
P AXIS: 67 DEGREES
PR INTERVAL: 150 MS
QRS AXIS: 74 DEGREES
QRSD INTERVAL: 90 MS
QT INTERVAL: 432 MS
QTC INTERVAL: 456 MS
T WAVE AXIS: 51 DEGREES
VENTRICULAR RATE: 67 BPM

## 2021-06-07 PROCEDURE — 93010 ELECTROCARDIOGRAM REPORT: CPT | Performed by: INTERNAL MEDICINE

## 2021-06-07 PROCEDURE — 93005 ELECTROCARDIOGRAM TRACING: CPT

## 2021-06-07 PROCEDURE — 99214 OFFICE O/P EST MOD 30 MIN: CPT | Performed by: PHYSICIAN ASSISTANT

## 2021-06-07 NOTE — PROGRESS NOTES
Assessment/Plan:  Class1 Obesity  -Patient is pursuing Conservative Program  -Initial weight loss goal of 5-10% weight loss for improved health  -Screening labs: reviewed A1c, insulin, TSH, T4, from 3/22/21  -Denies hx of CAD, PAD, glaucoma, palpitations (only after a generic Synthroid)or arrhythmia   -Kidney stone 30 years ago,  Passed on it's own  Aware of risks with Topamax   -reports always feeling hungry  Reviewed role of AOM and options  She is most interested in Qsymia  Prefers to avoid Contrave  The common potential side effects of Qysmia include: increased heart rate and/or blood pressure, palpitations, headache, trouble sleeping, dry mouth, constipation, abdominal upset, numbness/tingling, dizziness, and worsening of depression/anxiety  Qsymia should be stopped prior to surgery  Notify the provider with any changes in vision  Reviewed with the pt  Check EKG  -post menopausal  -Reviewed importance of exercise, calorie counting, measuring portions  Initial: 182  2  Current: 185  Change: +2 8 lbs  Goal:    Prediabetes  -A1c 6 1 %  -may improve with weight loss and dietary changes    Follow up in approximately 1 month with Non-Surgical Physician/Advanced Practitioner  Goals:  Food log (ie ) www myfitnesspal com,sparkpeople  com,loseit com,calorieking  com,etc  baritastic  No sugary beverages  At least 64oz of water daily  Increase physical activity by 10 minutes daily   Gradually increase physical activity to a goal of 5 days per week for 30 minutes of MODERATE intensity PLUS 2 days per week of FULL BODY resistance training  5-10 servings of fruits and vegetables per day and 25-35 grams of dietary fiber per day, gradually increasing  6864-6629 calories per day   avoid refined carbs including white bread/rice/pasta, pretzels, chips, sweets and sugary beverages  avoid foods high in saturated fat such as red meat, processed meats, excess cheese, whole milk, butter   If choosing starch pick whole grain/complex carbs and keep <1/2 cup: brown rice, quinoa, whole wheat pasta, sweet potato, chickpea noodles, red lentil noodles  Check EKG coverage  While on phentermine check your resting blood pressure and pulse at least 3 times per week  For example you may do this Monday morning, Wednesday afternoon, and Friday night  Your blood pressure should not be 140/90 or higher and goal pulse goal  bpm (beats per minute ) Notify the provider if either are consistently elevated     Diagnoses and all orders for this visit:    Class 1 obesity  -     ECG 12 lead; Future    Prediabetes  -     ECG 12 lead; Future    Medication management  -     ECG 12 lead; Future    Body mass index 31 0-31 9, adult  -     ECG 12 lead; Future        Subjective:   Chief Complaint   Patient presents with    follow up     Patient ID: Angela Will  is a 63 y o  female with excess weight/obesity here to pursue weight management  Patient is pursuing Conservative Program      HPI  The patient presents for [facility] follow up  Has had increased stress and 2 deaths in the family since f/u  Has not been food logging- phone was not working  Trying to eat more fruits/veggies  Salads, but with ranch (not measured)  Snacks: cheese stick, nuts (not measured)  Lean protein, veg, + potato with butter for dinner  Increased appetite/cravings: reports feeling hungry  Exercise: walks at lunch; gardening  Hydration: 3 bottles of water per day, 1 cup coffee with creamer (estimates 2 tsp), drinks green tea- tries not to drink daily  The following portions of the patient's history were reviewed and updated as appropriate: allergies, current medications, past family history, past medical history, past social history, past surgical history and problem list     Review of Systems   Cardiovascular: Negative for chest pain and palpitations  Psychiatric/Behavioral: Negative        Objective:    /82 (BP Location: Left arm, Cuff Size: Large)   Pulse 84   Temp 98 2 °F (36 8 °C) (Temporal)   Ht 5' 4" (1 626 m)   Wt 83 9 kg (185 lb)   SpO2 97%   BMI 31 76 kg/m²      Physical Exam  Vitals signs and nursing note reviewed  Constitutional   General appearance: Abnormal   well developed and obese  Pulmonary   Respiratory effort: No increased work of breathing or signs of respiratory distress  Auscultation of lungs: Clear to auscultation, equal breath sounds bilaterally, no wheezes, no rales, no rhonci  Cardiovascular   Auscultation of heart: Normal rate and rhythm, normal S1 and S2, without murmurs  Examination of extremities for edema and/or varicosities: Normal   no edema  Abdomen   Abdomen: Abnormal   The abdomen was obese    Musculoskeletal   Gait and station: Normal     Psychiatric   Orientation to person, place and time: Normal     Affect: appropriate

## 2021-06-07 NOTE — PATIENT INSTRUCTIONS
Goals: Food log (ie ) www myfitnesspal com,sparkpeople  com,loseit com,calorieking  com,etc  baritastic  No sugary beverages  At least 64oz of water daily  Increase physical activity by 10 minutes daily  Gradually increase physical activity to a goal of 5 days per week for 30 minutes of MODERATE intensity PLUS 2 days per week of FULL BODY resistance training  5-10 servings of fruits and vegetables per day and 25-35 grams of dietary fiber per day, gradually increasing  5791-9330 calories per day   avoid refined carbs including white bread/rice/pasta, pretzels, chips, sweets and sugary beverages  avoid foods high in saturated fat such as red meat, processed meats, excess cheese, whole milk, butter   If choosing starch pick whole grain/complex carbs and keep <1/2 cup: brown rice, quinoa, whole wheat pasta, sweet potato, chickpea noodles, red lentil noodles  While on phentermine check your resting blood pressure and pulse at least 3 times per week  For example you may do this Monday morning, Wednesday afternoon, and Friday night   Your blood pressure should not be 140/90 or higher and goal pulse goal  bpm (beats per minute ) Notify the provider if either are consistently elevated

## 2021-06-09 DIAGNOSIS — E66.9 CLASS 1 OBESITY: Primary | ICD-10-CM

## 2021-06-09 DIAGNOSIS — R73.03 PREDIABETES: ICD-10-CM

## 2021-06-09 RX ORDER — PHENTERMINE AND TOPIRAMATE 7.5; 46 MG/1; MG/1
CAPSULE, EXTENDED RELEASE ORAL
Qty: 30 CAPSULE | Refills: 0 | Status: SHIPPED | OUTPATIENT
Start: 2021-06-09 | End: 2021-07-19 | Stop reason: ALTCHOICE

## 2021-06-09 RX ORDER — PHENTERMINE AND TOPIRAMATE 3.75; 23 MG/1; MG/1
CAPSULE, EXTENDED RELEASE ORAL
Qty: 14 CAPSULE | Refills: 0 | Status: SHIPPED | OUTPATIENT
Start: 2021-06-09 | End: 2021-07-19 | Stop reason: ALTCHOICE

## 2021-06-15 ENCOUNTER — OFFICE VISIT (OUTPATIENT)
Dept: FAMILY MEDICINE CLINIC | Facility: CLINIC | Age: 61
End: 2021-06-15
Payer: COMMERCIAL

## 2021-06-15 VITALS
WEIGHT: 187 LBS | OXYGEN SATURATION: 98 % | HEART RATE: 69 BPM | HEIGHT: 64 IN | TEMPERATURE: 97 F | DIASTOLIC BLOOD PRESSURE: 80 MMHG | BODY MASS INDEX: 31.92 KG/M2 | SYSTOLIC BLOOD PRESSURE: 136 MMHG

## 2021-06-15 DIAGNOSIS — N63.10 LUMP OF RIGHT BREAST: Primary | ICD-10-CM

## 2021-06-15 PROCEDURE — 99214 OFFICE O/P EST MOD 30 MIN: CPT | Performed by: PHYSICIAN ASSISTANT

## 2021-06-15 NOTE — PROGRESS NOTES
Assessment/Plan:    Problem List Items Addressed This Visit     None      Visit Diagnoses     Lump of right breast    -  Primary    Relevant Orders    Mammo diagnostic bilateral w cad    US breast right limited (diagnostic)           Diagnoses and all orders for this visit:    Lump of right breast  -     Mammo diagnostic bilateral w cad; Future  -     US breast right limited (diagnostic); Future        Will get diagnostic US and Mammogram to further evaluate lump to rule out malignancy  Discussed with patient that it may also be a lymph node or cyst      Subjective:      Patient ID: Antionette Humphrey is a 63 y o  female  Elissa Aiken is a very pleasant 63year old female who is here today complaining of a lump in her right breast  She noticed it about 2 weeks ago  She feels that it has gotten slightly larger  When she noticed it, it was about pea-sized  She denies any pain, swelling, skin changes, skin dimpling, nipple changes, nipple discharge, or axillary lymphadenopathy  She denies family history of breast cancer  Her last mammogram was 3/28/19  She did have some left axillary lymphadenopathy after both COVID vaccines  She received both vaccines in her left arm, but that resolved within a few weeks  Her second vaccine was 21  She denies any other concerns at this time  The following portions of the patient's history were reviewed and updated as appropriate:   She has a past medical history of Abdominal pain, Atelectasis, Cancer ([address] ), Disease of thyroid gland, GERD (gastroesophageal reflux disease), Irritable bowel syndrome, Kidney stone, Motion sickness, PONV (postoperative nausea and vomiting), and Wears glasses  ,  does not have any pertinent problems on file  ,   has a past surgical history that includes Shoulder surgery (Bilateral); Biceps tendon repair (Right);  section; Exploratory laparotomy; Cholecystectomy; Appendectomy;  Tubal ligation; Knee arthroscopy w/ meniscal repair (Left); pr knee scope,med/lat menisectomy (Left, 1/16/2017); ARTHROSCOPY KNEE (Right, 2/21/2018); pr colonoscopy flx dx w/collj spec when pfrmd (N/A, 2/15/2019); Breast biopsy (Right, 01/01/2012); Carpal tunnel release (Left); Colonoscopy; Umbilical hernia repair; Achilles tendon repair (Right); LAPAROSCOPY; and pr revise median n/carpal tunnel surg (Right, 8/27/2020)  ,  family history includes Diabetes in her paternal grandmother; Heart disease in her father; Hypertension in her mother; Lung cancer in her maternal uncle; Mental illness in her father; Stroke in her mother; Substance Abuse in her brother  ,   reports that she quit smoking about 21 months ago  She has never used smokeless tobacco  She reports current alcohol use  She reports that she does not use drugs  ,  is allergic to erythromycin, betadine [povidone iodine], doxycycline, and medical tape     Current Outpatient Medications   Medication Sig Dispense Refill    cholecalciferol (VITAMIN D3) 1,000 units tablet Take 5,000 Units by mouth daily      Synthroid 88 MCG tablet Take 1 tablet (88 mcg total) by mouth daily 90 tablet 1    Ca Carbonate-Mag Hydroxide (ROLAIDS PO) Take by mouth as needed      calcium carbonate (TUMS) 500 mg chewable tablet Chew 1 tablet daily      Phentermine-Topiramate (Qsymia) 3 75-23 MG CP24 One tab po qam x 2 weeks then increase to 7 5-46 mg 14 capsule 0    Phentermine-Topiramate (Qsymia) 7 5-46 MG CP24 Take one tab po qam (Patient not taking: Reported on 6/15/2021) 30 capsule 0     No current facility-administered medications for this visit  Review of Systems   Constitutional: Negative for chills, diaphoresis, fatigue and fever  HENT: Negative for congestion, ear pain, postnasal drip, rhinorrhea, sneezing, sore throat and trouble swallowing  Eyes: Negative for pain and visual disturbance  Respiratory: Negative for apnea, cough, shortness of breath and wheezing  Cardiovascular: Negative for chest pain and palpitations  Gastrointestinal: Negative for abdominal pain, constipation, diarrhea, nausea and vomiting  Genitourinary: Negative for dysuria and hematuria         +Lump of right breast   Musculoskeletal: Negative for arthralgias, gait problem and myalgias  Neurological: Negative for dizziness, syncope, weakness, light-headedness, numbness and headaches  Psychiatric/Behavioral: Negative for suicidal ideas  The patient is not nervous/anxious  Objective:  Vitals:    06/15/21 1506   BP: 136/80   Pulse: 69   Temp: (!) 97 °F (36 1 °C)   SpO2: 98%   Weight: 84 8 kg (187 lb)   Height: 5' 4" (1 626 m)     Body mass index is 32 1 kg/m²  Physical Exam  Vitals and nursing note reviewed  Constitutional:       Appearance: She is well-developed  HENT:      Head: Normocephalic and atraumatic  Right Ear: External ear normal       Left Ear: External ear normal    Eyes:      Extraocular Movements: Extraocular movements intact  Cardiovascular:      Rate and Rhythm: Normal rate and regular rhythm  Heart sounds: Normal heart sounds  No murmur heard  No friction rub  No gallop  Pulmonary:      Effort: Pulmonary effort is normal  No respiratory distress  Breath sounds: Normal breath sounds  No wheezing or rales  Chest:      Breasts: Breasts are symmetrical          Right: Mass present  No swelling, bleeding, inverted nipple, nipple discharge, skin change or tenderness  Left: No swelling, bleeding, inverted nipple, mass, nipple discharge, skin change or tenderness  Musculoskeletal:         General: Normal range of motion  Cervical back: Normal range of motion and neck supple  Lymphadenopathy:      Cervical: No cervical adenopathy  Upper Body:      Right upper body: No supraclavicular or axillary adenopathy  Left upper body: No supraclavicular or axillary adenopathy  Skin:     General: Skin is warm and dry     Neurological:      Mental Status: She is alert and oriented to person, place, and time  Psychiatric:         Behavior: Behavior normal          Thought Content:  Thought content normal          Judgment: Judgment normal

## 2021-06-16 ENCOUNTER — TELEPHONE (OUTPATIENT)
Dept: BARIATRICS | Facility: CLINIC | Age: 61
End: 2021-06-16

## 2021-06-16 NOTE — PROGRESS NOTES
Call patient to notify normal results everything is totally back to normal with her blood count platelet counts up to 298,000 white blood count 7450 Podiatry Progress Note        ASSESSMENT: S/P Amputation 2nd digit left foot       TREATMENT:  -Surgical site on the left is progressing well. Sutures removed today, steri-strips applied.     -Recommend gradual increase in activity level with DM inserts and shoes. Avoid getting the left foot wet x5-7 days.     -He is discharged from my care at this time and will follow-up with me as concerns develop.    Jorge Adames DPM  [facility] Podiatry/Foot & Ankle Surgery      HPI: Prosper Kirby was seen again today s/p amputation 2nd digit left foot. Doing well. He has remained non-weight bearing.     Past Medical History:   Diagnosis Date     Asthma      Cellulitis      Diabetes mellitus (H)      Hyperlipemia      Hypertension      Osteomyelitis (H)      Urinary retention        No Known Allergies      Current Outpatient Medications:      acetaminophen (TYLENOL) 500 MG tablet, Take 1,000 mg by mouth every 6 (six) hours as needed for pain., Disp: , Rfl:      amLODIPine (NORVASC) 10 MG tablet, Take 10 mg by mouth daily. , Disp: , Rfl:      aspirin 325 MG tablet, Take 325 mg by mouth daily. , Disp: , Rfl:      glimepiride (AMARYL) 4 MG tablet, Take 4 mg by mouth daily. , Disp: , Rfl:      lisinopriL (PRINIVIL,ZESTRIL) 40 MG tablet, Take 40 mg by mouth daily., Disp: , Rfl:      metFORMIN (GLUCOPHAGE) 1000 MG tablet, Take 1,000 mg by mouth 2 (two) times a day with meals. , Disp: , Rfl:      oxyCODONE (ROXICODONE) 5 MG immediate release tablet, Take 1 tablet (5 mg total) by mouth every 6 (six) hours as needed for pain., Disp: 30 tablet, Rfl: 0     polyethylene glycol (MIRALAX) 17 gram packet, Take 1 packet (17 g total) by mouth daily as needed., Disp:  , Rfl: 0    Current Facility-Administered Medications:      lidocaine 2 % jelly (XYLOCAINE), , Topical, PRN, Jorge Adames DPM, Given at 03/22/21 0850    Review of Systems - Negative       OBJECTIVE:  Appearance: alert, well appearing, and in no distress.    Vitals:     04/22/21 1239   BP: 120/62   Pulse: 62   Resp: 20   SpO2: 98%       Surgical site on the amputated 2nd digit left foot has intact sutures with skin edges well coapted, no gapping noted. No erythema left foot. Neurovascular status unchanged left foot.

## 2021-06-16 NOTE — TELEPHONE ENCOUNTER
Spoke with pt and advised of the PA approval for Qsymia and will be ready for tomorrow after 3pm for  at her pharmacy

## 2021-06-17 ENCOUNTER — HOSPITAL ENCOUNTER (OUTPATIENT)
Dept: MAMMOGRAPHY | Facility: HOSPITAL | Age: 61
Discharge: HOME | End: 2021-06-17
Payer: COMMERCIAL

## 2021-06-17 ENCOUNTER — HOSPITAL ENCOUNTER (OUTPATIENT)
Dept: ULTRASOUND IMAGING | Facility: HOSPITAL | Age: 61
Discharge: HOME | End: 2021-06-17
Payer: COMMERCIAL

## 2021-06-17 VITALS — WEIGHT: 187 LBS | HEIGHT: 64 IN | BODY MASS INDEX: 31.92 KG/M2

## 2021-06-17 DIAGNOSIS — N63.10 LUMP OF RIGHT BREAST: ICD-10-CM

## 2021-06-17 PROCEDURE — 76642 ULTRASOUND BREAST LIMITED: CPT

## 2021-06-17 PROCEDURE — 77066 DX MAMMO INCL CAD BI: CPT

## 2021-06-17 PROCEDURE — G0279 TOMOSYNTHESIS, MAMMO: HCPCS

## 2021-07-19 ENCOUNTER — OFFICE VISIT (OUTPATIENT)
Dept: BARIATRICS | Facility: CLINIC | Age: 61
End: 2021-07-19
Payer: COMMERCIAL

## 2021-07-19 VITALS
HEART RATE: 75 BPM | TEMPERATURE: 98.6 F | DIASTOLIC BLOOD PRESSURE: 72 MMHG | WEIGHT: 186 LBS | BODY MASS INDEX: 31.76 KG/M2 | SYSTOLIC BLOOD PRESSURE: 124 MMHG | OXYGEN SATURATION: 98 % | HEIGHT: 64 IN

## 2021-07-19 DIAGNOSIS — R73.03 PREDIABETES: ICD-10-CM

## 2021-07-19 DIAGNOSIS — E66.9 CLASS 1 OBESITY: Primary | ICD-10-CM

## 2021-07-19 PROCEDURE — 99214 OFFICE O/P EST MOD 30 MIN: CPT | Performed by: PHYSICIAN ASSISTANT

## 2021-07-19 RX ORDER — PHENTERMINE HYDROCHLORIDE 37.5 MG/1
18.75 TABLET ORAL EVERY MORNING
Qty: 30 TABLET | Refills: 1 | Status: SHIPPED | OUTPATIENT
Start: 2021-07-19 | End: 2021-08-16 | Stop reason: SDUPTHER

## 2021-07-19 NOTE — ASSESSMENT & PLAN NOTE
-Patient is pursuing Conservative Program  -Initial weight loss goal of 5-10% weight loss for improved health  -Did not tolerate Qsymia: concentration issues/confusion, dizziness, fatigue  Not currently interested in other options, at this time, and would like to trial phentermine    -Screening labs: reviewed/UTD  -Dietary recall reviewed, suggestions provided    Initial: 182  2  Current: 186  Change: +3 8  Goal: 150

## 2021-07-19 NOTE — PATIENT INSTRUCTIONS
Goals: Food log (ie ) www myfitnesspal com,sparkpeople  com,loseit com,calorieking  com,etc  baritastic  No sugary beverages  At least 64oz of water daily  Increase physical activity by 10 minutes daily   Gradually increase physical activity to a goal of 5 days per week for 30 minutes of MODERATE intensity PLUS 2 days per week of FULL BODY resistance training  5-10 servings of fruits and vegetables per day and 25-35 grams of dietary fiber per day, gradually increasing  2203-2541 calories per day    If choosing starch pick whole grain/complex carbs and keep to 1/2 cup: oatmeal, brown rice, quinoa, whole wheat pasta, potatoes, sweet potato, chickpea noodles, red lentil noodles  Measure all portions: creamers, sugars, cooking oils/butters, condiments, dressings, etc and log calories

## 2021-07-19 NOTE — PROGRESS NOTES
Assessment/Plan:    Class 1 obesity  -Patient is pursuing Conservative Program  -Initial weight loss goal of 5-10% weight loss for improved health  -Did not tolerate Qsymia: concentration issues/confusion, dizziness, fatigue  Not currently interested in other options, at this time, and would like to trial phentermine    -Screening labs: reviewed/UTD  -Dietary recall reviewed, suggestions provided    Initial: 182  2  Current: 186  Change: +3 8  Goal: 150    Prediabetes  -A1c 6 1 %  -may improve with weight loss and dietary changes    Follow up in approximately 2 months with Non-Surgical Physician/Advanced Practitioner  Goals:  Food log (ie ) www myfitnesspal com,sparkpeople  com,loseit com,calorieking  com,etc  baritastic  No sugary beverages  At least 64oz of water daily  Increase physical activity by 10 minutes daily  Gradually increase physical activity to a goal of 5 days per week for 30 minutes of MODERATE intensity PLUS 2 days per week of FULL BODY resistance training  5-10 servings of fruits and vegetables per day and 25-35 grams of dietary fiber per day, gradually increasing  0204-4554 calories per day    If choosing starch pick whole grain/complex carbs and keep to 1/2 cup: oatmeal, brown rice, quinoa, whole wheat pasta, potatoes, sweet potato, chickpea noodles, red lentil noodles  Measure all portions: creamers, sugars, cooking oils/butters, condiments, dressings, etc and log calories     Diagnoses and all orders for this visit:    Class 1 obesity  -     phentermine (ADIPEX-P) 37 5 MG tablet; Take 0 5 tablets (18 75 mg total) by mouth every morning For two weeks then increase to 1 tab po qam  Before breakfast or 1-2 hours after    Prediabetes  -     phentermine (ADIPEX-P) 37 5 MG tablet;  Take 0 5 tablets (18 75 mg total) by mouth every morning For two weeks then increase to 1 tab po qam  Before breakfast or 1-2 hours after    Body mass index 31 0-31 9, adult        Subjective:   Chief Complaint   Patient presents with    follow up     Patient ID: Deon Banda  is a 63 y o  female with excess weight/obesity here to pursue weight management  Patient is pursuing Conservative Program      HPI  The patient presents for MWM follow up  Did not tolerate Qsymia, would like to try phentermine alone    Food logging: yes    B: light and fit yogurt with granola or split sandwich from [employer]odi  S: fruit  L: salad but no lean protein (feels hungry soon after)  S: none  D: protein + veg + starch  S: not usually, but sometimes ice cream/milkshake    Exercise: 12,000-13,000 steps per day, trying to also get formal exercise, but busy with puppies  Hydration: 3 bottles of water + diet green tea + hint water    The following portions of the patient's history were reviewed and updated as appropriate: allergies, current medications, past family history, past medical history, past social history, past surgical history and problem list     Review of Systems   Cardiovascular: Negative for chest pain and palpitations  Psychiatric/Behavioral: Negative  Objective:    /72 (BP Location: Left arm, Cuff Size: Large)   Pulse 75   Temp 98 6 °F (37 °C) (Temporal)   Ht 5' 4" (1 626 m)   Wt 84 4 kg (186 lb)   SpO2 98%   BMI 31 93 kg/m²      Physical Exam  Vitals and nursing note reviewed  Constitutional   General appearance: Abnormal   well developed and obese  Pulmonary   Respiratory effort: No increased work of breathing or signs of respiratory distress      Musculoskeletal   Gait and station: Normal     Psychiatric   Orientation to person, place and time: Normal     Affect: appropriate

## 2021-08-16 DIAGNOSIS — E66.9 CLASS 1 OBESITY: ICD-10-CM

## 2021-08-16 DIAGNOSIS — R73.03 PREDIABETES: ICD-10-CM

## 2021-08-16 RX ORDER — PHENTERMINE HYDROCHLORIDE 37.5 MG/1
18.75 TABLET ORAL EVERY MORNING
Qty: 30 TABLET | Refills: 1 | Status: SHIPPED | OUTPATIENT
Start: 2021-08-16 | End: 2021-09-21 | Stop reason: SDUPTHER

## 2021-08-18 DIAGNOSIS — R39.9 UTI SYMPTOMS: Primary | ICD-10-CM

## 2021-08-18 DIAGNOSIS — N30.00 ACUTE CYSTITIS WITHOUT HEMATURIA: Primary | ICD-10-CM

## 2021-08-18 PROCEDURE — 87186 SC STD MICRODIL/AGAR DIL: CPT | Performed by: PHYSICIAN ASSISTANT

## 2021-08-18 PROCEDURE — 87077 CULTURE AEROBIC IDENTIFY: CPT | Performed by: PHYSICIAN ASSISTANT

## 2021-08-18 PROCEDURE — 87086 URINE CULTURE/COLONY COUNT: CPT | Performed by: PHYSICIAN ASSISTANT

## 2021-08-18 RX ORDER — NITROFURANTOIN 25; 75 MG/1; MG/1
100 CAPSULE ORAL 2 TIMES DAILY
Qty: 14 CAPSULE | Refills: 0 | Status: SHIPPED | OUTPATIENT
Start: 2021-08-18 | End: 2021-08-25

## 2021-08-23 DIAGNOSIS — B37.3 VAGINAL YEAST INFECTION: Primary | ICD-10-CM

## 2021-08-23 RX ORDER — FLUCONAZOLE 150 MG/1
150 TABLET ORAL ONCE
Qty: 1 TABLET | Refills: 0 | Status: SHIPPED | OUTPATIENT
Start: 2021-08-23 | End: 2021-08-23

## 2021-08-26 ENCOUNTER — LAB REQUISITION (OUTPATIENT)
Dept: LAB | Facility: HOSPITAL | Age: 61
End: 2021-08-26
Payer: COMMERCIAL

## 2021-08-26 DIAGNOSIS — N89.8 OTHER SPECIFIED NONINFLAMMATORY DISORDERS OF VAGINA: ICD-10-CM

## 2021-08-26 DIAGNOSIS — Z11.3 ENCOUNTER FOR SCREENING FOR INFECTIONS WITH A PREDOMINANTLY SEXUAL MODE OF TRANSMISSION: ICD-10-CM

## 2021-08-26 DIAGNOSIS — D28.0 BENIGN NEOPLASM OF VULVA: ICD-10-CM

## 2021-08-26 PROCEDURE — 87510 GARDNER VAG DNA DIR PROBE: CPT | Performed by: SPECIALIST

## 2021-08-26 PROCEDURE — 87255 GENET VIRUS ISOLATE HSV: CPT | Performed by: SPECIALIST

## 2021-08-26 PROCEDURE — 87660 TRICHOMONAS VAGIN DIR PROBE: CPT | Performed by: SPECIALIST

## 2021-08-26 PROCEDURE — 87480 CANDIDA DNA DIR PROBE: CPT | Performed by: SPECIALIST

## 2021-08-28 LAB
CANDIDA RRNA VAG QL PROBE: NEGATIVE
G VAGINALIS RRNA GENITAL QL PROBE: NEGATIVE
T VAGINALIS RRNA GENITAL QL PROBE: NEGATIVE

## 2021-08-31 LAB — HSV SPEC CULT: NORMAL

## 2021-09-21 ENCOUNTER — APPOINTMENT (OUTPATIENT)
Dept: LAB | Facility: MEDICAL CENTER | Age: 61
End: 2021-09-21
Payer: COMMERCIAL

## 2021-09-21 DIAGNOSIS — R73.03 PREDIABETES: ICD-10-CM

## 2021-09-21 DIAGNOSIS — E66.9 CLASS 1 OBESITY: ICD-10-CM

## 2021-09-21 DIAGNOSIS — E03.9 HYPOTHYROIDISM, UNSPECIFIED TYPE: ICD-10-CM

## 2021-09-21 LAB — TSH SERPL DL<=0.05 MIU/L-ACNC: 2.33 UIU/ML (ref 0.36–3.74)

## 2021-09-21 PROCEDURE — 36415 COLL VENOUS BLD VENIPUNCTURE: CPT

## 2021-09-21 PROCEDURE — 84443 ASSAY THYROID STIM HORMONE: CPT

## 2021-09-21 RX ORDER — PHENTERMINE HYDROCHLORIDE 37.5 MG/1
18.75 TABLET ORAL EVERY MORNING
Qty: 30 TABLET | Refills: 1 | Status: SHIPPED | OUTPATIENT
Start: 2021-09-21 | End: 2021-09-27 | Stop reason: SDUPTHER

## 2021-09-22 DIAGNOSIS — E03.9 HYPOTHYROIDISM, UNSPECIFIED TYPE: ICD-10-CM

## 2021-09-22 RX ORDER — LEVOTHYROXINE SODIUM 88 MCG
88 TABLET ORAL DAILY
Qty: 90 TABLET | Refills: 1 | Status: SHIPPED | OUTPATIENT
Start: 2021-09-22 | End: 2022-04-05 | Stop reason: SDUPTHER

## 2021-09-27 ENCOUNTER — OFFICE VISIT (OUTPATIENT)
Dept: BARIATRICS | Facility: CLINIC | Age: 61
End: 2021-09-27
Payer: COMMERCIAL

## 2021-09-27 VITALS
HEART RATE: 76 BPM | WEIGHT: 177 LBS | TEMPERATURE: 97.6 F | BODY MASS INDEX: 30.22 KG/M2 | HEIGHT: 64 IN | DIASTOLIC BLOOD PRESSURE: 80 MMHG | OXYGEN SATURATION: 98 % | SYSTOLIC BLOOD PRESSURE: 136 MMHG

## 2021-09-27 DIAGNOSIS — E66.9 CLASS 1 OBESITY: Primary | ICD-10-CM

## 2021-09-27 DIAGNOSIS — R73.03 PREDIABETES: ICD-10-CM

## 2021-09-27 PROCEDURE — 99214 OFFICE O/P EST MOD 30 MIN: CPT | Performed by: PHYSICIAN ASSISTANT

## 2021-09-27 RX ORDER — PHENTERMINE HYDROCHLORIDE 37.5 MG/1
37.5 TABLET ORAL EVERY MORNING
Qty: 30 TABLET | Refills: 2 | Status: SHIPPED | OUTPATIENT
Start: 2021-09-27 | End: 2021-10-26 | Stop reason: SDUPTHER

## 2021-09-27 NOTE — ASSESSMENT & PLAN NOTE
-Patient is pursuing Conservative Program  -Initial weight loss goal of 5-10% weight loss for improved health  -Did not tolerate Qsymia: concentration issues/confusion, dizziness, fatigue  Not currently interested in other options, at this time, and would like to trial phentermine (186 lbs, 7/19/21)  -BP/P stable  -Screening labs: reviewed/UTD, update A1c/BMP after next visit  -Dietary recall reviewed, suggestions provided; reviewed importance of measuring portions and logging all calories     Initial: 182  2  Current: 177 (-9 lbs since last visit)  Change: -5 2  Goal: 150

## 2021-09-27 NOTE — PATIENT INSTRUCTIONS
Goals: Food log (ie ) www myfitnesspal com,sparkpeople  com,loseit com,calorieking  com,etc  baritastic  No sugary beverages  At least 64oz of water daily  Increase physical activity by 10 minutes daily  Gradually increase physical activity to a goal of 5 days per week for 30 minutes of MODERATE intensity PLUS 2 days per week of FULL BODY resistance training  5-10 servings of fruits and vegetables per day and 25-35 grams of dietary fiber per day, gradually increasing  7570-4077 calories per day   Measure all portions:beverages,  creamers, sugars, cooking oils/butters, condiments, dressings, etc and log calories   If choosing starch pick whole grain/complex carbs and keep to 1/2 cup: oatmeal, brown rice, quinoa, whole wheat pasta, potatoes, sweet potato, chickpea noodles, red lentil noodles    While on phentermine check your resting blood pressure and pulse at least 3 times per week  For example you may do this Monday morning, Wednesday afternoon, and Friday night   Your blood pressure should not be 140/90 or higher and goal pulse goal  bpm (beats per minute ) Notify the provider if either are consistently

## 2021-09-27 NOTE — PROGRESS NOTES
Assessment/Plan:    Class 1 obesity  -Patient is pursuing Conservative Program  -Initial weight loss goal of 5-10% weight loss for improved health  -Did not tolerate Qsymia: concentration issues/confusion, dizziness, fatigue  Not currently interested in other options, at this time, and would like to trial phentermine (186 lbs, 7/19/21)  -BP/P stable  -Screening labs: reviewed/UTD, update A1c/BMP after next visit  -Dietary recall reviewed, suggestions provided; reviewed importance of measuring portions and logging all calories     Initial: 182  2  Current: 177 (-9 lbs since last visit)  Change: -5 2  Goal: 150    Prediabetes  -A1c 6 1 %  -may improve with weight loss and dietary changes    Follow up in approximately 2 months with Non-Surgical Physician/Advanced Practitioner  Goals:  Food log (ie ) www myfitnesspal com,sparkpeople  com,loseit com,calorieking  com,etc  baritastic  No sugary beverages  At least 64oz of water daily  Increase physical activity by 10 minutes daily  Gradually increase physical activity to a goal of 5 days per week for 30 minutes of MODERATE intensity PLUS 2 days per week of FULL BODY resistance training  5-10 servings of fruits and vegetables per day and 25-35 grams of dietary fiber per day, gradually increasing  4942-7904 calories per day   Measure all portions:beverages,  creamers, sugars, cooking oils/butters, condiments, dressings, etc and log calories   If choosing starch pick whole grain/complex carbs and keep to 1/2 cup: oatmeal, brown rice, quinoa, whole wheat pasta, potatoes, sweet potato, chickpea noodles, red lentil noodles    While on phentermine check your resting blood pressure and pulse at least 3 times per week  For example you may do this Monday morning, Wednesday afternoon, and Friday night   Your blood pressure should not be 140/90 or higher and goal pulse goal  bpm (beats per minute ) Notify the provider if either are consistently elevated       Diagnoses and all orders for this visit:    Class 1 obesity  -     phentermine (ADIPEX-P) 37 5 MG tablet; Take 1 tablet (37 5 mg total) by mouth every morning Before breakfast or 1-2 hours after    Prediabetes  -     phentermine (ADIPEX-P) 37 5 MG tablet; Take 1 tablet (37 5 mg total) by mouth every morning Before breakfast or 1-2 hours after    Body mass index 30 0-30 9, adult        Subjective:   Chief Complaint   Patient presents with    follow up     Patient ID: Deon Banda  is a 63 y o  female with excess weight/obesity here to pursue weight management  Patient is pursuing Conservative Program      HPI  The patient presents for MWM follow up  Phentermine: doing well, but has not seen more weight loss since initial  Pulse in 80s    Food logging: yes tries to stay under 1000  B: yogurt   S: string cheese  L: salad w/o protein + dressing (estimates 3 tbsp)  S: none  D: protein +/- veg + potato  S: no    Switched to diet tea  Drinking aloe drink- 82 calories  Dines out once a week    Trying to exercise, but had knee injury; will be hunting and will be walking more    The following portions of the patient's history were reviewed and updated as appropriate: allergies, current medications, past family history, past medical history, past social history, past surgical history and problem list     Review of Systems   Cardiovascular: Negative for chest pain and palpitations  Psychiatric/Behavioral: Negative  Objective:    /80 (BP Location: Left arm, Cuff Size: Large)   Pulse 76   Temp 97 6 °F (36 4 °C) (Temporal)   Ht 5' 4" (1 626 m)   Wt 80 3 kg (177 lb)   SpO2 98%   BMI 30 38 kg/m²      Physical Exam  Vitals and nursing note reviewed  Constitutional   General appearance: Abnormal   well developed and obese  Pulmonary   Respiratory effort: No increased work of breathing or signs of respiratory distress  Abdomen   Abdomen: Abnormal   The abdomen was obese    Musculoskeletal   Gait and station: Normal  Psychiatric   Orientation to person, place and time: Normal     Affect: appropriate

## 2021-10-26 DIAGNOSIS — R73.03 PREDIABETES: ICD-10-CM

## 2021-10-26 DIAGNOSIS — E66.9 CLASS 1 OBESITY: ICD-10-CM

## 2021-10-26 RX ORDER — PHENTERMINE HYDROCHLORIDE 37.5 MG/1
37.5 TABLET ORAL EVERY MORNING
Qty: 30 TABLET | Refills: 1 | Status: SHIPPED | OUTPATIENT
Start: 2021-10-26 | End: 2021-11-08 | Stop reason: SDUPTHER

## 2021-11-01 DIAGNOSIS — E66.9 CLASS 1 OBESITY: ICD-10-CM

## 2021-11-01 DIAGNOSIS — R73.03 PREDIABETES: ICD-10-CM

## 2021-11-08 ENCOUNTER — OFFICE VISIT (OUTPATIENT)
Dept: BARIATRICS | Facility: CLINIC | Age: 61
End: 2021-11-08
Payer: COMMERCIAL

## 2021-11-08 VITALS
HEART RATE: 77 BPM | BODY MASS INDEX: 30.05 KG/M2 | HEIGHT: 64 IN | DIASTOLIC BLOOD PRESSURE: 70 MMHG | OXYGEN SATURATION: 98 % | TEMPERATURE: 98.2 F | SYSTOLIC BLOOD PRESSURE: 122 MMHG | WEIGHT: 176 LBS

## 2021-11-08 DIAGNOSIS — R73.03 PREDIABETES: ICD-10-CM

## 2021-11-08 DIAGNOSIS — E66.9 CLASS 1 OBESITY: Primary | ICD-10-CM

## 2021-11-08 PROCEDURE — 99214 OFFICE O/P EST MOD 30 MIN: CPT | Performed by: PHYSICIAN ASSISTANT

## 2021-11-08 RX ORDER — PHENTERMINE HYDROCHLORIDE 37.5 MG/1
37.5 TABLET ORAL EVERY MORNING
Qty: 30 TABLET | Refills: 3 | Status: SHIPPED | OUTPATIENT
Start: 2021-11-08 | End: 2022-02-21 | Stop reason: SDUPTHER

## 2021-12-15 DIAGNOSIS — Z20.822 EXPOSURE TO COVID-19 VIRUS: Primary | ICD-10-CM

## 2021-12-15 PROCEDURE — 87636 SARSCOV2 & INF A&B AMP PRB: CPT | Performed by: PHYSICIAN ASSISTANT

## 2022-01-13 DIAGNOSIS — J30.2 SEASONAL ALLERGIC RHINITIS, UNSPECIFIED TRIGGER: Primary | ICD-10-CM

## 2022-01-13 RX ORDER — FLUTICASONE PROPIONATE 50 MCG
2 SPRAY, SUSPENSION (ML) NASAL DAILY
Qty: 16 G | Refills: 3 | Status: SHIPPED | OUTPATIENT
Start: 2022-01-13 | End: 2022-03-08 | Stop reason: ALTCHOICE

## 2022-01-13 RX ORDER — METHYLPREDNISOLONE 4 MG/1
TABLET ORAL
Qty: 21 EACH | Refills: 0 | Status: SHIPPED | OUTPATIENT
Start: 2022-01-13 | End: 2022-02-21 | Stop reason: ALTCHOICE

## 2022-02-21 ENCOUNTER — TELEPHONE (OUTPATIENT)
Dept: BARIATRICS | Facility: CLINIC | Age: 62
End: 2022-02-21

## 2022-02-21 ENCOUNTER — TELEMEDICINE (OUTPATIENT)
Dept: BARIATRICS | Facility: CLINIC | Age: 62
End: 2022-02-21
Payer: COMMERCIAL

## 2022-02-21 VITALS — BODY MASS INDEX: 29.19 KG/M2 | HEIGHT: 65 IN | WEIGHT: 175.2 LBS

## 2022-02-21 DIAGNOSIS — R73.03 PREDIABETES: ICD-10-CM

## 2022-02-21 DIAGNOSIS — E66.9 CLASS 1 OBESITY: Primary | ICD-10-CM

## 2022-02-21 PROCEDURE — 99214 OFFICE O/P EST MOD 30 MIN: CPT | Performed by: PHYSICIAN ASSISTANT

## 2022-02-21 RX ORDER — PHENTERMINE HYDROCHLORIDE 37.5 MG/1
37.5 TABLET ORAL EVERY MORNING
Qty: 30 TABLET | Refills: 3 | Status: SHIPPED | OUTPATIENT
Start: 2022-02-21 | End: 2022-07-29 | Stop reason: SDUPTHER

## 2022-02-21 NOTE — PROGRESS NOTES
Assessment/Plan:  -Patient is pursuing Conservative Program  -Initial weight loss goal of 5-10% weight loss for improved health  -Did not tolerate Qsymia: concentration issues/confusion, dizziness, fatigue  Not currently interested in other options, at this time, and would like to trial phentermine (186 lbs, 7/19/21)  -Screening labs: reviewed/UTD, reviewed  A1c , TSH, LP, and insulin from 3/22/21; TSH from 9/21/21  -Nutrition recs provided; calorie goals discussed  -Exercise questions answered     Initial: 182  2  Current: 175 2 (-0 8 lbs since last visit)  Change: -7  Goal: 150    No problem-specific Assessment & Plan notes found for this encounter  Follow up in approximately 6 months with Non-Surgical Physician/Advanced Practitioner  Goals:  Food log (ie ) www myfitnesspal com,sparkpeople  com,loseit com,calorieking  com,etc  baritastic  No sugary beverages  At least 64oz of water daily  Increase physical activity by 10 minutes daily  Gradually increase physical activity to a goal of 5 days per week for 30 minutes of MODERATE intensity PLUS 2 days per week of FULL BODY resistance training  5-10 servings of fruits and vegetables per day and 25-35 grams of dietary fiber per day, gradually increasing  7761-7562 calories per day   Measure all portions: creamers, sugars, cooking oils/butters, condiments, dressings, etc and log calories     Diagnoses and all orders for this visit:    Class 1 obesity  -     phentermine (ADIPEX-P) 37 5 MG tablet; Take 1 tablet (37 5 mg total) by mouth every morning Before breakfast or 1-2 hours after    Prediabetes  -     phentermine (ADIPEX-P) 37 5 MG tablet;  Take 1 tablet (37 5 mg total) by mouth every morning Before breakfast or 1-2 hours after    Body mass index 29 0-29 9, adult        Subjective:   Chief Complaint   Patient presents with    Follow-up     MWM FUP    Virtual Regular Visit     Patient ID: Gisell Bansal  is a 63 y o  female with excess weight/obesity here to pursue weight management  Patient is pursuing Conservative Program      The patient presents for MWM follow up  Phentermine: tolerating  Refilled today  /70s  More sedentary at work  [employer] 2x/month      Fruit/Vegetable servings: 1-2 fruit, 2 veg    Having constipation  Drinks 2-3 bottles of water + diet green tea  The following portions of the patient's history were reviewed and updated as appropriate: allergies, current medications, past family history, past medical history, past social history, past surgical history and problem list     Objective:    Ht 5' 4 7" (1 643 m)   Wt 79 5 kg (175 lb 3 2 oz)   BMI 29 43 kg/m²     Virtual Regular Visit    Verification of patient location:    Patient is located in the following state in which I hold an active license [place]      Assessment/Plan:    Problem List Items Addressed This Visit        Other    Class 1 obesity - Primary    Relevant Medications    phentermine (ADIPEX-P) 37 5 MG tablet    Prediabetes    Relevant Medications    phentermine (ADIPEX-P) 37 5 MG tablet      Other Visit Diagnoses     Body mass index 29 0-29 9, adult                   Reason for visit is   Chief Complaint   Patient presents with    Follow-up     MWM FUP    Virtual Regular Visit        Encounter provider Souleymane Leroy PA-C    Provider located at [address] [place] [id number]      Recent Visits  No visits were found meeting these conditions  Showing recent visits within past 7 days and meeting all other requirements  Today's Visits  Date Type Provider Dept   02/21/22 Telephone Art Porfirio [facility]   02/21/22 Telemedicine Davina Guthrie PA-C [facility]   Showing today's visits and meeting all other requirements  Future Appointments  No visits were found meeting these conditions    Showing future appointments within next 150 days and meeting all other requirements       The patient was identified by name and date of birth  Mary Dennis was informed that this is a telemedicine visit and that the visit is being conducted through telephone and patient was informed that this is not a secure, HIPAA-compliant platform  She agrees to proceed     My office door was closed  No one else was in the room  She acknowledged consent and understanding of privacy and security of the video platform  The patient has agreed to participate and understands they can discontinue the visit at any time  Patient is aware this is a billable service  Lia Turner is a 63 y o  female for MWM f/u   HPI     Past Medical History:   Diagnosis Date    Abdominal pain     Atelectasis     Cancer (HCC)     cervical    Disease of thyroid gland     hypothyroidism    GERD (gastroesophageal reflux disease)     Irritable bowel syndrome     Kidney stone     Motion sickness     PONV (postoperative nausea and vomiting)     Wears glasses        Past Surgical History:   Procedure Laterality Date    ACHILLES TENDON REPAIR Right     APPENDECTOMY      ARTHROSCOPY KNEE Right 2018    Procedure: ARTHROSCOPY KNEE; PARTIAL LATERAL MENISCECTOMY; PARTIAL MEDIAL MENISCECTOMY; POSSIBLE DEBRIDEMENT; removal of loose bodies;  Surgeon: Jacinto Jenkins MD;  Location: [facility] OR;  Service: Orthopedics    BICEPS TENDON REPAIR Right     BREAST EXCISIONAL BIOPSY Right 2012    benign    CARPAL TUNNEL RELEASE Left      SECTION      three     CHOLECYSTECTOMY      COLONOSCOPY      EXPLORATORY LAPAROTOMY      KNEE ARTHROSCOPY W/ MENISCAL REPAIR Left     LAPAROSCOPY      DC COLONOSCOPY FLX DX W/COLLJ SPEC WHEN PFRMD N/A 2/15/2019    Procedure: COLONOSCOPY;  Surgeon: Kelin Villareal MD;  Location: [facility];   Service: Colorectal    DC KNEE SCOPE,MED/LAT MENISECTOMY Left 2017    Procedure: ARTHROSCOPY KNEE PARTIAL MEDIAL MENISECTOMY ;  Surgeon: Jacinto Jenkins MD;  Location[facility];  Service: Orthopedics    OR REVISE MEDIAN N/CARPAL TUNNEL SURG Right 8/27/2020    Procedure: CARPAL TUNNEL & IN SITU ULNAR NERVE RELEASE;  Surgeon: Rory Ramos MD;  Location[facility];  Service: Orthopedics    SHOULDER SURGERY Bilateral     TUBAL LIGATION      UMBILICAL HERNIA REPAIR      with mesh x2       Current Outpatient Medications   Medication Sig Dispense Refill    Ca Carbonate-Mag Hydroxide (ROLAIDS PO) Take by mouth as needed      calcium carbonate (TUMS) 500 mg chewable tablet Chew 1 tablet daily      cholecalciferol (VITAMIN D3) 1,000 units tablet Take 5,000 Units by mouth daily      phentermine (ADIPEX-P) 37 5 MG tablet Take 1 tablet (37 5 mg total) by mouth every morning Before breakfast or 1-2 hours after 30 tablet 3    Synthroid 88 MCG tablet Take 1 tablet (88 mcg total) by mouth daily 90 tablet 1    fluticasone (FLONASE) 50 mcg/act nasal spray 2 sprays into each nostril daily 16 g 3     No current facility-administered medications for this visit  Allergies   Allergen Reactions    Erythromycin Chest Pain    Betadine [Povidone Iodine] Other (See Comments)     Burns skin, blisters    Doxycycline Abdominal Pain    Medical Tape Other (See Comments)     Redness and peeling       Review of Systems   Cardiovascular: Negative for chest pain and palpitations  Psychiatric/Behavioral: Negative  Video Exam    Vitals:    02/21/22 1314   Weight: 79 5 kg (175 lb 3 2 oz)   Height: 5' 4 7" (1 643 m)       Physical Exam  Vitals and nursing note reviewed  Constitutional   General appearance: Abnormal   well developed and overweight  Pulmonary   Respiratory effort: No increased work of breathing or signs of respiratory distress      Musculoskeletal   Gait and station: Normal     Psychiatric   Orientation to person, place and time: Normal     Affect: appropriate    I spent 15 minutes with patient today in which greater than 50% of the time was spent in counseling/coordination of care regarding diet and lifestyle changes    It was my intent to perform this visit via video technology but the patient was not able to do a video connection so the visit was completed via audio telephone only  Pt and I logged in, but unable to see each other  VIRTUAL VISIT DISCLAIMER      Eve Richard verbally agrees to participate in [facility]  Pt is aware that [facility] could be limited without vital signs or the ability to perform a full hands-on physical Gearramiro Bland understands she or the provider may request at any time to terminate the video visit and request the patient to seek care or treatment in person

## 2022-02-21 NOTE — PATIENT INSTRUCTIONS
Goals: Food log (ie ) www myfitnesspal com,sparkpeople  com,loseit com,calorieking  com,etc  baritastic  No sugary beverages  At least 64oz of water daily  Increase physical activity by 10 minutes daily   Gradually increase physical activity to a goal of 5 days per week for 30 minutes of MODERATE intensity PLUS 2 days per week of FULL BODY resistance training  5-10 servings of fruits and vegetables per day and 25-35 grams of dietary fiber per day, gradually increasing  3946-5884 calories per day   Measure all portions: creamers, sugars, cooking oils/butters, condiments, dressings, etc and log calories

## 2022-03-04 DIAGNOSIS — J01.01 ACUTE RECURRENT MAXILLARY SINUSITIS: ICD-10-CM

## 2022-03-04 DIAGNOSIS — J10.1 INFLUENZA A: Primary | ICD-10-CM

## 2022-03-04 RX ORDER — AMOXICILLIN 500 MG/1
500 CAPSULE ORAL EVERY 8 HOURS SCHEDULED
Qty: 30 CAPSULE | Refills: 0 | Status: SHIPPED | OUTPATIENT
Start: 2022-03-04 | End: 2022-03-09 | Stop reason: ALTCHOICE

## 2022-03-04 RX ORDER — OSELTAMIVIR PHOSPHATE 75 MG/1
75 CAPSULE ORAL EVERY 12 HOURS SCHEDULED
Qty: 10 CAPSULE | Refills: 0 | Status: SHIPPED | OUTPATIENT
Start: 2022-03-04 | End: 2022-03-09 | Stop reason: ALTCHOICE

## 2022-03-07 ENCOUNTER — TELEPHONE (OUTPATIENT)
Dept: FAMILY MEDICINE CLINIC | Facility: CLINIC | Age: 62
End: 2022-03-07

## 2022-03-07 ENCOUNTER — APPOINTMENT (OUTPATIENT)
Dept: RADIOLOGY | Facility: MEDICAL CENTER | Age: 62
End: 2022-03-07
Payer: COMMERCIAL

## 2022-03-07 DIAGNOSIS — J22 LOWER RESPIRATORY INFECTION: Primary | ICD-10-CM

## 2022-03-07 DIAGNOSIS — R09.89 CHEST CONGESTION: ICD-10-CM

## 2022-03-07 DIAGNOSIS — R09.89 CHEST CONGESTION: Primary | ICD-10-CM

## 2022-03-07 PROCEDURE — 71046 X-RAY EXAM CHEST 2 VIEWS: CPT

## 2022-03-07 RX ORDER — AZITHROMYCIN 250 MG/1
TABLET, FILM COATED ORAL
Qty: 6 TABLET | Refills: 0 | Status: SHIPPED | OUTPATIENT
Start: 2022-03-07 | End: 2022-03-09 | Stop reason: SDUPTHER

## 2022-03-07 NOTE — TELEPHONE ENCOUNTER
At first Pt asked for Mycoplasma Pneumo testing and has now changed her mind  She is just concerned because her chest is so tight & the coughing is terrible

## 2022-03-09 ENCOUNTER — OFFICE VISIT (OUTPATIENT)
Dept: FAMILY MEDICINE CLINIC | Facility: CLINIC | Age: 62
End: 2022-03-09
Payer: COMMERCIAL

## 2022-03-09 DIAGNOSIS — J45.40 MODERATE PERSISTENT ASTHMA WITHOUT COMPLICATION: Primary | ICD-10-CM

## 2022-03-09 DIAGNOSIS — J22 LOWER RESPIRATORY INFECTION: ICD-10-CM

## 2022-03-09 DIAGNOSIS — B34.9 VIRAL INFECTION, UNSPECIFIED: ICD-10-CM

## 2022-03-09 PROCEDURE — U0005 INFEC AGEN DETEC AMPLI PROBE: HCPCS | Performed by: FAMILY MEDICINE

## 2022-03-09 PROCEDURE — U0003 INFECTIOUS AGENT DETECTION BY NUCLEIC ACID (DNA OR RNA); SEVERE ACUTE RESPIRATORY SYNDROME CORONAVIRUS 2 (SARS-COV-2) (CORONAVIRUS DISEASE [COVID-19]), AMPLIFIED PROBE TECHNIQUE, MAKING USE OF HIGH THROUGHPUT TECHNOLOGIES AS DESCRIBED BY CMS-2020-01-R: HCPCS | Performed by: FAMILY MEDICINE

## 2022-03-09 PROCEDURE — 99213 OFFICE O/P EST LOW 20 MIN: CPT | Performed by: FAMILY MEDICINE

## 2022-03-09 RX ORDER — AZITHROMYCIN 250 MG/1
TABLET, FILM COATED ORAL
Qty: 4 TABLET | Refills: 0 | Status: SHIPPED | OUTPATIENT
Start: 2022-03-09 | End: 2022-03-13

## 2022-03-09 RX ORDER — ALBUTEROL SULFATE 2.5 MG/3ML
2.5 SOLUTION RESPIRATORY (INHALATION) 3 TIMES DAILY
Qty: 75 ML | Refills: 0 | Status: SHIPPED | OUTPATIENT
Start: 2022-03-09

## 2022-03-09 RX ORDER — FAMOTIDINE 40 MG/1
40 TABLET, FILM COATED ORAL DAILY
Qty: 10 TABLET | Refills: 0 | Status: SHIPPED | OUTPATIENT
Start: 2022-03-09 | End: 2022-03-25 | Stop reason: SDUPTHER

## 2022-03-09 NOTE — PROGRESS NOTES
Assessment/Plan:  Take 500 mg of Zithromax today this will be day 3 decrease prednisone to 20 mg and patient will continue her albuterol I did tell her to take Mucinex drink more fluids her lung sounds actually sound quite clear but her voice is terrible she is very high-pitched squeaky voice she does get short of breath with changes of ambient temperature does better in cool environments a PCR COVID test was done    Problem List Items Addressed This Visit     None      Visit Diagnoses     Moderate persistent asthma without complication    -  Primary    Relevant Medications    albuterol (2 5 mg/3 mL) 0 083 % nebulizer solution    Lower respiratory infection        Relevant Medications    azithromycin (ZITHROMAX) 250 mg tablet    albuterol (2 5 mg/3 mL) 0 083 % nebulizer solution    famotidine (PEPCID) 40 MG tablet    Viral infection, unspecified        Relevant Orders    COVID Only - Office Collect           Diagnoses and all orders for this visit:    Moderate persistent asthma without complication  -     albuterol (2 5 mg/3 mL) 0 083 % nebulizer solution; Take 3 mL (2 5 mg total) by nebulization 3 (three) times a day    Lower respiratory infection  -     azithromycin (ZITHROMAX) 250 mg tablet; 1 tablet daily x 4 days  -     famotidine (PEPCID) 40 MG tablet; Take 1 tablet (40 mg total) by mouth daily    Viral infection, unspecified  -     COVID Only - Office Collect        No problem-specific Assessment & Plan notes found for this encounter  Subjective:      Patient ID: Pat Byrd is a 63 y o  female  HPI    The following portions of the patient's history were reviewed and updated as appropriate:   She has a past medical history of Abdominal pain, Atelectasis, Cancer ([address] ), Disease of thyroid gland, GERD (gastroesophageal reflux disease), Irritable bowel syndrome, Kidney stone, Motion sickness, PONV (postoperative nausea and vomiting), and Wears glasses  ,  does not have any pertinent problems on file ,   has a past surgical history that includes Shoulder surgery (Bilateral); Biceps tendon repair (Right);  section; Exploratory laparotomy; Cholecystectomy; Appendectomy; Tubal ligation; Knee arthroscopy w/ meniscal repair (Left); pr knee scope,med/lat menisectomy (Left, 2017); ARTHROSCOPY KNEE (Right, 2018); pr colonoscopy flx dx w/collj spec when pfrmd (N/A, 2/15/2019); Carpal tunnel release (Left); Colonoscopy; Umbilical hernia repair; Achilles tendon repair (Right); LAPAROSCOPY; pr revise median n/carpal tunnel surg (Right, 2020); and Breast excisional biopsy (Right, 2012)  ,  family history includes Diabetes in her paternal grandmother; Heart disease in her father; Hypertension in her mother; Lung cancer in her maternal uncle; Mental illness in her father; No Known Problems in her daughter and paternal aunt; Stroke in her mother; Substance Abuse in her brother  ,   reports that she quit smoking about 2 years ago  She has never used smokeless tobacco  She reports current alcohol use  She reports that she does not use drugs  ,  is allergic to erythromycin, betadine [povidone iodine], doxycycline, and medical tape     Current Outpatient Medications   Medication Sig Dispense Refill    albuterol (2 5 mg/3 mL) 0 083 % nebulizer solution Take 3 mL (2 5 mg total) by nebulization 3 (three) times a day 75 mL 0    azithromycin (ZITHROMAX) 250 mg tablet 1 tablet daily x 4 days 4 tablet 0    Ca Carbonate-Mag Hydroxide (ROLAIDS PO) Take by mouth as needed      calcium carbonate (TUMS) 500 mg chewable tablet Chew 1 tablet daily      cholecalciferol (VITAMIN D3) 1,000 units tablet Take 5,000 Units by mouth daily      famotidine (PEPCID) 40 MG tablet Take 1 tablet (40 mg total) by mouth daily 10 tablet 0    phentermine (ADIPEX-P) 37 5 MG tablet Take 1 tablet (37 5 mg total) by mouth every morning Before breakfast or 1-2 hours after 30 tablet 3    predniSONE 20 mg tablet TAKE 3 TABLETS NOW,THEN TAKE 2 TABLETS FOR 2 DAYS, THEN TAKE 1 TABLET FOR 2 DAYS      Synthroid 88 MCG tablet Take 1 tablet (88 mcg total) by mouth daily 90 tablet 1     No current facility-administered medications for this visit  Review of Systems   Constitutional: Positive for activity change, fatigue and fever  Respiratory: Positive for cough, shortness of breath and wheezing  Neurological: Positive for headaches  Objective: There were no vitals filed for this visit  There is no height or weight on file to calculate BMI  Physical Exam  Constitutional:       General: She is not in acute distress  Appearance: She is well-developed  She is not diaphoretic  HENT:      Head: Normocephalic  Right Ear: External ear normal       Left Ear: External ear normal       Nose: Nose normal    Eyes:      General: No scleral icterus  Right eye: No discharge  Left eye: No discharge  Conjunctiva/sclera: Conjunctivae normal       Pupils: Pupils are equal, round, and reactive to light  Neck:      Thyroid: No thyromegaly  Trachea: No tracheal deviation  Cardiovascular:      Rate and Rhythm: Normal rate and regular rhythm  Heart sounds: Normal heart sounds  No murmur heard  No friction rub  No gallop  Pulmonary:      Effort: Pulmonary effort is normal  No respiratory distress  Breath sounds: Normal breath sounds  No wheezing  Abdominal:      General: Bowel sounds are normal       Palpations: Abdomen is soft  There is no mass  Tenderness: There is no abdominal tenderness  There is no guarding  Musculoskeletal:         General: No deformity  Cervical back: Normal range of motion  Lymphadenopathy:      Cervical: No cervical adenopathy  Skin:     General: Skin is warm and dry  Findings: No erythema or rash  Neurological:      Mental Status: She is alert and oriented to person, place, and time  Cranial Nerves: No cranial nerve deficit  Psychiatric:         Thought Content:  Thought content normal

## 2022-03-10 ENCOUNTER — TELEPHONE (OUTPATIENT)
Dept: FAMILY MEDICINE CLINIC | Facility: CLINIC | Age: 62
End: 2022-03-10

## 2022-03-10 LAB — SARS-COV-2 RNA RESP QL NAA+PROBE: NEGATIVE

## 2022-03-22 ENCOUNTER — APPOINTMENT (OUTPATIENT)
Dept: RADIOLOGY | Facility: MEDICAL CENTER | Age: 62
End: 2022-03-22
Payer: COMMERCIAL

## 2022-03-22 DIAGNOSIS — R05.3 PERSISTENT COUGH FOR 3 WEEKS OR LONGER: Primary | ICD-10-CM

## 2022-03-22 DIAGNOSIS — R05.3 PERSISTENT COUGH FOR 3 WEEKS OR LONGER: ICD-10-CM

## 2022-03-22 PROCEDURE — 71046 X-RAY EXAM CHEST 2 VIEWS: CPT

## 2022-03-23 ENCOUNTER — APPOINTMENT (OUTPATIENT)
Dept: LAB | Facility: HOSPITAL | Age: 62
End: 2022-03-23

## 2022-03-23 DIAGNOSIS — Z00.8 HEALTH EXAMINATION IN POPULATION SURVEY: ICD-10-CM

## 2022-03-23 LAB
CHOLEST SERPL-MCNC: 213 MG/DL
EST. AVERAGE GLUCOSE BLD GHB EST-MCNC: 131 MG/DL
HBA1C MFR BLD: 6.2 %
HDLC SERPL-MCNC: 56 MG/DL
LDLC SERPL CALC-MCNC: 134 MG/DL (ref 0–100)
NONHDLC SERPL-MCNC: 157 MG/DL
TRIGL SERPL-MCNC: 114 MG/DL

## 2022-03-23 PROCEDURE — 36415 COLL VENOUS BLD VENIPUNCTURE: CPT

## 2022-03-23 PROCEDURE — 80061 LIPID PANEL: CPT

## 2022-03-23 PROCEDURE — 83036 HEMOGLOBIN GLYCOSYLATED A1C: CPT

## 2022-03-24 ENCOUNTER — TELEPHONE (OUTPATIENT)
Dept: BARIATRICS | Facility: CLINIC | Age: 62
End: 2022-03-24

## 2022-03-24 NOTE — TELEPHONE ENCOUNTER
[facility] called to request office notes to be faxed to them for the prior auth of Phentermine  The notes were faxed

## 2022-03-25 ENCOUNTER — OFFICE VISIT (OUTPATIENT)
Dept: OTOLARYNGOLOGY | Facility: CLINIC | Age: 62
End: 2022-03-25
Payer: COMMERCIAL

## 2022-03-25 VITALS
DIASTOLIC BLOOD PRESSURE: 70 MMHG | BODY MASS INDEX: 29.32 KG/M2 | HEART RATE: 78 BPM | OXYGEN SATURATION: 99 % | SYSTOLIC BLOOD PRESSURE: 122 MMHG | TEMPERATURE: 96.6 F | WEIGHT: 176 LBS | HEIGHT: 65 IN

## 2022-03-25 DIAGNOSIS — K21.9 GASTROESOPHAGEAL REFLUX DISEASE WITHOUT ESOPHAGITIS: ICD-10-CM

## 2022-03-25 DIAGNOSIS — R05.3 PERSISTENT COUGH FOR 3 WEEKS OR LONGER: ICD-10-CM

## 2022-03-25 DIAGNOSIS — J22 LOWER RESPIRATORY INFECTION: ICD-10-CM

## 2022-03-25 DIAGNOSIS — R05.9 COUGH: Primary | ICD-10-CM

## 2022-03-25 PROCEDURE — 31575 DIAGNOSTIC LARYNGOSCOPY: CPT | Performed by: OTOLARYNGOLOGY

## 2022-03-25 PROCEDURE — 3008F BODY MASS INDEX DOCD: CPT | Performed by: PHYSICIAN ASSISTANT

## 2022-03-25 PROCEDURE — 99204 OFFICE O/P NEW MOD 45 MIN: CPT | Performed by: OTOLARYNGOLOGY

## 2022-03-25 RX ORDER — FAMOTIDINE 40 MG/1
40 TABLET, FILM COATED ORAL DAILY
Qty: 30 TABLET | Refills: 4 | Status: SHIPPED | OUTPATIENT
Start: 2022-03-25

## 2022-03-25 RX ORDER — BENZONATATE 100 MG/1
100 CAPSULE ORAL 3 TIMES DAILY PRN
Qty: 60 CAPSULE | Refills: 0 | Status: SHIPPED | OUTPATIENT
Start: 2022-03-25

## 2022-03-25 NOTE — PROGRESS NOTES
Otolaryngology Clinic Visit  Name:  Alejandra Fong  MRN:  [id number]  Date:  3/25/2022 9:18 AM  ________________________________________________________________________       CHIEF COMPLAINT:   Cough     HPI:  Alejandra Fong is a 63 y o  female with PMH as below who presents today for evaluation of cough  She reports this started about 3 or 4 weeks ago now  She had some drainage from her nose and her lungs and started coughing  She was COVID negative  Over the next few weeks she has felt better but continues with a bothersome cough  She will cough so hard she will spit up at times  Cough does seem to be worse in the evenings  She has tried some reflux medication antibiotics and inhalers without much change  She reports this is wearing on her voice she is hoarse and has a weak voice  She does have some smoking history throughout her life  Not smoking currently  No issues with her ears today  Does report some drainage from the back of her nose, no other obstruction  No facial pressure or pain  Does report some reflux did try Pepcid without not much improvement    No other ENT questions concerns today    PMHx:  Past Medical History:   Diagnosis Date    Abdominal pain     Atelectasis     Cancer ([address] )     cervical    Disease of thyroid gland     hypothyroidism    GERD (gastroesophageal reflux disease)     Irritable bowel syndrome     Kidney stone     Motion sickness     PONV (postoperative nausea and vomiting)     Wears glasses        PSHx:  Past Surgical History:   Procedure Laterality Date    ACHILLES TENDON REPAIR Right     APPENDECTOMY      ARTHROSCOPY KNEE Right 2/21/2018    Procedure: ARTHROSCOPY KNEE; PARTIAL LATERAL MENISCECTOMY; PARTIAL MEDIAL MENISCECTOMY; POSSIBLE DEBRIDEMENT; removal of loose bodies;  Surgeon: Rebecca Felix MD;  [facility]: [facility];  Service: Orthopedics    BICEPS TENDON REPAIR Right     BREAST EXCISIONAL BIOPSY Right 01/01/2012    benign    CARPAL TUNNEL RELEASE Left      SECTION      three     CHOLECYSTECTOMY      COLONOSCOPY      EXPLORATORY LAPAROTOMY      KNEE ARTHROSCOPY W/ MENISCAL REPAIR Left     LAPAROSCOPY      IN COLONOSCOPY FLX DX W/COLLJ SPEC WHEN PFRMD N/A 2/15/2019    Procedure: COLONOSCOPY;  Surgeon: Jhony Mar MD;  [facility]; Service: Colorectal    IN KNEE SCOPE,MED/LAT MENISECTOMY Left 2017    Procedure: ARTHROSCOPY KNEE PARTIAL MEDIAL MENISECTOMY ;  Surgeon: Kirby Millre MD;  [facility];  Service: Orthopedics    IN REVISE MEDIAN N/CARPAL TUNNEL SURG Right 2020    Procedure: CARPAL TUNNEL & IN SITU ULNAR NERVE RELEASE;  Surgeon: Shakir Alvarez MD;  [facility];  Service: Orthopedics    SHOULDER SURGERY Bilateral     TUBAL LIGATION      UMBILICAL HERNIA REPAIR      with mesh x2       FAMHx:  Family History   Problem Relation Age of Onset   Justine May Stroke Mother     Hypertension Mother     Mental illness Father     Heart disease Father     Substance Abuse Brother     Lung cancer Maternal Uncle         62s    Diabetes Paternal Grandmother     No Known Problems Daughter     No Known Problems Paternal Aunt        SOCHx:  Social History     Socioeconomic History    Marital status: /Civil Union     Spouse name: None    Number of children: None    Years of education: None    Highest education level: None   Occupational History    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   Tobacco Use    Smoking status: Former Smoker     Quit date: 2019     Years since quittin 6    Smokeless tobacco: Never Used   Vaping Use    Vaping Use: Never used   Substance and Sexual Activity    Alcohol use:  Yes    Drug use: No    Sexual activity: None   Other Topics Concern    None   Social History Narrative    None     Social Determinants of Health     Financial Resource Strain: Not on file   Food Insecurity: Not on file   Transportation Needs: Not on file   Physical Activity: Not on file   Stress: Not on file   Social Connections: Not on file   Intimate Partner Violence: Not on file   Housing Stability: Not on file       Allergies: Allergies   Allergen Reactions    Erythromycin Chest Pain    Betadine [Povidone Iodine] Other (See Comments)     Burns skin, blisters    Doxycycline Abdominal Pain    Medical Tape Other (See Comments)     Redness and peeling        MEDS:  Reviewed    ROS:  See MA note from same date     PHYSICAL EXAM:  /70 (BP Location: Left arm, Cuff Size: Large)   Pulse 78   Temp (!) 96 6 °F (35 9 °C) (Tympanic)   Ht 5' 5" (1 651 m)   Wt 79 8 kg (176 lb)   SpO2 99%   BMI 29 29 kg/m²   General: NAD, AOx4  Eyes:  EOMI  Ears:  Right: ear canal normal, TM normal apperance, no fluid  Left: ear canal normal, TM normal apperance, no fluid  Nose:  Severe off crest Left septal deviation, no inferior turbinate hypertrophy, no mass/lesions  Oral cavity:  No trismus, no mass/lesions, tonsils 1+  Neck: Trachea is midline; no thyroid nodules, Salivary glands symmetrical, no masses/abnormality on palpation  Lymph:  No cervical lymphadenopathy  Skin:  No obvious facial lesions  Neuro: Face symmetrical, no obvious cranial nerve palsy  No focal deficits   Lungs:  Normal work of breathing, symmetrical chest expansion  Vascular: Well perfused    Procedures:  FIBEROPTIC LARYNGOSCOPY:  Procedure:Fiberoptic nasopharyngolaryngoscopy  Diagnosis: Cough  : Dr Yoli Fernandez    The risks, benefits and alternatives were discussed  The patient voiced their understanding  They wished to proceed and an informed consent was obtained  The fiberoptic endoscope was inserted via the right nasal cavity  Findings listed below:  --Normal appearing nasal cavity, nasopharynx, fossa of Rosenmüller, oropharynx, BOT, epiglottis    - Vocal folds mobile bilaterally  - Minimal pachydermia       Medical Data Reviewed:  Records reviewed and summarized as in EPIC    Radiology:  None    Labs:  None     Patient Active Problem List   Diagnosis    Complex tear of lateral meniscus of right knee    Hypothyroidism    Chondromalacia patellae    Closed fracture of metatarsal bone    Closed fracture of phalanx of foot    Disorder of bursae of shoulder region    Full thickness rotator cuff tear    Knee pain    Pain in limb    Shoulder joint pain    Shoulder pain    Strain of supraspinatus muscle or tendon    Recurrent fever, cause unknown    Lower abdominal pain    Hx of cholecystectomy    Hx of appendectomy    Menopause    Quit smoking    Colon cancer screening    Abdominal pain    Microhematuria    Chest pain    Heart palpitations    Tachycardia, unspecified    GERD (gastroesophageal reflux disease)    PONV (postoperative nausea and vomiting)    Class 1 obesity    Prediabetes       ASSESSMENT/PLAN:  Gisell Bansal is a 63 y o  female with acute and chronic problems as above who presents with:    1  Cough    2  Lower respiratory infection    3  Gastroesophageal reflux disease without esophagitis        63 y o  here today for further evaluation of cough after an acute respiratory infection about a month ago  She continues with chronic cough throughout the day  Exam is normal and reassuring today endoscopy is normal as well without any vocal cord pathology  Does sound like she might have a little bit of LPR D and reflux will continue her Pepcid advised her to take it at nighttime  She is heading towards or chronic or neurogenic cough picture I would like to have her give Tessalon Perles a trial over the next month before moving to more invasive medications  We will see her back and see if she is improving  I would like her to drink more water and watch her caffeine intake as well too    -Pepcid 40mg QHS, Rx sent  -Tessalon Perles TID next month  -Scope reviewed    RTC 1 mo      Lorin Hunt MD MPH  Otolaryngology--Head and Neck Surgery  [facility]  3/25/2022 9:18 AM

## 2022-03-25 NOTE — PROGRESS NOTES
Review of Systems   Constitutional: Negative  HENT: Positive for voice change  Eyes: Negative  Respiratory: Positive for cough and wheezing  Cardiovascular: Negative  Gastrointestinal: Negative  Endocrine: Negative  Genitourinary: Negative  Musculoskeletal: Negative  Skin: Negative  Allergic/Immunologic: Negative  Neurological: Negative  Hematological: Negative  Psychiatric/Behavioral: Negative

## 2022-03-30 ENCOUNTER — TELEPHONE (OUTPATIENT)
Dept: BARIATRICS | Facility: CLINIC | Age: 62
End: 2022-03-30

## 2022-03-30 NOTE — TELEPHONE ENCOUNTER
Spoke with FANNY Capps re: phentermine approval  Also, called pharmacy to have them run the RX through insurance

## 2022-04-05 DIAGNOSIS — E03.9 HYPOTHYROIDISM, UNSPECIFIED TYPE: ICD-10-CM

## 2022-04-05 RX ORDER — LEVOTHYROXINE SODIUM 88 MCG
88 TABLET ORAL DAILY
Qty: 90 TABLET | Refills: 1 | Status: SHIPPED | OUTPATIENT
Start: 2022-04-05

## 2022-04-22 DIAGNOSIS — J20.9 ACUTE BRONCHITIS, UNSPECIFIED ORGANISM: Primary | ICD-10-CM

## 2022-04-22 RX ORDER — AZITHROMYCIN 250 MG/1
TABLET, FILM COATED ORAL
Qty: 6 TABLET | Refills: 0 | Status: SHIPPED | OUTPATIENT
Start: 2022-04-22 | End: 2022-04-26

## 2022-04-29 ENCOUNTER — OFFICE VISIT (OUTPATIENT)
Dept: OTOLARYNGOLOGY | Facility: CLINIC | Age: 62
End: 2022-04-29
Payer: COMMERCIAL

## 2022-04-29 VITALS
BODY MASS INDEX: 29.16 KG/M2 | SYSTOLIC BLOOD PRESSURE: 124 MMHG | HEART RATE: 77 BPM | WEIGHT: 175 LBS | HEIGHT: 65 IN | DIASTOLIC BLOOD PRESSURE: 70 MMHG | TEMPERATURE: 97.8 F | OXYGEN SATURATION: 97 %

## 2022-04-29 DIAGNOSIS — R05.9 COUGH: ICD-10-CM

## 2022-04-29 DIAGNOSIS — R49.0 MUSCLE TENSION DYSPHONIA: Primary | ICD-10-CM

## 2022-04-29 DIAGNOSIS — K21.9 GASTROESOPHAGEAL REFLUX DISEASE WITHOUT ESOPHAGITIS: ICD-10-CM

## 2022-04-29 PROCEDURE — 99214 OFFICE O/P EST MOD 30 MIN: CPT | Performed by: OTOLARYNGOLOGY

## 2022-04-29 PROCEDURE — 31575 DIAGNOSTIC LARYNGOSCOPY: CPT | Performed by: OTOLARYNGOLOGY

## 2022-04-29 NOTE — PROGRESS NOTES
Review of Systems   Constitutional: Negative  HENT: Positive for voice change  Eyes: Negative  Respiratory: Positive for cough  Cardiovascular: Negative  Gastrointestinal: Negative  Endocrine: Negative  Genitourinary: Negative  Musculoskeletal: Negative  Skin: Negative  Allergic/Immunologic: Negative  Neurological: Negative  Hematological: Negative  Psychiatric/Behavioral: Negative

## 2022-04-29 NOTE — PROGRESS NOTES
Otolaryngology Clinic Visit  Name:  Lewis Cho  MRN:  [id number]  Date:  4/29/2022 10:10 AM  ________________________________________________________________________       CHIEF COMPLAINT:   Cough     HPI:  Lewis Cho is a 63 y o  female with PMH as below who presents today for evaluation of cough  She reports this started about 3 or 4 weeks ago now  She had some drainage from her nose and her lungs and started coughing  She was COVID negative  Over the next few weeks she has felt better but continues with a bothersome cough  She will cough so hard she will spit up at times  Cough does seem to be worse in the evenings  She has tried some reflux medication antibiotics and inhalers without much change  She reports this is wearing on her voice she is hoarse and has a weak voice  She does have some smoking history throughout her life  Not smoking currently  No issues with her ears today  Does report some drainage from the back of her nose, no other obstruction  No facial pressure or pain  Does report some reflux did try Pepcid without not much improvement  No other ENT questions concerns today    Interval history  Since last visit she did improve for a short time but voice is breaking again  Denies infection or illness  She is taking the pepcid  She does use her voice for work  The cough has improved  No other changes      PMHx:  Past Medical History:   Diagnosis Date    Abdominal pain     Atelectasis     Cancer ([address] )     cervical    Disease of thyroid gland     hypothyroidism    GERD (gastroesophageal reflux disease)     Irritable bowel syndrome     Kidney stone     Motion sickness     PONV (postoperative nausea and vomiting)     Wears glasses        PSHx:  Past Surgical History:   Procedure Laterality Date    ACHILLES TENDON REPAIR Right     APPENDECTOMY      ARTHROSCOPY KNEE Right 2/21/2018    Procedure: ARTHROSCOPY KNEE; PARTIAL LATERAL MENISCECTOMY; PARTIAL MEDIAL MENISCECTOMY; POSSIBLE DEBRIDEMENT; removal of loose bodies;  Surgeon: Babita Peters MD;  [facility];  Service: Orthopedics    BICEPS TENDON REPAIR Right     BREAST EXCISIONAL BIOPSY Right 2012    benign    CARPAL TUNNEL RELEASE Left      SECTION      three     CHOLECYSTECTOMY      COLONOSCOPY      EXPLORATORY LAPAROTOMY      KNEE ARTHROSCOPY W/ MENISCAL REPAIR Left     LAPAROSCOPY      WA COLONOSCOPY FLX DX W/COLLJ SPEC WHEN PFRMD N/A 2/15/2019    Procedure: COLONOSCOPY;  Surgeon: Drake Iverson MD;  [facility]; Service: Colorectal    WA KNEE SCOPE,MED/LAT MENISECTOMY Left 2017    Procedure: ARTHROSCOPY KNEE PARTIAL MEDIAL MENISECTOMY ;  Surgeon: Babita Peters MD;  [facility];  Service: Orthopedics    [place] REVISE MEDIAN N/CARPAL TUNNEL SURG Right 2020    Procedure: CARPAL TUNNEL & IN SITU ULNAR NERVE RELEASE;  Surgeon: Car Lepe MD;  [facility];  Service: Orthopedics    SHOULDER SURGERY Bilateral     TUBAL LIGATION      UMBILICAL HERNIA REPAIR      with mesh x2       FAMHx:  Family History   Problem Relation Age of Onset   [facility] Stroke Mother     Hypertension Mother     Mental illness Father     Heart disease Father     Substance Abuse Brother     Lung cancer Maternal Uncle         62s    Diabetes Paternal Grandmother     No Known Problems Daughter     No Known Problems Paternal Aunt        SOCHx:  Social History     Socioeconomic History    Marital status: /Civil Union     Spouse name: None    Number of children: None    Years of education: None    Highest education level: None   Occupational History    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   Tobacco Use    Smoking status: Former Smoker     Quit date: 2019     Years since quittin 6    Smokeless tobacco: Never Used   Vaping Use    Vaping Use: Never used   Substance and Sexual Activity    Alcohol use:  Yes    Drug use: No    Sexual activity: None Other Topics Concern    None   Social History Narrative    None     Social Determinants of Health     Financial Resource Strain: Not on file   Food Insecurity: Not on file   Transportation Needs: Not on file   Physical Activity: Not on file   Stress: Not on file   Social Connections: Not on file   Intimate Partner Violence: Not on file   Housing Stability: Not on file       Allergies: Allergies   Allergen Reactions    Erythromycin Chest Pain    Betadine [Povidone Iodine] Other (See Comments)     Burns skin, blisters    Doxycycline Abdominal Pain    Medical Tape Other (See Comments)     Redness and peeling        MEDS:  Reviewed    ROS:  See MA note from same date     PHYSICAL EXAM:  /70 (BP Location: Left arm, Cuff Size: Large)   Pulse 77   Temp 97 8 °F (36 6 °C) (Temporal)   Ht 5' 5" (1 651 m)   Wt 79 4 kg (175 lb)   SpO2 97%   BMI 29 12 kg/m²   General: NAD, AOx4  Eyes:  EOMI  Ears:  Right: ear canal normal, TM normal apperance, no fluid  Left: ear canal normal, TM normal apperance, no fluid  Nose:  Severe off crest Left septal deviation, no inferior turbinate hypertrophy, no mass/lesions  Oral cavity:  No trismus, no mass/lesions, tonsils 1+  Neck: Trachea is midline; no thyroid nodules, Salivary glands symmetrical, no masses/abnormality on palpation  Lymph:  No cervical lymphadenopathy  Skin:  No obvious facial lesions  Neuro: Face symmetrical, no obvious cranial nerve palsy  No focal deficits   Lungs:  Normal work of breathing, symmetrical chest expansion  Vascular: Well perfused    Procedures:  FIBEROPTIC LARYNGOSCOPY:  Procedure:Fiberoptic nasopharyngolaryngoscopy  Diagnosis: Cough  : Dr Shirley Johnson    The risks, benefits and alternatives were discussed  The patient voiced their understanding  They wished to proceed and an informed consent was obtained  The fiberoptic endoscope was inserted via the right nasal cavity    Findings listed below:  --Normal appearing nasal cavity, nasopharynx, fossa of Rosenmüller, oropharynx, BOT, epiglottis  - Vocal folds mobile bilaterally  - Minimal pachydermia  -Moderate false cord hypertrophy and AP compression       Medical Data Reviewed:  Records reviewed and summarized as in EPIC    Radiology:  None    Labs:  None     Patient Active Problem List   Diagnosis    Complex tear of lateral meniscus of right knee    Hypothyroidism    Chondromalacia patellae    Closed fracture of metatarsal bone    Closed fracture of phalanx of foot    Disorder of bursae of shoulder region    Full thickness rotator cuff tear    Knee pain    Pain in limb    Shoulder joint pain    Shoulder pain    Strain of supraspinatus muscle or tendon    Recurrent fever, cause unknown    Lower abdominal pain    Hx of cholecystectomy    Hx of appendectomy    Menopause    Quit smoking    Colon cancer screening    Abdominal pain    Microhematuria    Chest pain    Heart palpitations    Tachycardia, unspecified    GERD (gastroesophageal reflux disease)    PONV (postoperative nausea and vomiting)    Class 1 obesity    Prediabetes       ASSESSMENT/PLAN:  Tori Henderson is a 63 y o  female with acute and chronic problems as above who presents with:    1  Muscle tension dysphonia    2  Cough    3  Gastroesophageal reflux disease without esophagitis        63 y o  here today for further evaluation of voice issues  At her intiital evaluation she had a chronic cough picture with weak voice which has improved  Today she did improve but has worsened again  Scope exam shows mild LPRD with some signs of MTD  We reviewed the natural history of MTD and exacerbating factors  Would like her to continue her pepcid and see the SLP team for MTD therapy  I would like her to drink more water and watch her caffeine intake as well too    -Pepcid 40mg QHS  -SLP for MTD  -Scope reviewed    RTC 1 mo      Jose L Corado MD MPH  Otolaryngology--Head and Neck Surgery  [facility]  4/29/2022 10:10 AM

## 2022-07-17 ENCOUNTER — DOCUMENTATION (OUTPATIENT)
Dept: FAMILY MEDICINE CLINIC | Facility: CLINIC | Age: 62
End: 2022-07-17

## 2022-07-17 DIAGNOSIS — U07.1 COVID-19: Primary | ICD-10-CM

## 2022-07-18 ENCOUNTER — TELEMEDICINE (OUTPATIENT)
Dept: FAMILY MEDICINE CLINIC | Facility: CLINIC | Age: 62
End: 2022-07-18
Payer: COMMERCIAL

## 2022-07-18 VITALS — HEIGHT: 65 IN | BODY MASS INDEX: 29.16 KG/M2 | WEIGHT: 175 LBS | TEMPERATURE: 100.5 F

## 2022-07-18 DIAGNOSIS — U07.1 COVID-19: ICD-10-CM

## 2022-07-18 DIAGNOSIS — J45.41 MODERATE PERSISTENT ASTHMA WITH ACUTE EXACERBATION: Primary | ICD-10-CM

## 2022-07-18 PROCEDURE — 99213 OFFICE O/P EST LOW 20 MIN: CPT | Performed by: FAMILY MEDICINE

## 2022-07-18 RX ORDER — BUDESONIDE 0.5 MG/2ML
0.5 INHALANT ORAL 2 TIMES DAILY
Qty: 20 ML | Refills: 0 | Status: SHIPPED | OUTPATIENT
Start: 2022-07-18 | End: 2022-08-12 | Stop reason: ALTCHOICE

## 2022-07-18 NOTE — PROGRESS NOTES
COVID-19 Outpatient Progress Note    Assessment/Plan:    Problem List Items Addressed This Visit    None        Disposition:     Patient is fully vaccinated and I recommended self quarantine for 5 days followed by strict mask use for an additional 5 days  If patient were to develop symptoms, they should immediately self isolate and call our office for further guidance  I have spent 10 minutes directly with the patient  Greater than 50% of this time was spent in counseling/coordination of care regarding: diagnostic results, prognosis, risks and benefits of treatment options, instructions for management, patient and family education, importance of treatment compliance, risk factor reductions and impressions  Encounter provider Linda Tidwell DO    Provider located at [place] [address]  [address] [address]    Recent Visits  No visits were found meeting these conditions  Showing recent visits within past 7 days and meeting all other requirements  Today's Visits  Date Type Provider Dept   07/18/22 Telemedicine Linda Tidwell DO [facility]   Showing today's visits and meeting all other requirements  Future Appointments  No visits were found meeting these conditions  Showing future appointments within next 150 days and meeting all other requirements     This virtual check-in was done via [employer] and patient was informed that this is a secure, HIPAA-compliant platform  She agrees to proceed  Patient agrees to participate in a virtual check in via telephone or video visit instead of presenting to the office to address urgent/immediate medical needs  Patient is aware this is a billable service  After connecting through [facility], the patient was identified by name and date of birth  Risa Houston was informed that this was a telemedicine visit and that the exam was being conducted confidentially over secure lines  My office door was closed   No one else was in the room  Kj Pizano acknowledged consent and understanding of privacy and security of the telemedicine visit  I informed the patient that I have reviewed her record in Epic and presented the opportunity for her to ask any questions regarding the visit today  The patient agreed to participate  Verification of patient location:  Patient is located in the following state in which I hold an active license: [place]    Subjective:   Kj Pizano is a 63 y o  female who is concerned about COVID-19   Patient's symptoms include fever, fatigue, nasal congestion, sore throat, chest tightness and headache      - Date of exposure: 7/13/2022      COVID-19 vaccination status: Fully vaccinated (primary series)    Exposure:   Contact with a person who is under investigation (PUI) for or who is positive for COVID-19 within the last 14 days?: Yes    Hospitalized recently for fever and/or lower respiratory symptoms?: No      Currently a healthcare worker that is involved in direct patient care?: Yes      Works in a special setting where the risk of COVID-19 transmission may be high? (this may include long-term care, correctional and [facility] facilities; homeless shelters; assisted-living facilities and group homes ): No      Resident in a special setting where the risk of COVID-19 transmission may be high? (this may include long-term care, correctional and [facility] facilities; homeless shelters; assisted-living facilities and group homes ): No      Lab Results   Component Value Date    SARSCOV2 Negative 03/09/2022    Darlen Zonia Not Detected 10/25/2020     Past Medical History:   Diagnosis Date    Abdominal pain     Atelectasis     Cancer ([address] )     cervical    Disease of thyroid gland     hypothyroidism    GERD (gastroesophageal reflux disease)     Irritable bowel syndrome     Kidney stone     Motion sickness     PONV (postoperative nausea and vomiting)     Wears glasses      Past Surgical History:   Procedure Laterality Date    ACHILLES TENDON REPAIR Right     APPENDECTOMY      ARTHROSCOPY KNEE Right 2018    Procedure: ARTHROSCOPY KNEE; PARTIAL LATERAL MENISCECTOMY; PARTIAL MEDIAL MENISCECTOMY; POSSIBLE DEBRIDEMENT; removal of loose bodies;  Surgeon: Wil Hilton MD;  Location: [facility];  Service: Orthopedics    BICEPS TENDON REPAIR Right     BREAST EXCISIONAL BIOPSY Right 2012    benign    CARPAL TUNNEL RELEASE Left      SECTION      three     CHOLECYSTECTOMY      COLONOSCOPY      EXPLORATORY LAPAROTOMY      KNEE ARTHROSCOPY W/ MENISCAL REPAIR Left     LAPAROSCOPY      MI COLONOSCOPY FLX DX W/COLLJ SPEC WHEN PFRMD N/A 2/15/2019    Procedure: COLONOSCOPY;  Surgeon: Kate Villarreal MD;  [facility]: [facility];   Service: Colorectal    MI KNEE SCOPE,MED/LAT MENISECTOMY Left 2017    Procedure: ARTHROSCOPY KNEE PARTIAL MEDIAL MENISECTOMY ;  Surgeon: Wil Hilton MD;  Location: [facility];  Service: Orthopedics    MI REVISE MEDIAN N/CARPAL TUNNEL SURG Right 2020    Procedure: CARPAL TUNNEL & IN SITU ULNAR NERVE RELEASE;  Surgeon: Juliann Oquendo MD;  [facility];  Service: Orthopedics    SHOULDER SURGERY Bilateral     TUBAL LIGATION      UMBILICAL HERNIA REPAIR      with mesh x2     Current Outpatient Medications   Medication Sig Dispense Refill    Ca Carbonate-Mag Hydroxide (ROLAIDS PO) Take by mouth as needed      calcium carbonate (TUMS) 500 mg chewable tablet Chew 1 tablet daily      famotidine (PEPCID) 40 MG tablet Take 1 tablet (40 mg total) by mouth daily 30 tablet 4    nirmatrelvir & ritonavir (Paxlovid) tablet therapy pack Take 3 tablets by mouth 2 (two) times a day for 5 days 30 tablet 0    phentermine (ADIPEX-P) 37 5 MG tablet Take 1 tablet (37 5 mg total) by mouth every morning Before breakfast or 1-2 hours after 30 tablet 3    Synthroid 88 MCG tablet Take 1 tablet (88 mcg total) by mouth daily 90 tablet 1    albuterol (2 5 mg/3 mL) 0 083 % nebulizer solution Take 3 mL (2 5 mg total) by nebulization 3 (three) times a day (Patient not taking: No sig reported) 75 mL 0    benzonatate (TESSALON PERLES) 100 mg capsule Take 1 capsule (100 mg total) by mouth 3 (three) times a day as needed for cough (Patient not taking: Reported on 7/18/2022) 60 capsule 0    cholecalciferol (VITAMIN D3) 1,000 units tablet Take 5,000 Units by mouth daily (Patient not taking: Reported on 7/18/2022)      predniSONE 20 mg tablet TAKE 3 TABLETS NOW,THEN TAKE 2 TABLETS FOR 2 DAYS, THEN TAKE 1 TABLET FOR 2 DAYS       No current facility-administered medications for this visit  Allergies   Allergen Reactions    Erythromycin Chest Pain    Betadine [Povidone Iodine] Other (See Comments)     Burns skin, blisters    Doxycycline Abdominal Pain    Medical Tape Other (See Comments)     Redness and peeling       Review of Systems   Constitutional: Positive for fatigue and fever  HENT: Positive for congestion and sore throat  Respiratory: Positive for chest tightness  Neurological: Positive for headaches  Objective:    Vitals:    07/18/22 1048   Temp: 100 5 °F (38 1 °C)   TempSrc: Temporal   Weight: 79 4 kg (175 lb)   Height: 5' 5" (1 651 m)       Physical Exam  Constitutional:       Appearance: She is ill-appearing  HENT:      Head: Normocephalic  Nose: Congestion present  Pulmonary:      Breath sounds: Wheezing present  Neurological:      General: No focal deficit present  Mental Status: She is alert and oriented to person, place, and time  VIRTUAL VISIT DISCLAIMER    Antionette Humphrey verbally agrees to participate in [employer]  Pt is aware that [employer] could be limited without vital signs or the ability to perform a full hands-on physical Climmie Peeling understands she or the provider may request at any time to terminate the video visit and request the patient to seek care or treatment in person

## 2022-07-29 DIAGNOSIS — R73.03 PREDIABETES: ICD-10-CM

## 2022-07-29 DIAGNOSIS — E66.9 CLASS 1 OBESITY: ICD-10-CM

## 2022-08-01 RX ORDER — PHENTERMINE HYDROCHLORIDE 37.5 MG/1
37.5 TABLET ORAL EVERY MORNING
Qty: 30 TABLET | Refills: 1 | Status: SHIPPED | OUTPATIENT
Start: 2022-08-01 | End: 2022-08-22 | Stop reason: SDUPTHER

## 2022-08-12 ENCOUNTER — APPOINTMENT (OUTPATIENT)
Dept: LAB | Facility: MEDICAL CENTER | Age: 62
End: 2022-08-12
Payer: COMMERCIAL

## 2022-08-12 ENCOUNTER — OFFICE VISIT (OUTPATIENT)
Dept: FAMILY MEDICINE CLINIC | Facility: CLINIC | Age: 62
End: 2022-08-12
Payer: COMMERCIAL

## 2022-08-12 VITALS
DIASTOLIC BLOOD PRESSURE: 94 MMHG | HEART RATE: 82 BPM | HEIGHT: 65 IN | BODY MASS INDEX: 29.66 KG/M2 | OXYGEN SATURATION: 99 % | WEIGHT: 178 LBS | SYSTOLIC BLOOD PRESSURE: 138 MMHG | TEMPERATURE: 97.6 F

## 2022-08-12 DIAGNOSIS — E03.9 HYPOTHYROIDISM, UNSPECIFIED TYPE: Primary | ICD-10-CM

## 2022-08-12 DIAGNOSIS — M25.50 POLYARTHRALGIA: ICD-10-CM

## 2022-08-12 DIAGNOSIS — E55.9 VITAMIN D DEFICIENCY: ICD-10-CM

## 2022-08-12 DIAGNOSIS — M54.50 ACUTE BILATERAL LOW BACK PAIN, UNSPECIFIED WHETHER SCIATICA PRESENT: ICD-10-CM

## 2022-08-12 DIAGNOSIS — E03.9 HYPOTHYROIDISM, UNSPECIFIED TYPE: ICD-10-CM

## 2022-08-12 LAB
25(OH)D3 SERPL-MCNC: 29.8 NG/ML (ref 30–100)
ALBUMIN SERPL BCP-MCNC: 4.1 G/DL (ref 3.5–5)
ALP SERPL-CCNC: 73 U/L (ref 46–116)
ALT SERPL W P-5'-P-CCNC: 32 U/L (ref 12–78)
ANION GAP SERPL CALCULATED.3IONS-SCNC: 2 MMOL/L (ref 4–13)
AST SERPL W P-5'-P-CCNC: 20 U/L (ref 5–45)
BASOPHILS # BLD AUTO: 0.04 THOUSANDS/ΜL (ref 0–0.1)
BASOPHILS NFR BLD AUTO: 1 % (ref 0–1)
BILIRUB SERPL-MCNC: 0.35 MG/DL (ref 0.2–1)
BUN SERPL-MCNC: 14 MG/DL (ref 5–25)
CALCIUM SERPL-MCNC: 9.4 MG/DL (ref 8.3–10.1)
CHLORIDE SERPL-SCNC: 107 MMOL/L (ref 96–108)
CO2 SERPL-SCNC: 27 MMOL/L (ref 21–32)
CREAT SERPL-MCNC: 0.92 MG/DL (ref 0.6–1.3)
CRP SERPL QL: 3.5 MG/L
EOSINOPHIL # BLD AUTO: 0.11 THOUSAND/ΜL (ref 0–0.61)
EOSINOPHIL NFR BLD AUTO: 2 % (ref 0–6)
ERYTHROCYTE [DISTWIDTH] IN BLOOD BY AUTOMATED COUNT: 11.9 % (ref 11.6–15.1)
GFR SERPL CREATININE-BSD FRML MDRD: 67 ML/MIN/1.73SQ M
GLUCOSE SERPL-MCNC: 84 MG/DL (ref 65–140)
HCT VFR BLD AUTO: 38.7 % (ref 34.8–46.1)
HGB BLD-MCNC: 12.7 G/DL (ref 11.5–15.4)
IMM GRANULOCYTES # BLD AUTO: 0.01 THOUSAND/UL (ref 0–0.2)
IMM GRANULOCYTES NFR BLD AUTO: 0 % (ref 0–2)
LYMPHOCYTES # BLD AUTO: 1.99 THOUSANDS/ΜL (ref 0.6–4.47)
LYMPHOCYTES NFR BLD AUTO: 42 % (ref 14–44)
MCH RBC QN AUTO: 30.6 PG (ref 26.8–34.3)
MCHC RBC AUTO-ENTMCNC: 32.8 G/DL (ref 31.4–37.4)
MCV RBC AUTO: 93 FL (ref 82–98)
MONOCYTES # BLD AUTO: 0.42 THOUSAND/ΜL (ref 0.17–1.22)
MONOCYTES NFR BLD AUTO: 9 % (ref 4–12)
NEUTROPHILS # BLD AUTO: 2.21 THOUSANDS/ΜL (ref 1.85–7.62)
NEUTS SEG NFR BLD AUTO: 46 % (ref 43–75)
NRBC BLD AUTO-RTO: 0 /100 WBCS
PLATELET # BLD AUTO: 378 THOUSANDS/UL (ref 149–390)
PMV BLD AUTO: 9.5 FL (ref 8.9–12.7)
POTASSIUM SERPL-SCNC: 4.2 MMOL/L (ref 3.5–5.3)
PROT SERPL-MCNC: 7.4 G/DL (ref 6.4–8.4)
RBC # BLD AUTO: 4.15 MILLION/UL (ref 3.81–5.12)
SL AMB  POCT GLUCOSE, UA: NEGATIVE
SL AMB LEUKOCYTE ESTERASE,UA: NEGATIVE
SL AMB POCT BILIRUBIN,UA: NEGATIVE
SL AMB POCT BLOOD,UA: NEGATIVE
SL AMB POCT CLARITY,UA: CLEAR
SL AMB POCT COLOR,UA: YELLOW
SL AMB POCT KETONES,UA: NEGATIVE
SL AMB POCT NITRITE,UA: NEGATIVE
SL AMB POCT PH,UA: 6
SL AMB POCT SPECIFIC GRAVITY,UA: 1.02
SL AMB POCT URINE PROTEIN: NEGATIVE
SL AMB POCT UROBILINOGEN: NEGATIVE
SODIUM SERPL-SCNC: 136 MMOL/L (ref 135–147)
T4 FREE SERPL-MCNC: 1.08 NG/DL (ref 0.76–1.46)
TSH SERPL DL<=0.05 MIU/L-ACNC: 4.59 UIU/ML (ref 0.45–4.5)
WBC # BLD AUTO: 4.78 THOUSAND/UL (ref 4.31–10.16)

## 2022-08-12 PROCEDURE — 86618 LYME DISEASE ANTIBODY: CPT

## 2022-08-12 PROCEDURE — 86140 C-REACTIVE PROTEIN: CPT

## 2022-08-12 PROCEDURE — 80053 COMPREHEN METABOLIC PANEL: CPT

## 2022-08-12 PROCEDURE — 36415 COLL VENOUS BLD VENIPUNCTURE: CPT

## 2022-08-12 PROCEDURE — 84443 ASSAY THYROID STIM HORMONE: CPT

## 2022-08-12 PROCEDURE — 99214 OFFICE O/P EST MOD 30 MIN: CPT | Performed by: PHYSICIAN ASSISTANT

## 2022-08-12 PROCEDURE — 82306 VITAMIN D 25 HYDROXY: CPT

## 2022-08-12 PROCEDURE — 84439 ASSAY OF FREE THYROXINE: CPT

## 2022-08-12 PROCEDURE — 85025 COMPLETE CBC W/AUTO DIFF WBC: CPT

## 2022-08-12 PROCEDURE — 86430 RHEUMATOID FACTOR TEST QUAL: CPT

## 2022-08-12 PROCEDURE — 81002 URINALYSIS NONAUTO W/O SCOPE: CPT | Performed by: PHYSICIAN ASSISTANT

## 2022-08-12 PROCEDURE — 86038 ANTINUCLEAR ANTIBODIES: CPT

## 2022-08-12 RX ORDER — NAPROXEN 500 MG/1
500 TABLET ORAL 2 TIMES DAILY WITH MEALS
Qty: 60 TABLET | Refills: 1 | Status: SHIPPED | OUTPATIENT
Start: 2022-08-12

## 2022-08-12 NOTE — PROGRESS NOTES
Assessment/Plan:    Problem List Items Addressed This Visit        Endocrine    Hypothyroidism - Primary    Relevant Orders    TSH, 3rd generation with Free T4 reflex      Other Visit Diagnoses     Polyarthralgia        Relevant Orders    CBC and differential    Comprehensive metabolic panel    Lyme Antibody Profile with reflex to WB    RF Screen w/ Reflex to Titer    BETTY Screen w/ Reflex to Titer/Pattern    C-reactive protein    Vitamin D deficiency        Relevant Orders    Vitamin D 25 hydroxy    Acute bilateral low back pain, unspecified whether sciatica present        Relevant Medications    naproxen (Naprosyn) 500 mg tablet    Other Relevant Orders    POCT urine dip (Completed)           Diagnoses and all orders for this visit:    Hypothyroidism, unspecified type  -     TSH, 3rd generation with Free T4 reflex; Future    Polyarthralgia  -     CBC and differential; Future  -     Comprehensive metabolic panel; Future  -     Lyme Antibody Profile with reflex to WB; Future  -     RF Screen w/ Reflex to Titer; Future  -     BETTY Screen w/ Reflex to Titer/Pattern; Future  -     C-reactive protein; Future    Vitamin D deficiency  -     Vitamin D 25 hydroxy; Future    Acute bilateral low back pain, unspecified whether sciatica present  -     POCT urine dip  -     naproxen (Naprosyn) 500 mg tablet; Take 1 tablet (500 mg total) by mouth 2 (two) times a day with meals      Will get updated labs  Will check rheumatoid labs since she does have a family history of RA in her mother and will check lyme titer  Recommended heat, ice, stretching, and will prescribe Naproxen to help with back pain  May also alternate with tylenol  Suggested referral to PT, but patient does not have time right now  Recommended core strengthening exercises  She will follow-up with weight management for weight gain  Will check TSH to ensure euthyroid  Urine dip normal  May continue Miralax as needed to help with constipation   Recommended eating more fiber and drinking more water  Explained that this can contribute to back pain and bloating  She will notify us if symptoms do not improve or worsen       Subjective:      Patient ID: Antionette Humphrey is a 63 y o  female  Elissa Aiken is a pleasant 63year old female who is here today with a few concerns  First, she is complaining of low back pain  It comes and goes  She denies any injuries  She has been taking OTC Aleve with only mild relief  She has been doing home stretches and exercises, but they do not seem to be helping  She has had imaging years ago that showed degenerative changes and did PT  She noticed some bloating and pressure in her lower abdomen  She did have some discomfort with urination, but that resolved  She denies any blood in her urine, urgency, or frequency  She admits to constipation  She was taking Miralax  She admits to not drinking enough water  She admits to pain in her knees and hands as well  Her mother had RA, which concerns her  She also spends a lot of time outside and has had multiple tick bites in the past  She is also concerned about her weight  Despite trying to diet and exercise she can't lose weight  She is following with weight management  She does have a follow-up scheduled with them  She is due to have her thyroid checked  She has been compliant with her levothyroxine  She also complains of being off balanced at times  She denies any recent falls  The following portions of the patient's history were reviewed and updated as appropriate:   She has a past medical history of Abdominal pain, Atelectasis, Cancer ([address] ), Disease of thyroid gland, GERD (gastroesophageal reflux disease), Irritable bowel syndrome, Kidney stone, Motion sickness, PONV (postoperative nausea and vomiting), and Wears glasses  ,  does not have any pertinent problems on file  ,   has a past surgical history that includes Shoulder surgery (Bilateral); Biceps tendon repair (Right);   section; Exploratory laparotomy; Cholecystectomy; Appendectomy; Tubal ligation; Knee arthroscopy w/ meniscal repair (Left); pr knee scope,med/lat menisectomy (Left, 1/16/2017); ARTHROSCOPY KNEE (Right, 2/21/2018); pr colonoscopy flx dx w/collj spec when pfrmd (N/A, 2/15/2019); Carpal tunnel release (Left); Colonoscopy; Umbilical hernia repair; Achilles tendon repair (Right); LAPAROSCOPY; pr revise median n/carpal tunnel surg (Right, 8/27/2020); and Breast excisional biopsy (Right, 01/01/2012)  ,  family history includes Diabetes in her paternal grandmother; Heart disease in her father; Hypertension in her mother; Lung cancer in her maternal uncle; Mental illness in her father; No Known Problems in her daughter and paternal aunt; Stroke in her mother; Substance Abuse in her brother  ,   reports that she quit smoking about 2 years ago  She has never used smokeless tobacco  She reports current alcohol use  She reports that she does not use drugs  ,  is allergic to erythromycin, betadine [povidone iodine], doxycycline, and medical tape     Current Outpatient Medications   Medication Sig Dispense Refill    Ca Carbonate-Mag Hydroxide (ROLAIDS PO) Take by mouth as needed      calcium carbonate (TUMS) 500 mg chewable tablet Chew 1 tablet daily      famotidine (PEPCID) 40 MG tablet Take 1 tablet (40 mg total) by mouth daily 30 tablet 4    naproxen (Naprosyn) 500 mg tablet Take 1 tablet (500 mg total) by mouth 2 (two) times a day with meals 60 tablet 1    phentermine (ADIPEX-P) 37 5 MG tablet Take 1 tablet (37 5 mg total) by mouth every morning Before breakfast or 1-2 hours after 30 tablet 1    Synthroid 88 MCG tablet Take 1 tablet (88 mcg total) by mouth daily 90 tablet 1    cholecalciferol (VITAMIN D3) 1,000 units tablet Take 5,000 Units by mouth daily (Patient not taking: Reported on 7/18/2022)       No current facility-administered medications for this visit         Review of Systems   Constitutional: Positive for unexpected weight change  Negative for chills, diaphoresis, fatigue and fever  HENT: Negative for congestion, ear pain, postnasal drip, rhinorrhea, sneezing, sore throat and trouble swallowing  Eyes: Negative for pain and visual disturbance  Respiratory: Negative for apnea, cough, shortness of breath and wheezing  Cardiovascular: Negative for chest pain and palpitations  Gastrointestinal: Positive for constipation  Negative for abdominal pain, diarrhea, nausea and vomiting  +Bloating   Genitourinary: Positive for dysuria  Negative for pelvic pain  Musculoskeletal: Positive for arthralgias and back pain  Negative for gait problem and myalgias  Neurological: Negative for dizziness, syncope, weakness, light-headedness, numbness and headaches  +Off-balanced   Psychiatric/Behavioral: Negative for suicidal ideas  The patient is not nervous/anxious  Objective:  Vitals:    08/12/22 0835   BP: 138/94   Pulse: 82   Temp: 97 6 °F (36 4 °C)   SpO2: 99%   Weight: 80 7 kg (178 lb)   Height: 5' 5" (1 651 m)     Body mass index is 29 62 kg/m²  Physical Exam  Vitals and nursing note reviewed  Constitutional:       Appearance: She is well-developed  HENT:      Head: Normocephalic and atraumatic  Right Ear: Tympanic membrane, ear canal and external ear normal       Left Ear: Tympanic membrane, ear canal and external ear normal       Nose: Nose normal       Mouth/Throat:      Pharynx: No oropharyngeal exudate or posterior oropharyngeal erythema  Eyes:      Extraocular Movements: Extraocular movements intact  Cardiovascular:      Rate and Rhythm: Normal rate and regular rhythm  Heart sounds: Normal heart sounds  No murmur heard  No friction rub  No gallop  Pulmonary:      Effort: Pulmonary effort is normal  No respiratory distress  Breath sounds: Normal breath sounds  No wheezing or rales  Abdominal:      Palpations: Abdomen is soft  Tenderness:  There is no abdominal tenderness  Musculoskeletal:      Cervical back: Normal range of motion and neck supple  Lumbar back: Spasms and tenderness present  Decreased range of motion  Skin:     General: Skin is warm and dry  Neurological:      Mental Status: She is alert and oriented to person, place, and time  Psychiatric:         Behavior: Behavior normal          Thought Content:  Thought content normal          Judgment: Judgment normal

## 2022-08-14 LAB — B BURGDOR IGG+IGM SER-ACNC: <0.2 AI

## 2022-08-15 LAB
RHEUMATOID FACT SER QL LA: NEGATIVE
RYE IGE QN: NEGATIVE

## 2022-08-17 DIAGNOSIS — E03.9 HYPOTHYROIDISM, UNSPECIFIED TYPE: ICD-10-CM

## 2022-08-17 DIAGNOSIS — E55.9 VITAMIN D DEFICIENCY: Primary | ICD-10-CM

## 2022-08-17 RX ORDER — ERGOCALCIFEROL 1.25 MG/1
50000 CAPSULE ORAL WEEKLY
Qty: 12 CAPSULE | Refills: 0 | Status: SHIPPED | OUTPATIENT
Start: 2022-08-17

## 2022-08-17 RX ORDER — LEVOTHYROXINE SODIUM 100 MCG
100 TABLET ORAL DAILY
Qty: 90 TABLET | Refills: 1 | Status: SHIPPED | OUTPATIENT
Start: 2022-08-17

## 2022-08-18 DIAGNOSIS — B35.1 ONYCHOMYCOSIS OF GREAT TOE: Primary | ICD-10-CM

## 2022-08-22 ENCOUNTER — OFFICE VISIT (OUTPATIENT)
Dept: BARIATRICS | Facility: CLINIC | Age: 62
End: 2022-08-22
Payer: COMMERCIAL

## 2022-08-22 VITALS
WEIGHT: 174 LBS | TEMPERATURE: 96.8 F | SYSTOLIC BLOOD PRESSURE: 126 MMHG | OXYGEN SATURATION: 97 % | HEART RATE: 98 BPM | DIASTOLIC BLOOD PRESSURE: 72 MMHG | BODY MASS INDEX: 28.99 KG/M2 | HEIGHT: 65 IN

## 2022-08-22 DIAGNOSIS — E66.3 OVERWEIGHT: Primary | ICD-10-CM

## 2022-08-22 DIAGNOSIS — R73.03 PREDIABETES: ICD-10-CM

## 2022-08-22 PROCEDURE — 99214 OFFICE O/P EST MOD 30 MIN: CPT | Performed by: PHYSICIAN ASSISTANT

## 2022-08-22 RX ORDER — PHENTERMINE HYDROCHLORIDE 37.5 MG/1
37.5 TABLET ORAL EVERY MORNING
Qty: 30 TABLET | Refills: 3 | Status: SHIPPED | OUTPATIENT
Start: 2022-08-22

## 2022-08-22 RX ORDER — CONJUGATED ESTROGENS 0.62 MG/G
CREAM VAGINAL
COMMUNITY
Start: 2022-07-21

## 2022-08-22 NOTE — PATIENT INSTRUCTIONS
Goals: Food log (ie ) www myfitnesspal com,sparkpeople  com,loseit com,calorieking  com,etc  baritastic  No sugary beverages  At least 64oz of water daily  Increase physical activity by 10 minutes daily   Gradually increase physical activity to a goal of 5 days per week for 30 minutes of MODERATE intensity PLUS 2 days per week of FULL BODY resistance training  5-10 servings of fruits and vegetables per day and 25-35 grams of dietary fiber per day, gradually increasing  4418-9450 calories per day   Measure all portions: creamers, sugars, cooking oils/butters, condiments, dressings, etc and log calories

## 2022-08-22 NOTE — PROGRESS NOTES
Assessment/Plan:    Overweight  -Patient is pursuing Conservative Program  -Initial weight loss goal of 5-10% weight loss for improved health  -Did not tolerate Qsymia: concentration issues/confusion, dizziness, fatigue  Switched to phentermine (186 lbs, 7/19/21)  -BP/P stable  -Screening labs: A1c and LP reviewed from 3/23/22, TSH, T4, CMP, and vitamin D reviewed from 8/12/22     Initial: 182  2  Current: 174 (-1 2 lbs since last visit)  Change: -8 2  Goal: 150    Follow up in approximately 3 months with Non-Surgical Physician/Advanced Practitioner  Goals:  Food log (ie ) www myfitnesspal com,sparkpeople  com,loseit com,calorieking  com,etc  baritastic  No sugary beverages  At least 64oz of water daily  Increase physical activity by 10 minutes daily  Gradually increase physical activity to a goal of 5 days per week for 30 minutes of MODERATE intensity PLUS 2 days per week of FULL BODY resistance training  5-10 servings of fruits and vegetables per day and 25-35 grams of dietary fiber per day, gradually increasing  2976-1168 calories per day   Measure all portions: creamers, sugars, cooking oils/butters, condiments, dressings, etc and log calories     Diagnoses and all orders for this visit:    Overweight    Prediabetes    Body mass index 28 0-28 9, adult    Subjective:   Chief Complaint   Patient presents with    Follow-up     Patient ID: Angela Will  is a 63 y o  female with excess weight/obesity here to pursue weight management  Patient is pursuing Conservative Program      HPI  The patient presents for MWM follow up  Phentermine: held while she had COVID (1 week), restarted at 1/2 tab and now at a tab  Tolerating, refilled today       Watching portions; Cutting back on coffee (1 5 cups)     B:  Skips or banana  S: 1 cup of blueberry special K + unsweetened almond milk  L: salad + protein + Pastor dressing (tries to minimize amount)   S: none  D: protein + veg + starch  S: not usually    Exercise: had COVID in July and was tired, achy so has not restarted  Hydration: notes she needs to improve on this  Tips on this provided  Alcohol intake is infrequent  Sleep: okay    The following portions of the patient's history were reviewed and updated as appropriate: allergies, current medications, past family history, past medical history, past social history, past surgical history and problem list     Review of Systems   Cardiovascular: Negative for chest pain and palpitations  Psychiatric/Behavioral: Negative  Objective:    /72 (BP Location: Left arm, Cuff Size: Large)   Pulse 98   Temp (!) 96 8 °F (36 °C) (Temporal)   Ht 5' 5" (1 651 m)   Wt 78 9 kg (174 lb)   SpO2 97%   BMI 28 96 kg/m²      Physical Exam  Vitals and nursing note reviewed  Constitutional   General appearance: Abnormal   well developed and overweight  Pulmonary   Respiratory effort: No increased work of breathing or signs of respiratory distress      Musculoskeletal   Gait and station: Normal     Psychiatric   Orientation to person, place and time: Normal     Affect: appropriate

## 2022-08-22 NOTE — ASSESSMENT & PLAN NOTE
-Patient is pursuing Conservative Program  -Initial weight loss goal of 5-10% weight loss for improved health  -Did not tolerate Qsymia: concentration issues/confusion, dizziness, fatigue  Switched to phentermine (186 lbs, 7/19/21)  -BP/P stable  -Screening labs: A1c and LP reviewed from 3/23/22, TSH, T4, CMP, and vitamin D reviewed from 8/12/22     Initial: 182  2  Current: 174 (-1 2 lbs since last visit)  Change: -8 2  Goal: 150

## 2022-08-23 DIAGNOSIS — B35.1 ONYCHOMYCOSIS OF GREAT TOE: Primary | ICD-10-CM

## 2022-09-22 DIAGNOSIS — Z20.828 SARS-ASSOCIATED CORONAVIRUS EXPOSURE: Primary | ICD-10-CM

## 2022-09-22 PROCEDURE — U0005 INFEC AGEN DETEC AMPLI PROBE: HCPCS | Performed by: FAMILY MEDICINE

## 2022-09-22 PROCEDURE — U0003 INFECTIOUS AGENT DETECTION BY NUCLEIC ACID (DNA OR RNA); SEVERE ACUTE RESPIRATORY SYNDROME CORONAVIRUS 2 (SARS-COV-2) (CORONAVIRUS DISEASE [COVID-19]), AMPLIFIED PROBE TECHNIQUE, MAKING USE OF HIGH THROUGHPUT TECHNOLOGIES AS DESCRIBED BY CMS-2020-01-R: HCPCS | Performed by: FAMILY MEDICINE

## 2022-09-23 LAB — SARS-COV-2 RNA RESP QL NAA+PROBE: NEGATIVE

## 2022-09-30 DIAGNOSIS — J22 LOWER RESPIRATORY INFECTION: ICD-10-CM

## 2022-09-30 RX ORDER — FAMOTIDINE 40 MG/1
40 TABLET, FILM COATED ORAL 2 TIMES DAILY
Qty: 60 TABLET | Refills: 4 | Status: SHIPPED | OUTPATIENT
Start: 2022-09-30

## 2022-10-05 ENCOUNTER — TELEPHONE (OUTPATIENT)
Dept: FAMILY MEDICINE CLINIC | Facility: CLINIC | Age: 62
End: 2022-10-05

## 2022-10-05 DIAGNOSIS — M79.672 LEFT FOOT PAIN: Primary | ICD-10-CM

## 2022-11-21 ENCOUNTER — OFFICE VISIT (OUTPATIENT)
Dept: BARIATRICS | Facility: CLINIC | Age: 62
End: 2022-11-21

## 2022-11-21 VITALS
TEMPERATURE: 97.8 F | WEIGHT: 171.8 LBS | SYSTOLIC BLOOD PRESSURE: 130 MMHG | HEART RATE: 90 BPM | BODY MASS INDEX: 28.62 KG/M2 | HEIGHT: 65 IN | OXYGEN SATURATION: 96 % | DIASTOLIC BLOOD PRESSURE: 62 MMHG

## 2022-11-21 DIAGNOSIS — R73.03 PREDIABETES: ICD-10-CM

## 2022-11-21 DIAGNOSIS — E66.3 OVERWEIGHT: Primary | ICD-10-CM

## 2022-11-21 RX ORDER — PHENTERMINE HYDROCHLORIDE 37.5 MG/1
37.5 TABLET ORAL EVERY MORNING
Qty: 30 TABLET | Refills: 1 | Status: SHIPPED | OUTPATIENT
Start: 2022-11-21

## 2022-11-21 NOTE — PROGRESS NOTES
Assessment/Plan:    Overweight  -Patient is pursuing Conservative Program  -Initial weight loss goal of 5-10% weight loss for improved health  -Did not tolerate Qsymia: concentration issues/confusion, dizziness, fatigue  Switched to phentermine (186 lbs, 7/19/21)  -BP/P stable  -Screening labs: A1c and LP reviewed from 3/23/22, TSH, T4, CMP, and vitamin D reviewed from 8/12/22     Initial: 182  2  Current: 171 (-3  lbs since last visit)  Change: -11 2  Goal: 150    Follow up in approximately 6 weeks with Non-Surgical Physician/Advanced Practitioner  Goals:  Food log (ie ) www myfitnesspal com,sparkpeople  com,loseit com,calorieking  com,etc  baritastic  No sugary beverages  At least 64oz of water daily  Increase physical activity by 10 minutes daily  Gradually increase physical activity to a goal of 5 days per week for 30 minutes of MODERATE intensity PLUS 2 days per week of FULL BODY resistance training  5-10 servings of fruits and vegetables per day and 25-35 grams of dietary fiber per day, gradually increasing     Diagnoses and all orders for this visit:    Overweight  -     phentermine (ADIPEX-P) 37 5 MG tablet; Take 1 tablet (37 5 mg total) by mouth every morning Before breakfast or 1-2 hours after    Prediabetes  -     phentermine (ADIPEX-P) 37 5 MG tablet; Take 1 tablet (37 5 mg total) by mouth every morning Before breakfast or 1-2 hours after    Body mass index 28 0-28 9, adult        Subjective:   Chief Complaint   Patient presents with   • Follow-up     Patient ID: Carver Jeans  is a 63 y o  female with excess weight/obesity here to pursue weight management  Patient is pursuing Conservative Program      HPI  The patient presents for MWM follow up  Phentermine: Tolerating, refilled today       Walking for hunting and leisure    Plans to f/u with PCP regarding teeth (hair loss)/hair (needed to root canals, but does grind teeth) changes      Watching portions     B:  yogurt and/or banana OR skips  S: not usually  L: salad + protein + Pastor dressing (tries to minimize amount) OR yogurt OR leftovers  S: none  D: protein + veg + starch  S: not usually    Coffee- 1-3 cups per day with sweetened half and half  Denies regular alcohol use, maybe once a month    Dines out once per week (breaks into 2-3 meals)     Hydration:24-30 oz water; 30 oz diet green (at least)   Sleep: no concerns at least 6-7 hours    The following portions of the patient's history were reviewed and updated as appropriate: allergies, current medications, past family history, past medical history, past social history, past surgical history and problem list     Review of Systems   Cardiovascular: Negative for chest pain and palpitations  Psychiatric/Behavioral: Negative  Objective:    /62 (BP Location: Left arm, Cuff Size: Large)   Pulse 90   Temp 97 8 °F (36 6 °C) (Temporal)   Ht 5' 5" (1 651 m)   Wt 77 9 kg (171 lb 12 8 oz)   SpO2 96%   BMI 28 59 kg/m²      Physical Exam  Vitals and nursing note reviewed  Constitutional   General appearance: Abnormal   well developed and overweight  Pulmonary   Respiratory effort: No increased work of breathing or signs of respiratory distress      Musculoskeletal   Gait and station: Normal     Psychiatric   Orientation to person, place and time: Normal     Affect: appropriate

## 2022-11-21 NOTE — ASSESSMENT & PLAN NOTE
-Patient is pursuing Conservative Program  -Initial weight loss goal of 5-10% weight loss for improved health  -Did not tolerate Qsymia: concentration issues/confusion, dizziness, fatigue  Switched to phentermine (186 lbs, 7/19/21)  -BP/P stable  -Screening labs: A1c and LP reviewed from 3/23/22, TSH, T4, CMP, and vitamin D reviewed from 8/12/22     Initial: 182  2  Current: 171 (-3  lbs since last visit)  Change: -11 2  Goal: 150

## 2022-11-23 ENCOUNTER — TELEPHONE (OUTPATIENT)
Dept: FAMILY MEDICINE CLINIC | Facility: CLINIC | Age: 62
End: 2022-11-23

## 2022-11-23 DIAGNOSIS — N30.00 ACUTE CYSTITIS WITHOUT HEMATURIA: Primary | ICD-10-CM

## 2022-11-23 DIAGNOSIS — R10.9 FLANK PAIN: ICD-10-CM

## 2022-11-23 DIAGNOSIS — R30.0 DYSURIA: Primary | ICD-10-CM

## 2022-11-23 LAB
BILIRUB UR QL STRIP: NEGATIVE
CLARITY UR: CLEAR
COLOR UR: NORMAL
GLUCOSE UR STRIP-MCNC: NEGATIVE MG/DL
HGB UR QL STRIP.AUTO: NEGATIVE
KETONES UR STRIP-MCNC: NEGATIVE MG/DL
LEUKOCYTE ESTERASE UR QL STRIP: NEGATIVE
NITRITE UR QL STRIP: NEGATIVE
PH UR STRIP.AUTO: 6 [PH]
PROT UR STRIP-MCNC: NEGATIVE MG/DL
SP GR UR STRIP.AUTO: 1.02 (ref 1–1.03)
UROBILINOGEN UR STRIP-ACNC: <2 MG/DL

## 2022-11-23 RX ORDER — SULFAMETHOXAZOLE AND TRIMETHOPRIM 800; 160 MG/1; MG/1
1 TABLET ORAL EVERY 12 HOURS SCHEDULED
Qty: 20 TABLET | Refills: 0 | Status: SHIPPED | OUTPATIENT
Start: 2022-11-23 | End: 2022-12-03

## 2022-11-23 NOTE — TELEPHONE ENCOUNTER
Possible UTI - Flank pain, pain & burning w/ urination    UA & culture ordered  Would you be willing to Rx?     Pharm: [facility]    Allergies: Erythromycin, Doxycycline, Betadine, & Med tape

## 2022-11-24 LAB — BACTERIA UR CULT: NORMAL

## 2022-12-20 ENCOUNTER — APPOINTMENT (OUTPATIENT)
Dept: LAB | Facility: MEDICAL CENTER | Age: 62
End: 2022-12-20

## 2022-12-20 ENCOUNTER — TELEPHONE (OUTPATIENT)
Dept: FAMILY MEDICINE CLINIC | Facility: CLINIC | Age: 62
End: 2022-12-20

## 2022-12-20 DIAGNOSIS — E03.9 HYPOTHYROIDISM, UNSPECIFIED TYPE: ICD-10-CM

## 2022-12-20 DIAGNOSIS — D69.1 ABNORMAL PLATELETS (HCC): Primary | ICD-10-CM

## 2022-12-20 DIAGNOSIS — E55.9 VITAMIN D DEFICIENCY: ICD-10-CM

## 2022-12-20 LAB
25(OH)D3 SERPL-MCNC: 49.2 NG/ML (ref 30–100)
TSH SERPL DL<=0.05 MIU/L-ACNC: 2.71 UIU/ML (ref 0.45–4.5)

## 2022-12-20 NOTE — TELEPHONE ENCOUNTER
I ordered a CBC  I believe we may need to call lab to have this added on because the labs were already drawn

## 2022-12-20 NOTE — TELEPHONE ENCOUNTER
Randi had bldwk for Vitamin D and Thyroid  She was wondering if its possible to order CBCD   She likes to have it checked because of Platelet ct in the past     Thank  you

## 2022-12-21 RX ORDER — LEVOTHYROXINE SODIUM 100 MCG
100 TABLET ORAL DAILY
Qty: 90 TABLET | Refills: 0 | Status: SHIPPED | OUTPATIENT
Start: 2022-12-21

## 2023-01-03 ENCOUNTER — TELEMEDICINE (OUTPATIENT)
Dept: BARIATRICS | Facility: CLINIC | Age: 63
End: 2023-01-03

## 2023-01-03 ENCOUNTER — CLINICAL SUPPORT (OUTPATIENT)
Dept: BARIATRICS | Facility: CLINIC | Age: 63
End: 2023-01-03

## 2023-01-03 VITALS
SYSTOLIC BLOOD PRESSURE: 122 MMHG | HEIGHT: 65 IN | DIASTOLIC BLOOD PRESSURE: 78 MMHG | BODY MASS INDEX: 28.66 KG/M2 | WEIGHT: 172 LBS

## 2023-01-03 VITALS
WEIGHT: 172 LBS | BODY MASS INDEX: 28.66 KG/M2 | HEIGHT: 65 IN | DIASTOLIC BLOOD PRESSURE: 78 MMHG | SYSTOLIC BLOOD PRESSURE: 122 MMHG

## 2023-01-03 DIAGNOSIS — E66.3 OVERWEIGHT: ICD-10-CM

## 2023-01-03 DIAGNOSIS — R63.5 ABNORMAL WEIGHT GAIN: Primary | ICD-10-CM

## 2023-01-03 DIAGNOSIS — R73.03 PREDIABETES: ICD-10-CM

## 2023-01-03 DIAGNOSIS — E66.3 OVERWEIGHT: Primary | ICD-10-CM

## 2023-01-03 RX ORDER — METFORMIN HYDROCHLORIDE 750 MG/1
750 TABLET, EXTENDED RELEASE ORAL
Qty: 30 TABLET | Refills: 1 | Status: SHIPPED | OUTPATIENT
Start: 2023-01-03

## 2023-01-03 NOTE — PROGRESS NOTES
Assessment/Plan:    Overweight  -Patient is pursuing Conservative Program  -Initial weight loss goal of 5-10% weight loss for improved health  -Did not tolerate Qsymia: concentration issues/confusion, dizziness, fatigue  Switched to phentermine (186 lbs, 7/19/21)  -BP/P stable  -Screening labs: A1c and LP reviewed from 3/23/22, TSH, T4, CMP, and vitamin D reviewed from 8/12/22  -dietary recall reviewed, suggestions provided  -encouraged increasing water to goal     Initial: 182 2, highest 186 lbs  Current: 172  Change: -10 2 from initial, - 14lbs from highest  Goal: 150    Prediabetes  -A1c 6 2 %  -may improve with weight loss and dietary changes  -will start Metformin XR 750mg daily  SE profile reviewed    Goals:    Food log (ie ) www myfitnesspal com,sparkpeople  com,loseit com,calorieking  com,etc    No sugary beverages  At least 64oz of water daily  Increase physical activity by 10 minutes daily  Gradually increase physical activity to a goal of 5 days per week for 30 minutes of MODERATE intensity PLUS 2 days per week of FULL BODY resistance training  8476-6027 calories per day  5-10 servings of fruits and vegetables per day  25-35 grams of dietary fiber per day      Follow up in approximately 6 weeks with Non-Surgical Physician/Advanced Practitioner  Diagnoses and all orders for this visit:    Abnormal weight gain  Comments:  see plan under overweight    Overweight  -     metFORMIN (GLUCOPHAGE-XR) 750 mg 24 hr tablet; Take 1 tablet (750 mg total) by mouth daily with breakfast    Prediabetes  -     metFORMIN (GLUCOPHAGE-XR) 750 mg 24 hr tablet; Take 1 tablet (750 mg total) by mouth daily with breakfast          Subjective:   Chief Complaint   Patient presents with   • Follow-up   • Virtual Regular Visit        Patient ID: Jo Trammell  is a 63 y o  female with excess weight/obesity here to pursue weight managment  Patient is pursuing Conservative Program      HPI Patient presents for [facility] follow up   Remains on Phentermine 37 5mg without adverse SE    -Has not been as active due to a knee injury, plans to start back on elliptical  -consuming more sweets around the holidays, does walk more when hunting  -not measuring portions  -Hydration:  diet green tea, 16oz water, 1 cup of coffee + sweetened half and half    B: chobani yogurt  S: banana  L: left overs, salad + tuna/chicken + Pastor dressing  S: skips  D: chicken or steak + starch +/- veggie  S: skips      The following portions of the patient's history were reviewed and updated as appropriate: allergies, current medications, past family history, past medical history, past social history, past surgical history and problem list       Objective:    /78 Comment: Nurse visit this morning  Ht 5' 5" (1 651 m) Comment: Nurse visit this morning  Wt 78 kg (172 lb) Comment: Nurse visit this morning  BMI 28 62 kg/m²        Virtual Regular Visit    Verification of patient location:    Patient is located in the following state in which I hold an active license [place]      Assessment/Plan:    Problem List Items Addressed This Visit        Other    Overweight     -Patient is pursuing Conservative Program  -Initial weight loss goal of 5-10% weight loss for improved health  -Did not tolerate Qsymia: concentration issues/confusion, dizziness, fatigue  Switched to phentermine (186 lbs, 7/19/21)  -BP/P stable  -Screening labs: A1c and LP reviewed from 3/23/22, TSH, T4, CMP, and vitamin D reviewed from 8/12/22  -dietary recall reviewed, suggestions provided  -encouraged increasing water to goal     Initial: 182 2, highest 186 lbs  Current: 172  Change: -10 2 from initial, - 14lbs from highest  Goal: 150         Relevant Medications    metFORMIN (GLUCOPHAGE-XR) 750 mg 24 hr tablet    Prediabetes     -A1c 6 2 %  -may improve with weight loss and dietary changes  -will start Metformin XR 750mg daily   SE profile reviewed         Relevant Medications    metFORMIN (GLUCOPHAGE-XR) 750 mg 24 hr tablet   Other Visit Diagnoses     Abnormal weight gain    -  Primary    see plan under overweight               Reason for visit is   Chief Complaint   Patient presents with   • Follow-up   • Virtual Regular Visit        Encounter provider Kendal Johnson PA-C    Provider located at [address] [place] [id number] [phone]      Recent Visits  Date Type Provider Dept   01/03/23 Telemedicine Kendal Johnson PA-C [facility]   Showing recent visits within past 7 days and meeting all other requirements  Future Appointments  No visits were found meeting these conditions  Showing future appointments within next 150 days and meeting all other requirements       The patient was identified by name and date of birth  Stanly Rubinstein was informed that this is a telemedicine visit and that the visit is being conducted through the [address] [employer] platform  She agrees to proceed     My office door was closed  No one else was in the room  She acknowledged consent and understanding of privacy and security of the video platform  The patient has agreed to participate and understands they can discontinue the visit at any time  Patient is aware this is a billable service       Subjective  Stanly Rubinstein is a 63 y o  female MWM follow up      HPI     Past Medical History:   Diagnosis Date   • Abdominal pain    • Atelectasis    • Cancer (HCC)     cervical   • Disease of thyroid gland     hypothyroidism   • GERD (gastroesophageal reflux disease)    • Irritable bowel syndrome    • Kidney stone    • Motion sickness    • PONV (postoperative nausea and vomiting)    • Wears glasses        Past Surgical History:   Procedure Laterality Date   • ACHILLES TENDON REPAIR Right    • APPENDECTOMY     • ARTHROSCOPY KNEE Right 2/21/2018    Procedure: ARTHROSCOPY KNEE; PARTIAL LATERAL MENISCECTOMY; PARTIAL MEDIAL MENISCECTOMY; POSSIBLE DEBRIDEMENT; removal of loose bodies;  Surgeon: Lara Patel MD;  Location: [facility];  Service: Orthopedics   • BICEPS TENDON REPAIR Right    • BREAST EXCISIONAL BIOPSY Right 2012    benign   • CARPAL TUNNEL RELEASE Left    •  SECTION      three    • CHOLECYSTECTOMY     • COLONOSCOPY     • EXPLORATORY LAPAROTOMY     • KNEE ARTHROSCOPY W/ MENISCAL REPAIR Left    • LAPAROSCOPY     • IN ARTHRS KNE SURG W/MENISCECTOMY MED/LAT W/SHVG Left 2017    Procedure: ARTHROSCOPY KNEE PARTIAL MEDIAL MENISECTOMY ;  Surgeon: Lara Patel MD;  [facility];  Service: Orthopedics   • IN COLONOSCOPY FLX DX W/[facility] REHABILITATION WHEN PFRMD N/A 2/15/2019    Procedure: COLONOSCOPY;  Surgeon: Zoe Croft MD;  [facility];   Service: Colorectal   • IN NEUROPLASTY &/TRANSPOS MEDIAN NRV CARPAL TUNNE Right 2020    Procedure: CARPAL TUNNEL & IN SITU ULNAR NERVE RELEASE;  Surgeon: Alondra Flaherty MD;  [facility];  Service: Orthopedics   • SHOULDER SURGERY Bilateral    • TUBAL LIGATION     • UMBILICAL HERNIA REPAIR      with mesh x2       Current Outpatient Medications   Medication Sig Dispense Refill   • Ca Carbonate-Mag Hydroxide (ROLAIDS PO) Take by mouth as needed     • famotidine (PEPCID) 40 MG tablet Take 1 tablet (40 mg total) by mouth 2 (two) times a day 60 tablet 4   • metFORMIN (GLUCOPHAGE-XR) 750 mg 24 hr tablet Take 1 tablet (750 mg total) by mouth daily with breakfast 30 tablet 1   • phentermine (ADIPEX-P) 37 5 MG tablet Take 1 tablet (37 5 mg total) by mouth every morning Before breakfast or 1-2 hours after 30 tablet 1   • Premarin vaginal cream insert 1 applicatorful vaginally at bedtime     • Synthroid 100 MCG tablet Take 1 tablet (100 mcg total) by mouth daily 90 tablet 0   • calcium carbonate (TUMS) 500 mg chewable tablet Chew 1 tablet daily (Patient not taking: Reported on 2022)     • cholecalciferol (VITAMIN D3) 1,000 units tablet Take 5,000 Units by mouth daily (Patient not taking: Reported on 1/3/2023)       No current facility-administered medications for this visit  Allergies   Allergen Reactions   • Erythromycin Chest Pain   • Betadine [Povidone Iodine] Other (See Comments)     Burns skin, blisters   • Doxycycline Abdominal Pain   • Medical Tape Other (See Comments)     Redness and peeling       Review of Systems   Respiratory: Negative  Cardiovascular: Negative  Psychiatric/Behavioral: Negative  Video Exam    Vitals:    01/03/23 1458   BP: 122/78   Weight: 78 kg (172 lb)   Height: 5' 5" (1 651 m)       Physical Exam  Vitals and nursing note reviewed  Constitutional:       General: She is not in acute distress  Appearance: She is not ill-appearing or toxic-appearing  HENT:      Head: Normocephalic and atraumatic  Eyes:      General: No scleral icterus  Pulmonary:      Effort: Pulmonary effort is normal  No respiratory distress  Neurological:      Mental Status: She is alert and oriented to person, place, and time  Mental status is at baseline  Psychiatric:         Mood and Affect: Mood normal          Thought Content:  Thought content normal          Judgment: Judgment normal           I spent 20 minutes directly with the patient during this visit

## 2023-01-03 NOTE — ASSESSMENT & PLAN NOTE
-Patient is pursuing Conservative Program  -Initial weight loss goal of 5-10% weight loss for improved health  -Did not tolerate Qsymia: concentration issues/confusion, dizziness, fatigue  Switched to phentermine (186 lbs, 7/19/21)     -BP/P stable  -Screening labs: A1c and LP reviewed from 3/23/22, TSH, T4, CMP, and vitamin D reviewed from 8/12/22  -dietary recall reviewed, suggestions provided  -encouraged increasing water to goal     Initial: 182 2, highest 186 lbs  Current: 172  Change: -10 2 from initial, - 14lbs from highest  Goal: 150

## 2023-01-03 NOTE — ASSESSMENT & PLAN NOTE
-A1c 6 2 %  -may improve with weight loss and dietary changes  -will start Metformin XR 750mg daily   SE profile reviewed

## 2023-02-13 DIAGNOSIS — J22 LOWER RESPIRATORY INFECTION: ICD-10-CM

## 2023-02-13 RX ORDER — FAMOTIDINE 40 MG/1
40 TABLET, FILM COATED ORAL 2 TIMES DAILY
Qty: 180 TABLET | Refills: 3 | Status: SHIPPED | OUTPATIENT
Start: 2023-02-13

## 2023-02-16 ENCOUNTER — TELEMEDICINE (OUTPATIENT)
Dept: BARIATRICS | Facility: CLINIC | Age: 63
End: 2023-02-16

## 2023-02-16 VITALS — BODY MASS INDEX: 28.29 KG/M2 | WEIGHT: 170 LBS

## 2023-02-16 DIAGNOSIS — R63.5 ABNORMAL WEIGHT GAIN: Primary | ICD-10-CM

## 2023-02-16 DIAGNOSIS — R73.03 PREDIABETES: ICD-10-CM

## 2023-02-16 DIAGNOSIS — E66.3 OVERWEIGHT: ICD-10-CM

## 2023-02-16 NOTE — PATIENT INSTRUCTIONS
Goals: Food log (ie ) www myfitnesspal com,sparkpeople  com,loseit com,calorieking  com,etc    No sugary beverages  At least 64oz of water daily  Increase physical activity by 10 minutes daily   Gradually increase physical activity to a goal of 5 days per week for 30 minutes of MODERATE intensity PLUS 2 days per week of FULL BODY resistance training  9102-8041 calories per day  5-10 servings of fruits and vegetables per day  25-35 grams of dietary fiber per day

## 2023-02-16 NOTE — ASSESSMENT & PLAN NOTE
-Patient is pursuing Conservative Program  -Initial weight loss goal of 5-10% weight loss for improved health  -Did not tolerate Qsymia: concentration issues/confusion, dizziness, fatigue  Switched to phentermine (186 lbs, 7/19/21)     -BP/P stable  -Screening labs: A1c and LP reviewed from 3/23/22, TSH, T4, CMP, and vitamin D reviewed from 8/12/22  -dietary recall reviewed, suggestions provided  -encouraged increasing water to goal which she is doing better with  -[facility] will have her BP and weight recorded at [facility] office  Before a refill of Phentermine can be sent through      Initial: 182 2, highest 186 lbs  Current: 170 ( patient reported)  Last OV: 172 lbs  Change: -12 2 from initial, - 16 lbs from highest  Goal: 150

## 2023-02-16 NOTE — ASSESSMENT & PLAN NOTE
-A1c 6 2 %  -may improve with weight loss and dietary changes  -Started on Metformin XR 750mg and tolerating well

## 2023-02-16 NOTE — PROGRESS NOTES
Assessment/Plan:    Overweight  -Patient is pursuing Conservative Program  -Initial weight loss goal of 5-10% weight loss for improved health  -Did not tolerate Qsymia: concentration issues/confusion, dizziness, fatigue  Switched to phentermine (186 lbs, 7/19/21)  -BP/P stable  -Screening labs: A1c and LP reviewed from 3/23/22, TSH, T4, CMP, and vitamin D reviewed from 8/12/22  -dietary recall reviewed, suggestions provided  -encouraged increasing water to goal which she is doing better with  -[facility] will have her BP and weight recorded at [facility] office  Before a refill of Phentermine can be sent through      Initial: 182 2, highest 186 lbs  Current: 170 ( patient reported)  Last OV: 172 lbs  Change: -12 2 from initial, - 16 lbs from highest  Goal: 150    Prediabetes  -A1c 6 2 %  -may improve with weight loss and dietary changes  -Started on Metformin XR 750mg and tolerating well    Goals:    Food log (ie ) www myfitnesspal com,sparkpeople  com,loseit com,calorieking  com,etc    No sugary beverages  At least 64oz of water daily  Increase physical activity by 10 minutes daily  Gradually increase physical activity to a goal of 5 days per week for 30 minutes of MODERATE intensity PLUS 2 days per week of FULL BODY resistance training  5094-9102 calories per day  5-10 servings of fruits and vegetables per day  25-35 grams of dietary fiber per day    Follow up in approximately 1 month with Non-Surgical Physician/Advanced Practitioner  Diagnoses and all orders for this visit:    Abnormal weight gain  Comments:  see plan under overweight    Overweight    Prediabetes          Subjective:   Chief Complaint   Patient presents with   • Virtual Regular Visit        Patient ID: Alejandra Fong  is a 66 y o  female with excess weight/obesity here to pursue weight managment  Patient is pursuing Conservative Program      HPI: Patient presents for MWM follow up  Patient remains on Phentermine 37 5mg   Was also started on Metformin XR 750mg since last visit  Reports was forgetting to take the Metformin but plans to be more consistent with it    Hydration: water 40oz; diet green tea, 1 cup of coffee + sweetened half and half  Exercise: elliptical 4x per week just started back 10 minutes, walking over lunch    B: special K + unsweetened almond milk OR greek yogurt  S: banana  L: left over OR salad with tuna/grilled chicken/hard boiled egg + Pastor dressing   S: skips  D: chicken or steak + veggie, starch 3x per week  S: skips    Wt Readings from Last 10 Encounters:   02/20/23 77 5 kg (170 lb 12 8 oz)   02/16/23 77 1 kg (170 lb)   01/03/23 78 kg (172 lb)   01/03/23 78 kg (172 lb)   11/21/22 77 9 kg (171 lb 12 8 oz)   08/22/22 78 9 kg (174 lb)   08/12/22 80 7 kg (178 lb)   07/18/22 79 4 kg (175 lb)   04/29/22 79 4 kg (175 lb)   03/25/22 79 8 kg (176 lb)          The following portions of the patient's history were reviewed and updated as appropriate: allergies, current medications, past family history, past medical history, past social history, past surgical history and problem list       Objective: Wt 77 1 kg (170 lb) Comment: patient reported  BMI 28 29 kg/m²       Virtual Regular Visit    Verification of patient location:    Patient is located in the following state in which I hold an active license [place]      Assessment/Plan:    Problem List Items Addressed This Visit        Other    Overweight     -Patient is pursuing Conservative Program  -Initial weight loss goal of 5-10% weight loss for improved health  -Did not tolerate Qsymia: concentration issues/confusion, dizziness, fatigue  Switched to phentermine (186 lbs, 7/19/21)     -BP/P stable  -Screening labs: A1c and LP reviewed from 3/23/22, TSH, T4, CMP, and vitamin D reviewed from 8/12/22  -dietary recall reviewed, suggestions provided  -encouraged increasing water to goal which she is doing better with  -[facility] will have her BP and weight recorded at [facility] office  Before a refill of Phentermine can be sent through      Initial: 182 2, highest 186 lbs  Current: 170 ( patient reported)  Last OV: 172 lbs  Change: -12 2 from initial, - 16 lbs from highest  Goal: 150         Prediabetes     -A1c 6 2 %  -may improve with weight loss and dietary changes  -Started on Metformin XR 750mg and tolerating well        Other Visit Diagnoses     Abnormal weight gain    -  Primary    see plan under overweight               Reason for visit is   Chief Complaint   Patient presents with   • Virtual Regular Visit        Encounter provider Kecia Pacheco PA-C    Provider located at [address] [place] [id number] [phone]      Recent Visits  Date Type Provider Dept   02/16/23 Telemedicine Kecia Pacheco PA-C [facility]   Showing recent visits within past 7 days and meeting all other requirements  Future Appointments  No visits were found meeting these conditions  Showing future appointments within next 150 days and meeting all other requirements       The patient was identified by name and date of birth  Lucia Jenkins was informed that this is a telemedicine visit and that the visit is being conducted through the [address] Now platform  She agrees to proceed     My office door was closed  No one else was in the room  She acknowledged consent and understanding of privacy and security of the video platform  The patient has agreed to participate and understands they can discontinue the visit at any time  Patient is aware this is a billable service  Subjective  Lucia Jenkins is a 66 y o  female MWM follow up         HPI     Past Medical History:   Diagnosis Date   • Abdominal pain    • Atelectasis    • Cancer ([address] )     cervical   • Disease of thyroid gland     hypothyroidism   • GERD (gastroesophageal reflux disease)    • Irritable bowel syndrome    • Kidney stone    • Motion sickness    • PONV (postoperative nausea and vomiting)    • Wears glasses        Past Surgical History:   Procedure Laterality Date   • ACHILLES TENDON REPAIR Right    • APPENDECTOMY     • ARTHROSCOPY KNEE Right 2018    Procedure: ARTHROSCOPY KNEE; PARTIAL LATERAL MENISCECTOMY; PARTIAL MEDIAL MENISCECTOMY; POSSIBLE DEBRIDEMENT; removal of loose bodies;  Surgeon: Ray Lafleur MD;  Location: [facility];  Service: Orthopedics   • BICEPS TENDON REPAIR Right    • BREAST EXCISIONAL BIOPSY Right 2012    benign   • CARPAL TUNNEL RELEASE Left    •  SECTION      three    • CHOLECYSTECTOMY     • COLONOSCOPY     • EXPLORATORY LAPAROTOMY     • KNEE ARTHROSCOPY W/ MENISCAL REPAIR Left    • LAPAROSCOPY     • NC ARTHRS KNE SURG W/MENISCECTOMY MED/LAT W/SHVG Left 2017    Procedure: ARTHROSCOPY KNEE PARTIAL MEDIAL MENISECTOMY ;  Surgeon: Ray Lafleur MD;  [facility];  Service: Orthopedics   • [place] [facility] WHEN PFRMD N/A 2/15/2019    Procedure: COLONOSCOPY;  Surgeon: Edith Cooper MD;  [facility];   Service: Colorectal   • NC NEUROPLASTY &/TRANSPOS MEDIAN NRV CARPAL TUNNE Right 2020    Procedure: CARPAL TUNNEL & IN SITU ULNAR NERVE RELEASE;  Surgeon: Abdirashid Hernandez MD;  [facility];  Service: Orthopedics   • SHOULDER SURGERY Bilateral    • TUBAL LIGATION     • UMBILICAL HERNIA REPAIR      with mesh x2       Current Outpatient Medications   Medication Sig Dispense Refill   • Ca Carbonate-Mag Hydroxide (ROLAIDS PO) Take by mouth as needed     • famotidine (PEPCID) 40 MG tablet Take 1 tablet (40 mg total) by mouth 2 (two) times a day 180 tablet 3   • metFORMIN (GLUCOPHAGE-XR) 750 mg 24 hr tablet Take 1 tablet (750 mg total) by mouth daily with breakfast 30 tablet 1   • Premarin vaginal cream insert 1 applicatorful vaginally at bedtime     • Synthroid 100 MCG tablet Take 1 tablet (100 mcg total) by mouth daily 90 tablet 0   • calcium carbonate (TUMS) 500 mg chewable tablet Chew 1 tablet daily (Patient not taking: Reported on 11/21/2022)     • cholecalciferol (VITAMIN D3) 1,000 units tablet Take 5,000 Units by mouth daily (Patient not taking: Reported on 1/3/2023)     • phentermine (ADIPEX-P) 37 5 MG tablet Take 1 tablet (37 5 mg total) by mouth every morning Before breakfast or 1-2 hours after 30 tablet 1     No current facility-administered medications for this visit  Allergies   Allergen Reactions   • Erythromycin Chest Pain   • Betadine [Povidone Iodine] Other (See Comments)     Burns skin, blisters   • Doxycycline Abdominal Pain   • Medical Tape Other (See Comments)     Redness and peeling       Review of Systems   Respiratory: Negative  Cardiovascular: Negative  Video Exam    Vitals:    02/16/23 1406   Weight: 77 1 kg (170 lb)       Physical Exam  Nursing note reviewed  Constitutional:       General: She is not in acute distress  Appearance: She is not toxic-appearing  HENT:      Head: Normocephalic  Eyes:      General: No scleral icterus  Pulmonary:      Effort: Pulmonary effort is normal  No respiratory distress  Musculoskeletal:         General: Normal range of motion  Neurological:      Mental Status: She is alert and oriented to person, place, and time  Mental status is at baseline  Psychiatric:         Mood and Affect: Mood normal          Thought Content:  Thought content normal          Judgment: Judgment normal           I spent 15 minutes directly with the patient during this visit

## 2023-02-20 ENCOUNTER — CLINICAL SUPPORT (OUTPATIENT)
Dept: BARIATRICS | Facility: CLINIC | Age: 63
End: 2023-02-20

## 2023-02-20 VITALS
DIASTOLIC BLOOD PRESSURE: 82 MMHG | WEIGHT: 170.8 LBS | HEIGHT: 65 IN | SYSTOLIC BLOOD PRESSURE: 132 MMHG | BODY MASS INDEX: 28.46 KG/M2

## 2023-02-20 DIAGNOSIS — R63.5 ABNORMAL WEIGHT GAIN: ICD-10-CM

## 2023-02-21 DIAGNOSIS — E66.3 OVERWEIGHT: ICD-10-CM

## 2023-02-21 DIAGNOSIS — R73.03 PREDIABETES: ICD-10-CM

## 2023-02-21 RX ORDER — PHENTERMINE HYDROCHLORIDE 37.5 MG/1
37.5 TABLET ORAL EVERY MORNING
Qty: 30 TABLET | Refills: 1 | Status: SHIPPED | OUTPATIENT
Start: 2023-02-21

## 2023-03-04 DIAGNOSIS — E66.3 OVERWEIGHT: ICD-10-CM

## 2023-03-04 DIAGNOSIS — R73.03 PREDIABETES: ICD-10-CM

## 2023-03-07 RX ORDER — METFORMIN HYDROCHLORIDE 750 MG/1
TABLET, EXTENDED RELEASE ORAL
Qty: 30 TABLET | Refills: 1 | Status: SHIPPED | OUTPATIENT
Start: 2023-03-07

## 2023-03-27 NOTE — PROGRESS NOTES
Assessment/Plan:    Recommended monthly SBE, annual CBE and annual screening mammo  ASCCP guidelines reviewed and pap with cotesting noted to be up to date; this low risk patient was advised she meets criteria to d/c pap screening at age 74  Pap done today  DEXA script given and colonoscopy noted to be up to date  The patient denies STI risk factors and declines testing at this time  Reviewed diet/activity recommendations Calcium 1200 mg and Vit D 600-1000 IU daily  Discussed postmenopausal considerations and symptoms to report  Kegel exercises as instructed  RTO in one year for routine annual gyn exam or sooner PRN  Diagnoses and all orders for this visit:    Encounter for gynecological examination (general) (routine) without abnormal findings    Screening mammogram for breast cancer  -     Mammo screening bilateral w 3d & cad; Future    Menopause  -     DXA bone density spine hip and pelvis; Future    Osteoporosis screening  -     DXA bone density spine hip and pelvis; Future        Subjective:      Patient ID: Olga Adams is a 66 y o  female  This patient presents for routine annual gyn exam   She denies acute gyn complaints  She denies  bleeding or spotting, VM sx, pelvic pain, dyspareunia, breast concerns, abnormal discharge, bowel/bladder dysfunction, depression/anx  Sexually active and is monogamous  Denies STI concerns  No hx of STIs  Pap/HPV up to date and normal, 5/28/20  Mammography 6/17/21, benign done for hx of right breast lump  Resolved  Osteoporosis screening not done to date  Colonoscopy 2-3 yrs ago per pt  The following portions of the patient's history were reviewed and updated as appropriate: allergies, current medications, past family history, past medical history, past social history, past surgical history and problem list     Review of Systems   Constitutional: Negative  Respiratory: Negative  Cardiovascular: Negative  Gastrointestinal: Negative  "  Endocrine: Negative  Genitourinary: Negative for dyspareunia, dysuria, frequency, pelvic pain, urgency, vaginal bleeding, vaginal discharge and vaginal pain  Musculoskeletal: Negative  Skin: Negative  Neurological: Negative  Psychiatric/Behavioral: Negative  Objective:      /90   Pulse 72   Ht 5' 5\" (1 651 m)   Wt 79 2 kg (174 lb 9 6 oz)   BMI 29 05 kg/m²          Physical Exam  Vitals and nursing note reviewed  Exam conducted with a chaperone present  Constitutional:       Appearance: Normal appearance  She is well-developed  HENT:      Head: Normocephalic and atraumatic  Neck:      Thyroid: No thyroid mass or thyromegaly  Cardiovascular:      Rate and Rhythm: Normal rate and regular rhythm  Heart sounds: Normal heart sounds  Pulmonary:      Effort: Pulmonary effort is normal       Breath sounds: Normal breath sounds  Chest:   Breasts:     Breasts are symmetrical       Right: No inverted nipple, mass, nipple discharge, skin change or tenderness  Left: No inverted nipple, mass, nipple discharge, skin change or tenderness  Abdominal:      General: Bowel sounds are normal       Palpations: Abdomen is soft  Tenderness: There is no abdominal tenderness  Hernia: There is no hernia in the left inguinal area or right inguinal area  Genitourinary:     General: Normal vulva  Exam position: Supine  Pubic Area: No rash  Labia:         Right: No rash, tenderness, lesion or injury  Left: No rash, tenderness, lesion or injury  Urethra: No prolapse, urethral pain, urethral swelling or urethral lesion  Vagina: Normal  No signs of injury and foreign body  No vaginal discharge, erythema, tenderness, bleeding, lesions or prolapsed vaginal walls  Cervix: No cervical motion tenderness, discharge, friability, lesion, erythema, cervical bleeding or eversion        Uterus: Not deviated, not enlarged, not fixed, not tender and no " uterine prolapse  Adnexa:         Right: No mass, tenderness or fullness  Left: No mass, tenderness or fullness  Rectum: No external hemorrhoid  Comments: Urethra normal without lesions  No bladder tenderness  Musculoskeletal:         General: Normal range of motion  Cervical back: Normal range of motion and neck supple  Lymphadenopathy:      Lower Body: No right inguinal adenopathy  No left inguinal adenopathy  Skin:     General: Skin is warm and dry  Neurological:      Mental Status: She is alert and oriented to person, place, and time  Psychiatric:         Speech: Speech normal          Behavior: Behavior normal  Behavior is cooperative

## 2023-04-04 ENCOUNTER — OFFICE VISIT (OUTPATIENT)
Dept: GYNECOLOGY | Facility: CLINIC | Age: 63
End: 2023-04-04

## 2023-04-04 VITALS
SYSTOLIC BLOOD PRESSURE: 140 MMHG | HEART RATE: 72 BPM | HEIGHT: 65 IN | WEIGHT: 174.6 LBS | BODY MASS INDEX: 29.09 KG/M2 | DIASTOLIC BLOOD PRESSURE: 90 MMHG

## 2023-04-04 DIAGNOSIS — Z01.419 ENCOUNTER FOR GYNECOLOGICAL EXAMINATION (GENERAL) (ROUTINE) WITHOUT ABNORMAL FINDINGS: Primary | ICD-10-CM

## 2023-04-04 DIAGNOSIS — Z12.31 SCREENING MAMMOGRAM FOR BREAST CANCER: ICD-10-CM

## 2023-04-04 DIAGNOSIS — Z13.820 OSTEOPOROSIS SCREENING: ICD-10-CM

## 2023-04-04 DIAGNOSIS — N95.2 VAGINAL ATROPHY: ICD-10-CM

## 2023-04-04 DIAGNOSIS — Z78.0 MENOPAUSE: ICD-10-CM

## 2023-04-04 DIAGNOSIS — Z12.4 ENCOUNTER FOR PAPANICOLAOU SMEAR FOR CERVICAL CANCER SCREENING: ICD-10-CM

## 2023-04-06 ENCOUNTER — OFFICE VISIT (OUTPATIENT)
Dept: FAMILY MEDICINE CLINIC | Facility: CLINIC | Age: 63
End: 2023-04-06

## 2023-04-06 VITALS
DIASTOLIC BLOOD PRESSURE: 86 MMHG | OXYGEN SATURATION: 94 % | WEIGHT: 174.6 LBS | BODY MASS INDEX: 29.09 KG/M2 | HEIGHT: 65 IN | HEART RATE: 77 BPM | SYSTOLIC BLOOD PRESSURE: 144 MMHG

## 2023-04-06 DIAGNOSIS — I10 PRIMARY HYPERTENSION: ICD-10-CM

## 2023-04-06 DIAGNOSIS — R00.2 HEART PALPITATIONS: Primary | ICD-10-CM

## 2023-04-06 DIAGNOSIS — Z91.09 ENVIRONMENTAL ALLERGIES: ICD-10-CM

## 2023-04-06 RX ORDER — METOPROLOL SUCCINATE 25 MG/1
12.5 TABLET, EXTENDED RELEASE ORAL DAILY
Qty: 30 TABLET | Refills: 1 | Status: SHIPPED | OUTPATIENT
Start: 2023-04-06

## 2023-04-06 NOTE — PROGRESS NOTES
Name: Rufino Coleman      : 1960      MRN: [id number]  Encounter Provider: Jorge Luis Mora PA-C  Encounter Date: 2023   Encounter department: [address] [place] [address] Road     1  Heart palpitations  Assessment & Plan: Will check labs  Recommended drinking more water  Cut back on caffeine  Holter monitor and stress test ordered  Will start on metoprolol to see if this helps with BP and palpitations  Possibly stress induced  Stop phentermine    Orders:  -     TSH, 3rd generation with Free T4 reflex; Future  -     Holter monitor; Future; Expected date: 2023  -     CBC and differential; Future  -     Comprehensive metabolic panel; Future  -     metoprolol succinate (Toprol XL) 25 mg 24 hr tablet; Take 0 5 tablets (12 5 mg total) by mouth daily    2  Primary hypertension  Assessment & Plan:  Start metoprolol  She will monitor BP  Orders:  -     metoprolol succinate (Toprol XL) 25 mg 24 hr tablet; Take 0 5 tablets (12 5 mg total) by mouth daily    3  Environmental allergies  Assessment & Plan:  Start OTC antihistamine  I think this is contributing to headaches  Subjective      Bea Guerra is a very pleasant 66year old female who is here today complaining of palpitations, leg swelling, and high blood pressure  She is under a lot of stress  She has been trying to manage this  She stopped taking her phentermine 2 days ago  Today, she did note some improvement in the palpitations  She has been compliant with her Synthroid and is due for labs  She did have some chest tightness yesterday that resolved  She has been having a lot of indigestion, which is relieved with antacids  She has been checking her blood pressure, and it has been running high  She has been trying to watch sodium in her diet  She does have seasonal allergies  She has not tried any OTC antihistamines  Review of Systems   Constitutional: Negative for chills, diaphoresis, fatigue and fever     HENT: Negative for "congestion, ear pain, postnasal drip, rhinorrhea, sneezing, sore throat and trouble swallowing  Eyes: Negative for pain and visual disturbance  Respiratory: Positive for chest tightness  Negative for apnea, cough, shortness of breath and wheezing  Cardiovascular: Positive for palpitations and leg swelling  Negative for chest pain  Gastrointestinal: Negative for abdominal pain, constipation, diarrhea, nausea and vomiting  Genitourinary: Negative for dysuria  Musculoskeletal: Negative for arthralgias, gait problem and myalgias  Neurological: Positive for dizziness and headaches  Negative for syncope, weakness, light-headedness and numbness  Psychiatric/Behavioral: Negative for suicidal ideas  The patient is nervous/anxious  Current Outpatient Medications on File Prior to Visit   Medication Sig   • Ca Carbonate-Mag Hydroxide (ROLAIDS PO) Take by mouth as needed   • cholecalciferol (VITAMIN D3) 1,000 units tablet Take 5,000 Units by mouth daily   • estrogens, conjugated (Premarin) vaginal cream Insert 0 5 g into the vagina 2 (two) times a week   • famotidine (PEPCID) 40 MG tablet Take 1 tablet (40 mg total) by mouth 2 (two) times a day   • metFORMIN (GLUCOPHAGE-XR) 750 mg 24 hr tablet take 1 tablet by mouth once daily with breakfast   • Synthroid 100 MCG tablet Take 1 tablet (100 mcg total) by mouth daily   • phentermine (ADIPEX-P) 37 5 MG tablet Take 1 tablet (37 5 mg total) by mouth every morning Before breakfast or 1-2 hours after (Patient not taking: Reported on 4/6/2023)   • [DISCONTINUED] calcium carbonate (TUMS) 500 mg chewable tablet Chew 1 tablet daily (Patient not taking: Reported on 11/21/2022)       Objective     /86   Pulse 77   Ht 5' 5\" (1 651 m)   Wt 79 2 kg (174 lb 9 6 oz)   SpO2 94%   BMI 29 05 kg/m²     Physical Exam  Vitals and nursing note reviewed  Constitutional:       Appearance: She is well-developed  HENT:      Head: Normocephalic and atraumatic        " Right Ear: Tympanic membrane, ear canal and external ear normal       Left Ear: Tympanic membrane, ear canal and external ear normal       Nose: Nose normal       Mouth/Throat:      Pharynx: No oropharyngeal exudate or posterior oropharyngeal erythema  Eyes:      Extraocular Movements: Extraocular movements intact  Cardiovascular:      Rate and Rhythm: Normal rate and regular rhythm  Heart sounds: Normal heart sounds  No murmur heard  No friction rub  No gallop  Pulmonary:      Effort: Pulmonary effort is normal  No respiratory distress  Breath sounds: Normal breath sounds  No wheezing or rales  Musculoskeletal:         General: Normal range of motion  Cervical back: Normal range of motion and neck supple  Right lower leg: No edema  Left lower leg: No edema  Lymphadenopathy:      Cervical: No cervical adenopathy  Skin:     General: Skin is warm and dry  Neurological:      Mental Status: She is alert and oriented to person, place, and time  Psychiatric:         Behavior: Behavior normal          Thought Content:  Thought content normal          Judgment: Judgment normal        Deondre Murphy PA-C

## 2023-04-06 NOTE — ASSESSMENT & PLAN NOTE
Will check labs  Recommended drinking more water  Cut back on caffeine  Holter monitor and stress test ordered  Will start on metoprolol to see if this helps with BP and palpitations     Possibly stress induced  Stop phentermine

## 2023-04-24 ENCOUNTER — HOSPITAL ENCOUNTER (OUTPATIENT)
Dept: MAMMOGRAPHY | Facility: HOSPITAL | Age: 63
Discharge: HOME | End: 2023-04-24

## 2023-04-24 VITALS — BODY MASS INDEX: 28.83 KG/M2 | HEIGHT: 65 IN | WEIGHT: 173.06 LBS

## 2023-04-24 DIAGNOSIS — Z12.31 ENCOUNTER FOR SCREENING MAMMOGRAM FOR MALIGNANT NEOPLASM OF BREAST: ICD-10-CM

## 2023-04-24 DIAGNOSIS — Z12.31 SCREENING MAMMOGRAM FOR BREAST CANCER: ICD-10-CM

## 2023-05-04 ENCOUNTER — APPOINTMENT (OUTPATIENT)
Dept: LAB | Facility: MEDICAL CENTER | Age: 63
End: 2023-05-04

## 2023-05-04 DIAGNOSIS — Z00.8 HEALTH EXAMINATION IN POPULATION SURVEY: ICD-10-CM

## 2023-05-04 LAB
CHOLEST SERPL-MCNC: 176 MG/DL
HDLC SERPL-MCNC: 62 MG/DL
LDLC SERPL CALC-MCNC: 100 MG/DL (ref 0–100)
NONHDLC SERPL-MCNC: 114 MG/DL
TRIGL SERPL-MCNC: 72 MG/DL

## 2023-05-06 LAB
EST. AVERAGE GLUCOSE BLD GHB EST-MCNC: 126 MG/DL
HBA1C MFR BLD: 6 %

## 2023-06-02 ENCOUNTER — LAB (OUTPATIENT)
Dept: LAB | Facility: MEDICAL CENTER | Age: 63
End: 2023-06-02
Payer: COMMERCIAL

## 2023-06-02 ENCOUNTER — OFFICE VISIT (OUTPATIENT)
Dept: FAMILY MEDICINE CLINIC | Facility: CLINIC | Age: 63
End: 2023-06-02

## 2023-06-02 VITALS
TEMPERATURE: 96.9 F | DIASTOLIC BLOOD PRESSURE: 90 MMHG | SYSTOLIC BLOOD PRESSURE: 148 MMHG | OXYGEN SATURATION: 99 % | BODY MASS INDEX: 29.32 KG/M2 | WEIGHT: 176 LBS | HEART RATE: 72 BPM | HEIGHT: 65 IN

## 2023-06-02 DIAGNOSIS — E03.9 HYPOTHYROIDISM, UNSPECIFIED TYPE: ICD-10-CM

## 2023-06-02 DIAGNOSIS — R53.83 OTHER FATIGUE: Primary | ICD-10-CM

## 2023-06-02 DIAGNOSIS — R53.83 OTHER FATIGUE: ICD-10-CM

## 2023-06-02 DIAGNOSIS — B34.9 ACUTE VIRAL SYNDROME: ICD-10-CM

## 2023-06-02 DIAGNOSIS — R63.0 ANOREXIA: ICD-10-CM

## 2023-06-02 LAB
ALBUMIN SERPL BCP-MCNC: 3.8 G/DL (ref 3.5–5)
ALP SERPL-CCNC: 59 U/L (ref 46–116)
ALT SERPL W P-5'-P-CCNC: 21 U/L (ref 12–78)
AMYLASE SERPL-CCNC: 55 IU/L (ref 25–115)
ANION GAP SERPL CALCULATED.3IONS-SCNC: 2 MMOL/L (ref 4–13)
AST SERPL W P-5'-P-CCNC: 14 U/L (ref 5–45)
BASOPHILS # BLD AUTO: 0.07 THOUSANDS/ÂΜL (ref 0–0.1)
BASOPHILS NFR BLD AUTO: 1 % (ref 0–1)
BILIRUB SERPL-MCNC: 0.34 MG/DL (ref 0.2–1)
BUN SERPL-MCNC: 14 MG/DL (ref 5–25)
CALCIUM SERPL-MCNC: 9.2 MG/DL (ref 8.3–10.1)
CHLORIDE SERPL-SCNC: 111 MMOL/L (ref 96–108)
CO2 SERPL-SCNC: 24 MMOL/L (ref 21–32)
CREAT SERPL-MCNC: 0.75 MG/DL (ref 0.6–1.3)
EOSINOPHIL # BLD AUTO: 0.2 THOUSAND/ÂΜL (ref 0–0.61)
EOSINOPHIL NFR BLD AUTO: 4 % (ref 0–6)
ERYTHROCYTE [DISTWIDTH] IN BLOOD BY AUTOMATED COUNT: 11.9 % (ref 11.6–15.1)
GFR SERPL CREATININE-BSD FRML MDRD: 86 ML/MIN/1.73SQ M
GLUCOSE SERPL-MCNC: 77 MG/DL (ref 65–140)
HCT VFR BLD AUTO: 39.1 % (ref 34.8–46.1)
HGB BLD-MCNC: 12.9 G/DL (ref 11.5–15.4)
IMM GRANULOCYTES # BLD AUTO: 0.01 THOUSAND/UL (ref 0–0.2)
IMM GRANULOCYTES NFR BLD AUTO: 0 % (ref 0–2)
LYMPHOCYTES # BLD AUTO: 2.44 THOUSANDS/ÂΜL (ref 0.6–4.47)
LYMPHOCYTES NFR BLD AUTO: 45 % (ref 14–44)
MCH RBC QN AUTO: 30.4 PG (ref 26.8–34.3)
MCHC RBC AUTO-ENTMCNC: 33 G/DL (ref 31.4–37.4)
MCV RBC AUTO: 92 FL (ref 82–98)
MONOCYTES # BLD AUTO: 0.39 THOUSAND/ÂΜL (ref 0.17–1.22)
MONOCYTES NFR BLD AUTO: 7 % (ref 4–12)
NEUTROPHILS # BLD AUTO: 2.39 THOUSANDS/ÂΜL (ref 1.85–7.62)
NEUTS SEG NFR BLD AUTO: 43 % (ref 43–75)
NRBC BLD AUTO-RTO: 0 /100 WBCS
PLATELET # BLD AUTO: 378 THOUSANDS/UL (ref 149–390)
PMV BLD AUTO: 9.6 FL (ref 8.9–12.7)
POTASSIUM SERPL-SCNC: 3.8 MMOL/L (ref 3.5–5.3)
PROT SERPL-MCNC: 6.9 G/DL (ref 6.4–8.4)
RBC # BLD AUTO: 4.25 MILLION/UL (ref 3.81–5.12)
SODIUM SERPL-SCNC: 137 MMOL/L (ref 135–147)
TSH SERPL DL<=0.05 MIU/L-ACNC: 2.05 UIU/ML (ref 0.45–4.5)
WBC # BLD AUTO: 5.5 THOUSAND/UL (ref 4.31–10.16)

## 2023-06-02 PROCEDURE — 36415 COLL VENOUS BLD VENIPUNCTURE: CPT

## 2023-06-02 PROCEDURE — 85025 COMPLETE CBC W/AUTO DIFF WBC: CPT

## 2023-06-02 PROCEDURE — 84443 ASSAY THYROID STIM HORMONE: CPT

## 2023-06-02 PROCEDURE — 80053 COMPREHEN METABOLIC PANEL: CPT

## 2023-06-02 PROCEDURE — 82150 ASSAY OF AMYLASE: CPT

## 2023-06-02 NOTE — PROGRESS NOTES
Assessment/Plan: CBC amylase CMP urinalysis    Problem List Items Addressed This Visit    None  Visit Diagnoses     Other fatigue    -  Primary    Acute viral syndrome               Diagnoses and all orders for this visit:    Other fatigue    Acute viral syndrome        No problem-specific Assessment & Plan notes found for this encounter  Subjective:      Patient ID: Federico Trujillo is a 66 y o  female  5 days ago started to feel fatigue very very difficult to carry out her activities of work and daily living no appetite mild headache slight anorexia no vomiting no abdominal pain may be bloating but the fatigue is the primary concern the patient has she was started on Wegovy for weight reduction about a month ago takes her injections on  symptoms started Tuesday this is the fourth dose now she has had and she is ready for dose titration upwards      The following portions of the patient's history were reviewed and updated as appropriate:   She has a past medical history of Abdominal pain, Atelectasis, Cancer ([facility] [address] ), Disease of thyroid gland, GERD (gastroesophageal reflux disease), Irritable bowel syndrome, Kidney stone, Motion sickness, PONV (postoperative nausea and vomiting), and Wears glasses  ,  does not have any pertinent problems on file  ,   has a past surgical history that includes Shoulder surgery (Bilateral); Biceps tendon repair (Right);  section; Exploratory laparotomy; Cholecystectomy; Appendectomy; Tubal ligation; Knee arthroscopy w/ meniscal repair (Left); pr arthrs kne surg w/meniscectomy med/lat w/shvg (Left, 2017); ARTHROSCOPY KNEE (Right, 2018); pr colonoscopy flx dx w/collj spec when pfrmd (N/A, 2/15/2019); Carpal tunnel release (Left); Colonoscopy; Umbilical hernia repair; Achilles tendon repair (Right); LAPAROSCOPY; pr neuroplasty &/transpos median nrv carpal tunne (Right, 2020); and Breast excisional biopsy (Right, 2012)  ,  family history includes Diabetes in her paternal grandmother; Heart disease in her father; Hypertension in her mother; Lung cancer in her maternal uncle; Mental illness in her father; No Known Problems in her daughter and paternal aunt; Stroke in her mother; Substance Abuse in her brother  ,   reports that she quit smoking about 3 years ago  Her smoking use included cigarettes  She has never used smokeless tobacco  She reports current alcohol use  She reports that she does not use drugs  ,  is allergic to erythromycin, betadine [povidone iodine], doxycycline, and medical tape     Current Outpatient Medications   Medication Sig Dispense Refill   • Ca Carbonate-Mag Hydroxide (ROLAIDS PO) Take by mouth as needed     • cholecalciferol (VITAMIN D3) 1,000 units tablet Take 5,000 Units by mouth daily     • estrogens, conjugated (Premarin) vaginal cream Insert 0 5 g into the vagina 2 (two) times a week 30 g 2   • famotidine (PEPCID) 40 MG tablet Take 1 tablet (40 mg total) by mouth 2 (two) times a day 180 tablet 3   • Semaglutide-Weight Management (WEGOVY) 0 25 MG/0 5ML Inject 0 5 mL (0 25 mg total) under the skin once a week For one month then increase to 0 5 mg 2 mL 0   • Synthroid 112 MCG tablet Take 1 tablet (112 mcg total) by mouth daily in the early morning 30 tablet 1   • metFORMIN (GLUCOPHAGE-XR) 750 mg 24 hr tablet take 1 tablet by mouth once daily with breakfast (Patient not taking: Reported on 6/2/2023) 30 tablet 1   • metoprolol succinate (Toprol XL) 25 mg 24 hr tablet Take 0 5 tablets (12 5 mg total) by mouth daily (Patient not taking: Reported on 6/2/2023) 30 tablet 1   • Semaglutide-Weight Management (WEGOVY) 0 5 MG/0 5ML Inject 0 5 mL (0 5 mg total) under the skin once a week (Patient not taking: Reported on 6/2/2023) 2 mL 0     No current facility-administered medications for this visit  Review of Systems   Constitutional: Positive for activity change and fatigue  Negative for appetite change, diaphoresis and fever     HENT: "Negative  Eyes: Negative  Respiratory: Negative for apnea, cough, chest tightness, shortness of breath and wheezing  Cardiovascular: Negative for chest pain, palpitations and leg swelling  Gastrointestinal: Negative for abdominal distention, abdominal pain, anal bleeding, constipation, diarrhea, nausea and vomiting  Endocrine: Negative for cold intolerance, heat intolerance, polydipsia, polyphagia and polyuria  Genitourinary: Negative for difficulty urinating, dysuria, flank pain, hematuria and urgency  Musculoskeletal: Negative for arthralgias, back pain, gait problem, joint swelling and myalgias  Skin: Negative for color change, rash and wound  Allergic/Immunologic: Negative for environmental allergies, food allergies and immunocompromised state  Neurological: Negative for dizziness, seizures, syncope, speech difficulty, numbness and headaches  Hematological: Negative for adenopathy  Does not bruise/bleed easily  Psychiatric/Behavioral: Negative for agitation, behavioral problems, hallucinations, sleep disturbance and suicidal ideas  Objective:  Vitals:    06/02/23 1033   BP: 148/90   BP Location: Left arm   Patient Position: Sitting   Cuff Size: Standard   Pulse: 72   Temp: (!) 96 9 °F (36 1 °C)   TempSrc: Temporal   SpO2: 99%   Weight: 79 8 kg (176 lb)   Height: 5' 5\" (1 651 m)     Body mass index is 29 29 kg/m²  Physical Exam  Constitutional:       General: She is not in acute distress  Appearance: She is well-developed  She is not diaphoretic  HENT:      Head: Normocephalic  Right Ear: External ear normal       Left Ear: External ear normal       Nose: Nose normal    Eyes:      General: No scleral icterus  Right eye: No discharge  Left eye: No discharge  Conjunctiva/sclera: Conjunctivae normal       Pupils: Pupils are equal, round, and reactive to light  Neck:      Thyroid: No thyromegaly  Trachea: No tracheal deviation   " Cardiovascular:      Rate and Rhythm: Normal rate and regular rhythm  Heart sounds: Normal heart sounds  No murmur heard  No friction rub  No gallop  Pulmonary:      Effort: Pulmonary effort is normal  No respiratory distress  Breath sounds: Normal breath sounds  No wheezing  Abdominal:      General: Bowel sounds are normal       Palpations: Abdomen is soft  There is no mass  Tenderness: There is no abdominal tenderness  There is no guarding  Musculoskeletal:         General: No deformity  Cervical back: Normal range of motion  Lymphadenopathy:      Cervical: No cervical adenopathy  Skin:     General: Skin is warm and dry  Findings: No erythema or rash  Neurological:      Mental Status: She is alert and oriented to person, place, and time  Cranial Nerves: No cranial nerve deficit  Psychiatric:         Thought Content:  Thought content normal

## 2023-06-04 ENCOUNTER — APPOINTMENT (OUTPATIENT)
Dept: LAB | Facility: HOSPITAL | Age: 63
End: 2023-06-04
Attending: FAMILY MEDICINE
Payer: COMMERCIAL

## 2023-06-04 LAB
BACTERIA UR QL AUTO: ABNORMAL /HPF
BILIRUB UR QL STRIP: NEGATIVE
CLARITY UR: CLEAR
COLOR UR: YELLOW
GLUCOSE UR STRIP-MCNC: NEGATIVE MG/DL
HGB UR QL STRIP.AUTO: NEGATIVE
KETONES UR STRIP-MCNC: NEGATIVE MG/DL
LEUKOCYTE ESTERASE UR QL STRIP: NEGATIVE
NITRITE UR QL STRIP: NEGATIVE
NON-SQ EPI CELLS URNS QL MICRO: ABNORMAL /HPF
PH UR STRIP.AUTO: 6.5 [PH]
PROT UR STRIP-MCNC: NEGATIVE MG/DL
RBC #/AREA URNS AUTO: ABNORMAL /HPF
SP GR UR STRIP.AUTO: 1.02 (ref 1–1.03)
UROBILINOGEN UR QL STRIP.AUTO: 0.2 E.U./DL
WBC #/AREA URNS AUTO: ABNORMAL /HPF

## 2023-06-07 ENCOUNTER — TELEPHONE (OUTPATIENT)
Dept: PALLIATIVE MEDICINE | Facility: CLINIC | Age: 63
End: 2023-06-07

## 2023-06-07 ENCOUNTER — TELEPHONE (OUTPATIENT)
Dept: FAMILY MEDICINE CLINIC | Facility: CLINIC | Age: 63
End: 2023-06-07

## 2023-06-07 DIAGNOSIS — I10 PRIMARY HYPERTENSION: ICD-10-CM

## 2023-06-07 DIAGNOSIS — R73.03 PREDIABETES: ICD-10-CM

## 2023-06-07 DIAGNOSIS — K21.9 GASTROESOPHAGEAL REFLUX DISEASE, UNSPECIFIED WHETHER ESOPHAGITIS PRESENT: ICD-10-CM

## 2023-06-07 DIAGNOSIS — E78.5 MILD HYPERLIPIDEMIA: ICD-10-CM

## 2023-06-07 DIAGNOSIS — E66.3 OVERWEIGHT: ICD-10-CM

## 2023-06-07 DIAGNOSIS — E03.9 HYPOTHYROIDISM, UNSPECIFIED TYPE: ICD-10-CM

## 2023-06-07 RX ORDER — LEVOTHYROXINE SODIUM 112 MCG
TABLET ORAL
Qty: 30 TABLET | Refills: 1 | Status: SHIPPED | OUTPATIENT
Start: 2023-06-07

## 2023-06-07 NOTE — TELEPHONE ENCOUNTER
Patient called wondering since she can't find wegovy anywhere, if she can go back on phentermine  She felt great on that and was doing good on that as well

## 2023-06-08 NOTE — TELEPHONE ENCOUNTER
Her BP was elevated at her last visit with me and at her appt with Dr Federico Barbosa  Needs to be under better control before considering restart of phentermine   [place] would be best option

## 2023-06-10 RX ORDER — PEN NEEDLE, DIABETIC 32 GX 1/6"
NEEDLE, DISPOSABLE MISCELLANEOUS DAILY
Qty: 30 EACH | Refills: 3 | Status: SHIPPED | OUTPATIENT
Start: 2023-06-10

## 2023-06-12 ENCOUNTER — TELEPHONE (OUTPATIENT)
Dept: FAMILY MEDICINE CLINIC | Facility: CLINIC | Age: 63
End: 2023-06-12

## 2023-06-12 DIAGNOSIS — K21.9 GASTROESOPHAGEAL REFLUX DISEASE, UNSPECIFIED WHETHER ESOPHAGITIS PRESENT: Primary | ICD-10-CM

## 2023-06-12 DIAGNOSIS — K12.0 APHTHOUS ULCER OF MOUTH: ICD-10-CM

## 2023-06-12 RX ORDER — OMEPRAZOLE 40 MG/1
40 CAPSULE, DELAYED RELEASE ORAL
Qty: 90 CAPSULE | Refills: 1 | Status: SHIPPED | OUTPATIENT
Start: 2023-06-12 | End: 2023-12-09

## 2023-06-12 RX ORDER — VALACYCLOVIR HYDROCHLORIDE 500 MG/1
500 TABLET, FILM COATED ORAL 2 TIMES DAILY
Qty: 20 TABLET | Refills: 0 | Status: SHIPPED | OUTPATIENT
Start: 2023-06-12 | End: 2023-06-22

## 2023-06-14 DIAGNOSIS — K12.0 APHTHOUS ULCER: Primary | ICD-10-CM

## 2023-06-14 RX ORDER — LIDOCAINE HYDROCHLORIDE 20 MG/ML
5 SOLUTION OROPHARYNGEAL 4 TIMES DAILY PRN
Qty: 100 ML | Refills: 0 | Status: SHIPPED | OUTPATIENT
Start: 2023-06-14 | End: 2023-06-15 | Stop reason: ALTCHOICE

## 2023-06-15 DIAGNOSIS — K12.0 APHTHOUS ULCER: Primary | ICD-10-CM

## 2023-06-15 RX ORDER — SUCRALFATE 1 G/1
TABLET ORAL
Qty: 16 TABLET | Refills: 1 | Status: SHIPPED | OUTPATIENT
Start: 2023-06-15

## 2023-06-20 ENCOUNTER — TELEPHONE (OUTPATIENT)
Dept: FAMILY MEDICINE CLINIC | Facility: CLINIC | Age: 63
End: 2023-06-20

## 2023-06-20 VITALS — SYSTOLIC BLOOD PRESSURE: 112 MMHG | DIASTOLIC BLOOD PRESSURE: 70 MMHG

## 2023-07-03 ENCOUNTER — TELEPHONE (OUTPATIENT)
Dept: FAMILY MEDICINE CLINIC | Facility: CLINIC | Age: 63
End: 2023-07-03

## 2023-07-03 VITALS — SYSTOLIC BLOOD PRESSURE: 129 MMHG | DIASTOLIC BLOOD PRESSURE: 61 MMHG

## 2023-07-24 ENCOUNTER — OFFICE VISIT (OUTPATIENT)
Dept: BARIATRICS | Facility: CLINIC | Age: 63
End: 2023-07-24
Payer: COMMERCIAL

## 2023-07-24 VITALS
WEIGHT: 177.6 LBS | SYSTOLIC BLOOD PRESSURE: 122 MMHG | DIASTOLIC BLOOD PRESSURE: 72 MMHG | OXYGEN SATURATION: 98 % | BODY MASS INDEX: 29.59 KG/M2 | HEIGHT: 65 IN | TEMPERATURE: 97.8 F | HEART RATE: 80 BPM

## 2023-07-24 DIAGNOSIS — R73.03 PREDIABETES: ICD-10-CM

## 2023-07-24 DIAGNOSIS — I10 PRIMARY HYPERTENSION: ICD-10-CM

## 2023-07-24 DIAGNOSIS — E66.3 OVERWEIGHT: Primary | ICD-10-CM

## 2023-07-24 DIAGNOSIS — Z79.899 MEDICATION MANAGEMENT: ICD-10-CM

## 2023-07-24 PROCEDURE — 99214 OFFICE O/P EST MOD 30 MIN: CPT | Performed by: PHYSICIAN ASSISTANT

## 2023-07-24 NOTE — PROGRESS NOTES
Assessment/Plan:    Overweight  -Patient is pursuing Conservative Program  -Initial weight loss goal of 5-10% weight loss for improved health  -Did not tolerate Qsymia: concentration issues/confusion, dizziness, fatigue. Switched to phentermine (186 lbs, 7/19/21)- had positive response but d/c due to elevated BP and palpitations  -Started on [facility] (1/2023- 172), but [facility]  -Screening labs: A1c and LP reviewed from 3/23/22, TSH from 12/20/23; CMP drawn today  Patient denies personal and family history of MEN2 tumors and medullary thyroid/thyroid carcinoma. Patient denies personal history of pancreatitis. [address] started end of June (6/2/23- 176), however expensive  Denies seizuresReviewed the potential side effects of Contrave, which include: abdominal upset, headache, dizziness, trouble sleeping, increased blood pressure, depression/anxiety, and fatigue. Patient should call/return if he/she develops symptoms of depression/aniety. Patient should have ER evaluation if thoughts of harming self/others occur. Contrave should be stopped prior to narcotic pain medications. Alcohol is not recommened. Notify provider with any changes in vision  -Dietary recall reviewed, suggestions provided. Tips for dinner given     Initial: 182.2, highest weight 186  Current: 177.6  Change: -5.6  Goal: 150    Follow up in approximately 2 months with Non-Surgical Physician/Advanced Practitioner. Goals:  Food log (ie.) [email or web address],etc. baritastic  No sugary beverages. At least 64oz of water daily. Increase physical activity by 10 minutes daily.  Gradually increase physical activity to a goal of 5 days per week for 30 minutes of MODERATE intensity PLUS 2 days per week of FULL BODY resistance training  5-10 servings of fruits and vegetables per day and 25-35 grams of dietary fiber per day, gradually increasing    See AVS     Diagnoses and all orders for this visit:    Overweight  -     Naltrexone-buPROPion HCl ER 8-90 MG TB12; Take 1 tab po qam x1 week then 1 tab bid for 1 week then 2 tabs qam and 1 tab po qhs x1 week then 2 tabs po bid    Prediabetes  -     Naltrexone-buPROPion HCl ER 8-90 MG TB12; Take 1 tab po qam x1 week then 1 tab bid for 1 week then 2 tabs qam and 1 tab po qhs x1 week then 2 tabs po bid    Primary hypertension  -     Naltrexone-buPROPion HCl ER 8-90 MG TB12; Take 1 tab po qam x1 week then 1 tab bid for 1 week then 2 tabs qam and 1 tab po qhs x1 week then 2 tabs po bid    Body mass index 29.0-29.9, adult        Subjective:   Chief Complaint   Patient presents with   • Follow-up     Patient ID: Shadi Lund  is a 66 y.o. female with excess weight/obesity here to pursue weight management. Patient is pursuing Conservative Program.     HPI  The patient presents for [facility] follow up. Started Saxenda about 3 weeks ago. Feeling tired and nauseous at times, otherwise tolerating. However, expensive over $100/month    If sweets at work, having trouble not eating  B: special K + almond milk + banana  S: if stuff in office or stressed  L: varies- leftovers  S: 5-6 pretzels  D: dining out frequently recently  S: no    Increased appetite/cravings: yes, stress eating  Exercise: walking at lunch  Hydration:  >64 oz per day, diet green  Alcohol: rare    The following portions of the patient's history were reviewed and updated as appropriate: allergies, current medications, past family history, past medical history, past social history, past surgical history and problem list.    Review of Systems   Cardiovascular: Negative for chest pain and palpitations. Psychiatric/Behavioral: Negative. Objective:    /72 (BP Location: Right arm, Cuff Size: Large)   Pulse 80   Temp 97.8 °F (36.6 °C) (Temporal)   Ht 5' 5" (1.651 m)   Wt 80.6 kg (177 lb 9.6 oz)   SpO2 98%   BMI 29.55 kg/m²      Physical Exam  Vitals and nursing note reviewed.      Constitutional General appearance: Abnormal.  well developed and overweight. Pulmonary   Respiratory effort: No increased work of breathing or signs of respiratory distress.     Musculoskeletal   Gait and station: Normal.    Psychiatric   Orientation to person, place and time: Normal.    Affect: appropriate

## 2023-07-24 NOTE — ASSESSMENT & PLAN NOTE
-Patient is pursuing Conservative Program  -Initial weight loss goal of 5-10% weight loss for improved health  -Did not tolerate Qsymia: concentration issues/confusion, dizziness, fatigue. Switched to phentermine (186 lbs, 7/19/21)- had positive response but d/c due to elevated BP and palpitations  -Started on metformin (1/2023- 172), but was switched to [address] [facility]  -Screening labs: A1c and LP reviewed from 3/23/22, TSH from 12/20/23; CMP drawn today  Patient denies personal and family history of MEN2 tumors and medullary thyroid/thyroid carcinoma. Patient denies personal history of pancreatitis. [address] [employer] started end of June (6/2/23- 176), however expensive  Denies seizuresReviewed the potential side effects of Contrave, which include: abdominal upset, headache, dizziness, trouble sleeping, increased blood pressure, depression/anxiety, and fatigue. Patient should call/return if he/she develops symptoms of depression/aniety. Patient should have ER evaluation if thoughts of harming self/others occur. Contrave should be stopped prior to narcotic pain medications. Alcohol is not recommened. Notify provider with any changes in vision  -Dietary recall reviewed, suggestions provided.  Tips for dinner given     Initial: 182.2, highest weight 186  Current: 177.6  Change: -5.6  Goal: 150

## 2023-07-24 NOTE — PATIENT INSTRUCTIONS
Contrave: Take one tablet by mouth in the morning for one week, then take one tablet by mouth in the morning and one tablet by mouth at night for one week, then take two tablets by mouth in the morning and one tablet by mouth at night for one week, then take two tablets by mouth in the morning and at night. Do not take with high fat meal.    While on Contrave check your resting blood pressure and pulse at least 3 times per week. For example you may do this Monday morning, Wednesday afternoon, and Friday night.  Your blood pressure should not be 140/90 or higher and goal pulse goal  bpm (beats per minute.) Notify the provider if either are consistently elevated    Recommend checking lab coverage before having labs drawn

## 2023-07-28 ENCOUNTER — TELEPHONE (OUTPATIENT)
Dept: OTOLARYNGOLOGY | Facility: CLINIC | Age: 63
End: 2023-07-28

## 2023-07-28 NOTE — TELEPHONE ENCOUNTER
----- Message from Ryland Ceballos PA-C sent at 7/24/2023  1:32 PM EDT -----  Pt will need PA for Contrave

## 2023-07-28 NOTE — TELEPHONE ENCOUNTER
I spoke with Bailey Michaels from [facility] and started prior auth for contrave  Case # [id number]    (can take up to 15 days but can check back in 1 week by calling [phone]      ----- Message from Erika Calle PA-C sent at 7/24/2023  1:32 PM EDT -----  Pt will need PA for Contrave

## 2023-08-14 ENCOUNTER — TELEPHONE (OUTPATIENT)
Dept: FAMILY MEDICINE CLINIC | Facility: CLINIC | Age: 63
End: 2023-08-14

## 2023-08-14 DIAGNOSIS — M25.50 ARTHRALGIA, UNSPECIFIED JOINT: Primary | ICD-10-CM

## 2023-08-14 RX ORDER — NAPROXEN 500 MG/1
500 TABLET ORAL 2 TIMES DAILY WITH MEALS
Qty: 20 TABLET | Refills: 0 | Status: SHIPPED | OUTPATIENT
Start: 2023-08-14

## 2023-08-14 NOTE — TELEPHONE ENCOUNTER
Would like refill on her Naproxen 500 mg, does not take it often, last refill . Can you order more for her? Send to [facility] [place].

## 2023-08-16 DIAGNOSIS — E03.9 HYPOTHYROIDISM, UNSPECIFIED TYPE: ICD-10-CM

## 2023-08-16 RX ORDER — LEVOTHYROXINE SODIUM 112 MCG
112 TABLET ORAL EVERY MORNING
Qty: 90 TABLET | Refills: 1 | Status: SHIPPED | OUTPATIENT
Start: 2023-08-16

## 2023-08-24 ENCOUNTER — CONSULT (OUTPATIENT)
Dept: GASTROENTEROLOGY | Facility: CLINIC | Age: 63
End: 2023-08-24
Payer: COMMERCIAL

## 2023-08-24 VITALS
RESPIRATION RATE: 16 BRPM | OXYGEN SATURATION: 97 % | HEIGHT: 65 IN | BODY MASS INDEX: 35.89 KG/M2 | HEART RATE: 70 BPM | DIASTOLIC BLOOD PRESSURE: 77 MMHG | WEIGHT: 215.4 LBS | SYSTOLIC BLOOD PRESSURE: 144 MMHG | TEMPERATURE: 98.5 F

## 2023-08-24 DIAGNOSIS — R49.0 RASPY VOICE: ICD-10-CM

## 2023-08-24 DIAGNOSIS — R14.0 BLOATING: ICD-10-CM

## 2023-08-24 DIAGNOSIS — K21.9 GASTROESOPHAGEAL REFLUX DISEASE, UNSPECIFIED WHETHER ESOPHAGITIS PRESENT: ICD-10-CM

## 2023-08-24 DIAGNOSIS — Z12.11 SCREENING FOR COLON CANCER: ICD-10-CM

## 2023-08-24 DIAGNOSIS — K59.00 CONSTIPATION, UNSPECIFIED CONSTIPATION TYPE: ICD-10-CM

## 2023-08-24 DIAGNOSIS — R13.10 DYSPHAGIA, UNSPECIFIED TYPE: Primary | ICD-10-CM

## 2023-08-24 DIAGNOSIS — R10.84 GENERALIZED ABDOMINAL PAIN: ICD-10-CM

## 2023-08-24 PROCEDURE — 99244 OFF/OP CNSLTJ NEW/EST MOD 40: CPT | Performed by: NURSE PRACTITIONER

## 2023-08-24 RX ORDER — POLYETHYLENE GLYCOL 3350 17 G/17G
17 POWDER, FOR SOLUTION ORAL DAILY
Qty: 578 G | Refills: 2 | Status: SHIPPED | OUTPATIENT
Start: 2023-08-24

## 2023-08-24 RX ORDER — OMEPRAZOLE 40 MG/1
CAPSULE, DELAYED RELEASE ORAL
Qty: 180 CAPSULE | Refills: 1 | Status: SHIPPED | OUTPATIENT
Start: 2023-08-24

## 2023-08-24 NOTE — PROGRESS NOTES
[facility] - Outpatient Consultation  Sejal Hemphill 66 y.o. female MRN: [id number]  Encounter: [id number]          ASSESSMENT AND PLAN:    The patient presents today for an initial consultation for  her worsening reflux, raspy voice, and dysphagia. Exam:  Oral mucosa normal upon visual inspection, without any sores, lesions, or ulcerations. Sclera without icterus and benign. Lung sounds CTA b/l. Normal S1 & S2 upon exam. Abdomen is soft, moderately tender in the epigastric and umbilical region, with mild guarding, but without rebound pain with hypoactive bowel sounds x4. No edema noted of the b/l lower extremities upon exam today. Skin is non-icteric. 1. Dysphagia, unspecified type  2. Raspy voice  3. Gastroesophageal reflux disease, unspecified whether esophagitis present  While the patient was in the office today, I did discuss with the patient that I am highly suspicious that the dysphagia ever and py voice are most likely secondary to worsening reflux disease and at this point it would be in her best interest to proceed with an EGD for further evaluation of any other underlying etiology that could explain her symptoms. The patient was agreeable and verbalized an understanding. I discussed the risks of procedure with the patient including, but not limited to: bleeding, infection, sore throat, and perforation. The patient gave verbal understanding and is agreeable to proceed. In the meantime, I would also like to maximize her omeprazole since she is noting improvement but still has breakthrough reflux symptoms and epigastric pain, I would like her to increase the omeprazole to 40 mg twice daily for now. The patient was agreeable and verbalized an understanding. Continue to avoid trigger foods. - Ambulatory Referral to Gastroenterology  - omeprazole (PriLOSEC) 40 MG capsule; Take 1 PO BID 30 mins prior to a meal.  Dispense: 180 capsule;  Refill: 1  - EGD; Future    4. Generalized abdominal pain  5. Bloating  6. Constipation, unspecified constipation type  I also discussed with the patient that overall with her chronic abdominal pain and bloating, with her symptoms and bowel patterns, I am highly suspicious she suffers from chronic constipation and that it would be in her best interest to start MiraLAX at least every other day, if not daily and to increase her overall water intake to at least 64 ounces a day. The patient was agreeable and verbalized an understanding.    - polyethylene glycol (GLYCOLAX) 17 GM/SCOOP powder; Take 17 g by mouth daily  Dispense: 578 g; Refill: 2    7. Screening for Colon Cancer  I discussed with the patient that at this point time since she is not exhibiting any red flag symptoms and her previous colonoscopy was normal, she is not due for repeat colonoscopy until: 2/15/29, unless she develops any red flag symptoms. The patient was agreeable and verbalized an understanding. Low  While the patient was in the office today we did review GI red flag symptoms, including, but not limited to: chronic nausea, vomiting, diarrhea, chills, fever, and unintentional weight loss and should call or contact our office with any changes or concerns. I reviewed with the patient that if they notice any blood while vomiting or in their stool they should contact or office or go to the nearest emergency room for immediate evaluation. The patient was agreeable and verbalized an understanding. The patient will schedule a follow up office visit after her EGD. The patient was agreeable and verbalized an understanding.     ______________________________________________________________________    HPI: The patient is a 66 y.o. female who presents today for a consultation regarding evaluation for her worsening reflux, raspy voice, and dysphagia.   Patient reports she has had a chronic history of GERD and was previously on Prevacid, however, approximately 2 to 3 months ago she started with significantly worsening reflux symptoms that even when she was standing up had significantly worsened. She followed up with her primary care provider who discontinued the Prevacid and started her on omeprazole 40 mg daily which has definitely improved her symptoms, however, she still has breakthrough reflux symptoms and has been following with ENT cannot explain her raspy voice except for the possibility of the worsening reflux symptoms. The patient also reports she is occasionally choking on both food and drink and has to be very conscientious about how she swallows. She also continues with chronic abdominal pain, bloating, and varying degrees of constipation mixed with diarrhea. The patient reports that she can go from having several bowel movements a day to almost a week without a bowel movement and when she does start to get constipated MiraLAX does seem to be helpful. The patient denies any nausea, vomiting, decreased appetite, unplanned weight loss, or abdominal pain. Water Intake: 2 bottles daily. The patient denies eating any raw or uncooked fish, seafood, or sushi in the past 6-8 months. The patient reports that they have a BM on an irregular basis of several times a day to almost a week without a BM and reports that it is not relieving, without any nocturnal BMs, melena or bloody stools. Canadian: 1-7. Last BM: Yesterday. Flatus: Minimal.    The patient denies any evidence of jaundice, fevers, susan colored stools, swelling of the lower extremities, fatigue, confusion, memory loss or easy bruising. PMH/PSH:   Abdominal/Chest Surgery: Appendectomy, , Cholyscystectomy, Ex Lap, Tubal, Umbilical Hernia Repari, Right Breast Cyst removal.   Colon Cancer: Denied  Any Cancer: Cervical with comb biopsy.    Pre-Cancerous Polyps: Denied  Crohn's: Denid  Ulcerative Colitis: Denied    Tobacco/Vaping: Denied  ETOH: Rarely  Marijuana: Denied  Illicit Drug Use: Denied    FH:  Colon Cancer: Denied  Any Cancer: Denied  Family Members with Pre-Cancerous Polyps: N/A  Crohn's: Denied  Ulcerative Colitis: Denied    Meds: Omeprazole 40 mg daily. NSAID Use: Occasional Prn use of Naproxen. Imaging: (1/19/18) CT of the abdomen and pelvis with contrast:    Thickened transverse and descending colon attributed to under distention. Chronic underlying colitis may have this appearance in the appropriate clinical scenario.     Otherwise, no acute intra-abdominal or intrapelvic process. Endoscopy History: EGD: (Unsure):     COLONOSCOPY: (215/19): Normal. Recommend repeat in 10 years. DUE: 2/15/29    REVIEW OF SYSTEMS:    CONSTITUTIONAL: Denies any fever, chills, rigors, and weight loss. HEENT: No earache or tinnitus. Denies hearing loss or visual disturbances. CARDIOVASCULAR: No chest pain or palpitations. RESPIRATORY: Denies any cough, hemoptysis, shortness of breath or dyspnea on exertion. GASTROINTESTINAL: As noted in the History of Present Illness. GENITOURINARY: No problems with urination. Denies any hematuria or dysuria. NEUROLOGIC: No dizziness or vertigo, denies headaches. MUSCULOSKELETAL: Denies any muscle or joint pain. SKIN: Denies skin rashes or itching. ENDOCRINE: Denies excessive thirst. Denies intolerance to heat or cold. PSYCHOSOCIAL: Denies depression or anxiety. Denies any recent memory loss.        Historical Information   Past Medical History:   Diagnosis Date   • Abdominal pain    • Atelectasis    • Cancer (HCC)     cervical   • Disease of thyroid gland     hypothyroidism   • GERD (gastroesophageal reflux disease)    • Irritable bowel syndrome    • Kidney stone    • Motion sickness    • PONV (postoperative nausea and vomiting)    • Wears glasses      Past Surgical History:   Procedure Laterality Date   • ACHILLES TENDON REPAIR Right    • APPENDECTOMY     • ARTHROSCOPY KNEE Right 2/21/2018    Procedure: ARTHROSCOPY KNEE; PARTIAL LATERAL MENISCECTOMY; PARTIAL MEDIAL MENISCECTOMY; POSSIBLE DEBRIDEMENT; removal of loose bodies;  Surgeon: Maximilian Cross MD;  Location: [facility];  Service: Orthopedics   • BICEPS TENDON REPAIR Right    • BREAST EXCISIONAL BIOPSY Right 2012    benign   • CARPAL TUNNEL RELEASE Left    •  SECTION      three    • CHOLECYSTECTOMY     • COLONOSCOPY     • EXPLORATORY LAPAROTOMY     • KNEE ARTHROSCOPY W/ MENISCAL REPAIR Left    • LAPAROSCOPY     • MO ARTHRS KNE SURG W/MENISCECTOMY MED/LAT W/SHVG Left 2017    Procedure: ARTHROSCOPY KNEE PARTIAL MEDIAL MENISECTOMY ;  Surgeon: Maximilian Cross MD;  Location: [facility];  Service: Orthopedics   • MO COLONOSCOPY FLX DX W/COLLJ [facility] WHEN PFRMD N/A 2/15/2019    Procedure: COLONOSCOPY;  Surgeon: Sallye Angelucci, MD;  Location:  GI LAB;   Service: Colorectal   • MO NEUROPLASTY &/TRANSPOS MEDIAN NRV CARPAL TUNNE Right 2020    Procedure: CARPAL TUNNEL & IN SITU ULNAR NERVE RELEASE;  Surgeon: Lesley Mariano MD;  Location: [facility];  Service: Orthopedics   • SHOULDER SURGERY Bilateral    • TUBAL LIGATION     • UMBILICAL HERNIA REPAIR      with mesh x2     Social History   Social History     Substance and Sexual Activity   Alcohol Use Yes     Social History     Substance and Sexual Activity   Drug Use No     Social History     Tobacco Use   Smoking Status Former   • Types: Cigarettes   • Quit date: 2019   • Years since quittin.0   Smokeless Tobacco Never     Family History   Problem Relation Age of Onset   • Stroke Mother    • Hypertension Mother    • Mental illness Father    • Heart disease Father    • Substance Abuse Brother    • Lung cancer Maternal Uncle         62s   • Diabetes Paternal Grandmother    • No Known Problems Daughter    • No Known Problems Paternal Aunt        Meds/Allergies       Current Outpatient Medications:   •  Ca Carbonate-Mag Hydroxide (ROLAIDS PO)  •  cholecalciferol (VITAMIN D3) 1,000 units tablet  •  estrogens, conjugated (Premarin) vaginal cream  •  Insulin Pen Needle (NovoFine Plus Pen Needle) 32G X 4 MM MISC  •  liraglutide (SAXENDA) injection  •  metFORMIN (GLUCOPHAGE-XR) 750 mg 24 hr tablet  •  metoprolol succinate (Toprol XL) 25 mg 24 hr tablet  •  Naltrexone-buPROPion HCl ER 8-90 MG TB12  •  naproxen (Naprosyn) 500 mg tablet  •  omeprazole (PriLOSEC) 40 MG capsule  •  sucralfate (CARAFATE) 1 g tablet  •  Synthroid 112 MCG tablet  •  valACYclovir (VALTREX) 500 mg tablet    Allergies   Allergen Reactions   • Erythromycin Chest Pain   • Betadine [Povidone Iodine] Other (See Comments)     Burns skin, blisters   • Doxycycline Abdominal Pain   • Medical Tape Other (See Comments)     Redness and peeling           Objective     not currently breastfeeding. There is no height or weight on file to calculate BMI. PHYSICAL EXAM:      General Appearance:   Alert, cooperative, no distress   HEENT:   Normocephalic, atraumatic, anicteric. Neck:  Supple, symmetrical, trachea midline   Lungs:   Clear to auscultation bilaterally; no rales, rhonchi or wheezing; respirations unlabored    Heart[de-identified]   Regular rate and rhythm; no murmur, rub, or gallop. Abdomen:   Soft, non-tender, non-distended; normal bowel sounds; no masses, no organomegaly    Genitalia:   Deferred    Rectal:   Deferred    Extremities:  No cyanosis, clubbing or edema    Pulses:  2+ and symmetric    Skin:  No jaundice, rashes, or lesions    Lymph nodes:  No palpable cervical lymphadenopathy        Lab Results:   No visits with results within 1 Day(s) from this visit.    Latest known visit with results is:   Lab on 06/02/2023   Component Date Value   • TSH 3RD GENERATON 06/02/2023 2.051    • WBC 06/02/2023 5.50    • RBC 06/02/2023 4.25    • Hemoglobin 06/02/2023 12.9    • Hematocrit 06/02/2023 39.1    • MCV 06/02/2023 92    • MCH 06/02/2023 30.4    • MCHC 06/02/2023 33.0    • RDW 06/02/2023 11.9    • MPV 06/02/2023 9.6    • Platelets 80/70/2779 378    • nRBC 06/02/2023 0    • Neutrophils Relative 06/02/2023 43    • Immat GRANS % 06/02/2023 0    • Lymphocytes Relative 06/02/2023 45 (H)    • Monocytes Relative 06/02/2023 7    • Eosinophils Relative 06/02/2023 4    • Basophils Relative 06/02/2023 1    • Neutrophils Absolute 06/02/2023 2.39    • Immature Grans Absolute 06/02/2023 0.01    • Lymphocytes Absolute 06/02/2023 2.44    • Monocytes Absolute 06/02/2023 0.39    • Eosinophils Absolute 06/02/2023 0.20    • Basophils Absolute 06/02/2023 0.07    • Sodium 06/02/2023 137    • Potassium 06/02/2023 3.8    • Chloride 06/02/2023 111 (H)    • CO2 06/02/2023 24    • ANION GAP 06/02/2023 2 (L)    • BUN 06/02/2023 14    • Creatinine 06/02/2023 0.75    • Glucose 06/02/2023 77    • Calcium 06/02/2023 9.2    • AST 06/02/2023 14    • ALT 06/02/2023 21    • Alkaline Phosphatase 06/02/2023 59    • Total Protein 06/02/2023 6.9    • Albumin 06/02/2023 3.8    • Total Bilirubin 06/02/2023 0.34    • eGFR 06/02/2023 86    • Amylase 06/02/2023 55          Radiology Results:   No results found.

## 2023-08-24 NOTE — PATIENT INSTRUCTIONS
Increase omeprazole 40 mg to BID  Proceed with EGD  Start miralax daily. Increase water intake as close to 64 oz daily. Schedule follow up OV after EGD.

## 2023-09-06 ENCOUNTER — OFFICE VISIT (OUTPATIENT)
Dept: FAMILY MEDICINE CLINIC | Facility: CLINIC | Age: 63
End: 2023-09-06
Payer: COMMERCIAL

## 2023-09-06 VITALS
BODY MASS INDEX: 30.32 KG/M2 | OXYGEN SATURATION: 98 % | WEIGHT: 182 LBS | TEMPERATURE: 96.3 F | HEIGHT: 65 IN | SYSTOLIC BLOOD PRESSURE: 142 MMHG | HEART RATE: 76 BPM | DIASTOLIC BLOOD PRESSURE: 72 MMHG

## 2023-09-06 DIAGNOSIS — R60.9 DEPENDENT EDEMA: Primary | ICD-10-CM

## 2023-09-06 PROCEDURE — 99212 OFFICE O/P EST SF 10 MIN: CPT | Performed by: FAMILY MEDICINE

## 2023-09-06 RX ORDER — SPIRONOLACTONE 50 MG/1
50 TABLET, FILM COATED ORAL DAILY
Qty: 30 TABLET | Refills: 5 | Status: SHIPPED | OUTPATIENT
Start: 2023-09-06

## 2023-09-06 NOTE — PROGRESS NOTES
Depression Screening and Follow-up Plan: Patient was screened for depression during today's encounter. They screened negative with a PHQ-2 score of 0. Assessment/Plan:    Problem List Items Addressed This Visit    None  Visit Diagnoses     Dependent edema    -  Primary           Diagnoses and all orders for this visit:    Dependent edema        No problem-specific Assessment & Plan notes found for this encounter. Subjective:      Patient ID: Sejal Hemphill is a 66 y.o. female. Patient with dependent edema both lower extremities patient is also experiencing occasional dyspnea she is to avoid sodium she has been did by bariatric care with different weight loss medications without without success because of the side effects that are associated with she recently had COVID and she has occasional shortness of breath blood pressure is borderline 142/72      The following portions of the patient's history were reviewed and updated as appropriate:   She has a past medical history of Abdominal pain, Atelectasis, Cancer ([address]), Disease of thyroid gland, GERD (gastroesophageal reflux disease), Irritable bowel syndrome, Kidney stone, Motion sickness, PONV (postoperative nausea and vomiting), and Wears glasses. ,  does not have any pertinent problems on file. ,   has a past surgical history that includes Shoulder surgery (Bilateral); Biceps tendon repair (Right);  section; Exploratory laparotomy; Cholecystectomy; Appendectomy; Tubal ligation; Knee arthroscopy w/ meniscal repair (Left); pr arthrs kne surg w/meniscectomy med/lat w/shvg (Left, 2017); ARTHROSCOPY KNEE (Right, 2018); pr colonoscopy flx dx w/collj spec when pfrmd (N/A, 2/15/2019); Carpal tunnel release (Left); Colonoscopy; Umbilical hernia repair; Achilles tendon repair (Right); LAPAROSCOPY; pr neuroplasty &/transpos median nrv carpal tunne (Right, 2020); and Breast excisional biopsy (Right, 2012). ,  family history includes Diabetes in her paternal grandmother; Heart disease in her father; Hypertension in her mother; Lung cancer in her maternal uncle; Mental illness in her father; No Known Problems in her daughter and paternal aunt; Stroke in her mother; Substance Abuse in her brother. ,   reports that she quit smoking about 4 years ago. Her smoking use included cigarettes. She has never used smokeless tobacco. She reports current alcohol use. She reports that she does not use drugs. ,  is allergic to erythromycin, betadine [povidone iodine], doxycycline, and medical tape. .  Current Outpatient Medications   Medication Sig Dispense Refill   • cholecalciferol (VITAMIN D3) 1,000 units tablet Take 5,000 Units by mouth daily     • estrogens, conjugated (Premarin) vaginal cream Insert 0.5 g into the vagina 2 (two) times a week 30 g 2   • naproxen (Naprosyn) 500 mg tablet Take 1 tablet (500 mg total) by mouth 2 (two) times a day with meals 20 tablet 0   • omeprazole (PriLOSEC) 40 MG capsule Take 1 PO BID 30 mins prior to a meal. 180 capsule 1   • polyethylene glycol (GLYCOLAX) 17 GM/SCOOP powder Take 17 g by mouth daily 578 g 2   • Synthroid 112 MCG tablet Take 1 tablet (112 mcg total) by mouth every morning 90 tablet 1   • Ca Carbonate-Mag Hydroxide (ROLAIDS PO) Take by mouth as needed (Patient not taking: Reported on 9/6/2023)       No current facility-administered medications for this visit. Review of Systems   Constitutional: Negative for activity change, appetite change, diaphoresis, fatigue and fever. HENT: Negative. Eyes: Negative. Respiratory: Positive for cough and shortness of breath. Negative for apnea, chest tightness and wheezing. Cardiovascular: Positive for leg swelling. Negative for chest pain and palpitations. Gastrointestinal: Negative for abdominal distention, abdominal pain, anal bleeding, constipation, diarrhea, nausea and vomiting.    Endocrine: Negative for cold intolerance, heat intolerance, polydipsia, polyphagia and polyuria. Genitourinary: Negative for difficulty urinating, dysuria, flank pain, hematuria and urgency. Musculoskeletal: Negative for arthralgias, back pain, gait problem, joint swelling and myalgias. Skin: Negative for color change, rash and wound. Allergic/Immunologic: Negative for environmental allergies, food allergies and immunocompromised state. Neurological: Negative for dizziness, seizures, syncope, speech difficulty, numbness and headaches. Hematological: Negative for adenopathy. Does not bruise/bleed easily. Psychiatric/Behavioral: Negative for agitation, behavioral problems, hallucinations, sleep disturbance and suicidal ideas. Objective:  Vitals:    09/06/23 1520   BP: 142/72   BP Location: Left arm   Patient Position: Sitting   Cuff Size: Standard   Pulse: 76   Temp: (!) 96.3 °F (35.7 °C)   TempSrc: Temporal   SpO2: 98%   Weight: 82.6 kg (182 lb)   Height: 5' 5" (1.651 m)     Body mass index is 30.29 kg/m². Physical Exam  Constitutional:       General: She is not in acute distress. Appearance: She is well-developed. She is not diaphoretic. HENT:      Head: Normocephalic. Right Ear: External ear normal.      Left Ear: External ear normal.      Nose: Nose normal.   Eyes:      General: No scleral icterus. Right eye: No discharge. Left eye: No discharge. Conjunctiva/sclera: Conjunctivae normal.      Pupils: Pupils are equal, round, and reactive to light. Neck:      Thyroid: No thyromegaly. Trachea: No tracheal deviation. Cardiovascular:      Rate and Rhythm: Normal rate and regular rhythm. Heart sounds: Normal heart sounds. No murmur heard. No friction rub. No gallop. Pulmonary:      Effort: Pulmonary effort is normal. No respiratory distress. Breath sounds: Normal breath sounds. No wheezing. Abdominal:      General: Bowel sounds are normal.      Palpations: Abdomen is soft. There is no mass.       Tenderness: There is no abdominal tenderness. There is no guarding. Musculoskeletal:         General: Swelling present. No deformity. Cervical back: Normal range of motion. Right lower leg: Edema present. Left lower leg: Edema present. Lymphadenopathy:      Cervical: No cervical adenopathy. Skin:     General: Skin is warm and dry. Findings: No erythema or rash. Neurological:      Mental Status: She is alert and oriented to person, place, and time. Cranial Nerves: No cranial nerve deficit. Psychiatric:         Thought Content:  Thought content normal.

## 2023-09-18 ENCOUNTER — TELEPHONE (OUTPATIENT)
Dept: BARIATRICS | Facility: CLINIC | Age: 63
End: 2023-09-18

## 2023-09-18 DIAGNOSIS — R73.03 PREDIABETES: Primary | ICD-10-CM

## 2023-09-18 DIAGNOSIS — E66.9 CLASS 1 OBESITY: ICD-10-CM

## 2023-09-18 RX ORDER — METFORMIN HYDROCHLORIDE 750 MG/1
750 TABLET, EXTENDED RELEASE ORAL
Qty: 30 TABLET | Refills: 1 | Status: SHIPPED | OUTPATIENT
Start: 2023-09-18

## 2023-09-18 NOTE — TELEPHONE ENCOUNTER
----- Message from Kyra Mauricio sent at 9/18/2023  7:45 AM EDT -----  Regarding: FW: Medication  Contact: [phone]    ----- Message -----  From: Wendi Albarran  Sent: 9/11/2023   3:30 PM EDT  To: #  Subject: Medication                                       I will try the Metformin again  Thank you

## 2023-10-02 ENCOUNTER — OFFICE VISIT (OUTPATIENT)
Dept: BARIATRICS | Facility: CLINIC | Age: 63
End: 2023-10-02
Payer: COMMERCIAL

## 2023-10-02 VITALS
DIASTOLIC BLOOD PRESSURE: 70 MMHG | HEART RATE: 71 BPM | WEIGHT: 181.2 LBS | BODY MASS INDEX: 30.19 KG/M2 | TEMPERATURE: 97.7 F | OXYGEN SATURATION: 97 % | HEIGHT: 65 IN | SYSTOLIC BLOOD PRESSURE: 110 MMHG

## 2023-10-02 DIAGNOSIS — E66.9 CLASS 1 OBESITY: Primary | ICD-10-CM

## 2023-10-02 DIAGNOSIS — R73.03 PREDIABETES: ICD-10-CM

## 2023-10-02 PROCEDURE — 99214 OFFICE O/P EST MOD 30 MIN: CPT | Performed by: PHYSICIAN ASSISTANT

## 2023-10-02 NOTE — ASSESSMENT & PLAN NOTE
-Patient is pursuing Conservative Program  -Initial weight loss goal of 5-10% weight loss for improved health  -Did not tolerate Qsymia: concentration issues/confusion, dizziness, fatigue. Switched to phentermine (186 lbs, 7/19/21)- had positive response but d/c due to elevated BP and palpitations  -Stopped Contrave- abdominal pain   -Saxenda- cost  -Screening labs: A1c and LP reviewed from 3/23/22, TSH from 12/20/23; CMP drawn today  -Did not take Saxenda due to cost, just restarted on metformin.  If abdominal cramping resolves with moving to evening will increase to 1500 mg.   -dietary recall reviewed, suggestions provided     Initial: 182.2, highest weight 186  Current: 181.3 (+3.7 lbs sivce last visit)  Change: -0.9  Goal: 150

## 2023-10-02 NOTE — PATIENT INSTRUCTIONS
Measure all portions: creamers, sugars, cooking oils/butters, condiments, dressings, etc and log calories   If choosing starch pick whole grain/complex carbs and keep to 1/2 cup: oatmeal, brown rice, quinoa, whole wheat pasta, potatoes, sweet potato, chickpea noodles, red lentil noodles  Try moving yogurt for snack, eggs + fruit for breakfast, add fruit/veg to the 5 pretzels  Have CMP done

## 2023-10-02 NOTE — PROGRESS NOTES
Assessment/Plan:    Class 1 obesity  -Patient is pursuing Conservative Program  -Initial weight loss goal of 5-10% weight loss for improved health  -Did not tolerate Qsymia: concentration issues/confusion, dizziness, fatigue. Switched to phentermine (186 lbs, 7/19/21)- had positive response but d/c due to elevated BP and palpitations  -Stopped Contrave- abdominal pain   -Saxenda- cost  -Screening labs: A1c and LP reviewed from 3/23/22, TSH from 12/20/23; CMP drawn today  -Did not take Saxenda due to cost, just restarted on metformin. If abdominal cramping resolves with moving to evening will increase to 1500 mg.   -dietary recall reviewed, suggestions provided     Initial: 182.2, highest weight 186  Current: 181.3 (+3.7 lbs sivce last visit)  Change: -0.9  Goal: 150    Follow up in approximately 4 months with Non-Surgical Physician/Advanced Practitioner. Goals:  Food log (ie.) www.[employer].com,[email or web address],[employer]Mat[employer]. com,etc. baritastic  No sugary beverages. At least 64oz of water daily. Increase physical activity by 10 minutes daily.  Gradually increase physical activity to a goal of 5 days per week for 30 minutes of MODERATE intensity PLUS 2 days per week of FULL BODY resistance training  5-10 servings of fruits and vegetables per day and 25-35 grams of dietary fiber per day, gradually increasing  Measure all portions: creamers, sugars, cooking oils/butters, condiments, dressings, etc and log calories   If choosing starch pick whole grain/complex carbs and keep to 1/2 cup: oatmeal, brown rice, quinoa, whole wheat pasta, potatoes, sweet potato, chickpea noodles, red lentil noodles  Try moving yogurt for snack, eggs + fruit for breakfast, add fruit/veg to the 5 pretzels  Have CMP done     Diagnoses and all orders for this visit:    Class 1 obesity    Prediabetes    Body mass index 30.0-30.9, adult        Subjective:   Chief Complaint   Patient presents with   • Follow-up     Patient ID: TERRANCE Capps Rachell Gomez  is a 66 y.o. female with excess weight/obesity here to pursue weight management. Patient is pursuing Conservative Program.     HPI  The patient presents for [facility] follow up. Just started metformin- had mild stomach cramps- will try taking in the evening    B: banana or yogurt  S: sometimes- 5 small pretzels  L: salad OR leftovers  S: no defined snack  D: protein + starch + vegetables  S: no  Grazes throughout day at work    Increased appetite/cravings: always feels hungry  Exercise:  walking  Hydration: 32 oz of water + diet green  Alvarado tea  Alcohol: no  Sleep: 6-6.5 hours    The following portions of the patient's history were reviewed and updated as appropriate: allergies, current medications, past family history, past medical history, past social history, past surgical history and problem list.    Review of Systems   Psychiatric/Behavioral: Negative. Objective:    /70 (BP Location: Left arm, Cuff Size: Large)   Pulse 71   Temp 97.7 °F (36.5 °C) (Temporal)   Ht 5' 5" (1.651 m)   Wt 82.2 kg (181 lb 3.2 oz)   SpO2 97%   BMI 30.15 kg/m²      Physical Exam  Vitals and nursing note reviewed. Constitutional   General appearance: Abnormal.  well developed and obese. Pulmonary   Respiratory effort: No increased work of breathing or signs of respiratory distress. Abdomen   Abdomen: Abnormal.  The abdomen was obese.    Musculoskeletal   Gait and station: Normal.    Psychiatric   Orientation to person, place and time: Normal.    Affect: appropriate

## 2023-10-04 ENCOUNTER — ANESTHESIA EVENT (OUTPATIENT)
Dept: PERIOP | Facility: HOSPITAL | Age: 63
End: 2023-10-04

## 2023-10-04 ENCOUNTER — ANESTHESIA (OUTPATIENT)
Dept: PERIOP | Facility: HOSPITAL | Age: 63
End: 2023-10-04

## 2023-10-04 ENCOUNTER — HOSPITAL ENCOUNTER (OUTPATIENT)
Dept: PERIOP | Facility: HOSPITAL | Age: 63
Setting detail: OUTPATIENT SURGERY
Discharge: HOME | End: 2023-10-04
Payer: COMMERCIAL

## 2023-10-04 VITALS
RESPIRATION RATE: 18 BRPM | TEMPERATURE: 97.8 F | DIASTOLIC BLOOD PRESSURE: 76 MMHG | SYSTOLIC BLOOD PRESSURE: 124 MMHG | HEART RATE: 64 BPM | OXYGEN SATURATION: 97 %

## 2023-10-04 DIAGNOSIS — R49.0 RASPY VOICE: ICD-10-CM

## 2023-10-04 DIAGNOSIS — K21.9 GASTROESOPHAGEAL REFLUX DISEASE, UNSPECIFIED WHETHER ESOPHAGITIS PRESENT: ICD-10-CM

## 2023-10-04 DIAGNOSIS — R13.10 DYSPHAGIA, UNSPECIFIED TYPE: ICD-10-CM

## 2023-10-04 PROCEDURE — 88305 TISSUE EXAM BY PATHOLOGIST: CPT | Performed by: SPECIALIST

## 2023-10-04 PROCEDURE — 43239 EGD BIOPSY SINGLE/MULTIPLE: CPT | Performed by: INTERNAL MEDICINE

## 2023-10-04 RX ORDER — LIDOCAINE HYDROCHLORIDE 20 MG/ML
INJECTION, SOLUTION EPIDURAL; INFILTRATION; INTRACAUDAL; PERINEURAL AS NEEDED
Status: DISCONTINUED | OUTPATIENT
Start: 2023-10-04 | End: 2023-10-04

## 2023-10-04 RX ORDER — PROPOFOL 10 MG/ML
INJECTION, EMULSION INTRAVENOUS AS NEEDED
Status: DISCONTINUED | OUTPATIENT
Start: 2023-10-04 | End: 2023-10-04

## 2023-10-04 RX ORDER — SODIUM CHLORIDE, SODIUM LACTATE, POTASSIUM CHLORIDE, CALCIUM CHLORIDE 600; 310; 30; 20 MG/100ML; MG/100ML; MG/100ML; MG/100ML
20 INJECTION, SOLUTION INTRAVENOUS CONTINUOUS
Status: CANCELLED | OUTPATIENT
Start: 2023-10-04

## 2023-10-04 RX ORDER — SODIUM CHLORIDE, SODIUM LACTATE, POTASSIUM CHLORIDE, CALCIUM CHLORIDE 600; 310; 30; 20 MG/100ML; MG/100ML; MG/100ML; MG/100ML
INJECTION, SOLUTION INTRAVENOUS CONTINUOUS PRN
Status: DISCONTINUED | OUTPATIENT
Start: 2023-10-04 | End: 2023-10-04

## 2023-10-04 RX ADMIN — LIDOCAINE HYDROCHLORIDE 100 MG: 20 INJECTION, SOLUTION EPIDURAL; INFILTRATION; INTRACAUDAL; PERINEURAL at 08:39

## 2023-10-04 RX ADMIN — PROPOFOL 80 MG: 10 INJECTION, EMULSION INTRAVENOUS at 08:41

## 2023-10-04 RX ADMIN — SODIUM CHLORIDE, SODIUM LACTATE, POTASSIUM CHLORIDE, AND CALCIUM CHLORIDE: .6; .31; .03; .02 INJECTION, SOLUTION INTRAVENOUS at 08:34

## 2023-10-04 RX ADMIN — PROPOFOL 120 MG: 10 INJECTION, EMULSION INTRAVENOUS at 08:39

## 2023-10-04 RX ADMIN — PROPOFOL 40 MG: 10 INJECTION, EMULSION INTRAVENOUS at 08:43

## 2023-10-04 NOTE — H&P
H&P EXAM - Outpatient Endoscopy   Ryland Breen 66 y.o. female MRN: [id number]    [address] [facility]   Encounter: [id number]      History and Physical - [facility]  Ryland Breen 66 y.o. female MRN: [id number]                  HPI: Ryland Breen is a 66y.o. year old female who presents for GERD, abdominal pain, bloating, dysphagia      REVIEW OF SYSTEMS: Per the HPI, and otherwise unremarkable. Historical Information   Past Medical History:   Diagnosis Date   • Abdominal pain    • Atelectasis    • Cancer (HCC)     cervical   • Disease of thyroid gland     hypothyroidism   • GERD (gastroesophageal reflux disease)    • Irritable bowel syndrome    • Kidney stone    • Motion sickness    • PONV (postoperative nausea and vomiting)    • Wears glasses      Past Surgical History:   Procedure Laterality Date   • ACHILLES TENDON REPAIR Right    • APPENDECTOMY     • ARTHROSCOPY KNEE Right 2018    Procedure: ARTHROSCOPY KNEE; PARTIAL LATERAL MENISCECTOMY; PARTIAL MEDIAL MENISCECTOMY; POSSIBLE DEBRIDEMENT; removal of loose bodies;  Surgeon: Ivan Reddy MD;  Location: [facility];  Service: Orthopedics   • BICEPS TENDON REPAIR Right    • BREAST EXCISIONAL BIOPSY Right 2012    benign   • CARPAL TUNNEL RELEASE Left    •  SECTION      three    • CHOLECYSTECTOMY     • COLONOSCOPY     • EXPLORATORY LAPAROTOMY     • KNEE ARTHROSCOPY W/ MENISCAL REPAIR Left    • LAPAROSCOPY     • NH ARTHRS KNE SURG W/MENISCECTOMY MED/LAT W/SHVG Left 2017    Procedure: ARTHROSCOPY KNEE PARTIAL MEDIAL MENISECTOMY ;  Surgeon: Ivan Reddy MD;  [facility];  Service: Orthopedics   • [place] COLONOSCOPY FLX DX W/HUY [facility] INPATIENT REHABILITATION WHEN PFRMD N/A 2/15/2019    Procedure: COLONOSCOPY;  Surgeon: Leah Hoffman MD;  [facility];   Service: Colorectal   • NH NEUROPLASTY &/TRANSPOS MEDIAN NRV CARPAL TUNNE Right 2020    Procedure: CARPAL TUNNEL & IN SITU ULNAR NERVE RELEASE;  Surgeon: Ramez Boyce MD;  [facility];  Service: Orthopedics   • SHOULDER SURGERY Bilateral    • TUBAL LIGATION     • UMBILICAL HERNIA REPAIR      with mesh x2     Social History   Social History     Substance and Sexual Activity   Alcohol Use Yes    Comment: rarely     Social History     Substance and Sexual Activity   Drug Use No     Social History     Tobacco Use   Smoking Status Former   • Types: Cigarettes   • Quit date: 2019   • Years since quittin.1   Smokeless Tobacco Never     Family History   Problem Relation Age of Onset   • Stroke Mother    • Hypertension Mother    • Mental illness Father    • Heart disease Father    • Substance Abuse Brother    • Lung cancer Maternal Uncle         62s   • Diabetes Paternal Grandmother    • No Known Problems Daughter    • No Known Problems Paternal Aunt        Meds/Allergies     (Not in a hospital admission)      Allergies   Allergen Reactions   • Erythromycin Chest Pain   • Betadine [Povidone Iodine] Other (See Comments)     Burns skin, blisters   • Doxycycline Abdominal Pain   • Medical Tape Other (See Comments)     Redness and peeling       Objective     /79   Pulse 58   Temp 97.8 °F (36.6 °C)   Resp 18   SpO2 99%       PHYSICAL EXAM    Gen: NAD  CV: RRR  CHEST: Clear  ABD: soft, NT/ND  EXT: no edema      ASSESSMENT/PLAN:  This is a 66y.o. year old female here for egd, and she is stable and optimized for her procedure.

## 2023-10-04 NOTE — ANESTHESIA PREPROCEDURE EVALUATION
Procedure:  EGD    Relevant Problems   ANESTHESIA   (+) PONV (postoperative nausea and vomiting)      CARDIO   (+) Primary hypertension      ENDO   (+) Hypothyroidism      GI/HEPATIC   (+) Dysphagia   (+) GERD (gastroesophageal reflux disease)      Other   (+) Class 1 obesity   (+) Heart palpitations   (+) Prediabetes   (+) Quit smoking   (+) Raspy voice        Physical Exam    Airway    Mallampati score: I  TM Distance: >3 FB  Neck ROM: full     Dental       Cardiovascular      Pulmonary      Other Findings        Anesthesia Plan  ASA Score- 2     Anesthesia Type- IV sedation with anesthesia with ASA Monitors. Additional Monitors:   Airway Plan:     Comment: Recent labs personally reviewed:  Lab Results       Component                Value               Date                       WBC                      5.50                06/02/2023                 HGB                      12.9                06/02/2023                 PLT                      378                 06/02/2023            Lab Results       Component                Value               Date                       NA                       142                 01/14/2015                 K                        3.8                 06/02/2023                 BUN                      14                  06/02/2023                 CREATININE               0.75                06/02/2023                 GLUCOSE                  92                  01/14/2015            Lab Results       Component                Value               Date                       PTT                      34                  01/24/2019             Lab Results       Component                Value               Date                       INR                      1.11                01/24/2019              Blood type     I, Binu Frausto MD, have personally seen and evaluated the patient prior to anesthetic care.   I have reviewed the pre-anesthetic record, medical history, allergies, medications and any other medical records if appropriate to the anesthetic care. If a CRNA is involved in the case, I have reviewed the CRNA assessment, if present, and agree. Patient consented for IV Sedation, general anesthesia as back up. Discussed risks of aspiration, IV infiltration, indications for conversion to general anesthesia. All questions and concerns addressed. .       Plan Factors-Exercise tolerance (METS): >4 METS. Chart reviewed. Patient summary reviewed. Patient is not a current smoker. Patient did not smoke on day of surgery. Obstructive sleep apnea risk education given perioperatively. Induction- intravenous. Postoperative Plan-     Informed Consent- Anesthetic plan and risks discussed with patient. I personally reviewed this patient with the CRNA. Discussed and agreed on the Anesthesia Plan with the CRNA. Darylene Pipe

## 2023-10-04 NOTE — ANESTHESIA POSTPROCEDURE EVALUATION
Post-Op Assessment Note    CV Status:  Stable    Pain management: adequate     Mental Status:  Sleepy   Hydration Status:  Euvolemic   PONV Controlled:  Controlled   Airway Patency:  Patent      Post Op Vitals Reviewed: Yes      Staff: CRNA         No notable events documented.     BP   115/69   Temp   97.9   Pulse  63   Resp   18   SpO2   98%

## 2023-10-09 PROCEDURE — 88305 TISSUE EXAM BY PATHOLOGIST: CPT | Performed by: SPECIALIST

## 2023-10-23 DIAGNOSIS — T14.8XXA PUNCTURE WOUND: Primary | ICD-10-CM

## 2023-10-23 DIAGNOSIS — Z23 ENCOUNTER FOR IMMUNIZATION: ICD-10-CM

## 2023-10-23 PROBLEM — Z12.11 SCREENING FOR COLON CANCER: Status: RESOLVED | Noted: 2023-08-24 | Resolved: 2023-10-23

## 2023-10-23 PROCEDURE — 90715 TDAP VACCINE 7 YRS/> IM: CPT | Performed by: FAMILY MEDICINE

## 2023-10-23 PROCEDURE — 90471 IMMUNIZATION ADMIN: CPT | Performed by: FAMILY MEDICINE

## 2023-11-07 ENCOUNTER — LAB (OUTPATIENT)
Dept: LAB | Facility: MEDICAL CENTER | Age: 63
End: 2023-11-07
Payer: COMMERCIAL

## 2023-11-07 DIAGNOSIS — Z79.899 MEDICATION MANAGEMENT: ICD-10-CM

## 2023-11-07 DIAGNOSIS — I10 PRIMARY HYPERTENSION: ICD-10-CM

## 2023-11-07 DIAGNOSIS — E66.3 OVERWEIGHT: ICD-10-CM

## 2023-11-07 DIAGNOSIS — R73.03 PREDIABETES: ICD-10-CM

## 2023-11-07 DIAGNOSIS — A09 INFECTIOUS ENTERITIS, UNSPECIFIED INFECTIOUS AGENT: ICD-10-CM

## 2023-11-07 DIAGNOSIS — A09 INFECTIOUS ENTERITIS, UNSPECIFIED INFECTIOUS AGENT: Primary | ICD-10-CM

## 2023-11-07 LAB
ALBUMIN SERPL BCP-MCNC: 4.3 G/DL (ref 3.5–5)
ALP SERPL-CCNC: 54 U/L (ref 34–104)
ALT SERPL W P-5'-P-CCNC: 25 U/L (ref 7–52)
AMYLASE SERPL-CCNC: 37 IU/L (ref 29–103)
ANION GAP SERPL CALCULATED.3IONS-SCNC: 9 MMOL/L
AST SERPL W P-5'-P-CCNC: 22 U/L (ref 13–39)
BASOPHILS # BLD AUTO: 0.05 THOUSANDS/ÂΜL (ref 0–0.1)
BASOPHILS NFR BLD AUTO: 1 % (ref 0–1)
BILIRUB SERPL-MCNC: 0.33 MG/DL (ref 0.2–1)
BUN SERPL-MCNC: 12 MG/DL (ref 5–25)
CALCIUM SERPL-MCNC: 9.6 MG/DL (ref 8.4–10.2)
CHLORIDE SERPL-SCNC: 105 MMOL/L (ref 96–108)
CO2 SERPL-SCNC: 26 MMOL/L (ref 21–32)
CREAT SERPL-MCNC: 0.86 MG/DL (ref 0.6–1.3)
EOSINOPHIL # BLD AUTO: 0.16 THOUSAND/ÂΜL (ref 0–0.61)
EOSINOPHIL NFR BLD AUTO: 3 % (ref 0–6)
ERYTHROCYTE [DISTWIDTH] IN BLOOD BY AUTOMATED COUNT: 11.7 % (ref 11.6–15.1)
GFR SERPL CREATININE-BSD FRML MDRD: 73 ML/MIN/1.73SQ M
GLUCOSE SERPL-MCNC: 104 MG/DL (ref 65–140)
HCT VFR BLD AUTO: 39.9 % (ref 34.8–46.1)
HGB BLD-MCNC: 12.8 G/DL (ref 11.5–15.4)
IMM GRANULOCYTES # BLD AUTO: 0.01 THOUSAND/UL (ref 0–0.2)
IMM GRANULOCYTES NFR BLD AUTO: 0 % (ref 0–2)
LIPASE SERPL-CCNC: 47 U/L (ref 11–82)
LYMPHOCYTES # BLD AUTO: 2.31 THOUSANDS/ÂΜL (ref 0.6–4.47)
LYMPHOCYTES NFR BLD AUTO: 49 % (ref 14–44)
MCH RBC QN AUTO: 29.8 PG (ref 26.8–34.3)
MCHC RBC AUTO-ENTMCNC: 32.1 G/DL (ref 31.4–37.4)
MCV RBC AUTO: 93 FL (ref 82–98)
MONOCYTES # BLD AUTO: 0.33 THOUSAND/ÂΜL (ref 0.17–1.22)
MONOCYTES NFR BLD AUTO: 7 % (ref 4–12)
NEUTROPHILS # BLD AUTO: 1.91 THOUSANDS/ÂΜL (ref 1.85–7.62)
NEUTS SEG NFR BLD AUTO: 40 % (ref 43–75)
NRBC BLD AUTO-RTO: 0 /100 WBCS
PLATELET # BLD AUTO: 374 THOUSANDS/UL (ref 149–390)
PMV BLD AUTO: 10.5 FL (ref 8.9–12.7)
POTASSIUM SERPL-SCNC: 4.1 MMOL/L (ref 3.5–5.3)
PROT SERPL-MCNC: 6.8 G/DL (ref 6.4–8.4)
RBC # BLD AUTO: 4.29 MILLION/UL (ref 3.81–5.12)
SODIUM SERPL-SCNC: 140 MMOL/L (ref 135–147)
WBC # BLD AUTO: 4.77 THOUSAND/UL (ref 4.31–10.16)

## 2023-11-07 PROCEDURE — 82150 ASSAY OF AMYLASE: CPT

## 2023-11-07 PROCEDURE — 85025 COMPLETE CBC W/AUTO DIFF WBC: CPT

## 2023-11-07 PROCEDURE — 36415 COLL VENOUS BLD VENIPUNCTURE: CPT

## 2023-11-07 PROCEDURE — 83690 ASSAY OF LIPASE: CPT

## 2023-11-07 PROCEDURE — 80053 COMPREHEN METABOLIC PANEL: CPT

## 2023-11-08 ENCOUNTER — LAB (OUTPATIENT)
Dept: LAB | Facility: MEDICAL CENTER | Age: 63
End: 2023-11-08
Payer: COMMERCIAL

## 2023-11-08 DIAGNOSIS — Z20.5 EXPOSURE TO HEPATITIS C: Primary | ICD-10-CM

## 2023-11-08 DIAGNOSIS — A09 INFECTIOUS ENTERITIS, UNSPECIFIED INFECTIOUS AGENT: ICD-10-CM

## 2023-11-08 PROCEDURE — 87505 NFCT AGENT DETECTION GI: CPT | Performed by: FAMILY MEDICINE

## 2023-11-09 ENCOUNTER — HOSPITAL ENCOUNTER (OUTPATIENT)
Dept: CT IMAGING | Facility: HOSPITAL | Age: 63
Discharge: HOME | End: 2023-11-09
Attending: FAMILY MEDICINE
Payer: COMMERCIAL

## 2023-11-09 ENCOUNTER — LAB (OUTPATIENT)
Dept: LAB | Facility: HOSPITAL | Age: 63
End: 2023-11-09
Attending: FAMILY MEDICINE
Payer: COMMERCIAL

## 2023-11-09 DIAGNOSIS — A09 INFECTIOUS ENTERITIS, UNSPECIFIED INFECTIOUS AGENT: ICD-10-CM

## 2023-11-09 DIAGNOSIS — R19.7 DIARRHEA OF PRESUMED INFECTIOUS ORIGIN: ICD-10-CM

## 2023-11-09 DIAGNOSIS — A09 INFECTIOUS ENTERITIS, UNSPECIFIED INFECTIOUS AGENT: Primary | ICD-10-CM

## 2023-11-09 DIAGNOSIS — R10.84 GENERALIZED ABDOMINAL PAIN: Primary | ICD-10-CM

## 2023-11-09 DIAGNOSIS — R10.84 GENERALIZED ABDOMINAL PAIN: ICD-10-CM

## 2023-11-09 DIAGNOSIS — Z20.5 EXPOSURE TO HEPATITIS C: ICD-10-CM

## 2023-11-09 LAB
BACTERIA UR QL AUTO: ABNORMAL /HPF
BASOPHILS # BLD AUTO: 0.04 THOUSANDS/ÂΜL (ref 0–0.1)
BASOPHILS NFR BLD AUTO: 1 % (ref 0–1)
BILIRUB UR QL STRIP: NEGATIVE
C DIFF TOX GENS STL QL NAA+PROBE: NEGATIVE
CAMPYLOBACTER DNA SPEC NAA+PROBE: NORMAL
CLARITY UR: CLEAR
COLOR UR: YELLOW
EOSINOPHIL # BLD AUTO: 0.14 THOUSAND/ÂΜL (ref 0–0.61)
EOSINOPHIL NFR BLD AUTO: 3 % (ref 0–6)
ERYTHROCYTE [DISTWIDTH] IN BLOOD BY AUTOMATED COUNT: 11.5 % (ref 11.6–15.1)
G LAMBLIA AG STL QL IA: NEGATIVE
GLUCOSE UR STRIP-MCNC: NEGATIVE MG/DL
HCT VFR BLD AUTO: 38.4 % (ref 34.8–46.1)
HCV AB SER QL: NORMAL
HGB BLD-MCNC: 12.4 G/DL (ref 11.5–15.4)
HGB UR QL STRIP.AUTO: NEGATIVE
IMM GRANULOCYTES # BLD AUTO: 0.01 THOUSAND/UL (ref 0–0.2)
IMM GRANULOCYTES NFR BLD AUTO: 0 % (ref 0–2)
KETONES UR STRIP-MCNC: NEGATIVE MG/DL
LEUKOCYTE ESTERASE UR QL STRIP: NEGATIVE
LYMPHOCYTES # BLD AUTO: 1.98 THOUSANDS/ÂΜL (ref 0.6–4.47)
LYMPHOCYTES NFR BLD AUTO: 45 % (ref 14–44)
MCH RBC QN AUTO: 29.8 PG (ref 26.8–34.3)
MCHC RBC AUTO-ENTMCNC: 32.3 G/DL (ref 31.4–37.4)
MCV RBC AUTO: 92 FL (ref 82–98)
MONOCYTES # BLD AUTO: 0.3 THOUSAND/ÂΜL (ref 0.17–1.22)
MONOCYTES NFR BLD AUTO: 7 % (ref 4–12)
NEUTROPHILS # BLD AUTO: 1.97 THOUSANDS/ÂΜL (ref 1.85–7.62)
NEUTS SEG NFR BLD AUTO: 44 % (ref 43–75)
NITRITE UR QL STRIP: NEGATIVE
NON-SQ EPI CELLS URNS QL MICRO: ABNORMAL /HPF
NRBC BLD AUTO-RTO: 0 /100 WBCS
PH UR STRIP.AUTO: 7 [PH]
PLATELET # BLD AUTO: 332 THOUSANDS/UL (ref 149–390)
PMV BLD AUTO: 9 FL (ref 8.9–12.7)
PROT UR STRIP-MCNC: NEGATIVE MG/DL
RBC # BLD AUTO: 4.16 MILLION/UL (ref 3.81–5.12)
RBC #/AREA URNS AUTO: ABNORMAL /HPF
SALMONELLA DNA SPEC QL NAA+PROBE: NORMAL
SHIGA TOXIN STX GENE SPEC NAA+PROBE: NORMAL
SHIGELLA DNA SPEC QL NAA+PROBE: NORMAL
SP GR UR STRIP.AUTO: <=1.005 (ref 1–1.03)
UROBILINOGEN UR QL STRIP.AUTO: 0.2 E.U./DL
WBC # BLD AUTO: 4.44 THOUSAND/UL (ref 4.31–10.16)
WBC #/AREA URNS AUTO: ABNORMAL /HPF

## 2023-11-09 PROCEDURE — G1004 CDSM NDSC: HCPCS

## 2023-11-09 PROCEDURE — 74177 CT ABD & PELVIS W/CONTRAST: CPT

## 2023-11-09 PROCEDURE — 86803 HEPATITIS C AB TEST: CPT

## 2023-11-09 PROCEDURE — 81001 URINALYSIS AUTO W/SCOPE: CPT | Performed by: FAMILY MEDICINE

## 2023-11-09 PROCEDURE — 85025 COMPLETE CBC W/AUTO DIFF WBC: CPT

## 2023-11-09 PROCEDURE — 36415 COLL VENOUS BLD VENIPUNCTURE: CPT

## 2023-11-09 RX ADMIN — IOHEXOL 100 ML: 350 INJECTION, SOLUTION INTRAVENOUS at 10:24

## 2023-11-09 NOTE — RESULT ENCOUNTER NOTE
Call patient to notify normal results to be set up with GI for possible gastritis or peptic ulcer disease

## 2023-11-09 NOTE — RESULT ENCOUNTER NOTE
Call patient to notify normal results I spoke to the patient personally about this result no need to call the patient

## 2023-11-10 ENCOUNTER — OFFICE VISIT (OUTPATIENT)
Dept: GASTROENTEROLOGY | Facility: CLINIC | Age: 63
End: 2023-11-10
Payer: COMMERCIAL

## 2023-11-10 ENCOUNTER — APPOINTMENT (OUTPATIENT)
Dept: LAB | Facility: HOSPITAL | Age: 63
End: 2023-11-10

## 2023-11-10 VITALS
SYSTOLIC BLOOD PRESSURE: 129 MMHG | WEIGHT: 180 LBS | BODY MASS INDEX: 29.95 KG/M2 | HEART RATE: 73 BPM | DIASTOLIC BLOOD PRESSURE: 80 MMHG | TEMPERATURE: 98.2 F

## 2023-11-10 DIAGNOSIS — K21.9 GASTROESOPHAGEAL REFLUX DISEASE, UNSPECIFIED WHETHER ESOPHAGITIS PRESENT: ICD-10-CM

## 2023-11-10 DIAGNOSIS — R13.10 DYSPHAGIA, UNSPECIFIED TYPE: ICD-10-CM

## 2023-11-10 DIAGNOSIS — R10.84 GENERALIZED ABDOMINAL PAIN: ICD-10-CM

## 2023-11-10 DIAGNOSIS — R19.7 DIARRHEA OF PRESUMED INFECTIOUS ORIGIN: Primary | ICD-10-CM

## 2023-11-10 PROBLEM — Z12.11 SCREENING FOR COLON CANCER: Status: ACTIVE | Noted: 2023-11-10

## 2023-11-10 PROCEDURE — 99214 OFFICE O/P EST MOD 30 MIN: CPT | Performed by: NURSE PRACTITIONER

## 2023-11-10 RX ORDER — CHOLESTYRAMINE 4 G/9G
1 POWDER, FOR SUSPENSION ORAL 2 TIMES DAILY WITH MEALS
Qty: 60 PACKET | Refills: 1 | Status: SHIPPED | OUTPATIENT
Start: 2023-11-10

## 2023-11-10 NOTE — PATIENT INSTRUCTIONS
Proceed with rest of stool studies. Start Questran 2 x daily prior to meal.   Can use imodium 1-2 x daily as needed. Avoid drinking your well water. Try eating some yogurt/activia to help rebuild good bacteria in GI tract. Gradually resume a normal diet starting with a clears/soft and go from there. Continue the Omeprazole as prescribed. F/u as scheduled in December.

## 2023-11-10 NOTE — PROGRESS NOTES
[facility]patient [facility]  Romel Flair 66 y.o. female MRN: [id number]  Encounter: [id number]          ASSESSMENT AND PLAN:    The patient presents today for follow-up office visit regarding her new onset abdominal pain and diarrhea. Exam: Abdomen is soft, nondistended, with mild tenderness noted upon exam without any significant guarding or rebound tenderness and faint bowel sounds x4. No edema noted of the b/l lower extremities upon exam today. Skin is non-icteric. 1. Diarrhea of presumed infectious origin  2. Generalized abdominal pain  While the patient was in the office today, I discussed with the patient that I am highly suspicious that with her reporting parasites in her well water, that this could also be another possible underlying cause and the previous stool studies did not include parasite examination. At this point we will proceed with updated stools studies that have not yet been completed to rule out any other possible underlying cause. Vies the patient that once we have the results of the stool studies, our office will contact her and to discuss her treatment plan options. The patient was agreeable and verbalized an understanding. In the meantime I advised the patient that I would like to start her on Questran twice daily prior to eating and that if she needs to she can take Imodium 1-2 times a day to try to make the diarrhea more manageable. The patient was agreeable and verbalized an understanding. I also discussed with the patient that over the next few days as she hopefully starts to feel better with less diarrhea, she should increase her diet as tolerated starting with a clear liquid diet and gradually increase it to a softer diet and increase her diet to a normal diet slowly over time as tolerated.   I also encouraged the patient to try to eat smaller amounts more often just to see how she does and give her body time to get used to digestion. I advised the patient if any of her symptoms should change or worsen that she should contact our office. The patient was agreeable and verbalized an understanding.    - Ambulatory Referral to Gastroenterology  - cholestyramine (QUESTRAN) 4 g packet; Take 1 packet (4 g total) by mouth 2 (two) times a day with meals  Dispense: 60 packet; Refill: 1  - Calprotectin,Fecal; Future  - Ova and parasite examination; Future  - Giardia/Cryptosporidium EIA; Future    3. Gastroesophageal reflux disease, unspecified whether esophagitis present  4. Dysphagia, unspecified type  Stable    Continue omeprazole as prescribed for now. Continue to try to avoid trigger foods. The patient will schedule a follow up office visit in 1 month, but understands to call or contact our office if there are any issues or concerns in the mean time. ______________________________________________________________________    SUBJECTIVE: Patient is a 66 y.o. female who was previously seen in our office on 8/24/23 and presents today for follow-up office visit regarding her new onset abdominal pain and diarrhea. The patient reports that last Saturday had lobster for dinner and started Sunday with abdominal pain and diarrhea. She reports that she has diarrhea every time she eats and that typically within a very short period time after eating anything, even a piece of toast, she has to run to the bathroom to have pure water like diarrhea. She reports that she is having diarrhea more than 10 times a day as she cannot count the amount of times overall. The patient reports that for the first 2 days she was having significant abdominal pain and cramping, however, now she feels that there is definitely cramping prior to having a bowel movement with a lot of flatus. She denies any melena or rectal bleeding.   She does continue to try to use Pedialyte daily and keep her fluids up to try to prevent dehydration, but feels exhausted. The patient reports that she continues with the omeprazole which seems to currently be managing her GERD symptoms. She does report that currently her home water supplies from a well and they recently discovered that there was high nitrates in the water as well as some parasites. She is actually currently, today, having a whole new filtration system being installed and she does report still using her home water to brush her teeth but not drink it or no longer uses it to make coffee or cook with. Meds: Omeprazole 40 mg twice daily as she currently is not taking her MiraLAX because she does not need to. Imaging: (23): CT Abd/Pel w/Con: Normal.    Endoscopy History: EGD: (10/4/23): Normal. Path: Peptic Duodenitis    COLONOSCOPY: (2/15/19): Normal. Recommend repeat in 10 years. DUE: 2/15/29  REVIEW OF SYSTEMS IS OTHERWISE NEGATIVE.       Historical Information   Past Medical History:   Diagnosis Date    Abdominal pain     Atelectasis     Cancer ([address])     cervical    Disease of thyroid gland     hypothyroidism    GERD (gastroesophageal reflux disease)     Irritable bowel syndrome     Kidney stone     Motion sickness     PONV (postoperative nausea and vomiting)     Wears glasses      Past Surgical History:   Procedure Laterality Date    ACHILLES TENDON REPAIR Right     APPENDECTOMY      ARTHROSCOPY KNEE Right 2018    Procedure: ARTHROSCOPY KNEE; PARTIAL LATERAL MENISCECTOMY; PARTIAL MEDIAL MENISCECTOMY; POSSIBLE DEBRIDEMENT; removal of loose bodies;  Surgeon: Eze Mora MD;  Location: [facility];  Service: Orthopedics    BICEPS TENDON REPAIR Right     BREAST EXCISIONAL BIOPSY Right 2012    benign    CARPAL TUNNEL RELEASE Left      SECTION      three     CHOLECYSTECTOMY      COLONOSCOPY      EXPLORATORY LAPAROTOMY      KNEE ARTHROSCOPY W/ MENISCAL REPAIR Left     LAPAROSCOPY      [place] ARTHRS KNE SURG W/MENISCECTOMY MED/LAT W/SHVG Left 2017    Procedure: ARTHROSCOPY KNEE PARTIAL MEDIAL MENISECTOMY ;  Surgeon: Tabatha Ponce MD;  [facility];  Service: Orthopedics    [place] COLONOSCOPY FLX DX W/[facility]LL[facility] WHEN PFRMD N/A 2/15/2019    Procedure: COLONOSCOPY;  Surgeon: Marcelino Leos MD;  [facility]; Service: Colorectal    [place] NEUROPLASTY &/TRANSPOS MEDIAN NRV CARPAL TUNNE Right 2020    Procedure: CARPAL TUNNEL & IN SITU ULNAR NERVE RELEASE;  Surgeon: Loyd Oates MD;  [facility];  Service: Orthopedics    SHOULDER SURGERY Bilateral     TUBAL LIGATION      UMBILICAL HERNIA REPAIR      with mesh x2     Social History   Social History     Substance and Sexual Activity   Alcohol Use Yes    Comment: rarely     Social History     Substance and Sexual Activity   Drug Use No     Social History     Tobacco Use   Smoking Status Former    Types: Cigarettes    Quit date: 2019    Years since quittin.2   Smokeless Tobacco Never     Family History   Problem Relation Age of Onset    Stroke Mother     Hypertension Mother     Mental illness Father     Heart disease Father     Substance Abuse Brother     Lung cancer Maternal Uncle         62s    Diabetes Paternal Grandmother     No Known Problems Daughter     No Known Problems Paternal Aunt        Meds/Allergies       Current Outpatient Medications:     Ca Carbonate-Mag Hydroxide (ROLAIDS PO)    cholecalciferol (VITAMIN D3) 1,000 units tablet    estrogens, conjugated (Premarin) vaginal cream    metFORMIN (GLUCOPHAGE-XR) 750 mg 24 hr tablet    naproxen (Naprosyn) 500 mg tablet    omeprazole (PriLOSEC) 40 MG capsule    polyethylene glycol (GLYCOLAX) 17 GM/SCOOP powder    spironolactone (ALDACTONE) 50 mg tablet    Synthroid 112 MCG tablet  No current facility-administered medications for this visit.     Allergies   Allergen Reactions    Erythromycin Chest Pain    Betadine [Povidone Iodine] Other (See Comments)     Burns skin, blisters    Doxycycline Abdominal Pain    Medical Tape Other (See Comments)     Redness and peeling           Objective     not currently breastfeeding. There is no height or weight on file to calculate BMI. PHYSICAL EXAM:      General Appearance:   Alert, cooperative, no distress   HEENT:   Normocephalic, atraumatic, anicteric. Neck:  Supple, symmetrical, trachea midline   Lungs:   Clear to auscultation bilaterally; no rales, rhonchi or wheezing; respirations unlabored    Heart[de-identified]   Regular rate and rhythm; no murmur, rub, or gallop. Abdomen:   Soft, non-tender, non-distended; normal bowel sounds; no masses, no organomegaly    Genitalia:   Deferred    Rectal:   Deferred    Extremities:  No cyanosis, clubbing or edema    Pulses:  2+ and symmetric    Skin:  No jaundice, rashes, or lesions    Lymph nodes:  No palpable cervical lymphadenopathy        Lab Results:   No visits with results within 1 Day(s) from this visit.    Latest known visit with results is:   Lab on 11/09/2023   Component Date Value    Hepatitis C Ab 11/09/2023 Non-reactive     WBC 11/09/2023 4.44     RBC 11/09/2023 4.16     Hemoglobin 11/09/2023 12.4     Hematocrit 11/09/2023 38.4     MCV 11/09/2023 92     MCH 11/09/2023 29.8     MCHC 11/09/2023 32.3     RDW 11/09/2023 11.5 (L)     MPV 11/09/2023 9.0     Platelets 81/48/6444 332     nRBC 11/09/2023 0     Neutrophils Relative 11/09/2023 44     Immat GRANS % 11/09/2023 0     Lymphocytes Relative 11/09/2023 45 (H)     Monocytes Relative 11/09/2023 7     Eosinophils Relative 11/09/2023 3     Basophils Relative 11/09/2023 1     Neutrophils Absolute 11/09/2023 1.97     Immature Grans Absolute 11/09/2023 0.01     Lymphocytes Absolute 11/09/2023 1.98     Monocytes Absolute 11/09/2023 0.30     Eosinophils Absolute 11/09/2023 0.14     Basophils Absolute 11/09/2023 0.04          Radiology Results:   CT abdomen pelvis w contrast    Result Date: 11/9/2023  Narrative: CT ABDOMEN AND PELVIS WITH IV CONTRAST INDICATION:   A09: Infectious gastroenteritis and colitis, unspecified. COMPARISON: CT abdomen/pelvis 2/23/2019 TECHNIQUE:  CT examination of the abdomen and pelvis was performed. Multiplanar 2D reformatted images were created from the source data. This examination, like all CT scans performed in the [facility], was performed utilizing techniques to minimize radiation dose exposure, including the use of iterative reconstruction and automated exposure control. Radiation dose length product (DLP) for this visit:  581 mGy-cm IV Contrast:  100 mL of iohexol (OMNIPAQUE) Enteric Contrast: Enteric contrast was administered. FINDINGS: ABDOMEN LOWER CHEST:  No clinically significant abnormality identified in the visualized lower chest. LIVER/BILIARY TREE:  Unremarkable. GALLBLADDER: Gallbladder is surgically absent. SPLEEN:  Unremarkable. PANCREAS:  Unremarkable. ADRENAL GLANDS:  Unremarkable. KIDNEYS/URETERS:  Unremarkable. No hydronephrosis. STOMACH AND BOWEL:  Unremarkable. APPENDIX: There are expected postoperative changes of appendectomy. ABDOMINOPELVIC CAVITY:  No ascites. No pneumoperitoneum. No lymphadenopathy. VESSELS:  Unremarkable for patient's age. PELVIS REPRODUCTIVE ORGANS:  Unremarkable for patient's age. URINARY BLADDER:  Unremarkable. ABDOMINAL WALL/INGUINAL REGIONS:  Unremarkable. OSSEOUS STRUCTURES: No acute fracture or destructive osseous lesion. Spinal degenerative changes are noted. Impression: No acute abnormality in the abdomen or pelvis.  Workstation performed: [id number]

## 2023-11-20 ENCOUNTER — APPOINTMENT (OUTPATIENT)
Dept: RADIOLOGY | Facility: MEDICAL CENTER | Age: 63
End: 2023-11-20
Payer: COMMERCIAL

## 2023-11-20 DIAGNOSIS — M79.671 PAIN OF RIGHT HEEL: Primary | ICD-10-CM

## 2023-11-20 DIAGNOSIS — M79.671 PAIN OF RIGHT HEEL: ICD-10-CM

## 2023-11-20 PROCEDURE — 73630 X-RAY EXAM OF FOOT: CPT

## 2023-11-21 ENCOUNTER — OFFICE VISIT (OUTPATIENT)
Dept: PODIATRY | Facility: CLINIC | Age: 63
End: 2023-11-21
Payer: COMMERCIAL

## 2023-11-21 VITALS
DIASTOLIC BLOOD PRESSURE: 84 MMHG | BODY MASS INDEX: 30.32 KG/M2 | OXYGEN SATURATION: 97 % | WEIGHT: 182 LBS | HEART RATE: 73 BPM | SYSTOLIC BLOOD PRESSURE: 129 MMHG | HEIGHT: 65 IN

## 2023-11-21 DIAGNOSIS — M79.671 PAIN OF RIGHT HEEL: Primary | ICD-10-CM

## 2023-11-21 PROCEDURE — 99204 OFFICE O/P NEW MOD 45 MIN: CPT | Performed by: STUDENT IN AN ORGANIZED HEALTH CARE EDUCATION/TRAINING PROGRAM

## 2023-11-21 PROCEDURE — 20552 NJX 1/MLT TRIGGER POINT 1/2: CPT | Performed by: STUDENT IN AN ORGANIZED HEALTH CARE EDUCATION/TRAINING PROGRAM

## 2023-11-21 RX ORDER — DEXAMETHASONE SODIUM PHOSPHATE 10 MG/ML
40 INJECTION, SOLUTION INTRAMUSCULAR; INTRAVENOUS
Status: SHIPPED | OUTPATIENT
Start: 2023-11-21

## 2023-11-21 RX ORDER — MELOXICAM 7.5 MG/1
7.5 TABLET ORAL DAILY
Qty: 10 TABLET | Refills: 0 | Status: SHIPPED | OUTPATIENT
Start: 2023-11-21

## 2023-11-21 RX ORDER — TRIAMCINOLONE ACETONIDE 40 MG/ML
40 INJECTION, SUSPENSION INTRA-ARTICULAR; INTRAMUSCULAR
Status: SHIPPED | OUTPATIENT
Start: 2023-11-21

## 2023-11-21 RX ORDER — LIDOCAINE HYDROCHLORIDE 10 MG/ML
0.5 INJECTION, SOLUTION INFILTRATION; PERINEURAL
Status: SHIPPED | OUTPATIENT
Start: 2023-11-21

## 2023-11-21 RX ADMIN — DEXAMETHASONE SODIUM PHOSPHATE 40 MG: 10 INJECTION, SOLUTION INTRAMUSCULAR; INTRAVENOUS at 13:45

## 2023-11-21 RX ADMIN — LIDOCAINE HYDROCHLORIDE 0.5 ML: 10 INJECTION, SOLUTION INFILTRATION; PERINEURAL at 13:45

## 2023-11-21 RX ADMIN — TRIAMCINOLONE ACETONIDE 40 MG: 40 INJECTION, SUSPENSION INTRA-ARTICULAR; INTRAMUSCULAR at 13:45

## 2023-11-21 NOTE — PROGRESS NOTES
Assessment/Plan:     Diagnoses and all orders for this visit:    Pain of right heel          Imaging Reviewed at this visit (I personally reviewed/independently interpreted images and reports in PACS)  XR right foot WB 3v 11/20/23: No acute osseous abnormalities noted. Large posterior calcaneal spur (enthesophyte), rectus to high arch. IMPRESSION:  Right heel pain. Differential diagnosis include plantar fasciitis, Gonzalez's nerve entrapment, degenerative changes (calcaneal spurs, arthrosis of the joints of the foot), and inflammatory conditions of the ligaments and fascia of the foot and ankle. PLAN:  I reviewed clinical exam and radiographic imaging (XR) with patient in detail today. I have discussed with the patient the pathophysiology of this diagnosis and reviewed how the examination correlates with this diagnosis. I explained at length the biomechanical abnormalities leading to the significant overload of the plantar fascia. I stressed the importance of proper shoe gear and OTC arch supports to reposition foot to reduce tension on soft tissue structures and give cushion. Instructions were given for conservative care which was also demonstrated during the clinical visit. I stressed the importance of achilles tendon stretching, icing and applying voltarin gel or biofreeze (OTC) at least 3x/day. I recommend stiff bottomed sneakers/shoes (ex Asics, Vionic, New balance, Rodgers, etc) for daily use and Joseph Mckenzie (recovery slides) for in home use. I advised pt to obtain OTC orthotics such as powerstep or superfeet or *Dananberg arch supports  I performed right heel injection as below  I rx'd short course of meloxicam today and discussed close monitoring for stomach bleed symptoms which she is to stop the medication immediately if she has such. RTC 6wks      Universal Protocol:  Consent: Verbal consent obtained.   Risks and benefits: risks, benefits and alternatives were discussed (infection, pain, plantar fascial rupture)  Consent given by: patient  Time out: Immediately prior to procedure a "time out" was called to verify the correct patient, procedure, equipment, support staff and site/side marked as required. Patient understanding: patient states understanding of the procedure being performed  Patient consent: the patient's understanding of the procedure matches consent given  Supporting Documentation  Indications: pain   Trigger Point Injections: single/multiple trigger point(s): 1-2 muscle groups    Injection site identified by: palpation  Procedure Details  Location(s):  Needle size: 27 G  Medications: 40 mg triamcinolone acetonide 40 mg/mL; 40 mg dexamethasone 100 mg/10 mL; 0.5 mL lidocaine 1 %  Patient tolerance: patient tolerated the procedure well with no immediate complications  Additional procedure details: After reviewing risks (pain, steroid flare, infection, plantar fascial injury/tear, skin thinning) and obtaining verbal consent, I cleaned skin with alcohol and injected right heel with 0.5cc kenalog 40 + 0.5cc dexamethasone + 0.5cc bupivicaine plain. Oral directions were given for rest and ice on the affected heel. The ice is to be used for approximately 20 minutes at a time, 1-2 times a day for a few days. The patient is to call at once if there is any increased pain, swelling, tenderness, redness, etc.         Subjective:      Patient ID: Tae Ralph is a 66 y.o. female. Randi presents to clinic today concerning right foot pain. Pain present for <1mo without trauma or inciting event. Notes most pain in AM and getting up from rise at first step. Notes shoes are a bit better than barefoot. No tx. Has h/o previous plantar fasciitis but not for years. Starting to stretch a bit.          The following portions of the patient's history were reviewed and updated as appropriate: allergies, current medications, past family history, past medical history, past social history, past surgical history, and problem list.    Review of Systems   Constitutional:  Negative for activity change, chills and fever. HENT: Negative. Respiratory:  Negative for cough, chest tightness and shortness of breath. Cardiovascular:  Negative for chest pain and leg swelling. Endocrine: Negative. Genitourinary: Negative. Musculoskeletal:  Positive for gait problem (R foot pain). Neurological:  Negative for numbness. Psychiatric/Behavioral: Negative. Negative for agitation and behavioral problems. Objective:      /84   Pulse 73   Ht 5' 5" (1.651 m)   Wt 82.6 kg (182 lb)   SpO2 97%   BMI 30.29 kg/m²          Physical Exam  Constitutional:       Appearance: Normal appearance. She is not ill-appearing. Cardiovascular:      Pulses: Normal pulses. Comments: Bilateral DP & PT pulses 2/4. CRT WNL. Pedal hair present. Feet warm and well perfused. Pulmonary:      Effort: Pulmonary effort is normal. No respiratory distress. Musculoskeletal:         General: Tenderness (Right foot pain at plantar calcaneus at medial tubercle at insertion of plantar fascia.) present. Normal range of motion. Comments: There is decreased ankle DF with knee extended and flexed indicating gastroc-soleal equinus. B/L WB exam exhibits rectus to high arch foot type, heel position subtle varus. Bilateral ankle, STJ, TNJ, TMTJ, MTPJ ROM WNL and without pain. LE muscle strength WNL. Skin:     General: Skin is warm. Capillary Refill: Capillary refill takes less than 2 seconds. Findings: No erythema or lesion. Neurological:      General: No focal deficit present. Mental Status: She is alert and oriented to person, place, and time. Sensory: No sensory deficit. Comments: Gross sensation intact. Denies N/T/B to B/L feet.     Psychiatric:         Mood and Affect: Mood normal.

## 2023-11-21 NOTE — PATIENT INSTRUCTIONS
Foot Pain Home Therapy    Stretch. Place painful foot in back with heel on ground and lean against wall. Do not lift heel off of ground. You will feel the stretch in the calf muscles and possibly the heel. Hold the stretch for 20-30 seconds. Do this at least 5-6 times per day. It is best done after exercise when your muscles are warm. Wear supportive shoes at all times. Avoid flip-flops, flat sandals, barefoot walking (never walk barefoot, even at home). Generally avoid shoes that are too flexible and bend in the arch. Your shoes should only slightly bend in the toe area, not the middle. Running sneakers are often the best choice. Supportive sneaker brands: Ezio Gomez, On [address], MEDNAX (Bondii style), [place], New Balance, Asics, [employer] (discount if mention Dr referred)  [address] [place] [address] [employer] [place]  Supportive daily shoe brands: Vionic, SUND, [place], Dansko, Swisher, WayneBarspace, [place] CRAiLAR, SPScholarPRO  Supportive home shoes: Aptito (recovery slides)*  Purchase over the counter topical pain creams such as Voltarin gel, biofreeze, or CBD cream - will need to apply 2-3 times per day for benefit. + deep tissue massage. Look in to over the counter shoe inserts/arch supports such as Superfeet, Powerstep or *Dananberg arch supports. These are all available on [employer]. com as well as their individual website's. [employer]. com: Vasyli+Dananberg 1st Ray Orthotic, Medium, 1st Ray Function, Medium Density, Full-Length Insole, Heat Molding Optional, Best All Around Orthotic, Functional Biomechanical Control for Pain Relief, Black Yellow (89071) : Health & Household        Achilles Tendon Stretching Exercises    A) Standing Gastrocnemius stretch  Place hands on wall or chair. If using wall, put your hands at eye level. Step the leg you want to stretch behind you.  Keep your back heel on the floor and point your toes straight ahead or slightly inward towards the heel of the opposite foot. Bend your knee toward the wall while keeping your back leg straight. Lean toward the wall until you feel a gentle stretch in you calf of the straight leg. Don't lean so far that you feel pain. Hold for 15 seconds. Complete 3 reps. B) Standing soleus stretch  Place your hands on the wall or chair. If using wall, put your hands at eye level. Step the leg you want to stretch behind you (your back foot will need to be closer to the front foot than the above stretch). Keep your back heel on the floor and point your toes straight ahead or slightly inward towards the heel of the opposite foot. Bend both your front and back knee at the same time (may help to stick your butt out). You do not need to lean towards the wall, just bend the knees. Lean toward the wall until you feel a gentle stretch in you calf of the straight leg. Don't lean so far that you feel pain. Hold for 15 seconds. Complete 3 reps. Keep these tips and tricks in mind to get the most out of your stretching; Take your time - move slowly, whether you are deepening into a stretch or changing positions. This will limit the risk of injury & discomfort. Avoid bouncing - quick sudden movements will only worsen achilles tendon issues. Stay relaxed during stretch. Keep your heel down and toes straight ahead or slightly inward - this will allow the achilles tendon to stretch properly. Stop if you feel pain - Don't strain or force your muscle. If you feel sharp pain, stop immediately.

## 2023-11-30 DIAGNOSIS — E66.9 CLASS 1 OBESITY: ICD-10-CM

## 2023-11-30 DIAGNOSIS — R73.03 PREDIABETES: ICD-10-CM

## 2023-12-02 RX ORDER — METFORMIN HYDROCHLORIDE 750 MG/1
750 TABLET, EXTENDED RELEASE ORAL
Qty: 30 TABLET | Refills: 1 | Status: SHIPPED | OUTPATIENT
Start: 2023-12-02 | End: 2023-12-04 | Stop reason: SDUPTHER

## 2023-12-04 ENCOUNTER — TELEPHONE (OUTPATIENT)
Dept: BARIATRICS | Facility: CLINIC | Age: 63
End: 2023-12-04

## 2023-12-04 DIAGNOSIS — R73.03 PREDIABETES: ICD-10-CM

## 2023-12-04 DIAGNOSIS — E66.9 CLASS 1 OBESITY: ICD-10-CM

## 2023-12-04 RX ORDER — METFORMIN HYDROCHLORIDE 750 MG/1
1500 TABLET, EXTENDED RELEASE ORAL
Qty: 60 TABLET | Refills: 1 | Status: SHIPPED | OUTPATIENT
Start: 2023-12-04

## 2023-12-04 NOTE — TELEPHONE ENCOUNTER
----- Message from Bryan Uriarte sent at 12/2/2023  5:49 PM EST -----  Regarding: Metformin  Contact: [phone]  Hello,  It did, sometimes get morning, oh no, cramping and quick run to the bathroom. That would be great because I really need to lose weight. My feet and knees are not happy with me! Lol.  I will try it, I will need a refill and how do you want me to take it?     Thank you

## 2023-12-11 ENCOUNTER — APPOINTMENT (OUTPATIENT)
Dept: LAB | Facility: MEDICAL CENTER | Age: 63
End: 2023-12-11
Payer: COMMERCIAL

## 2023-12-11 DIAGNOSIS — R19.7 DIARRHEA OF PRESUMED INFECTIOUS ORIGIN: ICD-10-CM

## 2023-12-11 PROCEDURE — 87177 OVA AND PARASITES SMEARS: CPT

## 2023-12-11 PROCEDURE — 87209 SMEAR COMPLEX STAIN: CPT

## 2023-12-11 PROCEDURE — 83993 ASSAY FOR CALPROTECTIN FECAL: CPT

## 2023-12-15 LAB — CALPROTECTIN STL-MCNT: <5 UG/G (ref 0–120)

## 2023-12-20 DIAGNOSIS — M79.674 CHRONIC TOE PAIN, RIGHT FOOT: Primary | ICD-10-CM

## 2023-12-20 DIAGNOSIS — G89.29 CHRONIC TOE PAIN, RIGHT FOOT: Primary | ICD-10-CM

## 2023-12-20 DIAGNOSIS — M79.671 PAIN IN RIGHT FOOT: ICD-10-CM

## 2023-12-21 ENCOUNTER — HOSPITAL ENCOUNTER (OUTPATIENT)
Dept: MRI IMAGING | Facility: HOSPITAL | Age: 63
Discharge: HOME | End: 2023-12-21
Attending: FAMILY MEDICINE
Payer: COMMERCIAL

## 2023-12-21 DIAGNOSIS — M79.671 PAIN IN RIGHT FOOT: ICD-10-CM

## 2023-12-21 PROCEDURE — G1004 CDSM NDSC: HCPCS

## 2023-12-21 PROCEDURE — 73718 MRI LOWER EXTREMITY W/O DYE: CPT

## 2023-12-27 ENCOUNTER — TELEPHONE (OUTPATIENT)
Dept: PODIATRY | Age: 63
End: 2023-12-27

## 2023-12-27 DIAGNOSIS — M79.671 PAIN OF RIGHT HEEL: Primary | ICD-10-CM

## 2023-12-28 ENCOUNTER — OFFICE VISIT (OUTPATIENT)
Dept: PODIATRY | Facility: CLINIC | Age: 63
End: 2023-12-28
Payer: COMMERCIAL

## 2023-12-28 VITALS — HEART RATE: 74 BPM | OXYGEN SATURATION: 98 %

## 2023-12-28 DIAGNOSIS — M72.2 PLANTAR FASCIITIS OF RIGHT FOOT: ICD-10-CM

## 2023-12-28 DIAGNOSIS — T14.8XXA MUSCLE TEAR: ICD-10-CM

## 2023-12-28 DIAGNOSIS — M79.671 PAIN OF RIGHT HEEL: Primary | ICD-10-CM

## 2023-12-28 PROCEDURE — 99213 OFFICE O/P EST LOW 20 MIN: CPT | Performed by: STUDENT IN AN ORGANIZED HEALTH CARE EDUCATION/TRAINING PROGRAM

## 2023-12-28 NOTE — PROGRESS NOTES
Assessment/Plan:     Diagnoses and all orders for this visit:    Pain of right heel  -     Ambulatory referral to Physical Therapy; Future    Plantar fasciitis of right foot  -     Ambulatory referral to Physical Therapy; Future    Muscle tear  -     Ambulatory referral to Physical Therapy; Future            Imaging Reviewed at this visit (I personally reviewed/independently interpreted images and reports in PACS)  MRI right foot 12/21/23: plantar fasciitis and partial tear FDB muscle    Prior Imaging  XR right foot WB 3v 11/20/23: No acute osseous abnormalities noted. Large posterior calcaneal spur (enthesophyte), rectus to high arch.      IMPRESSION:  Right heel pain. Differential diagnosis include plantar fasciitis, Gonzalez's nerve entrapment, degenerative changes (calcaneal spurs, arthrosis of the joints of the foot), and inflammatory conditions of the ligaments and fascia of the foot and ankle.   Right heel pain after injury w/ MRI as above     PLAN:  I reviewed MRI as above; shows likely chronic plantar fasciitis and partial tear FDB muscle (acute). I discussed healing timeline of muscle tear with patient as this is an acute injury.  RICE. Hold off on stretching for now. Topical pain cream PRN.   C/w CAM boot  NSAIDs prn  Obtain supportive shoes, Dananberg arch supports  PT rx given once acute pain improves  F/u 2-3wks      Subjective:      Patient ID: Randi Mathews is a 63 y.o. female.    Randi presents to clinic today concerning right foot pain f/u. Notes injection, meloxicam did not really help however she did get new shoes and arch supports and stretching daily. Notes she was starting to feel better until she twisted her foot in a store and felt more pain. Had MRI and I placed her in a CAM boot which feels much better for her.          The following portions of the patient's history were reviewed and updated as appropriate: allergies, current medications, past family history, past medical history, past  social history, past surgical history, and problem list.    Review of Systems   Constitutional:  Negative for activity change, chills and fever.   HENT: Negative.     Respiratory:  Negative for cough, chest tightness and shortness of breath.    Cardiovascular:  Negative for chest pain and leg swelling.   Endocrine: Negative.    Genitourinary: Negative.    Musculoskeletal:  Positive for gait problem (R foot pain).   Neurological:  Negative for numbness.   Psychiatric/Behavioral: Negative.  Negative for agitation and behavioral problems.          Objective:      Pulse 74   SpO2 98%          Physical Exam  Constitutional:       Appearance: Normal appearance. She is not ill-appearing.   Cardiovascular:      Pulses: Normal pulses.      Comments: Bilateral DP & PT pulses 2/4. CRT WNL. Pedal hair present. Feet warm and well perfused.   Pulmonary:      Effort: Pulmonary effort is normal. No respiratory distress.   Musculoskeletal:         General: Swelling (Right heel) and tenderness (Right foot pain at plantar calcaneus at medial tubercle at insertion of plantar fascia.) present. Normal range of motion.      Comments: There is decreased ankle DF with knee extended and flexed indicating gastroc-soleal equinus.   B/L WB exam exhibits rectus to high arch foot type, heel position subtle varus.  Bilateral ankle, STJ, TNJ, TMTJ, MTPJ ROM WNL and without pain. LE muscle strength WNL.   Skin:     General: Skin is warm.      Capillary Refill: Capillary refill takes less than 2 seconds.      Findings: No erythema or lesion.   Neurological:      General: No focal deficit present.      Mental Status: She is alert and oriented to person, place, and time.      Sensory: No sensory deficit.      Comments: Gross sensation intact. Denies N/T/B to B/L feet.    Psychiatric:         Mood and Affect: Mood normal.

## 2024-01-09 PROBLEM — Z12.11 SCREENING FOR COLON CANCER: Status: RESOLVED | Noted: 2023-11-10 | Resolved: 2024-01-09

## 2024-01-09 PROBLEM — R19.7 DIARRHEA OF PRESUMED INFECTIOUS ORIGIN: Status: RESOLVED | Noted: 2023-11-10 | Resolved: 2024-01-09

## 2024-01-11 ENCOUNTER — OFFICE VISIT (OUTPATIENT)
Dept: PODIATRY | Facility: CLINIC | Age: 64
End: 2024-01-11
Payer: COMMERCIAL

## 2024-01-11 VITALS — WEIGHT: 186 LBS | HEART RATE: 72 BPM | HEIGHT: 65 IN | BODY MASS INDEX: 30.99 KG/M2

## 2024-01-11 DIAGNOSIS — T14.8XXA MUSCLE TEAR: ICD-10-CM

## 2024-01-11 DIAGNOSIS — M72.2 PLANTAR FASCIITIS OF RIGHT FOOT: ICD-10-CM

## 2024-01-11 DIAGNOSIS — M79.671 PAIN OF RIGHT HEEL: Primary | ICD-10-CM

## 2024-01-11 PROCEDURE — 99213 OFFICE O/P EST LOW 20 MIN: CPT | Performed by: STUDENT IN AN ORGANIZED HEALTH CARE EDUCATION/TRAINING PROGRAM

## 2024-01-11 RX ORDER — METHYLPREDNISOLONE 4 MG/1
TABLET ORAL
Qty: 1 EACH | Refills: 0 | Status: SHIPPED | OUTPATIENT
Start: 2024-01-11

## 2024-01-11 NOTE — PATIENT INSTRUCTIONS
Wear supportive shoes at all times. Avoid flip-flops, flat sandals, barefoot walking (never walk barefoot, even at home). Generally avoid shoes that are too flexible and bend in the arch. Your shoes should only slightly bend in the toe area, not the middle. Running sneakers are often the best choice.  Supportive sneaker brands: Rodgers, On Cloud, [place] (Bondii style), Altra, New Balance, Asics, Mizuno  [employer] (discount if mention Dr referred)  Fleet Feet [employer]  [facility]   [place] Vizibility [place]  Supportive daily shoe brands: Vionic, Orthofeet, Nikki, Dansko, Chacos, Aguilar, Teva, Birkenstock  Supportive home shoes: OJoseph joseph (recovery slides)*  Purchase over the counter topical pain creams such as Voltarin gel, biofreeze, or CBD cream - will need to apply 2-3 times per day for benefit. + deep tissue massage.   Look in to over the counter shoe inserts/arch supports such as *Tyson arch supports. These are all available on Amazon.com as well as their individual website's.   [employer]: Vasyli+Tyson 1st Ray Orthotic, Medium, 1st Ray Function, Medium Density, Full-Length Insole, Heat Molding Optional, Best All Around Orthotic, Functional Biomechanical Control for Pain Relief, Black Yellow (78906) : Health & Household

## 2024-01-11 NOTE — PROGRESS NOTES
Assessment/Plan:     Diagnoses and all orders for this visit:    Pain of right heel  -     methylPREDNISolone 4 MG tablet therapy pack; Use as directed on package    Plantar fasciitis of right foot  -     methylPREDNISolone 4 MG tablet therapy pack; Use as directed on package    Muscle tear  -     methylPREDNISolone 4 MG tablet therapy pack; Use as directed on package              Imaging Reviewed at this visit (I personally reviewed/independently interpreted images and reports in PACS)  MRI right foot 12/21/23: plantar fasciitis and partial tear FDB muscle    Prior Imaging  XR right foot WB 3v 11/20/23: No acute osseous abnormalities noted. Large posterior calcaneal spur (enthesophyte), rectus to high arch.      IMPRESSION:  Right heel pain. Differential diagnosis include plantar fasciitis, Gonzalez's nerve entrapment, degenerative changes (calcaneal spurs, arthrosis of the joints of the foot), and inflammatory conditions of the ligaments and fascia of the foot and ankle.   Right heel pain after injury w/ MRI as above revealing partial tear in FDB muscle     PLAN:  MRI as above; shows likely chronic plantar fasciitis and partial tear FDB muscle (acute). Pain is still present even with CAM boot however she has not rested  May report to PT  Obtain supportive shoes, Dananberg arch supports  Medrol dose pack rx'd  I discussed that she needs to rest and stay off of foot as much as possible   F/u 4wks      Subjective:      Patient ID: Randi Mathews is a 63 y.o. female.    Randi presents to clinic today concerning right foot pain f/u. CAM boot was feeling ok however she had been on her feet a lot and now stopped working. Wore her sneakers today which do feel ok. Did not get the recommended arch supports.         The following portions of the patient's history were reviewed and updated as appropriate: allergies, current medications, past family history, past medical history, past social history, past surgical history, and  "problem list.    Review of Systems   Constitutional:  Negative for activity change, chills and fever.   HENT: Negative.     Respiratory:  Negative for cough, chest tightness and shortness of breath.    Cardiovascular:  Negative for chest pain and leg swelling.   Endocrine: Negative.    Genitourinary: Negative.    Musculoskeletal:  Positive for gait problem (R foot pain).   Neurological:  Negative for numbness.   Psychiatric/Behavioral: Negative.  Negative for agitation and behavioral problems.          Objective:      Pulse 72   Ht 5' 5\" (1.651 m)   Wt 84.4 kg (186 lb)   BMI 30.95 kg/m²          Physical Exam  Constitutional:       Appearance: Normal appearance. She is not ill-appearing.   Cardiovascular:      Pulses: Normal pulses.      Comments: Bilateral DP & PT pulses 2/4. CRT WNL. Pedal hair present. Feet warm and well perfused.   Pulmonary:      Effort: Pulmonary effort is normal. No respiratory distress.   Musculoskeletal:         General: Swelling (Right heel) and tenderness (Right foot pain at plantar calcaneus at medial tubercle at insertion of plantar fascia.) present. Normal range of motion.      Comments: There is decreased ankle DF with knee extended and flexed indicating gastroc-soleal equinus.   B/L WB exam exhibits rectus to high arch foot type, heel position subtle varus.  Bilateral ankle, STJ, TNJ, TMTJ, MTPJ ROM WNL and without pain. LE muscle strength WNL.   Skin:     General: Skin is warm.      Capillary Refill: Capillary refill takes less than 2 seconds.      Findings: No erythema or lesion.   Neurological:      General: No focal deficit present.      Mental Status: She is alert and oriented to person, place, and time.      Sensory: No sensory deficit.      Comments: Gross sensation intact. Denies N/T/B to B/L feet.    Psychiatric:         Mood and Affect: Mood normal.           "

## 2024-01-16 LAB — MISCELLANEOUS LAB TEST RESULT: NORMAL

## 2024-01-17 ENCOUNTER — TELEPHONE (OUTPATIENT)
Dept: FAMILY MEDICINE CLINIC | Facility: CLINIC | Age: 64
End: 2024-01-17

## 2024-01-17 VITALS — DIASTOLIC BLOOD PRESSURE: 80 MMHG | SYSTOLIC BLOOD PRESSURE: 124 MMHG

## 2024-01-18 ENCOUNTER — TELEPHONE (OUTPATIENT)
Dept: FAMILY MEDICINE CLINIC | Facility: CLINIC | Age: 64
End: 2024-01-18

## 2024-01-18 VITALS — DIASTOLIC BLOOD PRESSURE: 78 MMHG | SYSTOLIC BLOOD PRESSURE: 128 MMHG

## 2024-01-24 ENCOUNTER — EVALUATION (OUTPATIENT)
Dept: PHYSICAL THERAPY | Facility: CLINIC | Age: 64
End: 2024-01-24
Payer: COMMERCIAL

## 2024-01-24 DIAGNOSIS — T14.8XXA MUSCLE TEAR: ICD-10-CM

## 2024-01-24 DIAGNOSIS — M72.2 PLANTAR FASCIITIS OF RIGHT FOOT: ICD-10-CM

## 2024-01-24 DIAGNOSIS — M79.671 PAIN OF RIGHT HEEL: Primary | ICD-10-CM

## 2024-01-24 PROCEDURE — 97161 PT EVAL LOW COMPLEX 20 MIN: CPT | Performed by: PHYSICAL THERAPIST

## 2024-01-24 PROCEDURE — 97535 SELF CARE MNGMENT TRAINING: CPT | Performed by: PHYSICAL THERAPIST

## 2024-01-24 PROCEDURE — 97112 NEUROMUSCULAR REEDUCATION: CPT | Performed by: PHYSICAL THERAPIST

## 2024-01-24 PROCEDURE — 97140 MANUAL THERAPY 1/> REGIONS: CPT | Performed by: PHYSICAL THERAPIST

## 2024-01-24 NOTE — PROGRESS NOTES
PT Evaluation     Today's date: 2024  Patient name: Randi Mathews  : 1960  MRN: [id number]  Referring provider: Betzaida Brown DPM  Dx:   Encounter Diagnosis     ICD-10-CM    1. Pain of right heel  M79.671 Ambulatory referral to Physical Therapy      2. Plantar fasciitis of right foot  M72.2 Ambulatory referral to Physical Therapy      3. Muscle tear  T14.8XXA Ambulatory referral to Physical Therapy                     Assessment  Understanding of Dx/Px/POC: good   Prognosis: good    Goals  STGs: To be complete within 4 weeks  - Decrease pain to < 2/10 at worst  - Increase AROM to WNL  - Increase posterior/lat LE chain strength to > 4+/5  - Improve gait to WNL for distances < 6 blocks     LTGs: To be complete within 6 weeks  - Able to walk for any extended amount of time/distance without pain or limitation for increased safety and functional capacity with ADLs and work-related duty  - Able to repetitively squat without pain or limitation for increased safety and functional capacity with ADLs and work-related duty  - Able to repetitively ascend/descend a full flight of stairs without pain or limitation for increased safety and functional capacity with ADLs and work-related duty  - Able to repetitively complete transfers without pain or limitation for increased safety and functional capacity with ADLs and work-related duty    Plan  Planned therapy interventions: manual therapy, neuromuscular re-education, patient education, postural training, self care, therapeutic activities, therapeutic exercise, gait training and home exercise program  Duration in weeks: 6       Pt is a very pleasant 63 y.o. Female with R foot and ankle pain who presents with functional deficits including decreased capacity with walking, squatting, stairs, and transfers. Upon completion of today's initial evaluation, Randi's sx remain consistent with R foot plantar fascitis, tibialis posterior inflammation, Partial tear flexor  digitorum brevis muscle, and gait dysfunction. Patient will benefit from skilled physical therapy to address current deficits.         Subjective Evaluation    Patient Goals  Patient goals for therapy: increased strength, increased motion and decreased pain    Pain  Current pain ratin  At best pain ratin  At worst pain ratin  Location: R Foot/Ankle         Pt reports she has been battling R foot and ankle pain for > 3 months. Reports sx have continued to negatively impact her overall safety and functional capacity with ADLs and work-related duty.        Objective Pain level ranges 2-8/10  AROM: R Ankle DF 50% decreased; WNL t/o all remaining planes; L Ankle WNL  Strength: R Ankle 4/5 t/o all planes; L Ankle 5/5 t/o all planes  Gait: Bilateral toeing out pattern, pes cavus, bilateral ankle instability  Recommendations: Footwear and night sock  Swelling: Mild (will continue to monitor)  FOTO: 42; GOAL: 66  Unable to walk without pain and limitation  Unable to squat without pain and limitation  Unable complete transfers without pain and limitation  Unable to ascend/descend stairs without pain and limitation              Precautions: None at this time    Daily Treatment Diary    HEP: Handout provided and discussed      Manuals 24       ART R Foot/Heel/Ankle x15'       PROM/Stretch        IASTM        STM/Triggerpoint        JM                Neuro Re-Ed        Standing Calf/Achilles Stretch 6x20'' ea       Calf Stretch with Strap        No shoe AE Steamboats  This session      Timed SLS AE No Shoe 8x20''       SL Ecc HR  This session      Ankle Pumps        Timed SLS Bosu  This session      Upside down BOSU squat holds        4-way wooden CAPPTURES Board  This session      Thera-stick roll Golf ball x5' This session thera stick                                      Ther Ex        Ankle  Circles        Lateral Walk        4-way wooden BAPS Board        HR/TR  This session      Seated HR/TR        Seated Ankle  inv/ev towel        Seated Ankle sup/pron        Toe flx/ext curls towel                Ther Activity        Bike/NuStep        Treadmill  This session incline      Forward/backward heel to toe Walk                Gait Training                        Modalities        MHP        CP        US/Stim

## 2024-01-25 ENCOUNTER — TELEPHONE (OUTPATIENT)
Dept: FAMILY MEDICINE CLINIC | Facility: CLINIC | Age: 64
End: 2024-01-25

## 2024-01-25 VITALS — DIASTOLIC BLOOD PRESSURE: 82 MMHG | SYSTOLIC BLOOD PRESSURE: 130 MMHG

## 2024-01-29 ENCOUNTER — OFFICE VISIT (OUTPATIENT)
Dept: PHYSICAL THERAPY | Facility: CLINIC | Age: 64
End: 2024-01-29
Payer: COMMERCIAL

## 2024-01-29 DIAGNOSIS — T14.8XXA MUSCLE TEAR: ICD-10-CM

## 2024-01-29 DIAGNOSIS — M72.2 PLANTAR FASCIITIS OF RIGHT FOOT: ICD-10-CM

## 2024-01-29 DIAGNOSIS — M79.671 PAIN OF RIGHT HEEL: Primary | ICD-10-CM

## 2024-01-29 PROCEDURE — 97140 MANUAL THERAPY 1/> REGIONS: CPT | Performed by: PHYSICAL THERAPIST

## 2024-01-29 PROCEDURE — 97110 THERAPEUTIC EXERCISES: CPT | Performed by: PHYSICAL THERAPIST

## 2024-01-29 PROCEDURE — 97112 NEUROMUSCULAR REEDUCATION: CPT | Performed by: PHYSICAL THERAPIST

## 2024-01-29 NOTE — PROGRESS NOTES
Daily Note     Today's date: 2024  Patient name: Randi Mathews  : 1960  MRN: [id number]  Referring provider: Betzaida Brown DPM  Dx:   Encounter Diagnosis     ICD-10-CM    1. Pain of right heel  M79.671       2. Plantar fasciitis of right foot  M72.2       3. Muscle tear  T14.8XXA                      Subjective: Pt reports HEP going well. No setbacks. Anxious to continue making progress.      Objective: See treatment diary below      Assessment: Tolerated treatment well. Patient demonstrated fatigue post treatment, exhibited good technique with therapeutic exercises, and would benefit from continued PT      Plan: Continue per plan of care.  Progress treatment as tolerated.         Precautions: None at this time    Daily Treatment Diary    HEP: Handout provided and discussed      Manuals 24      ART R Foot/Heel/Ankle x15' x25'      PROM/Stretch        IASTM        STM/Triggerpoint        JM                Neuro Re-Ed        Standing Calf/Achilles Stretch 6x20'' ea 6x20'' ea      Calf Stretch with Strap        No shoe AE Steamboats   This session     Timed SLS AE No Shoe 8x20'' 8x20''      SL Ecc HR   This session     Ankle Pumps        Timed SLS Bosu   This session     Upside down BOSU squat holds        4-way wooden [employer] Board   This session     Thera-stick roll Golf ball x5' X5' Therastick                                      Ther Ex        Ankle  Circles        Lateral Walk        4-way wooden BAPS Board        HR/TR  3x10      Seated HR/TR        Seated Ankle inv/ev towel        Seated Ankle sup/pron        Toe flx/ext curls towel                Ther Activity        Bike/NuStep        Treadmill   This session incline     Forward/backward heel to toe Walk                Gait Training                        Modalities        MHP        CP  Ice Massage x10'      US/Stim

## 2024-01-31 ENCOUNTER — OFFICE VISIT (OUTPATIENT)
Dept: PHYSICAL THERAPY | Facility: CLINIC | Age: 64
End: 2024-01-31
Payer: COMMERCIAL

## 2024-01-31 DIAGNOSIS — M72.2 PLANTAR FASCIITIS OF RIGHT FOOT: ICD-10-CM

## 2024-01-31 DIAGNOSIS — T14.8XXA MUSCLE TEAR: ICD-10-CM

## 2024-01-31 DIAGNOSIS — M79.671 PAIN OF RIGHT HEEL: Primary | ICD-10-CM

## 2024-01-31 PROCEDURE — 97110 THERAPEUTIC EXERCISES: CPT | Performed by: PHYSICAL THERAPIST

## 2024-01-31 PROCEDURE — 97140 MANUAL THERAPY 1/> REGIONS: CPT | Performed by: PHYSICAL THERAPIST

## 2024-01-31 PROCEDURE — 97112 NEUROMUSCULAR REEDUCATION: CPT | Performed by: PHYSICAL THERAPIST

## 2024-01-31 NOTE — PROGRESS NOTES
Daily Note     Today's date: 2024  Patient name: Randi Mathews  : 1960  MRN: [id number]  Referring provider: Betzaida rBown DPM  Dx:   Encounter Diagnosis     ICD-10-CM    1. Pain of right heel  M79.671       2. Plantar fasciitis of right foot  M72.2       3. Muscle tear  T14.8XXA                      Subjective: Pt reports HEP going well. No setbacks. Anxious to continue making progress.      Objective: See treatment diary below      Assessment: Tolerated treatment well. Patient demonstrated fatigue post treatment, exhibited good technique with therapeutic exercises, and would benefit from continued PT      Plan: Continue per plan of care.  Progress treatment as tolerated.         Precautions: None at this time    Daily Treatment Diary    HEP: Handout provided and discussed      Manuals 24     ART R Foot/Heel/Ankle x15' x25' x25'     PROM/Stretch        IASTM        STM/Triggerpoint        JM                Neuro Re-Ed        Standing Calf/Achilles Stretch 6x20'' ea 6x20'' ea 6x20'' ea     Calf Stretch with Strap        No shoe AE Steamboats    This session    Timed SLS AE No Shoe 8x20'' 8x20'' 8x20''     SL Ecc HR    This session    Ankle Pumps        Timed SLS Bosu    This session    Upside down BOSU squat holds        4-way wooden [employer] Board    This session    Thera-stick roll Golf ball x5' X5' Therastick x5'                                     Ther Ex        Ankle  Circles        Lateral Walk        4-way wooden [employer] Board        HR/TR  3x10 3x10     Seated HR/TR        Seated Ankle inv/ev towel        Seated Ankle sup/pron        Toe flx/ext curls towel                Ther Activity        Bike/NuStep        Treadmill        Forward/backward heel to toe Walk                Gait Training                        Modalities        MHP        CP  Ice Massage x10' x10'     US/Stim

## 2024-02-05 ENCOUNTER — APPOINTMENT (OUTPATIENT)
Dept: PHYSICAL THERAPY | Facility: CLINIC | Age: 64
End: 2024-02-05
Payer: COMMERCIAL

## 2024-02-06 ENCOUNTER — OFFICE VISIT (OUTPATIENT)
Dept: PODIATRY | Facility: CLINIC | Age: 64
End: 2024-02-06
Payer: COMMERCIAL

## 2024-02-06 VITALS
HEIGHT: 65 IN | DIASTOLIC BLOOD PRESSURE: 81 MMHG | SYSTOLIC BLOOD PRESSURE: 122 MMHG | BODY MASS INDEX: 30.99 KG/M2 | WEIGHT: 186 LBS

## 2024-02-06 DIAGNOSIS — G57.51 TARSAL TUNNEL SYNDROME OF RIGHT SIDE: ICD-10-CM

## 2024-02-06 DIAGNOSIS — T14.8XXA MUSCLE TEAR: ICD-10-CM

## 2024-02-06 DIAGNOSIS — M72.2 PLANTAR FASCIITIS OF RIGHT FOOT: ICD-10-CM

## 2024-02-06 DIAGNOSIS — M79.671 PAIN OF RIGHT HEEL: Primary | ICD-10-CM

## 2024-02-06 PROCEDURE — 99212 OFFICE O/P EST SF 10 MIN: CPT | Performed by: STUDENT IN AN ORGANIZED HEALTH CARE EDUCATION/TRAINING PROGRAM

## 2024-02-06 NOTE — PROGRESS NOTES
Assessment/Plan:     Diagnoses and all orders for this visit:    Pain of right heel  -     Ankle Cude ankle/Ankle Brace  -     Ambulatory Referral to Podiatry; Future    Plantar fasciitis of right foot  -     Ankle Cude ankle/Ankle Brace  -     Ambulatory Referral to Podiatry; Future    Muscle tear  -     Ankle Cude ankle/Ankle Brace  -     Ambulatory Referral to Podiatry; Future    Tarsal tunnel syndrome of right side  -     Ambulatory Referral to Podiatry; Future              Prior Imaging   MRI right foot 12/21/23: plantar fasciitis and partial tear FDB muscle  XR right foot WB 3v 11/20/23: No acute osseous abnormalities noted. Large posterior calcaneal spur (enthesophyte), rectus to high arch.      IMPRESSION:  Right heel pain. Differential diagnosis include plantar fasciitis, Gonzalez's nerve entrapment, TTS, degenerative changes (calcaneal spurs, arthrosis of the joints of the foot), and inflammatory conditions of the ligaments and fascia of the foot and ankle.   Right heel pain after injury w/ MRI as above revealing partial tear in FDB muscle     PLAN:  MRI as above; shows likely chronic plantar fasciitis and partial tear FDB muscle (acute). Pain is without improvement overall with PT, AT stretching, supportive shoes, dananberg arch supports, offloading, medrol dose pack, nsaids, injection.   I reviewed PT notes to date.   C/w supportive shoes, Dananberg arch supports  I applied lace up ankle brace with foot inverted to reduce tension on medial ankle structures  I discussed surgery prn  I placed asap consult for Dr. Turpin for 2nd opinion      Subjective:      Patient ID: Randi Mathews is a 63 y.o. female.    Randi presents to clinic today concerning right foot pain f/u. Pain without any improvement (dull, burning pain constant) to heel and medial ankle/foot. Going to PT. Did get recommended arch supports. Medrol dose pack did not help.         The following portions of the patient's history were reviewed and  "updated as appropriate: allergies, current medications, past family history, past medical history, past social history, past surgical history, and problem list.    Review of Systems   Constitutional:  Negative for activity change, chills and fever.   HENT: Negative.     Respiratory:  Negative for cough, chest tightness and shortness of breath.    Cardiovascular:  Negative for chest pain and leg swelling.   Endocrine: Negative.    Genitourinary: Negative.    Musculoskeletal:  Positive for gait problem (R foot pain).   Neurological:  Negative for numbness.   Psychiatric/Behavioral: Negative.  Negative for agitation and behavioral problems.          Objective:      /81   Ht 5' 5\" (1.651 m)   Wt 84.4 kg (186 lb)   BMI 30.95 kg/m²          Physical Exam  Constitutional:       Appearance: Normal appearance. She is not ill-appearing.   Cardiovascular:      Pulses: Normal pulses.      Comments: Bilateral DP & PT pulses 2/4. CRT WNL. Pedal hair present. Feet warm and well perfused.   Pulmonary:      Effort: Pulmonary effort is normal. No respiratory distress.   Musculoskeletal:         General: Swelling (Right heel) and tenderness (Right foot pain at plantar calcaneus at medial tubercle at insertion of plantar fascia. up medial ankle at tarsal tunnel region to gianni-pedis) present. Normal range of motion.      Comments: There is decreased ankle DF with knee extended and flexed indicating gastroc-soleal equinus.   B/L WB exam exhibits rectus to high arch foot type, heel position subtle varus.  Bilateral ankle, STJ, TNJ, TMTJ, MTPJ ROM WNL and without pain. LE muscle strength WNL.   Skin:     General: Skin is warm.      Capillary Refill: Capillary refill takes less than 2 seconds.      Findings: No erythema or lesion.   Neurological:      General: No focal deficit present.      Mental Status: She is alert and oriented to person, place, and time.      Sensory: No sensory deficit.      Comments: Gross sensation intact. " Denies N/T to B/L feet. + R heel burning   Psychiatric:         Mood and Affect: Mood normal.

## 2024-02-07 ENCOUNTER — OFFICE VISIT (OUTPATIENT)
Dept: PHYSICAL THERAPY | Facility: CLINIC | Age: 64
End: 2024-02-07
Payer: COMMERCIAL

## 2024-02-07 DIAGNOSIS — T14.8XXA MUSCLE TEAR: ICD-10-CM

## 2024-02-07 DIAGNOSIS — M79.671 PAIN OF RIGHT HEEL: Primary | ICD-10-CM

## 2024-02-07 DIAGNOSIS — M72.2 PLANTAR FASCIITIS OF RIGHT FOOT: ICD-10-CM

## 2024-02-07 PROCEDURE — 97112 NEUROMUSCULAR REEDUCATION: CPT

## 2024-02-07 PROCEDURE — 97110 THERAPEUTIC EXERCISES: CPT

## 2024-02-07 PROCEDURE — 97140 MANUAL THERAPY 1/> REGIONS: CPT

## 2024-02-07 NOTE — PROGRESS NOTES
"Daily Note     Today's date: 2024  Patient name: Randi Mathews  : 1960  MRN: [id number]  Referring provider: Betzaida Brown DPM  Dx:   Encounter Diagnosis     ICD-10-CM    1. Pain of right heel  M79.671       2. Plantar fasciitis of right foot  M72.2       3. Muscle tear  T14.8XXA                      Subjective: Pt reports seeing MD will be referred for another opinion as per MRI results. Reports burning sensation with stretching too much.       Objective: See treatment diary below      Assessment: Tolerated treatment well. Patient demonstrated fatigue post treatment and exhibited good technique with therapeutic exercises. Pt challenged with SLS balance. Joshua stretching well today with towel roll today as foam roll increased pain.       Plan: Continue per plan of care.      Precautions: None at this time    Daily Treatment Diary    HEP: Handout provided and discussed      Manuals 24/    ART R Foot/Heel/Ankle x15' x25' x25'     PROM/Stretch    5'    IASTM        STM/Triggerpoint    10'    JM                Neuro Re-Ed        Standing Calf/Achilles Stretch 6x20'' ea 6x20'' ea 6x20'' ea 620\" ea towel roll    Calf Stretch with Strap        No shoe AE Steamboats    This session    Timed SLS AE No Shoe 8x20'' 8x20'' 8x20'' 820\"    SL Ecc HR    This session    Ankle Pumps        Timed SLS Bosu    This session    Upside down BOSU squat holds        4-way SMGBBS Board    This session    Thera-stick roll Golf ball x5' X5' Therastick x5' 5'                                    Ther Ex        Ankle  Circles        Lateral Walk        4-way SMGBBS Board        HR/TR  3x10 3x10 3/10    Seated HR/TR        Seated Ankle inv/ev towel        Seated Ankle sup/pron        Toe flx/ext curls towel                Ther Activity        Bike/NuStep        Treadmill        Forward/backward heel to toe Walk                Gait Training                        Modalities        MHP    10' with ankle " in DF at wall    CP  Ice Massage x10' x10'     US/Stim

## 2024-02-12 ENCOUNTER — TELEPHONE (OUTPATIENT)
Dept: BARIATRICS | Facility: CLINIC | Age: 64
End: 2024-02-12

## 2024-02-12 ENCOUNTER — OFFICE VISIT (OUTPATIENT)
Dept: BARIATRICS | Facility: CLINIC | Age: 64
End: 2024-02-12
Payer: COMMERCIAL

## 2024-02-12 ENCOUNTER — APPOINTMENT (OUTPATIENT)
Dept: PHYSICAL THERAPY | Facility: CLINIC | Age: 64
End: 2024-02-12
Payer: COMMERCIAL

## 2024-02-12 VITALS
HEIGHT: 65 IN | SYSTOLIC BLOOD PRESSURE: 130 MMHG | BODY MASS INDEX: 30.82 KG/M2 | WEIGHT: 185 LBS | TEMPERATURE: 97.3 F | HEART RATE: 67 BPM | DIASTOLIC BLOOD PRESSURE: 82 MMHG

## 2024-02-12 DIAGNOSIS — E66.9 CLASS 1 OBESITY: Primary | ICD-10-CM

## 2024-02-12 DIAGNOSIS — I10 PRIMARY HYPERTENSION: ICD-10-CM

## 2024-02-12 DIAGNOSIS — R73.03 PREDIABETES: ICD-10-CM

## 2024-02-12 PROCEDURE — 99214 OFFICE O/P EST MOD 30 MIN: CPT | Performed by: PHYSICIAN ASSISTANT

## 2024-02-12 NOTE — ASSESSMENT & PLAN NOTE
-Patient is pursuing Conservative Program  -Initial weight loss goal of 5-10% weight loss for improved health  -Did not tolerate Qsymia: concentration issues/confusion, dizziness, fatigue.Switched to phentermine (186 lbs, 7/19/21)- had positive response but d/c due to elevated BP and palpitations  -Stopped Contrave- abdominal pain   -Saxenda- cost, but also did not like daily injection   -Screening labs: A1c reviewed 5/4/23, TSH from 12/20/23; CMP reviewed from 11/7/23  -Tolerated wegovy, but was on it very short term (due to supply). Interested in restart.   -Dietary recall reviewed, suggestions provided    Initial: 182.2, highest weight 186  Current: 185 (+3.7 lbs sivce last visit)  Change: +2.8  Goal: 150

## 2024-02-12 NOTE — PROGRESS NOTES
Assessment/Plan:  Class 1 obesity  -Patient is pursuing Conservative Program  -Initial weight loss goal of 5-10% weight loss for improved health  -Did not tolerate Qsymia: concentration issues/confusion, dizziness, fatigue.Switched to phentermine (186 lbs, 7/19/21)- had positive response but d/c due to elevated BP and palpitations  -Stopped Contrave- abdominal pain   -Saxenda- cost, but also did not like daily injection   -Screening labs: A1c reviewed 5/4/23, TSH from 12/20/23; CMP reviewed from 11/7/23  -Tolerated wegovy, but was on it very short term (due to supply). Interested in restart.   -Dietary recall reviewed, suggestions provided    Initial: 182.2, highest weight 186  Current: 185 (+3.7 lbs sivce last visit)  Change: +2.8  Goal: 150    Follow up in 2 months with RD and approximately  4 months  with Non-Surgical Physician/Advanced Practitioner.    Goals:  Food log (ie.) www.[email or web address],[email or web address],S[employer]Oneit.[email or web address],My[employer].com,etc. baritastic  No sugary beverages. At least 64oz of water daily.  Increase physical activity by 10 minutes daily. Gradually increase physical activity to a goal of 5 days per week for 30 minutes of MODERATE intensity PLUS 2 days per week of FULL BODY resistance training  5-10 servings of fruits and vegetables per day and 25-35 grams of dietary fiber per day, gradually increasing     Diagnoses and all orders for this visit:    Class 1 obesity  -     Semaglutide-Weight Management (WEGOVY) 0.25 MG/0.5ML; Inject 0.5 mL (0.25 mg total) under the skin once a week X1 month then increase to 0.5 mg  -     Semaglutide-Weight Management (WEGOVY) 0.5 MG/0.5ML; Inject 0.5 mL (0.5 mg total) under the skin once a week X1 month then increase to 1 mg  -     Basic metabolic panel; Future    Prediabetes  -     Semaglutide-Weight Management (WEGOVY) 0.25 MG/0.5ML; Inject 0.5 mL (0.25 mg total) under the skin once a week X1 month then increase to 0.5 mg  -     Semaglutide-Weight Management  "(WEGOVY) 0.5 MG/0.5ML; Inject 0.5 mL (0.5 mg total) under the skin once a week X1 month then increase to 1 mg  -     Basic metabolic panel; Future    Primary hypertension  -     Semaglutide-Weight Management (WEGOVY) 0.25 MG/0.5ML; Inject 0.5 mL (0.25 mg total) under the skin once a week X1 month then increase to 0.5 mg  -     Semaglutide-Weight Management (WEGOVY) 0.5 MG/0.5ML; Inject 0.5 mL (0.5 mg total) under the skin once a week X1 month then increase to 1 mg  -     Basic metabolic panel; Future        Subjective:   Chief Complaint   Patient presents with    Follow-up     Patient ID: Randi Mathews  is a 64 y.o. female with excess weight/obesity here to pursue weight management.  Patient is pursuing Conservative Program.     HPI  The patient presents for [facility] follow up.     Stopped the metformin 2 or more weeks ago. Was concerned about her BP. Reports once she stopped, her BP improved. Also reports significant diarrhea     Struggling to exercise due to plantar fasciitis     Trying to make \"right\" choices, but always feels hungry     B: greek yogurt + berries + granola   S: banana +/- PB- but infrequent (estimates 200 calories)   L: salads + 10 dried cranberries, cheese +oil/vinegar  S: no   D: last night steak with saldana no sides  S: no     Finds herself hungry- pretzels   Dines out some weekends    Hydration: 32 oz of water over work, bottle in the am     The following portions of the patient's history were reviewed and updated as appropriate: allergies, current medications, past family history, past medical history, past social history, past surgical history, and problem list.    Review of Systems   Gastrointestinal:  Positive for constipation (since stopping metformin).   Psychiatric/Behavioral: Negative.       Objective:    /82 (BP Location: Left arm, Cuff Size: Standard)   Pulse 67   Temp (!) 97.3 °F (36.3 °C)   Ht 5' 5\" (1.651 m)   Wt 83.9 kg (185 lb)   BMI 30.79 kg/m²      Physical Exam  Vitals " and nursing note reviewed.     Constitutional   General appearance: Abnormal.  well developed and obese.   Pulmonary   Respiratory effort: No increased work of breathing or signs of respiratory distress.    Abdomen   Abdomen: Abnormal.  The abdomen was obese.  Musculoskeletal   Gait and station: Normal.    Psychiatric   Orientation to person, place and time: Normal.    Affect: appropriate

## 2024-02-13 NOTE — TELEPHONE ENCOUNTER
The [employer] Prior Authorization Team is unable to review this request for prior authorization as the medication has been previously approved. This approval will  2024. No further action is needed at this time.

## 2024-02-14 ENCOUNTER — APPOINTMENT (OUTPATIENT)
Dept: PHYSICAL THERAPY | Facility: CLINIC | Age: 64
End: 2024-02-14
Payer: COMMERCIAL

## 2024-02-15 ENCOUNTER — TELEPHONE (OUTPATIENT)
Dept: FAMILY MEDICINE CLINIC | Facility: CLINIC | Age: 64
End: 2024-02-15

## 2024-02-15 DIAGNOSIS — R05.9 COUGH, UNSPECIFIED TYPE: Primary | ICD-10-CM

## 2024-02-15 DIAGNOSIS — J11.1 INFLUENZA: Primary | ICD-10-CM

## 2024-02-15 PROCEDURE — 87636 SARSCOV2 & INF A&B AMP PRB: CPT | Performed by: FAMILY MEDICINE

## 2024-02-15 RX ORDER — OSELTAMIVIR PHOSPHATE 75 MG/1
75 CAPSULE ORAL EVERY 12 HOURS SCHEDULED
Qty: 10 CAPSULE | Refills: 0 | Status: SHIPPED | OUTPATIENT
Start: 2024-02-15 | End: 2024-02-20

## 2024-02-15 NOTE — TELEPHONE ENCOUNTER
Asking for an order for COVID/Flu swab.  Pt said she has been needing to clear her throat & has a low grade fever.   [phone]

## 2024-02-16 LAB
FLUAV RNA RESP QL NAA+PROBE: NEGATIVE
FLUBV RNA RESP QL NAA+PROBE: NEGATIVE
SARS-COV-2 RNA RESP QL NAA+PROBE: NEGATIVE

## 2024-02-19 ENCOUNTER — APPOINTMENT (OUTPATIENT)
Dept: PHYSICAL THERAPY | Facility: CLINIC | Age: 64
End: 2024-02-19
Payer: COMMERCIAL

## 2024-02-21 ENCOUNTER — OFFICE VISIT (OUTPATIENT)
Dept: PHYSICAL THERAPY | Facility: CLINIC | Age: 64
End: 2024-02-21
Payer: COMMERCIAL

## 2024-02-21 DIAGNOSIS — M79.671 PAIN OF RIGHT HEEL: Primary | ICD-10-CM

## 2024-02-21 DIAGNOSIS — T14.8XXA MUSCLE TEAR: ICD-10-CM

## 2024-02-21 DIAGNOSIS — M72.2 PLANTAR FASCIITIS OF RIGHT FOOT: ICD-10-CM

## 2024-02-21 PROBLEM — R50.9 RECURRENT FEVER, CAUSE UNKNOWN: Status: RESOLVED | Noted: 2019-01-24 | Resolved: 2024-02-21

## 2024-02-21 PROBLEM — Z12.11 COLON CANCER SCREENING: Status: RESOLVED | Noted: 2019-01-25 | Resolved: 2024-02-21

## 2024-02-21 PROCEDURE — 97110 THERAPEUTIC EXERCISES: CPT

## 2024-02-21 PROCEDURE — 97112 NEUROMUSCULAR REEDUCATION: CPT

## 2024-02-21 PROCEDURE — 97140 MANUAL THERAPY 1/> REGIONS: CPT

## 2024-02-21 NOTE — PROGRESS NOTES
"Daily Note     Today's date: 2024  Patient name: Randi Mathews  : 1960  MRN: [id number]  Referring provider: Betzaida Brown DPM  Dx:   Encounter Diagnosis     ICD-10-CM    1. Pain of right heel  M79.671       2. Plantar fasciitis of right foot  M72.2       3. Muscle tear  T14.8XXA           Start Time: 08  Stop Time: 933  Total time in clinic (min): 48 minutes    Subjective: Patient states that today she does not have much pain and is able to walk without pain.        Objective: See treatment diary below      Assessment: Tolerated treatment well.   Patient participated in skilled PT session focused on strengthening, stretching, and ROM.  Patient able to complete exercise program with mild increase in pain of R foot medial and to R achilles.  Added new exercises, which patient was able to tolerate well.  Patient was challenged on compliant surface with R ankle stability, having increased swaying.  Patient experienced an increased in R foot/achilles pain after session.  Patient would continue to benefit from skilled PT interventions to address strengthening, stretching, and ROM. Patient demonstrated fatigue post treatment and exhibited good technique with therapeutic exercises      Plan: Continue per plan of care.      Precautions: None at this time    Daily Treatment Diary    HEP: Handout provided and discussed      Manuals 24   ART R Foot/Heel/Ankle x15' x25' x25'     PROM/Stretch    5' 5'   IASTM        STM/Triggerpoint    10' 10'   JM                Neuro Re-Ed        Standing Calf/Achilles Stretch 6x20'' ea 6x20'' ea 6x20'' ea \" ea towel roll 20\" 6x ea w/towel roll   Calf Stretch with Strap        No shoe AE Steamboats    This session 2x10 on airex   Timed SLS AE No Shoe 8x20'' 8x20'' 8x20'' \"    SL Ecc HR    This session 10x   Ankle Pumps        Timed SLS Bosu    This session 10\" 10x   Upside down BOSU squat holds        4-way wooden BAPS Board    This " session Standing A/P  M/L   CW/CCW 10x ea   Thera-stick roll Golf ball x5' X5' Therastick x5' 5'                                    Ther Ex        Ankle  Circles        Lateral Walk        4-way wooden [employer] Board        HR/TR  3x10 3x10 3/10 3x10   Seated HR/TR        Seated Ankle inv/ev towel        Seated Ankle sup/pron        Toe flx/ext curls towel                Ther Activity        Bike/NuStep        Treadmill        Forward/backward heel to toe Walk                Gait Training                        Modalities        MHP    10' with ankle in DF at wall 10' with ankle in DF at wall   CP  Ice Massage x10' x10'  Ice w/water    US/Stim

## 2024-02-26 ENCOUNTER — OFFICE VISIT (OUTPATIENT)
Dept: PODIATRY | Facility: CLINIC | Age: 64
End: 2024-02-26
Payer: COMMERCIAL

## 2024-02-26 ENCOUNTER — OFFICE VISIT (OUTPATIENT)
Dept: PHYSICAL THERAPY | Facility: CLINIC | Age: 64
End: 2024-02-26
Payer: COMMERCIAL

## 2024-02-26 ENCOUNTER — TELEPHONE (OUTPATIENT)
Dept: FAMILY MEDICINE CLINIC | Facility: CLINIC | Age: 64
End: 2024-02-26

## 2024-02-26 ENCOUNTER — TELEPHONE (OUTPATIENT)
Dept: BARIATRICS | Facility: CLINIC | Age: 64
End: 2024-02-26

## 2024-02-26 VITALS — WEIGHT: 185 LBS | BODY MASS INDEX: 30.79 KG/M2

## 2024-02-26 VITALS — DIASTOLIC BLOOD PRESSURE: 74 MMHG | SYSTOLIC BLOOD PRESSURE: 132 MMHG

## 2024-02-26 DIAGNOSIS — M21.171 ACQUIRED RIGHT REARFOOT VARUS: ICD-10-CM

## 2024-02-26 DIAGNOSIS — T14.8XXA MUSCLE TEAR: ICD-10-CM

## 2024-02-26 DIAGNOSIS — I10 PRIMARY HYPERTENSION: ICD-10-CM

## 2024-02-26 DIAGNOSIS — Q66.229 METATARSUS ADDUCTUS: ICD-10-CM

## 2024-02-26 DIAGNOSIS — M79.671 PAIN OF RIGHT HEEL: Primary | ICD-10-CM

## 2024-02-26 DIAGNOSIS — G58.9 NERVE ENTRAPMENT: Primary | ICD-10-CM

## 2024-02-26 DIAGNOSIS — M79.671 PAIN OF RIGHT HEEL: ICD-10-CM

## 2024-02-26 DIAGNOSIS — E66.9 CLASS 1 OBESITY: ICD-10-CM

## 2024-02-26 DIAGNOSIS — R73.03 PREDIABETES: ICD-10-CM

## 2024-02-26 DIAGNOSIS — G57.51 TARSAL TUNNEL SYNDROME OF RIGHT SIDE: ICD-10-CM

## 2024-02-26 DIAGNOSIS — M72.2 PLANTAR FASCIITIS OF RIGHT FOOT: ICD-10-CM

## 2024-02-26 PROBLEM — M21.6X1: Status: ACTIVE | Noted: 2024-02-26

## 2024-02-26 PROCEDURE — 97110 THERAPEUTIC EXERCISES: CPT

## 2024-02-26 PROCEDURE — 97140 MANUAL THERAPY 1/> REGIONS: CPT

## 2024-02-26 PROCEDURE — 97112 NEUROMUSCULAR REEDUCATION: CPT

## 2024-02-26 PROCEDURE — 99213 OFFICE O/P EST LOW 20 MIN: CPT | Performed by: PODIATRIST

## 2024-02-26 NOTE — PROGRESS NOTES
"Daily Note     Today's date: 2024  Patient name: Randi Mathews  : 1960  MRN: [id number]  Referring provider: Betzaida Brown DPM  Dx:   Encounter Diagnosis     ICD-10-CM    1. Pain of right heel  M79.671       2. Plantar fasciitis of right foot  M72.2       3. Muscle tear  T14.8XXA           Start Time: 0845  Stop Time: 0940  Total time in clinic (min): 55 minutes    Subjective: Patient reports pain today as 7/10 on the medial side of her ankle/achilles.  Patient reports increased pain with heat this session.  Heat taken off at 7 min.      Objective: See treatment diary below      Assessment: Tolerated treatment well.   Patient participated in skilled PT session focused on strengthening, stretching, and ROM. Patient able to complete exercise program, but experiences increased pain with R ankle inversion exercises. Patient would continue to benefit from skilled PT interventions to address strengthening, stretching, and ROM. Patient demonstrated fatigue post treatment and exhibited good technique with therapeutic exercises      Plan: Continue per plan of care.      Precautions: None at this time    Daily Treatment Diary    HEP: Handout provided and discussed      Manuals 24 224   ART R Foot/Heel/Ankle  x25' x25'     PROM/Stretch 5'   5' 5'   IASTM        STM/Triggerpoint 10'   10' 10'   JM                Neuro Re-Ed        Standing Calf/Achilles Stretch 20\" 6x ea w/towel roll 6x20'' ea 6x20'' ea \" ea towel roll 20\" 6x ea w/towel roll   Calf Stretch with Strap        No shoe AE Steamboats On airex 2x10   This session 2x10 on airex   Timed SLS AE No Shoe 20\" 8x 8x20'' 8x20'' \"    SL Ecc HR    This session 10x   Ankle Pumps        Timed SLS Bosu    This session 10\" 10x   Upside down BOSU squat holds        4-way wooden BAPS Board Standing small wooden A/P, M/L, CW/CCW 10x ea   This session Standing A/P  M/L   CW/CCW 10x ea   Thera-stick roll  X5' Therastick x5' 5'          "                           Ther Ex        Ankle  Circles        Lateral Walk        4-way wooden [employer] Board        HR/TR 3x10 ea 3x10 3x10 3/10 3x10   Seated HR/TR        Seated Ankle inv/ev towel 10x ea       Seated Ankle sup/pron        Toe flx/ext curls towel                Ther Activity        Bike/NuStep        Treadmill        Forward/backward heel to toe Walk //bars x 8 laps               Gait Training                        Modalities        MHP 7' with ankle in DF at wall increased pain medial of achilles   10' with ankle in DF at wall 10' with ankle in DF at wall   CP CP to R plantar of foot x 10 min Ice Massage x10' x10'  Ice w/water    US/Stim

## 2024-02-26 NOTE — TELEPHONE ENCOUNTER
Prior authorization approved  Payer: [employer]  Note from payer: The [employer] Prior Authorization Team is unable to review this request for prior authorization as the medication has been previously approved. This approval will  2024. No further action is needed at this time.

## 2024-02-26 NOTE — PATIENT INSTRUCTIONS
[place] Technique for fascial release      Medial calcaneal nerve entrapment    Subtle cavus foot

## 2024-02-26 NOTE — PROGRESS NOTES
Assessment/Plan:    No problem-specific Assessment & Plan notes found for this encounter.       Diagnoses and all orders for this visit:    Nerve entrapment  -     Ambulatory referral to Physical Therapy; Future  -     Orthotics B/L    Pain of right heel  -     Ambulatory Referral to Podiatry  -     Ambulatory referral to Physical Therapy; Future  -     Orthotics B/L    Plantar fasciitis of right foot  -     Ambulatory Referral to Podiatry  -     Ambulatory referral to Physical Therapy; Future  -     Orthotics B/L    Muscle tear  -     Ambulatory Referral to Podiatry  -     Ambulatory referral to Physical Therapy; Future  -     Orthotics B/L    Tarsal tunnel syndrome of right side  -     Ambulatory Referral to Podiatry  -     Ambulatory referral to Physical Therapy; Future  -     Orthotics B/L    Acquired right rearfoot varus  -     Ambulatory referral to Physical Therapy; Future  -     Orthotics B/L    Metatarsus adductus  -     Ambulatory referral to Physical Therapy; Future  -     Orthotics B/L      -At this point I am hoping that the tear of the FDP is somewhat incidental, her primarily the issue of her pain is neuritic in nature stabbing and worse with activity as she is on her feet more.  - I will send her to physical therapy for directed Graston [employer]ique along the medial calcaneal nerve, I discussed this so she could begin this at home with Voltaren as a contact layer as well  - Supportive shoes at all times in the house and I would also recommend that she does obtain custom orthotics with first ray cut out through physical therapy as well which was ordered as well  - She is to return in 4 to 6 weeks for repeat assessment of her pain      Subjective:      Patient ID: Randi Mathews is a 64 y.o. female.    Patient presents for evaluation and management of her right foot. Has been going on for her right foot for about 3 months. Complaining of first step in the morning pain. No PT pain along the inside arch as  the day goes on. Complaining of burning pain about the inside of her heel. States that there is not really much improvement with the addition of therapy. Cannot really tell the difference between being barefoot and shoes. Has had on injection in the past without any change in her pain. NSAIDs were not helpful. Just went to [facility] and started wearing them today.         The following portions of the patient's history were reviewed and updated as appropriate: allergies, current medications, past family history, past medical history, past social history, past surgical history, and problem list.    Review of Systems   Constitutional:  Negative for chills and fever.   HENT:  Negative for ear pain and sore throat.    Eyes:  Negative for pain and visual disturbance.   Respiratory:  Negative for cough and shortness of breath.    Cardiovascular:  Negative for chest pain and palpitations.   Gastrointestinal:  Negative for abdominal pain and vomiting.   Genitourinary:  Negative for dysuria and hematuria.   Musculoskeletal:  Negative for arthralgias and back pain.   Skin:  Negative for color change and rash.   Neurological:  Negative for seizures and syncope.   All other systems reviewed and are negative.        Objective:      Wt 83.9 kg (185 lb) Comment: Reported  BMI 30.79 kg/m²          Physical Exam  Vitals reviewed.   Constitutional:       Appearance: Normal appearance.   HENT:      Head: Normocephalic and atraumatic.      Nose: Nose normal.      Mouth/Throat:      Mouth: Mucous membranes are moist.   Eyes:      Pupils: Pupils are equal, round, and reactive to light.   Pulmonary:      Effort: Pulmonary effort is normal.   Musculoskeletal:      Comments: Positive nodes along the medial calcaneal nerve, there is a significantly high arch right foot type with lateral overload with metatarsus adductus and somewhat of a rear foot varus type of foot, equinus is easily well-maintained and she is able to get to around +10+15  degrees with knee extended   Skin:     Capillary Refill: Capillary refill takes less than 2 seconds.   Neurological:      General: No focal deficit present.      Mental Status: She is alert and oriented to person, place, and time. Mental status is at baseline.

## 2024-02-28 ENCOUNTER — APPOINTMENT (OUTPATIENT)
Dept: PHYSICAL THERAPY | Facility: CLINIC | Age: 64
End: 2024-02-28
Payer: COMMERCIAL

## 2024-03-07 ENCOUNTER — OFFICE VISIT (OUTPATIENT)
Dept: PHYSICAL THERAPY | Facility: CLINIC | Age: 64
End: 2024-03-07
Payer: COMMERCIAL

## 2024-03-07 DIAGNOSIS — M79.671 PAIN OF RIGHT HEEL: Primary | ICD-10-CM

## 2024-03-07 DIAGNOSIS — Q66.229 METATARSUS ADDUCTUS: ICD-10-CM

## 2024-03-07 DIAGNOSIS — M72.2 PLANTAR FASCIITIS OF RIGHT FOOT: ICD-10-CM

## 2024-03-07 DIAGNOSIS — G58.9 NERVE ENTRAPMENT: ICD-10-CM

## 2024-03-07 DIAGNOSIS — G57.51 TARSAL TUNNEL SYNDROME OF RIGHT SIDE: ICD-10-CM

## 2024-03-07 DIAGNOSIS — M21.171 ACQUIRED RIGHT HINDFOOT VARUS: ICD-10-CM

## 2024-03-07 DIAGNOSIS — T14.8XXA MUSCLE TEAR: ICD-10-CM

## 2024-03-07 PROCEDURE — 97112 NEUROMUSCULAR REEDUCATION: CPT | Performed by: PHYSICAL THERAPIST

## 2024-03-07 PROCEDURE — 97140 MANUAL THERAPY 1/> REGIONS: CPT | Performed by: PHYSICAL THERAPIST

## 2024-03-07 PROCEDURE — 97110 THERAPEUTIC EXERCISES: CPT | Performed by: PHYSICAL THERAPIST

## 2024-03-07 NOTE — PROGRESS NOTES
PT Re-Evaluation     Today's date: 3/7/2024  Patient name: Randi Mathews  : 1960  MRN: [id number]  Referring provider: Mello Turpin*  Dx:   Encounter Diagnosis     ICD-10-CM    1. Pain of right heel  M79.671       2. Plantar fasciitis of right foot  M72.2       3. Muscle tear  T14.8XXA       4. Tarsal tunnel syndrome of right side  G57.51       5. Nerve entrapment  G58.9       6. Acquired right hindfoot varus  M21.171       7. Metatarsus adductus  Q66.229                      Assessment  Understanding of Dx/Px/POC: good   Prognosis: good    Goals  STGs: To be complete within 2-3 weeks (partially met)  - Decrease pain to < 2/10 at worst  - Increase AROM to WNL  - Increase posterior/lat LE chain strength to > 4+/5  - Improve gait to WNL for distances < 6 blocks     LTGs: To be complete within 4 weeks (partially met)  - Able to walk for any extended amount of time/distance without pain or limitation for increased safety and functional capacity with ADLs and work-related duty  - Able to repetitively squat without pain or limitation for increased safety and functional capacity with ADLs and work-related duty  - Able to repetitively ascend/descend a full flight of stairs without pain or limitation for increased safety and functional capacity with ADLs and work-related duty  - Able to repetitively complete transfers without pain or limitation for increased safety and functional capacity with ADLs and work-related duty    Plan  Planned therapy interventions: manual therapy, neuromuscular re-education, patient education, postural training, self care, therapeutic activities, therapeutic exercise, gait training and home exercise program  Frequency: 2x week  Duration in weeks: 6       Upon completion of today's re-evaluation, as evidenced by present objective and subjective measures, Halley sx remain consistent with continued, fair-paced progress from R foot plantar fascitis, tibialis posterior  "inflammation, Partial tear flexor digitorum brevis muscle, and gait dysfunction. Patient will be setting up a consult with orthotics and will benefit from continued skilled physical therapy to address current deficits.         Subjective Evaluation    Patient Goals  Patient goals for therapy: increased strength, increased motion and decreased pain    Pain  Current pain ratin  At best pain ratin  At worst pain ratin  Location: R Foot/Ankle         Pt reports she has been battling R foot and ankle pain for > 3 months. Reports sx have continued to negatively impact her overall safety and functional capacity with ADLs and work-related duty.  Update 3/7/24: Pt reports approximately 25% improvement since starting physical therapy in regards to overall pain intensity and function. Feels if she continues with physical therapy she will continue to see progress. Anxious to continue.        Objective Pain level ranges 1-6/10  AROM: R Ankle DF 25% decreased; WNL t/o all remaining planes; L Ankle WNL  Strength: R Ankle 4+/5 t/o all planes; L Ankle 5/5 t/o all planes  Gait: Bilateral toeing out pattern, pes cavus, bilateral ankle instability  Recommendations: Footwear and night sock  Swelling: Mild (will continue to monitor)  FOTO: nv; GOAL: 66  Unable to walk without pain and limitation, but improving  Unable to squat without pain and limitation, but improving  Unable complete transfers without pain and limitation, but improving  Unable to ascend/descend stairs without pain and limitation, but improving              Precautions: None at this time    Daily Treatment Diary    HEP: Handout provided and discussed      Manuals 2/26/24 3/7/24 1/31/24 2/7 2/21/24   ART R Foot/Heel/Ankle  x15' x25'     PROM/Stretch 5'   5' 5'   IASTM  x5'      STM/Triggerpoint 10'   10' 10'   JM                Neuro Re-Ed        Standing Calf/Achilles Stretch 20\" 6x ea w/towel roll 6x20'' ea 6x20'' ea \" ea towel roll 20\" 6x ea w/towel " "roll   Calf Stretch with Strap        No shoe AE Steamboats On airex 2x10 3x10  This session 2x10 on airex   Timed SLS AE No Shoe 20\" 8x 8x20'' 8x20'' 8/20\"    SL Ecc HR 10x 10x  This session 10x   Ankle Pumps        Timed SLS Bosu  6x20''  This session 10\" 10x   Upside down BOSU squat holds        4-way wooden [employer] Board 2x10 ea 2x10 ea  This session 2x10 ea   Thera-stick roll x5' X5' x5' 5' x5'                                   Ther Ex        Ankle  Circles        Lateral Walk        4-way wooden BAPS Board        HR/TR 3x10 ea 3x10 3x10 3/10 3x10   Seated HR/TR        Seated Ankle inv/ev 10x ea       Seated Ankle sup/pron        Toe flx/ext curls towel                Ther Activity        Bike/NuStep        Treadmill  X10' incline walk      Forward/backward heel to toe Walk                Gait Training                        Modalities        MHP 7' with ankle in DF at wall increased pain medial of achilles   10' with ankle in DF at wall 10' with ankle in DF at wall   CP CP to R plantar of foot x 10 min Ice Massage x10' x10'  Ice w/water    US/Stim                    "

## 2024-03-12 ENCOUNTER — OFFICE VISIT (OUTPATIENT)
Dept: PHYSICAL THERAPY | Facility: CLINIC | Age: 64
End: 2024-03-12
Payer: COMMERCIAL

## 2024-03-12 DIAGNOSIS — M21.171 ACQUIRED RIGHT HINDFOOT VARUS: ICD-10-CM

## 2024-03-12 DIAGNOSIS — G57.51 TARSAL TUNNEL SYNDROME OF RIGHT SIDE: ICD-10-CM

## 2024-03-12 DIAGNOSIS — T14.8XXA MUSCLE TEAR: ICD-10-CM

## 2024-03-12 DIAGNOSIS — G58.9 NERVE ENTRAPMENT: ICD-10-CM

## 2024-03-12 DIAGNOSIS — Q66.229 METATARSUS ADDUCTUS: ICD-10-CM

## 2024-03-12 DIAGNOSIS — M79.671 PAIN OF RIGHT HEEL: Primary | ICD-10-CM

## 2024-03-12 DIAGNOSIS — M72.2 PLANTAR FASCIITIS OF RIGHT FOOT: ICD-10-CM

## 2024-03-12 PROCEDURE — 97140 MANUAL THERAPY 1/> REGIONS: CPT

## 2024-03-12 PROCEDURE — 97110 THERAPEUTIC EXERCISES: CPT

## 2024-03-12 PROCEDURE — 97112 NEUROMUSCULAR REEDUCATION: CPT

## 2024-03-14 ENCOUNTER — OFFICE VISIT (OUTPATIENT)
Dept: PHYSICAL THERAPY | Facility: CLINIC | Age: 64
End: 2024-03-14
Payer: COMMERCIAL

## 2024-03-14 DIAGNOSIS — M72.2 PLANTAR FASCIITIS OF RIGHT FOOT: ICD-10-CM

## 2024-03-14 DIAGNOSIS — T14.8XXA MUSCLE TEAR: ICD-10-CM

## 2024-03-14 DIAGNOSIS — M79.671 PAIN OF RIGHT HEEL: Primary | ICD-10-CM

## 2024-03-14 DIAGNOSIS — M21.171 ACQUIRED RIGHT HINDFOOT VARUS: ICD-10-CM

## 2024-03-14 DIAGNOSIS — G58.9 NERVE ENTRAPMENT: ICD-10-CM

## 2024-03-14 DIAGNOSIS — Q66.229 METATARSUS ADDUCTUS: ICD-10-CM

## 2024-03-14 DIAGNOSIS — G57.51 TARSAL TUNNEL SYNDROME OF RIGHT SIDE: ICD-10-CM

## 2024-03-14 PROCEDURE — 97110 THERAPEUTIC EXERCISES: CPT | Performed by: PHYSICAL THERAPIST

## 2024-03-14 PROCEDURE — 97140 MANUAL THERAPY 1/> REGIONS: CPT | Performed by: PHYSICAL THERAPIST

## 2024-03-14 NOTE — PROGRESS NOTES
"Daily Note     Today's date: 3/14/2024  Patient name: Randi Mathews  : 1960  MRN: [id number]  Referring provider: Betzaida Brown DPM  Dx:   Encounter Diagnosis     ICD-10-CM    1. Pain of right heel  M79.671       2. Plantar fasciitis of right foot  M72.2       3. Muscle tear  T14.8XXA       4. Tarsal tunnel syndrome of right side  G57.51       5. Nerve entrapment  G58.9       6. Acquired right hindfoot varus  M21.171       7. Metatarsus adductus  Q66.229                      Subjective: Pt reports continued progress. Anxious to continue.      Objective: See treatment diary below      Assessment: Tolerated treatment well. Patient demonstrated fatigue post treatment, exhibited good technique with therapeutic exercises, and would benefit from continued PT. Pt challenged with SLS balance. Cont to make gains with improved overall sx today.       Plan: Continue per plan of care.  Progress treatment as tolerated.         Precautions: None at this time    Daily Treatment Diary    HEP: Handout provided and discussed      Manuals 2/26/24 3/7/24 3/12 3/14 2/21/24   ART R Foot/Heel/Ankle  x15'  x15'    PROM/Stretch 5'    5'   IASTM  x5'      STM/Triggerpoint 10'  10' 10' 10'   JM                Neuro Re-Ed        Standing Calf/Achilles Stretch 20\" 6x ea w/towel roll 6x20'' ea 6x20'' ea \" ea towel roll 20\" 6x ea w/towel roll   Calf Stretch with Strap        No shoe AE Steamboats On airex 2x10 3x10 3/10 This session 2x10 on airex   Timed SLS AE No Shoe 20\" 8x 8x20''  \"    SL Ecc HR 10x 10x 2/10 This session 10x   Ankle Pumps        Timed SLS Bosu  6x20'' \"  This session 10\" 10x   Upside down BOSU squat holds        4-way wooden BAPS Board 2x10 ea 2x10 ea 2/10 ea This session 2x10 ea   Thera-stick roll x5' X5'  5' x5'                                   Ther Ex        Ankle  Circles        Lateral Walk        4-way wooden BAPS Board        HR/TR 3x10 ea 3x10  3/10 3x10   Seated HR/TR        Seated Ankle inv/ev " 10x ea       Seated Ankle sup/pron        Toe flx/ext curls towel                Ther Activity        Bike/NuStep        Treadmill  X10' incline walk 10' incline walk x10'    Forward/backward heel to toe Walk                Gait Training                        Modalities        MHP 7' with ankle in DF at wall increased pain medial of achilles    10' with ankle in DF at wall   CP CP to R plantar of foot x 10 min Ice Massage x10' 5' ice massage  Ice w/water    Radio pressure wave    x10'    US    x10'

## 2024-03-19 ENCOUNTER — APPOINTMENT (OUTPATIENT)
Dept: PHYSICAL THERAPY | Facility: CLINIC | Age: 64
End: 2024-03-19
Payer: COMMERCIAL

## 2024-03-19 ENCOUNTER — OFFICE VISIT (OUTPATIENT)
Dept: PHYSICAL THERAPY | Facility: CLINIC | Age: 64
End: 2024-03-19
Payer: COMMERCIAL

## 2024-03-19 DIAGNOSIS — G58.9 NERVE ENTRAPMENT: ICD-10-CM

## 2024-03-19 DIAGNOSIS — G57.51 TARSAL TUNNEL SYNDROME OF RIGHT SIDE: ICD-10-CM

## 2024-03-19 DIAGNOSIS — M79.671 PAIN OF RIGHT HEEL: Primary | ICD-10-CM

## 2024-03-19 DIAGNOSIS — M72.2 PLANTAR FASCIITIS OF RIGHT FOOT: ICD-10-CM

## 2024-03-19 DIAGNOSIS — Q66.229 METATARSUS ADDUCTUS: ICD-10-CM

## 2024-03-19 DIAGNOSIS — T14.8XXA MUSCLE TEAR: ICD-10-CM

## 2024-03-19 DIAGNOSIS — M21.6X1 ACQUIRED RIGHT HINDFOOT VARUS: ICD-10-CM

## 2024-03-19 PROCEDURE — 97164 PT RE-EVAL EST PLAN CARE: CPT | Performed by: PHYSICAL MEDICINE & REHABILITATION

## 2024-03-19 PROCEDURE — 97760 ORTHOTIC MGMT&TRAING 1ST ENC: CPT | Performed by: PHYSICAL MEDICINE & REHABILITATION

## 2024-03-19 NOTE — PROGRESS NOTES
"PT Evaluation /Orthotics     Today's date: 3/19/2024  Patient name: Randi Mathews  : 1960  MRN: [id number]  Referring provider: Mello Turpin*  Dx:   Encounter Diagnosis     ICD-10-CM    1. Pain of right heel  M79.671       2. Plantar fasciitis of right foot  M72.2       3. Muscle tear  T14.8XXA       4. Tarsal tunnel syndrome of right side  G57.51       5. Nerve entrapment  G58.9       6. Acquired right hindfoot varus  M21.171       7. Metatarsus adductus  Q66.229                                  SUBJECTIVE:    Per recent DPM note:  \"-At this point I am hoping that the tear of the FDP is somewhat incidental, her primarily the issue of her pain is neuritic in nature stabbing and worse with activity as she is on her feet more.  - I will send her to physical therapy for directed Graston Technique along the medial calcaneal nerve, I discussed this so she could begin this at home with Voltaren as a contact layer as well  - Supportive shoes at all times in the house and I would also recommend that she does obtain custom orthotics with first ray cut out through physical therapy as well which was ordered as well  - She is to return in 4 to 6 weeks for repeat assessment of her pain        Subjective:       Patient ID: Randi Mathews is a 64 y.o. female.     Patient presents for evaluation and management of her right foot. Has been going on for her right foot for about 3 months. Complaining of first step in the morning pain. No PT pain along the inside arch as the day goes on. Complaining of burning pain about the inside of her heel. States that there is not really much improvement with the addition of therapy. Cannot really tell the difference between being barefoot and shoes. Has had on injection in the past without any change in her pain. NSAIDs were not helpful. Just went to [facility] and started wearing them today. \"    PT IE 3/19:  Pt notes chronic hx of R heel pain. Has been going to PT. Notes her sx " "are variable day to day; Good days/bad days. Some days \"can't even put the foot down\". Pain 1* R achilles vs R medial ankle vs under R heel. Pain \"where her surgery was\"  R heelcord sometimes; heel has been numb since then however. Pt notes her R heel was feeling better until she twisted and felt something pop some time before her MRI; pain has been off/on since. Pain is worse with standing and walking; worse sometimes with doing certain stretches. Nothing seems to help once it is triggered. No relief with antiinflammatories. No n/t or sensory changes in her feet. Does get burning pain medial R heel/ankle. Has trialed OTC orthotics ; unsure if they help. Pt works ; 1* computer work. Pt enjoys hunting and fishing. Wears [employer] Ghost Max sneakers; womens size 7.5 wide.     OBJECTIVE:    Age: 64 y.o.  Weight: 186lb    Foot Posture Index Score    Right Foot  Talar dome - 0  Malleolar curve - 0   Calcaneal eversion - 0  Talonavicular bulge - 0  Medial longitudinal arch - -1  Too many toes - 0  Total Score R = -1    Left Foot  Talar dome - 0  Malleolar curve - 0   Calcaneal eversion - 0  Talonavicular bulge - 0  Medial longitudinal arch - 0  Too many toes - 0  Total Score L = 0    Reference Values  Normal =    0 to +5  Pronated =  +6 to +9 Highly Pronated =  10+  Supinated =   -1 to -4 Highly Supinated = -5 to -12      Objective      Gait Assessment:  Right Stance Phase- R RF neutral at IC ; no RF valgus note t/o stance, very mild valgus at terminal stance only; apparent mild varus R knee as weight shifts more lateral over foot/ankle/leg with mid stance ; decreased midfoot pronation/FF pronation t/o stance   Pt demonstrates higher angulation of arch R foot vs L   Left Stance Phase - L RF neutral at IC; mild valgus RF t/o stance ; mild FF pronation terminal stance     AROM/PROM:             MMT          AROM          PROM    Ankle         R          L          R         L          R         L   PF   WNL WNL     DF.   0-5* " 0-5*     DF bent knee         EHL         Inv.   WFL WFL     Ever.   WFl WFL     Great toe Extension    WFL WFL       Functional Ankle Dorsiflexion ROM:  NT    Palpation:  TTP R medial heel and ankle in region of tarsal tunnel; mild ttp medial calcaneal tubercle and PTT     Observation:  Redness noted in region of R medial calcaneal tubercle; pt notes this has been present for a long time and not sure when it started/why; maybe after an injection there; comes/goes t/o day per pt; not spreading/worsening; this is area of her pain per pt     Neurological Testing:  Numbness back of R heel per pt   Otherwise no n/t or sensory changes R/L foot/ankle     Joint Mobility:        Right                         Left     Subtalar-                  hypo                               hypo  Midfoot-                   hypo                               slight hypo     Forefoot-                 WFL                               WFL  Talocrural-              Hypo                               Hypo  First Ray-                WFL   decreased dorsal glide            WFL    Subtalar Neutral Assessment:  Right- See scans, PF first ray    Left- See scans       ASSESSMENT:    Patient requires custom foot orthosis with B deepened heel cups, B medial longitudinal arch supports, B heel pads, and possible PF first ray with/without FF medial posting to correct altered CKC foot/ankle alignment and altered gait mechanics contributing to pain/sx and functional limitations. Patient is not currently controlled by her motion-controlled shoe. Foot/ankle exam and Ipad scans were completed this date as noted above. Will consult with orthotist regarding patient presentation and scans. Pt will return to PT for dispensing of orthotics when they arrive. Will review orthotic fit to shoe and pt's foot, wearing schedule, gait assessment with orthotics etc at that time. Pt in agreement with plan with no questions/concerns end of session. Thank you for your  referral.       Orthotic goals:  1) Patient will have custom foot orthoses fitted to her shoe.   2) Patient will be compliant with break-in period of custom foot orthoses as prescribed by PT.   3) Patient will be compliant with custom foot orthoses use as prescribed by PT.     Plan:    Planned therapy interventions: orthotic fitting/training  Duration in visits: 2

## 2024-03-20 DIAGNOSIS — E66.9 CLASS 1 OBESITY: ICD-10-CM

## 2024-03-20 DIAGNOSIS — I10 PRIMARY HYPERTENSION: ICD-10-CM

## 2024-03-20 DIAGNOSIS — R73.03 PREDIABETES: ICD-10-CM

## 2024-03-21 ENCOUNTER — APPOINTMENT (OUTPATIENT)
Dept: PHYSICAL THERAPY | Facility: CLINIC | Age: 64
End: 2024-03-21
Payer: COMMERCIAL

## 2024-03-26 ENCOUNTER — OFFICE VISIT (OUTPATIENT)
Dept: PHYSICAL THERAPY | Facility: CLINIC | Age: 64
End: 2024-03-26
Payer: COMMERCIAL

## 2024-03-26 DIAGNOSIS — M79.671 PAIN OF RIGHT HEEL: Primary | ICD-10-CM

## 2024-03-26 DIAGNOSIS — M72.2 PLANTAR FASCIITIS OF RIGHT FOOT: ICD-10-CM

## 2024-03-26 DIAGNOSIS — M21.6X1 ACQUIRED RIGHT HINDFOOT VARUS: ICD-10-CM

## 2024-03-26 DIAGNOSIS — G57.51 TARSAL TUNNEL SYNDROME OF RIGHT SIDE: ICD-10-CM

## 2024-03-26 DIAGNOSIS — Q66.229 METATARSUS ADDUCTUS: ICD-10-CM

## 2024-03-26 DIAGNOSIS — G58.9 NERVE ENTRAPMENT: ICD-10-CM

## 2024-03-26 DIAGNOSIS — T14.8XXA MUSCLE TEAR: ICD-10-CM

## 2024-03-26 PROCEDURE — 97140 MANUAL THERAPY 1/> REGIONS: CPT

## 2024-03-26 PROCEDURE — 97110 THERAPEUTIC EXERCISES: CPT

## 2024-03-26 PROCEDURE — 97112 NEUROMUSCULAR REEDUCATION: CPT

## 2024-03-26 NOTE — PROGRESS NOTES
"Daily Note     Today's date: 3/26/2024  Patient name: Randi Mathews  : 1960  MRN: [id number]  Referring provider: Betzaida Brown DPM  Dx:   Encounter Diagnosis     ICD-10-CM    1. Pain of right heel  M79.671       2. Plantar fasciitis of right foot  M72.2       3. Muscle tear  T14.8XXA       4. Tarsal tunnel syndrome of right side  G57.51       5. Nerve entrapment  G58.9       6. Acquired right hindfoot varus  M21.171       7. Metatarsus adductus  Q66.229                      Subjective: Pt reports hiking and having some increased medial ankle and heel pain.       Objective: See treatment diary below      Assessment: Tolerated treatment well. Patient demonstrated fatigue post treatment and exhibited good technique with therapeutic exercises. Pt with mild antalgic gait today with PT. Pt with mild edema along medial heel today.       Plan: Continue per plan of care.        Manuals 2/26/24 3/7/24 3/12 3/14 3/26   ART R Foot/Heel/Ankle   x15'   x15'     PROM/Stretch 5'       5'   IASTM   x5'         STM/Triggerpoint 10'   10' 10' 10'   JM                           Neuro Re-Ed             Standing Calf/Achilles Stretch 20\" 6x ea w/towel roll 6x20'' ea 6x20'' ea \" ea towel roll 20\" 6x ea w/towel roll   Calf Stretch with Strap             No shoe AE Steamboats On airex 2x10 3x10 3/10 This session 2x10 on airex   Timed SLS AE No Shoe 20\" 8x 8x20''   \"     SL Ecc HR 10x 10x 2/10 This session 10x2   Ankle Pumps             Timed SLS Bosu   6x20'' \"  This session    Upside down BOSU squat holds             4-way wooden BAPS Board 2x10 ea 2x10 ea 2/10 ea This session 2x10 ea   Thera-stick roll x5' X5'   5'                                                            Ther Ex             Ankle  Circles             Lateral Walk             4-way wooden BAPS Board             HR/TR 3x10 ea 3x10   3/10 3x10   Seated HR/TR             Seated Ankle inv/ev 10x ea           Seated Ankle sup/pron             Toe flx/ext " alexandro towel                           Ther Activity             Bike/NuStep             Treadmill   X10' incline walk 10' incline walk x10'  10'   Forward/backward heel to toe Walk                           Gait Training                                         Modalities             MHP 7' with ankle in DF at wall increased pain medial of achilles       l   CP CP to R plantar of foot x 10 min Ice Massage x10' 5' ice massage   Ice massage 10'    Radio pressure wave       x10'     US       x10'

## 2024-03-28 ENCOUNTER — APPOINTMENT (OUTPATIENT)
Dept: PHYSICAL THERAPY | Facility: CLINIC | Age: 64
End: 2024-03-28
Payer: COMMERCIAL

## 2024-04-02 ENCOUNTER — APPOINTMENT (OUTPATIENT)
Dept: PHYSICAL THERAPY | Facility: CLINIC | Age: 64
End: 2024-04-02
Payer: COMMERCIAL

## 2024-04-02 DIAGNOSIS — B34.9 VIRAL SYNDROME: Primary | ICD-10-CM

## 2024-04-02 DIAGNOSIS — B34.9 VIRAL INFECTION, UNSPECIFIED: ICD-10-CM

## 2024-04-02 DIAGNOSIS — Z20.822 EXPOSURE TO COVID-19 VIRUS: Primary | ICD-10-CM

## 2024-04-02 PROCEDURE — 87636 SARSCOV2 & INF A&B AMP PRB: CPT | Performed by: FAMILY MEDICINE

## 2024-04-03 ENCOUNTER — OFFICE VISIT (OUTPATIENT)
Dept: FAMILY MEDICINE CLINIC | Facility: CLINIC | Age: 64
End: 2024-04-03
Payer: COMMERCIAL

## 2024-04-03 ENCOUNTER — LAB (OUTPATIENT)
Dept: LAB | Facility: MEDICAL CENTER | Age: 64
End: 2024-04-03
Payer: COMMERCIAL

## 2024-04-03 VITALS
TEMPERATURE: 97.4 F | BODY MASS INDEX: 31.32 KG/M2 | HEART RATE: 76 BPM | WEIGHT: 188 LBS | HEIGHT: 65 IN | SYSTOLIC BLOOD PRESSURE: 132 MMHG | OXYGEN SATURATION: 96 % | DIASTOLIC BLOOD PRESSURE: 86 MMHG

## 2024-04-03 DIAGNOSIS — I10 PRIMARY HYPERTENSION: ICD-10-CM

## 2024-04-03 DIAGNOSIS — J02.9 ACUTE PHARYNGITIS, UNSPECIFIED ETIOLOGY: ICD-10-CM

## 2024-04-03 DIAGNOSIS — R73.03 PREDIABETES: ICD-10-CM

## 2024-04-03 DIAGNOSIS — J06.9 UPPER RESPIRATORY TRACT INFECTION, UNSPECIFIED TYPE: ICD-10-CM

## 2024-04-03 DIAGNOSIS — J02.9 ACUTE PHARYNGITIS, UNSPECIFIED ETIOLOGY: Primary | ICD-10-CM

## 2024-04-03 DIAGNOSIS — E66.9 CLASS 1 OBESITY: ICD-10-CM

## 2024-04-03 DIAGNOSIS — J02.9 PHARYNGITIS, UNSPECIFIED ETIOLOGY: Primary | ICD-10-CM

## 2024-04-03 LAB
ALBUMIN SERPL BCP-MCNC: 4.2 G/DL (ref 3.5–5)
ALP SERPL-CCNC: 58 U/L (ref 34–104)
ALT SERPL W P-5'-P-CCNC: 17 U/L (ref 7–52)
ANION GAP SERPL CALCULATED.3IONS-SCNC: 8 MMOL/L (ref 4–13)
AST SERPL W P-5'-P-CCNC: 15 U/L (ref 13–39)
BASOPHILS # BLD AUTO: 0.04 THOUSANDS/ÂΜL (ref 0–0.1)
BASOPHILS NFR BLD AUTO: 1 % (ref 0–1)
BILIRUB SERPL-MCNC: 0.4 MG/DL (ref 0.2–1)
BUN SERPL-MCNC: 14 MG/DL (ref 5–25)
CALCIUM SERPL-MCNC: 9.1 MG/DL (ref 8.4–10.2)
CHLORIDE SERPL-SCNC: 105 MMOL/L (ref 96–108)
CO2 SERPL-SCNC: 29 MMOL/L (ref 21–32)
CREAT SERPL-MCNC: 0.68 MG/DL (ref 0.6–1.3)
EOSINOPHIL # BLD AUTO: 0.18 THOUSAND/ÂΜL (ref 0–0.61)
EOSINOPHIL NFR BLD AUTO: 3 % (ref 0–6)
ERYTHROCYTE [DISTWIDTH] IN BLOOD BY AUTOMATED COUNT: 11.4 % (ref 11.6–15.1)
FLUAV RNA RESP QL NAA+PROBE: NEGATIVE
FLUBV RNA RESP QL NAA+PROBE: NEGATIVE
GFR SERPL CREATININE-BSD FRML MDRD: 94 ML/MIN/1.73SQ M
GLUCOSE P FAST SERPL-MCNC: 101 MG/DL (ref 65–99)
HCT VFR BLD AUTO: 41.5 % (ref 34.8–46.1)
HGB BLD-MCNC: 14 G/DL (ref 11.5–15.4)
IMM GRANULOCYTES # BLD AUTO: 0.02 THOUSAND/UL (ref 0–0.2)
IMM GRANULOCYTES NFR BLD AUTO: 0 % (ref 0–2)
LYMPHOCYTES # BLD AUTO: 1.95 THOUSANDS/ÂΜL (ref 0.6–4.47)
LYMPHOCYTES NFR BLD AUTO: 32 % (ref 14–44)
MCH RBC QN AUTO: 31.5 PG (ref 26.8–34.3)
MCHC RBC AUTO-ENTMCNC: 33.7 G/DL (ref 31.4–37.4)
MCV RBC AUTO: 93 FL (ref 82–98)
MONOCYTES # BLD AUTO: 0.32 THOUSAND/ÂΜL (ref 0.17–1.22)
MONOCYTES NFR BLD AUTO: 5 % (ref 4–12)
NEUTROPHILS # BLD AUTO: 3.5 THOUSANDS/ÂΜL (ref 1.85–7.62)
NEUTS SEG NFR BLD AUTO: 59 % (ref 43–75)
NRBC BLD AUTO-RTO: 0 /100 WBCS
PLATELET # BLD AUTO: 373 THOUSANDS/UL (ref 149–390)
PMV BLD AUTO: 10.1 FL (ref 8.9–12.7)
POTASSIUM SERPL-SCNC: 3.9 MMOL/L (ref 3.5–5.3)
PROT SERPL-MCNC: 6.6 G/DL (ref 6.4–8.4)
RBC # BLD AUTO: 4.45 MILLION/UL (ref 3.81–5.12)
SARS-COV-2 RNA RESP QL NAA+PROBE: NEGATIVE
SODIUM SERPL-SCNC: 142 MMOL/L (ref 135–147)
WBC # BLD AUTO: 6.01 THOUSAND/UL (ref 4.31–10.16)

## 2024-04-03 PROCEDURE — 36415 COLL VENOUS BLD VENIPUNCTURE: CPT | Performed by: FAMILY MEDICINE

## 2024-04-03 PROCEDURE — 87070 CULTURE OTHR SPECIMN AEROBIC: CPT | Performed by: FAMILY MEDICINE

## 2024-04-03 PROCEDURE — 99213 OFFICE O/P EST LOW 20 MIN: CPT | Performed by: FAMILY MEDICINE

## 2024-04-03 PROCEDURE — 80053 COMPREHEN METABOLIC PANEL: CPT | Performed by: FAMILY MEDICINE

## 2024-04-03 PROCEDURE — 85025 COMPLETE CBC W/AUTO DIFF WBC: CPT

## 2024-04-03 RX ORDER — AMOXICILLIN 500 MG/1
500 CAPSULE ORAL EVERY 8 HOURS SCHEDULED
Qty: 30 CAPSULE | Refills: 0 | Status: SHIPPED | OUTPATIENT
Start: 2024-04-03 | End: 2024-04-13

## 2024-04-03 NOTE — PROGRESS NOTES
Assessment/Plan:    Problem List Items Addressed This Visit    None  Visit Diagnoses       Acute pharyngitis, unspecified etiology    -  Primary    Upper respiratory tract infection, unspecified type                 Diagnoses and all orders for this visit:    Acute pharyngitis, unspecified etiology    Upper respiratory tract infection, unspecified type        No problem-specific Assessment & Plan notes found for this encounter.        Subjective:      Patient ID: Randi Mathews is a 64 y.o. female.    Ade is here today with symptoms of an upper respiratory tract infection with pharyngitis sinusitis eustachian tube dysfunction of both ears she is also been out of work the last 2 days because of a gastroenteritis that seems to be coming to an end but now she is got red throat with a lot of postnasal drip her sinuses are red and hyperemic I would like to get her started on antibiotic however I need to know for sure were treating a bacterial infection and not chasing a virus she is I had a throat culture here in the office and she will go to the lab get a CBC and a chemistry panel which should be available by this afternoon and the yesterday she was swabbed for COVID and flu so those should be available by this afternoon also and then we can do appropriate treatment    Sore Throat   Associated symptoms include coughing and diarrhea. Pertinent negatives include no abdominal pain, headaches, shortness of breath or vomiting.       The following portions of the patient's history were reviewed and updated as appropriate:   She has a past medical history of Abdominal pain, Atelectasis, Cancer (HCC), Disease of thyroid gland, GERD (gastroesophageal reflux disease), Irritable bowel syndrome, Kidney stone, Motion sickness, PONV (postoperative nausea and vomiting), and Wears glasses.,  does not have any pertinent problems on file.,   has a past surgical history that includes Shoulder surgery (Bilateral); Biceps tendon repair  (Right);  section; Exploratory laparotomy; Cholecystectomy; Appendectomy; Tubal ligation; Knee arthroscopy w/ meniscal repair (Left); pr arthrs kne surg w/meniscectomy med/lat w/shvg (Left, 2017); ARTHROSCOPY KNEE (Right, 2018); pr colonoscopy flx dx w/collj spec when pfrmd (N/A, 2/15/2019); Carpal tunnel release (Left); Colonoscopy; Umbilical hernia repair; Achilles tendon repair (Right); LAPAROSCOPY; pr neuroplasty &/transpos median nrv carpal tunne (Right, 2020); and Breast excisional biopsy (Right, 2012).,  family history includes Diabetes in her paternal grandmother; Heart disease in her father; Hypertension in her mother; Lung cancer in her maternal uncle; Mental illness in her father; No Known Problems in her daughter and paternal aunt; Stroke in her mother; Substance Abuse in her brother.,   reports that she quit smoking about 4 years ago. Her smoking use included cigarettes. She has never used smokeless tobacco. She reports current alcohol use. She reports that she does not use drugs.,  is allergic to erythromycin, betadine [povidone iodine], doxycycline, and medical tape..  Current Outpatient Medications   Medication Sig Dispense Refill    cholecalciferol (VITAMIN D3) 1,000 units tablet Take 5,000 Units by mouth daily      cholestyramine (QUESTRAN) 4 g packet Take 1 packet (4 g total) by mouth 2 (two) times a day with meals 60 packet 1    estrogens, conjugated (Premarin) vaginal cream Insert 0.5 g into the vagina 2 (two) times a week 30 g 2    naproxen (Naprosyn) 500 mg tablet Take 1 tablet (500 mg total) by mouth 2 (two) times a day with meals 20 tablet 0    omeprazole (PriLOSEC) 40 MG capsule Take 1 PO BID 30 mins prior to a meal. 180 capsule 1    polyethylene glycol (GLYCOLAX) 17 GM/SCOOP powder Take 17 g by mouth daily 578 g 2    Semaglutide-Weight Management (WEGOVY) 0.25 MG/0.5ML Inject 0.5 mL (0.25 mg total) under the skin once a week X1 month then increase to 0.5 mg 2  mL 0    Synthroid 112 MCG tablet Take 1 tablet (112 mcg total) by mouth every morning 90 tablet 1    Ca Carbonate-Mag Hydroxide (ROLAIDS PO) Take by mouth as needed (Patient not taking: Reported on 4/3/2024)      Semaglutide-Weight Management (WEGOVY) 0.5 MG/0.5ML Inject 0.5 mL (0.5 mg total) under the skin once a week X1 month then increase to 1 mg (Patient not taking: Reported on 4/3/2024) 2 mL 0    Semaglutide-Weight Management (WEGOVY) 1 MG/0.5ML Inject 0.5 mL (1 mg total) under the skin once a week X1 month then increase to 1.7 mg (Patient not taking: Reported on 4/3/2024) 2 mL 0    spironolactone (ALDACTONE) 50 mg tablet Take 1 tablet (50 mg total) by mouth daily (Patient not taking: Reported on 10/2/2023) 30 tablet 5     No current facility-administered medications for this visit.       Review of Systems   Constitutional:  Positive for fatigue. Negative for activity change, appetite change, diaphoresis and fever.   HENT:  Positive for sinus pressure, sinus pain and sore throat.    Eyes: Negative.    Respiratory:  Positive for cough. Negative for apnea, chest tightness, shortness of breath and wheezing.    Cardiovascular:  Negative for chest pain, palpitations and leg swelling.   Gastrointestinal:  Positive for diarrhea. Negative for abdominal distention, abdominal pain, anal bleeding, constipation, nausea and vomiting.   Endocrine: Negative for cold intolerance, heat intolerance, polydipsia, polyphagia and polyuria.   Genitourinary:  Negative for difficulty urinating, dysuria, flank pain, hematuria and urgency.   Musculoskeletal:  Negative for arthralgias, back pain, gait problem, joint swelling and myalgias.   Skin:  Negative for color change, rash and wound.   Allergic/Immunologic: Negative for environmental allergies, food allergies and immunocompromised state.   Neurological:  Negative for dizziness, seizures, syncope, speech difficulty, numbness and headaches.   Hematological:  Negative for adenopathy.  "Does not bruise/bleed easily.   Psychiatric/Behavioral:  Negative for agitation, behavioral problems, hallucinations, sleep disturbance and suicidal ideas.          Objective:  Vitals:    04/03/24 0656   BP: 132/86   BP Location: Left arm   Patient Position: Sitting   Cuff Size: Large   Pulse: 76   Temp: (!) 97.4 °F (36.3 °C)   TempSrc: Temporal   SpO2: 96%   Weight: 85.3 kg (188 lb)   Height: 5' 5\" (1.651 m)     Body mass index is 31.28 kg/m².     Physical Exam  Constitutional:       General: She is not in acute distress.     Appearance: She is well-developed. She is not diaphoretic.   HENT:      Head: Normocephalic.      Right Ear: Tympanic membrane and external ear normal.      Left Ear: Tympanic membrane and external ear normal.      Nose: Nose normal.      Mouth/Throat:      Pharynx: Posterior oropharyngeal erythema present.   Eyes:      General: No scleral icterus.        Right eye: No discharge.         Left eye: No discharge.      Conjunctiva/sclera: Conjunctivae normal.      Pupils: Pupils are equal, round, and reactive to light.   Neck:      Thyroid: No thyromegaly.      Trachea: No tracheal deviation.   Cardiovascular:      Rate and Rhythm: Normal rate and regular rhythm.      Heart sounds: Normal heart sounds. No murmur heard.     No friction rub. No gallop.   Pulmonary:      Effort: Pulmonary effort is normal. No respiratory distress.      Breath sounds: Normal breath sounds. No wheezing.   Abdominal:      General: Bowel sounds are normal.      Palpations: Abdomen is soft. There is no mass.      Tenderness: There is no abdominal tenderness. There is no guarding.   Musculoskeletal:         General: No deformity.      Cervical back: Normal range of motion.   Lymphadenopathy:      Cervical: No cervical adenopathy.   Skin:     General: Skin is warm and dry.      Findings: No erythema or rash.   Neurological:      Mental Status: She is alert and oriented to person, place, and time.      Cranial Nerves: No " cranial nerve deficit.   Psychiatric:         Thought Content: Thought content normal.

## 2024-04-03 NOTE — RESULT ENCOUNTER NOTE
Call patient to notify normal results BC shows an absolutely normal white blood count with the throat culture will not be back until tomorrow so will start a dose of amoxicillin to cover strep we will send it to her pharmacy

## 2024-04-04 ENCOUNTER — APPOINTMENT (OUTPATIENT)
Dept: PHYSICAL THERAPY | Facility: CLINIC | Age: 64
End: 2024-04-04
Payer: COMMERCIAL

## 2024-04-05 LAB — BACTERIA THROAT CULT: NORMAL

## 2024-04-09 ENCOUNTER — APPOINTMENT (OUTPATIENT)
Dept: PHYSICAL THERAPY | Facility: CLINIC | Age: 64
End: 2024-04-09
Payer: COMMERCIAL

## 2024-04-09 ENCOUNTER — OFFICE VISIT (OUTPATIENT)
Dept: PHYSICAL THERAPY | Facility: CLINIC | Age: 64
End: 2024-04-09
Payer: COMMERCIAL

## 2024-04-09 DIAGNOSIS — Q66.229 METATARSUS ADDUCTUS: ICD-10-CM

## 2024-04-09 DIAGNOSIS — G57.51 TARSAL TUNNEL SYNDROME OF RIGHT SIDE: ICD-10-CM

## 2024-04-09 DIAGNOSIS — T14.8XXA MUSCLE TEAR: ICD-10-CM

## 2024-04-09 DIAGNOSIS — M72.2 PLANTAR FASCIITIS OF RIGHT FOOT: ICD-10-CM

## 2024-04-09 DIAGNOSIS — M79.671 PAIN OF RIGHT HEEL: Primary | ICD-10-CM

## 2024-04-09 DIAGNOSIS — G58.9 NERVE ENTRAPMENT: ICD-10-CM

## 2024-04-09 DIAGNOSIS — M21.6X1 ACQUIRED RIGHT HINDFOOT VARUS: ICD-10-CM

## 2024-04-09 PROCEDURE — 97140 MANUAL THERAPY 1/> REGIONS: CPT

## 2024-04-09 PROCEDURE — 97112 NEUROMUSCULAR REEDUCATION: CPT

## 2024-04-09 PROCEDURE — 97110 THERAPEUTIC EXERCISES: CPT

## 2024-04-09 NOTE — PROGRESS NOTES
"Daily Note     Today's date: 2024  Patient name: Randi Mathews  : 1960  MRN: [id number]  Referring provider: Betzaida Brown DPM  Dx:   Encounter Diagnosis     ICD-10-CM    1. Pain of right heel  M79.671       2. Plantar fasciitis of right foot  M72.2       3. Muscle tear  T14.8XXA       4. Tarsal tunnel syndrome of right side  G57.51       5. Nerve entrapment  G58.9       6. Acquired right hindfoot varus  M21.6X1       7. 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 Eager to cont to make gains and improved sx with program.       Objective: See treatment diary below. [place] x10' to med heel.       Assessment: Tolerated treatment well. Patient exhibited good technique with therapeutic exercises. Pt don MT today with overall good don. Pt without increased pain post treatment today.      Plan: Continue per plan of care.      Manuals 4/9 3/7/24 3/12 3/14 3/26   ART R Foot/Heel/Ankle   x15'   x15'     PROM/Stretch 5'       5'   IASTM   x5'         STM/Triggerpoint 10' grasoton   10' 10' 10'   JM                           Neuro Re-Ed             Standing Calf/Achilles Stretch 20\" 6x ea stand 6x20'' ea 6x20'' ea \" ea towel roll 20\" 6x ea w/towel roll   Calf Stretch with Strap             No shoe AE Steamboats On airex 2x10 3x10 3/10 This session 2x10 on airex   Timed SLS AE No Shoe 20\" 8x 8x20''   \"     SL Ecc HR 10x 10x 2/10 This session 10x2   Ankle Pumps             Timed SLS Bosu   6x20'' \"  This session    Upside down BOSU squat holds             4-way wooden BAPS Board 2x10 ea 2x10 ea 2/10 ea This session 2x10 ea   Thera-stick roll  X5'   5'                                                            Ther Ex             Ankle  Circles             Lateral Walk             4-way wooden BAPS Board             HR/TR  3x10   3/10 3x10   Seated HR/TR             Seated Ankle inv/ev            Seated Ankle sup/pron             Toe flx/ext " alexandro towel                           Ther Activity             Bike/NuStep             Treadmill  5' inc 3.5   2.2 mph X10' incline walk 10' incline walk x10'  10'   Forward/backward heel to toe Walk                           Gait Training                                         Modalities             MHP        l   CP  Ice Massage x10' 5' ice massage   Ice massage 10'    Radio pressure wave       x10'     US       x10'

## 2024-04-11 ENCOUNTER — OFFICE VISIT (OUTPATIENT)
Dept: PHYSICAL THERAPY | Facility: CLINIC | Age: 64
End: 2024-04-11
Payer: COMMERCIAL

## 2024-04-11 DIAGNOSIS — M72.2 PLANTAR FASCIITIS OF RIGHT FOOT: ICD-10-CM

## 2024-04-11 DIAGNOSIS — Q66.229 METATARSUS ADDUCTUS: ICD-10-CM

## 2024-04-11 DIAGNOSIS — G57.51 TARSAL TUNNEL SYNDROME OF RIGHT SIDE: ICD-10-CM

## 2024-04-11 DIAGNOSIS — T14.8XXA MUSCLE TEAR: ICD-10-CM

## 2024-04-11 DIAGNOSIS — G58.9 NERVE ENTRAPMENT: ICD-10-CM

## 2024-04-11 DIAGNOSIS — M21.6X1 ACQUIRED RIGHT HINDFOOT VARUS: ICD-10-CM

## 2024-04-11 DIAGNOSIS — M79.671 PAIN OF RIGHT HEEL: Primary | ICD-10-CM

## 2024-04-11 PROCEDURE — 97112 NEUROMUSCULAR REEDUCATION: CPT

## 2024-04-11 PROCEDURE — 97110 THERAPEUTIC EXERCISES: CPT

## 2024-04-11 NOTE — PROGRESS NOTES
"Daily Note     Today's date: 2024  Patient name: Randi Mathews  : 1960  MRN: [id number]  Referring provider: Betzaida Brown DPM  Dx:   Encounter Diagnosis     ICD-10-CM    1. Pain of right heel  M79.671       2. Plantar fasciitis of right foot  M72.2       3. Muscle tear  T14.8XXA       4. Tarsal tunnel syndrome of right side  G57.51       5. Nerve entrapment  G58.9       6. Acquired right hindfoot varus  M21.6X1       7. 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 Pt reports increased pain following last visit.       Objective: See treatment diary below      Assessment: Tolerated treatment well. Patient exhibited good technique with therapeutic exercises. Pt making cont slow steady gains with program. Cont to have pain at times along medial calcaneus.       Plan: Continue per plan of care.      Manuals 4/9 4/11 3/12 3/14 3/26   ART R Foot/Heel/Ankle      x15'     PROM/Stretch 5'       5'   IASTM   10'         STM/Triggerpoint 10' grasoton   10' 10' 10'   JM                           Neuro Re-Ed             Standing Calf/Achilles Stretch 20\" 6x ea stand 6x20'' ea 6x20'' ea \" ea towel roll 20\" 6x ea w/towel roll   Calf Stretch with Strap             No shoe AE Steamboats On airex 2x10 3x10 3/10 This session 2x10 on airex   Timed SLS AE No Shoe 20\" 8x    8/20\"     SL Ecc HR 10x 10x2 10 This session 10x2   Ankle Pumps             Timed SLS Bosu    \"  This session    Upside down BOSU squat holds             4-way wooden BAPS Board 2x10 ea Pain held today 2/10 ea This session 2x10 ea   Thera-stick roll     5'                                                            Ther Ex             Ankle  Circles             Lateral Walk             4-way wooden BAPS Board             HR/TR  3x10   3/10 3x10   Seated HR/TR             Seated Ankle inv/ev            Seated Ankle sup/pron             Toe flx/ext curls towel           "                 Ther Activity             Bike/NuStep             Treadmill  5' inc 3.5   2.2 mph X10' incline walk 10' incline walk x10'  10'   Forward/backward heel to toe Walk                           Gait Training                                         Modalities             MHP        l   CP   5' ice massage   Ice massage 10'    Radio pressure wave       x10'     US       x10'

## 2024-04-12 DIAGNOSIS — N95.2 VAGINAL ATROPHY: ICD-10-CM

## 2024-04-15 ENCOUNTER — TELEPHONE (OUTPATIENT)
Dept: FAMILY MEDICINE CLINIC | Facility: CLINIC | Age: 64
End: 2024-04-15

## 2024-04-15 VITALS — DIASTOLIC BLOOD PRESSURE: 78 MMHG | SYSTOLIC BLOOD PRESSURE: 132 MMHG

## 2024-04-15 RX ORDER — CONJUGATED ESTROGENS 0.62 MG/G
CREAM VAGINAL
Qty: 30 G | Refills: 2 | Status: SHIPPED | OUTPATIENT
Start: 2024-04-15

## 2024-04-16 ENCOUNTER — OFFICE VISIT (OUTPATIENT)
Dept: PHYSICAL THERAPY | Facility: CLINIC | Age: 64
End: 2024-04-16
Payer: COMMERCIAL

## 2024-04-16 DIAGNOSIS — G57.51 TARSAL TUNNEL SYNDROME OF RIGHT SIDE: ICD-10-CM

## 2024-04-16 DIAGNOSIS — M72.2 PLANTAR FASCIITIS OF RIGHT FOOT: ICD-10-CM

## 2024-04-16 DIAGNOSIS — Q66.229 METATARSUS ADDUCTUS: ICD-10-CM

## 2024-04-16 DIAGNOSIS — G58.9 NERVE ENTRAPMENT: ICD-10-CM

## 2024-04-16 DIAGNOSIS — M21.6X1 ACQUIRED RIGHT HINDFOOT VARUS: ICD-10-CM

## 2024-04-16 DIAGNOSIS — T14.8XXA MUSCLE TEAR: ICD-10-CM

## 2024-04-16 DIAGNOSIS — M79.671 PAIN OF RIGHT HEEL: Primary | ICD-10-CM

## 2024-04-16 PROCEDURE — 97140 MANUAL THERAPY 1/> REGIONS: CPT

## 2024-04-16 PROCEDURE — 97112 NEUROMUSCULAR REEDUCATION: CPT

## 2024-04-16 PROCEDURE — 97110 THERAPEUTIC EXERCISES: CPT

## 2024-04-16 NOTE — PROGRESS NOTES
"Daily Note     Today's date: 2024  Patient name: Randi Mathews  : 1960  MRN: [id number]  Referring provider: Betzaida Brown DPM  Dx:   Encounter Diagnosis     ICD-10-CM    1. Pain of right heel  M79.671       2. Plantar fasciitis of right foot  M72.2       3. Muscle tear  T14.8XXA       4. Tarsal tunnel syndrome of right side  G57.51       5. Nerve entrapment  G58.9       6. Acquired right hindfoot varus  M21.6X1       7. 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 Reports cont pain with activity in medial calcaneus into arch.       Objective: See treatment diary below      Assessment: Tolerated treatment well. Patient exhibited good technique with therapeutic exercises. Pt with cont intermittent discomfort in medial heel and arch. Pt cont to don IASTM well with some improved sx post tx.       Plan: Continue per plan of care.      Manuals 4/9 4/11 4/16 3/14 3/26   ART R Foot/Heel/Ankle      x15'     PROM/Stretch 5'       5'   IASTM   10'  10'       STM/Triggerpoint 10' grasoton    10' 10'   JM                           Neuro Re-Ed             Standing Calf/Achilles Stretch 20\" 6x ea stand 6x20'' ea 6x20'' ea \" ea towel roll 20\" 6x ea w/towel roll   Calf Stretch with Strap             No shoe AE Steamboats On airex 2x10 3x10 3/10 This session 2x10 on airex   Timed SLS AE No Shoe 20\" 8x    8/20\"     SL Ecc HR 10x 10x2 2/10 This session 10x2   Ankle Pumps             Timed SLS Bosu    \"  This session    Upside down BOSU squat holds             4-way wooden BAPS Board 2x10 ea Pain held today pain This session 2x10 ea   Thera-stick roll     5'                                                            Ther Ex             Ankle  Circles             Lateral Walk             4-way wooden BAPS Board             HR/TR  3x10   3/10 3x10   Seated HR/TR             Seated Ankle inv/ev            Seated Ankle sup/pron             Toe flx/ext curls towel "                           Ther Activity             Bike/NuStep             Treadmill  5' inc 3.5   2.2 mph X10' incline walk 10' incline walk x10'  10'   Forward/backward heel to toe Walk                           Gait Training                                         Modalities             MHP        l   CP      Ice massage 10'    Radio pressure wave       x10'     US       x10'

## 2024-04-17 ENCOUNTER — TELEPHONE (OUTPATIENT)
Dept: PAIN MEDICINE | Facility: CLINIC | Age: 64
End: 2024-04-17

## 2024-04-18 ENCOUNTER — TELEPHONE (OUTPATIENT)
Dept: PAIN MEDICINE | Facility: CLINIC | Age: 64
End: 2024-04-18

## 2024-04-18 ENCOUNTER — APPOINTMENT (OUTPATIENT)
Dept: PHYSICAL THERAPY | Facility: CLINIC | Age: 64
End: 2024-04-18
Payer: COMMERCIAL

## 2024-04-18 NOTE — TELEPHONE ENCOUNTER
PA for wegovy    Submitted via    []CMM-KEY    [][employer]-Case ID #    []Faxed to plan   []Other website    [x]Phone call Case ID #  [id number]    Office notes sent, clinical questions answered. Awaiting determination    Turnaround time for your insurance to make a decision on your Prior Authorization can take 7-21 business days.

## 2024-04-19 ENCOUNTER — TELEPHONE (OUTPATIENT)
Dept: FAMILY MEDICINE CLINIC | Facility: CLINIC | Age: 64
End: 2024-04-19

## 2024-04-19 ENCOUNTER — APPOINTMENT (OUTPATIENT)
Dept: LAB | Facility: MEDICAL CENTER | Age: 64
End: 2024-04-19
Payer: COMMERCIAL

## 2024-04-19 DIAGNOSIS — E03.9 HYPOTHYROIDISM, UNSPECIFIED TYPE: Primary | ICD-10-CM

## 2024-04-19 DIAGNOSIS — E03.9 HYPOTHYROIDISM, UNSPECIFIED TYPE: ICD-10-CM

## 2024-04-19 LAB — TSH SERPL DL<=0.05 MIU/L-ACNC: 0.96 UIU/ML (ref 0.45–4.5)

## 2024-04-19 PROCEDURE — 84443 ASSAY THYROID STIM HORMONE: CPT

## 2024-04-19 PROCEDURE — 36415 COLL VENOUS BLD VENIPUNCTURE: CPT

## 2024-04-19 NOTE — TELEPHONE ENCOUNTER
Pt needs a refill of Levothyroxine and asking if she needs to have a TSH drawn prior.  If so, please order.

## 2024-04-22 DIAGNOSIS — E03.9 HYPOTHYROIDISM, UNSPECIFIED TYPE: ICD-10-CM

## 2024-04-22 RX ORDER — LEVOTHYROXINE SODIUM 112 MCG
112 TABLET ORAL EVERY MORNING
Qty: 90 TABLET | Refills: 1 | Status: SHIPPED | OUTPATIENT
Start: 2024-04-22

## 2024-04-23 ENCOUNTER — OFFICE VISIT (OUTPATIENT)
Dept: PHYSICAL THERAPY | Facility: CLINIC | Age: 64
End: 2024-04-23
Payer: COMMERCIAL

## 2024-04-23 DIAGNOSIS — G58.9 NERVE ENTRAPMENT: ICD-10-CM

## 2024-04-23 DIAGNOSIS — K21.9 GASTROESOPHAGEAL REFLUX DISEASE, UNSPECIFIED WHETHER ESOPHAGITIS PRESENT: ICD-10-CM

## 2024-04-23 DIAGNOSIS — G57.51 TARSAL TUNNEL SYNDROME OF RIGHT SIDE: ICD-10-CM

## 2024-04-23 DIAGNOSIS — M79.671 PAIN OF RIGHT HEEL: Primary | ICD-10-CM

## 2024-04-23 DIAGNOSIS — Q66.229 METATARSUS ADDUCTUS: ICD-10-CM

## 2024-04-23 DIAGNOSIS — T14.8XXA MUSCLE TEAR: ICD-10-CM

## 2024-04-23 DIAGNOSIS — M72.2 PLANTAR FASCIITIS OF RIGHT FOOT: ICD-10-CM

## 2024-04-23 DIAGNOSIS — M21.6X1 ACQUIRED RIGHT HINDFOOT VARUS: ICD-10-CM

## 2024-04-23 PROCEDURE — 97110 THERAPEUTIC EXERCISES: CPT

## 2024-04-23 PROCEDURE — 97112 NEUROMUSCULAR REEDUCATION: CPT

## 2024-04-23 PROCEDURE — 97140 MANUAL THERAPY 1/> REGIONS: CPT

## 2024-04-23 RX ORDER — OMEPRAZOLE 40 MG/1
CAPSULE, DELAYED RELEASE ORAL
Qty: 180 CAPSULE | Refills: 1 | Status: SHIPPED | OUTPATIENT
Start: 2024-04-23

## 2024-04-23 NOTE — PROGRESS NOTES
"Daily Note     Today's date: 2024  Patient name: Randi Mathews  : 1960  MRN: [id number]  Referring provider: Betzaida Brown DPM  Dx:   Encounter Diagnosis     ICD-10-CM    1. Pain of right heel  M79.671       2. Plantar fasciitis of right foot  M72.2       3. Muscle tear  T14.8XXA       4. Tarsal tunnel syndrome of right side  G57.51       5. Nerve entrapment  G58.9       6. Acquired right hindfoot varus  M21.6X1       7. 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 Eager to cont to make gains.       Objective: See treatment diary below      Assessment: Tolerated treatment well. Patient demonstrated fatigue post treatment and exhibited good technique with therapeutic exercises. Cont to be challenged with SLS on Bosu. Pt don program well today with min swelling in med heel.       Plan: Continue per plan of care.      Manuals 4/9 4/11 4/16 4/23 3/26   ART R Foot/Heel/Ankle           PROM/Stretch 5'       5'   IASTM   10'  10'  10'     STM/Triggerpoint 10' grasoton     10'   JM                           Neuro Re-Ed             Standing Calf/Achilles Stretch 20\" 6x ea stand 6x20'' ea 6x20'' ea \" ea /2 roll 20\" 6x ea w/towel roll   Calf Stretch with Strap             No shoe AE Steamboats On airex 2x10 3x10 3/10 3/10 2x10 on airex   Timed SLS AE No Shoe 20\" 8x         SL Ecc HR 10x 10x2 2/10 2/10 10x2   Ankle Pumps             Timed SLS Bosu    20\"  \"     Upside down BOSU squat holds             4-way wooden BAPS Board 2x10 ea Pain held today pain  2x10 ea   Thera-stick roll                                                                 Ther Ex             Ankle  Circles             Lateral Walk             4-way wooden BAPS Board             HR/TR  3x10   3/10 3x10   Seated HR/TR             Seated Ankle inv/ev            Seated Ankle sup/pron             Toe flx/ext curls towel                           Ther Activity             Bike/NuStep   "           Treadmill  5' inc 3.5   2.2 mph X10' incline walk 10' incline walk x10'  10'   Forward/backward heel to toe Walk                           Gait Training                                         Modalities             MHP        l   CP      Ice massage 10'    Radio pressure wave            US       x10'

## 2024-04-24 NOTE — TELEPHONE ENCOUNTER
PA for wegovy Approved   Date(s) approved 4/18/25  Case #     Patient advised by [x] [employer] Message                      [] Phone call       Pharmacy advised by [x]Fax                                     []Phone call    Approval letter scanned into [employer] Yes

## 2024-04-25 ENCOUNTER — APPOINTMENT (OUTPATIENT)
Dept: PHYSICAL THERAPY | Facility: CLINIC | Age: 64
End: 2024-04-25
Payer: COMMERCIAL

## 2024-04-29 DIAGNOSIS — R73.03 PREDIABETES: ICD-10-CM

## 2024-04-29 DIAGNOSIS — Z79.899 MEDICATION MANAGEMENT: ICD-10-CM

## 2024-04-29 DIAGNOSIS — E66.9 CLASS 1 OBESITY: Primary | ICD-10-CM

## 2024-04-30 ENCOUNTER — APPOINTMENT (OUTPATIENT)
Dept: PHYSICAL THERAPY | Facility: CLINIC | Age: 64
End: 2024-04-30
Payer: COMMERCIAL

## 2024-05-02 ENCOUNTER — APPOINTMENT (OUTPATIENT)
Dept: PHYSICAL THERAPY | Facility: CLINIC | Age: 64
End: 2024-05-02
Payer: COMMERCIAL

## 2024-05-02 ENCOUNTER — OFFICE VISIT (OUTPATIENT)
Dept: PHYSICAL THERAPY | Facility: CLINIC | Age: 64
End: 2024-05-02

## 2024-05-02 ENCOUNTER — OFFICE VISIT (OUTPATIENT)
Dept: LAB | Facility: HOSPITAL | Age: 64
End: 2024-05-02
Payer: COMMERCIAL

## 2024-05-02 DIAGNOSIS — T14.8XXA MUSCLE TEAR: ICD-10-CM

## 2024-05-02 DIAGNOSIS — R73.03 PREDIABETES: ICD-10-CM

## 2024-05-02 DIAGNOSIS — M79.671 PAIN OF RIGHT HEEL: Primary | ICD-10-CM

## 2024-05-02 DIAGNOSIS — M21.6X1 ACQUIRED RIGHT HINDFOOT VARUS: ICD-10-CM

## 2024-05-02 DIAGNOSIS — Q66.229 METATARSUS ADDUCTUS: ICD-10-CM

## 2024-05-02 DIAGNOSIS — G57.51 TARSAL TUNNEL SYNDROME OF RIGHT SIDE: ICD-10-CM

## 2024-05-02 DIAGNOSIS — E66.9 CLASS 1 OBESITY: ICD-10-CM

## 2024-05-02 DIAGNOSIS — Z79.899 MEDICATION MANAGEMENT: ICD-10-CM

## 2024-05-02 DIAGNOSIS — G58.9 NERVE ENTRAPMENT: ICD-10-CM

## 2024-05-02 DIAGNOSIS — M72.2 PLANTAR FASCIITIS OF RIGHT FOOT: ICD-10-CM

## 2024-05-02 PROCEDURE — 93005 ELECTROCARDIOGRAM TRACING: CPT

## 2024-05-02 NOTE — PROGRESS NOTES
Daily Note     Today's date: 2024  Patient name: Randi Mathews  : 1960  MRN: [id number]  Referring provider: Mello Turpin*  Dx:   Encounter Diagnosis     ICD-10-CM    1. Pain of right heel  M79.671       2. Plantar fasciitis of right foot  M72.2       3. Muscle tear  T14.8XXA       4. Tarsal tunnel syndrome of right side  G57.51       5. Nerve entrapment  G58.9       6. Acquired right hindfoot varus  M21.6X1       7. 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 No new sx/complaints.       Objective: CMO fitting       Assessment: Fitted CMOs to pt's feet in sitting and standing. PT notes when heel is seated correctly in R/L heel cup, medial great toe is hanging over medial portion of device. When placed in pt's sneakers, pt notes feeling this hang over in the shoe as well; not painful but potentially bothersome per pt. It appears device is 1/2 size too small in length for patient despite ordering a women's 7.5 and her wearing a 7.5 sneaker. Pt notes she also feels she would prefer a slightly higher arch. Trialed 1/2 longitudinal arch pad with CMO and pt notes this felt better. Took pictures with Ipad of pt's feet on device to show lab. Will send CMOs back to increase length to 7.5 and increase arch height slightly B. Pt notes otherwise the orthotics felt very comfortable. Pt in agreement with  noted plan. Will call pt when orthotics return.       Plan:  pt to return when her CMOs arrive back to clinic for re-check

## 2024-05-03 LAB
ATRIAL RATE: 66 BPM
P AXIS: 34 DEGREES
PR INTERVAL: 138 MS
QRS AXIS: 32 DEGREES
QRSD INTERVAL: 88 MS
QT INTERVAL: 436 MS
QTC INTERVAL: 457 MS
T WAVE AXIS: 27 DEGREES
VENTRICULAR RATE: 66 BPM

## 2024-05-03 PROCEDURE — 93010 ELECTROCARDIOGRAM REPORT: CPT | Performed by: INTERNAL MEDICINE

## 2024-05-04 ENCOUNTER — TELEPHONE (OUTPATIENT)
Dept: BARIATRICS | Facility: CLINIC | Age: 64
End: 2024-05-04

## 2024-05-04 DIAGNOSIS — E66.9 CLASS 1 OBESITY: Primary | ICD-10-CM

## 2024-05-04 RX ORDER — PHENTERMINE HYDROCHLORIDE 37.5 MG/1
18.75 TABLET ORAL EVERY MORNING
Qty: 15 TABLET | Refills: 1 | Status: SHIPPED | OUTPATIENT
Start: 2024-05-04

## 2024-05-04 NOTE — TELEPHONE ENCOUNTER
Restarting phentermine. Pt will need to be seen by RD in 1 month for menu planning + vitals. She may need additional vital checks with the nurses every 6-8 weeks following the RD appt until she can be seen by me. Please also get her on my schedule    Thank you!

## 2024-05-06 ENCOUNTER — TELEPHONE (OUTPATIENT)
Dept: PAIN MEDICINE | Facility: CLINIC | Age: 64
End: 2024-05-06

## 2024-05-07 ENCOUNTER — OFFICE VISIT (OUTPATIENT)
Dept: PHYSICAL THERAPY | Facility: CLINIC | Age: 64
End: 2024-05-07
Payer: COMMERCIAL

## 2024-05-07 DIAGNOSIS — M79.671 PAIN OF RIGHT HEEL: Primary | ICD-10-CM

## 2024-05-07 DIAGNOSIS — G57.51 TARSAL TUNNEL SYNDROME OF RIGHT SIDE: ICD-10-CM

## 2024-05-07 DIAGNOSIS — T14.8XXA MUSCLE TEAR: ICD-10-CM

## 2024-05-07 DIAGNOSIS — Q66.229 METATARSUS ADDUCTUS: ICD-10-CM

## 2024-05-07 DIAGNOSIS — M21.6X1 ACQUIRED RIGHT HINDFOOT VARUS: ICD-10-CM

## 2024-05-07 DIAGNOSIS — M72.2 PLANTAR FASCIITIS OF RIGHT FOOT: ICD-10-CM

## 2024-05-07 DIAGNOSIS — G58.9 NERVE ENTRAPMENT: ICD-10-CM

## 2024-05-07 PROCEDURE — 97110 THERAPEUTIC EXERCISES: CPT

## 2024-05-07 PROCEDURE — 97112 NEUROMUSCULAR REEDUCATION: CPT

## 2024-05-07 PROCEDURE — 97140 MANUAL THERAPY 1/> REGIONS: CPT

## 2024-05-07 NOTE — PROGRESS NOTES
"Daily Note     Today's date: 2024  Patient name: Randi Mathews  : 1960  MRN: [id number]  Referring provider: Betzaida Brown DPM  Dx:   Encounter Diagnosis     ICD-10-CM    1. Pain of right heel  M79.671       2. Plantar fasciitis of right foot  M72.2       3. Muscle tear  T14.8XXA       4. Tarsal tunnel syndrome of right side  G57.51       5. Nerve entrapment  G58.9       6. Acquired right hindfoot varus  M21.6X1       7. 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 Reports cont to feel better when leaving PT. Reports some discomfort following night after PT. .       Objective: See treatment diary below      Assessment: Tolerated treatment well. Patient exhibited good technique with therapeutic exercises. Pt with cont point tenderness along med heel and achilles. Cont to improved with sx.       Plan: Continue per plan of care.           anuals    ART R Foot/Heel/Ankle             PROM/Stretch 5'          IASTM   10'  10'  10'  10'   STM/Triggerpoint 10' grasoton                                     Neuro Re-Ed             Standing Calf/Achilles Stretch 20\" 6x ea stand 6x20'' ea 6x20'' ea \" ea 1/2 roll \" 1/2 roll   Calf Stretch with Strap             No shoe AE Steamboats On airex 2x10 3x10 3/10 3/10 2x10 on airex   Timed SLS AE No Shoe 20\" 8x           SL Ecc HR 10x 10x2 2/10 2/10 10x2   Ankle Pumps             Timed SLS Bosu     20\"  \"      Upside down BOSU squat holds             4-way wooden [employer] Board 2x10 ea Pain held today pain   2x10 ea   Thera-stick roll                                                                     Ther Ex             Ankle  Circles             Lateral Walk             4-way wooden BAPS Board             HR/TR   3x10   3/10 3x10   Seated HR/TR             Seated Ankle inv/ev             Seated Ankle sup/pron             Toe flx/ext curls towel                           Ther Activity         "     Bike/NuStep             Treadmill  5' inc 3.5   2.2 mph X10' incline walk 10' incline walk x10'  10'   Forward/backward heel to toe Walk                           Gait Training                                         Modalities             MHP         l   CP            Radio pressure wave             US       x10'

## 2024-05-07 NOTE — TELEPHONE ENCOUNTER
PA for Phentermine    Submitted via    []CMM-KEY    [x][employer]-Case ID # [id number]   []Faxed to plan   []Other website    []Phone call Case ID #      Office notes sent, clinical questions answered. Awaiting determination    Turnaround time for your insurance to make a decision on your Prior Authorization can take 7-21 business days.

## 2024-05-09 ENCOUNTER — OFFICE VISIT (OUTPATIENT)
Dept: PHYSICAL THERAPY | Facility: CLINIC | Age: 64
End: 2024-05-09
Payer: COMMERCIAL

## 2024-05-09 DIAGNOSIS — T14.8XXA MUSCLE TEAR: ICD-10-CM

## 2024-05-09 DIAGNOSIS — M79.671 PAIN OF RIGHT HEEL: Primary | ICD-10-CM

## 2024-05-09 DIAGNOSIS — M21.6X1 ACQUIRED RIGHT HINDFOOT VARUS: ICD-10-CM

## 2024-05-09 DIAGNOSIS — G57.51 TARSAL TUNNEL SYNDROME OF RIGHT SIDE: ICD-10-CM

## 2024-05-09 DIAGNOSIS — G58.9 NERVE ENTRAPMENT: ICD-10-CM

## 2024-05-09 DIAGNOSIS — M72.2 PLANTAR FASCIITIS OF RIGHT FOOT: ICD-10-CM

## 2024-05-09 DIAGNOSIS — Q66.229 METATARSUS ADDUCTUS: ICD-10-CM

## 2024-05-09 PROCEDURE — 97035 APP MDLTY 1+ULTRASOUND EA 15: CPT

## 2024-05-09 PROCEDURE — 97112 NEUROMUSCULAR REEDUCATION: CPT

## 2024-05-09 PROCEDURE — 97110 THERAPEUTIC EXERCISES: CPT

## 2024-05-09 NOTE — PROGRESS NOTES
"Daily Note     Today's date: 2024  Patient name: Randi Mathews  : 1960  MRN: [id number]  Referring provider: Betzaida Brown DPM  Dx:   Encounter Diagnosis     ICD-10-CM    1. Pain of right heel  M79.671       2. Plantar fasciitis of right foot  M72.2       3. Muscle tear  T14.8XXA       4. Tarsal tunnel syndrome of right side  G57.51       5. Nerve entrapment  G58.9       6. Acquired right hindfoot varus  M21.6X1       7. Metatarsus adductus  Q66.229                      Subjective: pt reports cont pain in medial heel since last visit.       Objective: See treatment diary below      Assessment: Tolerated treatment well. Patient demonstrated fatigue post treatment and exhibited good technique with therapeutic exercises. Pt making cont slow steady gains with program. Pt however haad notable increased pain today. Joshua Radial pressure well with vibration setting well with improved sx post tx.       Plan: Continue per plan of care.      Manuals    ART R Foot/Heel/Ankle             PROM/Stretch            IASTM   10'  10'  10'  10'   STM/Triggerpoint            JM                           Neuro Re-Ed             Standing Calf/Achilles Stretch 20\" 6x ea stand 6x20'' ea 6x20'' ea \" ea 1/2 roll \" 1/2 roll   Calf Stretch with Strap             No shoe AE Steamboats On airex 2x10 3x10 3/10 3/10 2x10 on airex   Timed SLS AE No Shoe            SL Ecc HR 10x2 10x2 2/10 2/10 10x2   Ankle Pumps             Timed SLS Bosu     \"  \"      Upside down BOSU squat holds             4-way wooden BAPS Board  Pain held today pain   2x10 ea   Thera-stick roll                                                                     Ther Ex             Ankle  Circles             Lateral Walk             4-way wooden BAPS Board             HR/TR   3x10   3/10 3x10   Seated HR/TR             Seated Ankle inv/ev             Seated Ankle sup/pron             Toe flx/ext curls towel                        "    Ther Activity             Bike/NuStep             Treadmill  5' inc 3.5   2.2 mph X10' incline walk 10' incline walk x10'  10'   Forward/backward heel to toe Walk                           Gait Training                                         Modalities             MHP         l   CP             Radio pressure wave  10'           US       x10'

## 2024-05-10 ENCOUNTER — TELEPHONE (OUTPATIENT)
Dept: FAMILY MEDICINE CLINIC | Facility: CLINIC | Age: 64
End: 2024-05-10

## 2024-05-10 VITALS — SYSTOLIC BLOOD PRESSURE: 132 MMHG | DIASTOLIC BLOOD PRESSURE: 80 MMHG

## 2024-05-14 ENCOUNTER — APPOINTMENT (OUTPATIENT)
Dept: PHYSICAL THERAPY | Facility: CLINIC | Age: 64
End: 2024-05-14
Payer: COMMERCIAL

## 2024-05-16 ENCOUNTER — OFFICE VISIT (OUTPATIENT)
Dept: PHYSICAL THERAPY | Facility: CLINIC | Age: 64
End: 2024-05-16
Payer: COMMERCIAL

## 2024-05-16 DIAGNOSIS — T14.8XXA MUSCLE TEAR: ICD-10-CM

## 2024-05-16 DIAGNOSIS — G57.51 TARSAL TUNNEL SYNDROME OF RIGHT SIDE: ICD-10-CM

## 2024-05-16 DIAGNOSIS — G58.9 NERVE ENTRAPMENT: ICD-10-CM

## 2024-05-16 DIAGNOSIS — M72.2 PLANTAR FASCIITIS OF RIGHT FOOT: ICD-10-CM

## 2024-05-16 DIAGNOSIS — M79.671 PAIN OF RIGHT HEEL: Primary | ICD-10-CM

## 2024-05-16 DIAGNOSIS — M21.6X1 ACQUIRED RIGHT HINDFOOT VARUS: ICD-10-CM

## 2024-05-16 DIAGNOSIS — Q66.229 METATARSUS ADDUCTUS: ICD-10-CM

## 2024-05-16 PROCEDURE — 97110 THERAPEUTIC EXERCISES: CPT

## 2024-05-16 PROCEDURE — 97112 NEUROMUSCULAR REEDUCATION: CPT

## 2024-05-16 PROCEDURE — 97035 APP MDLTY 1+ULTRASOUND EA 15: CPT

## 2024-05-16 NOTE — PROGRESS NOTES
"Daily Note     Today's date: 2024  Patient name: Randi Mathews  : 1960  MRN: [id number]  Referring provider: Betzaida Brown DPM  Dx:   Encounter Diagnosis     ICD-10-CM    1. Pain of right heel  M79.671       2. Plantar fasciitis of right foot  M72.2       3. Muscle tear  T14.8XXA       4. Tarsal tunnel syndrome of right side  G57.51       5. Nerve entrapment  G58.9       6. Acquired right hindfoot varus  M21.6X1       7. Metatarsus adductus  Q66.229                      Subjective: Pt reports notable improved sx since last visit. Pt reports no pain for appx past week.       Objective: See treatment diary below      Assessment: Tolerated treatment well. Patient exhibited good technique with therapeutic exercises. Pt making gains with program as she has notable improved sx today. No c/o pain.       Plan: Continue per plan of care.      Manuals  5   ART R Foot/Heel/Ankle             PROM/Stretch            IASTM     10'  10'  10'   STM/Triggerpoint            JM                           Neuro Re-Ed             Standing Calf/Achilles Stretch 20\" 6x ea stand 6x20'' ea 6x20'' ea \" ea 1/2 roll \" 1/2 roll   Calf Stretch with Strap             No shoe AE Steamboats On airex 2x10 3x10 AE 3/10 3/10 2x10 on airex   Timed SLS AE No Shoe            SL Ecc HR 10x2 10x2 2/10 2/10 10x2   Ankle Pumps             Timed SLS Bosu     \"  \"      Upside down BOSU squat holds             4-way wooden BAPS Board   pain   2x10 ea   Thera-stick roll                                                                     Ther Ex             Ankle  Circles             Lateral Walk             4-way wooden BAPS Board             HR/TR   3x10   3/10 3x10   Seated HR/TR             Seated Ankle inv/ev             Seated Ankle sup/pron             Toe flx/ext curls towel                           Ther Activity             Bike/NuStep             Treadmill  5' inc 3.5   2.2 mph X10' incline walk 10' " incline walk x10'  10'   Forward/backward heel to toe Walk                           Gait Training                                         Modalities             MHP         l   CP             Radio pressure wave  10'  10'          US       x10'

## 2024-05-21 ENCOUNTER — OFFICE VISIT (OUTPATIENT)
Dept: PHYSICAL THERAPY | Facility: CLINIC | Age: 64
End: 2024-05-21
Payer: COMMERCIAL

## 2024-05-21 DIAGNOSIS — T14.8XXA MUSCLE TEAR: ICD-10-CM

## 2024-05-21 DIAGNOSIS — G58.9 NERVE ENTRAPMENT: ICD-10-CM

## 2024-05-21 DIAGNOSIS — M21.6X1 ACQUIRED RIGHT HINDFOOT VARUS: ICD-10-CM

## 2024-05-21 DIAGNOSIS — G57.51 TARSAL TUNNEL SYNDROME OF RIGHT SIDE: ICD-10-CM

## 2024-05-21 DIAGNOSIS — M79.671 PAIN OF RIGHT HEEL: Primary | ICD-10-CM

## 2024-05-21 DIAGNOSIS — Q66.229 METATARSUS ADDUCTUS: ICD-10-CM

## 2024-05-21 DIAGNOSIS — M72.2 PLANTAR FASCIITIS OF RIGHT FOOT: ICD-10-CM

## 2024-05-21 PROCEDURE — 97112 NEUROMUSCULAR REEDUCATION: CPT

## 2024-05-21 PROCEDURE — 97035 APP MDLTY 1+ULTRASOUND EA 15: CPT

## 2024-05-21 PROCEDURE — 97110 THERAPEUTIC EXERCISES: CPT

## 2024-05-21 NOTE — PROGRESS NOTES
"Daily Note     Today's date: 2024  Patient name: Randi Mathews  : 1960  MRN: [id number]  Referring provider: Betzaida Brown DPM  Dx:   Encounter Diagnosis     ICD-10-CM    1. Pain of right heel  M79.671       2. Plantar fasciitis of right foot  M72.2       3. Muscle tear  T14.8XXA       4. Tarsal tunnel syndrome of right side  G57.51       5. Nerve entrapment  G58.9       6. Acquired right hindfoot varus  M21.6X1       7. Metatarsus adductus  Q66.229                      Subjective: Pt repots she cont to do well and is managing sx when she gets pain.       Objective: See treatment diary below. Passed FOTO.       Assessment: Tolerated treatment well. Patient exhibited good technique with therapeutic exercises. Pt making cont gains with program. Cont to don well with no c/o pain today. Cont to make steady gains.       Plan: Continue per plan of care. Poss DC next week.      Manuals  5   ART R Foot/Heel/Ankle             PROM/Stretch            IASTM      10'  10'   STM/Triggerpoint            JM                           Neuro Re-Ed             Standing Calf/Achilles Stretch 20\" 6x ea stand 6x20'' ea 6x20'' ea \" ea 1/2 roll \" 1/2 roll   Calf Stretch with Strap             No shoe AE Steamboats On airex 2x10 3x10 AE 3/10 3/10 2x10 on airex   Timed SLS AE No Shoe            SL Ecc HR 10x2 10x2 2/10 2/10 10x2   Ankle Pumps             Timed SLS Bosu     \"  \"      Upside down BOSU squat holds             4-way wooden BAPS Board      2x10 ea   Thera-stick roll                                                                     Ther Ex             Ankle  Circles             Lateral Walk             4-way wooden BAPS Board             HR/TR   3x10   3/10 3x10   Seated HR/TR             Seated Ankle inv/ev             Seated Ankle sup/pron             Toe flx/ext curls towel                           Ther Activity             Bike/NuStep             Treadmill  5' inc 3.5   2.2 " mph X10' incline walk 10' incline walk x10'  10'   Forward/backward heel to toe Walk                           Gait Training                                         Modalities             MHP         l   CP             Radio pressure wave  10'  10'   10'       US       x10'

## 2024-05-23 ENCOUNTER — OFFICE VISIT (OUTPATIENT)
Dept: PHYSICAL THERAPY | Facility: CLINIC | Age: 64
End: 2024-05-23
Payer: COMMERCIAL

## 2024-05-23 DIAGNOSIS — M21.6X1 ACQUIRED RIGHT HINDFOOT VARUS: ICD-10-CM

## 2024-05-23 DIAGNOSIS — M79.671 PAIN OF RIGHT HEEL: Primary | ICD-10-CM

## 2024-05-23 DIAGNOSIS — G58.9 NERVE ENTRAPMENT: ICD-10-CM

## 2024-05-23 DIAGNOSIS — G57.51 TARSAL TUNNEL SYNDROME OF RIGHT SIDE: ICD-10-CM

## 2024-05-23 DIAGNOSIS — T14.8XXA MUSCLE TEAR: ICD-10-CM

## 2024-05-23 DIAGNOSIS — Q66.229 METATARSUS ADDUCTUS: ICD-10-CM

## 2024-05-23 DIAGNOSIS — M72.2 PLANTAR FASCIITIS OF RIGHT FOOT: ICD-10-CM

## 2024-05-23 PROCEDURE — 97110 THERAPEUTIC EXERCISES: CPT

## 2024-05-23 PROCEDURE — 97035 APP MDLTY 1+ULTRASOUND EA 15: CPT

## 2024-05-23 PROCEDURE — 97112 NEUROMUSCULAR REEDUCATION: CPT

## 2024-05-23 NOTE — PROGRESS NOTES
"Daily Note     Today's date: 2024  Patient name: Randi Mathews  : 1960  MRN: [id number]  Referring provider: Betzaida Brown DPM  Dx:   Encounter Diagnosis     ICD-10-CM    1. Pain of right heel  M79.671       2. Plantar fasciitis of right foot  M72.2       3. Muscle tear  T14.8XXA       4. Tarsal tunnel syndrome of right side  G57.51       5. Nerve entrapment  G58.9       6. Acquired right hindfoot varus  M21.6X1       7. Metatarsus adductus  Q66.229                      Subjective: Pt reports some increased pain since last visit. Pt states she may have over stretched.       Objective: See treatment diary below      Assessment: Tolerated treatment well. Patient exhibited good technique with therapeutic exercises. Pt with notable increased point tenderness along medial ankle and calcaneus today. Improved sx post tx.     Plan: Continue per plan of care.      Manuals  5   ART R Foot/Heel/Ankle             PROM/Stretch            IASTM       10'   STM/Triggerpoint            JM                           Neuro Re-Ed             Standing Calf/Achilles Stretch 20\" 6x ea stand 6x20'' ea 6x20'' ea \" ea 1/2 roll \" 1/2 roll   Calf Stretch with Strap             No shoe AE Steamboats On airex 2x10 3x10 AE 3/10 3/10 2x10 on airex   Timed SLS AE No Shoe            SL Ecc HR 10x2 10x2 2/10 2/10 10x2   Ankle Pumps             Timed SLS Bosu     \"  \"      Upside down BOSU squat holds             4-way wooden BAPS Board      2x10 ea   Thera-stick roll                                                                     Ther Ex             Ankle  Circles             Lateral Walk             4-way wooden BAPS Board             HR/TR   3x10    3x10   Seated HR/TR             Seated Ankle inv/ev             Seated Ankle sup/pron             Toe flx/ext curls towel                           Ther Activity             Bike/NuStep             Treadmill  5' inc 3.5   2.2 mph X10' incline walk " 10' incline walk x10'  10'   Forward/backward heel to toe Walk                           Gait Training                                         Modalities             MHP         l   CP             Radio pressure wave  10'  10'   10'  10'     US

## 2024-05-24 ENCOUNTER — OFFICE VISIT (OUTPATIENT)
Dept: GASTROENTEROLOGY | Facility: CLINIC | Age: 64
End: 2024-05-24
Payer: COMMERCIAL

## 2024-05-24 VITALS
HEART RATE: 78 BPM | RESPIRATION RATE: 16 BRPM | OXYGEN SATURATION: 96 % | WEIGHT: 185.6 LBS | HEIGHT: 65 IN | SYSTOLIC BLOOD PRESSURE: 128 MMHG | DIASTOLIC BLOOD PRESSURE: 82 MMHG | BODY MASS INDEX: 30.92 KG/M2 | TEMPERATURE: 98.5 F

## 2024-05-24 DIAGNOSIS — K21.9 GASTROESOPHAGEAL REFLUX DISEASE, UNSPECIFIED WHETHER ESOPHAGITIS PRESENT: ICD-10-CM

## 2024-05-24 DIAGNOSIS — K59.00 CONSTIPATION, UNSPECIFIED CONSTIPATION TYPE: ICD-10-CM

## 2024-05-24 DIAGNOSIS — Z12.11 SCREENING FOR COLON CANCER: ICD-10-CM

## 2024-05-24 DIAGNOSIS — R14.0 BLOATING: ICD-10-CM

## 2024-05-24 DIAGNOSIS — R10.84 GENERALIZED ABDOMINAL PAIN: Primary | ICD-10-CM

## 2024-05-24 PROBLEM — R19.7 DIARRHEA OF PRESUMED INFECTIOUS ORIGIN: Status: RESOLVED | Noted: 2023-11-10 | Resolved: 2024-05-24

## 2024-05-24 PROBLEM — R13.10 DYSPHAGIA: Status: RESOLVED | Noted: 2023-08-24 | Resolved: 2024-05-24

## 2024-05-24 PROCEDURE — 99214 OFFICE O/P EST MOD 30 MIN: CPT | Performed by: NURSE PRACTITIONER

## 2024-05-24 RX ORDER — CHOLESTYRAMINE 4 G/9G
1 POWDER, FOR SUSPENSION ORAL 2 TIMES DAILY WITH MEALS
Qty: 60 PACKET | Refills: 1 | Status: CANCELLED | OUTPATIENT
Start: 2024-05-24

## 2024-05-24 RX ORDER — OMEPRAZOLE 40 MG/1
CAPSULE, DELAYED RELEASE ORAL
Qty: 90 CAPSULE | Refills: 1 | Status: SHIPPED | OUTPATIENT
Start: 2024-05-24

## 2024-05-24 RX ORDER — POLYETHYLENE GLYCOL 3350 17 G/17G
17 POWDER, FOR SOLUTION ORAL DAILY
Start: 2024-05-24

## 2024-05-24 NOTE — ASSESSMENT & PLAN NOTE
Improved/Stable    Discussed with patient that maybe she just decreased the omeprazole too quickly and that for the next 2 weeks I would like her to go back on the omeprazole twice daily every other day and daily on the opposite days for 2 weeks and then decrease it down to just once a day.      She is to contact us if she has any issues with the changes.

## 2024-05-24 NOTE — PROGRESS NOTES
[facility] - Outpatient Follow-up Note  Randi Mathews 64 y.o. female MRN: [id number]  Encounter: [id number]          ASSESSMENT AND PLAN:    The patient presents today or follow-up office visit regarding her chronic generalized abdominal pain, GERD symptoms, and constipation.     Exam: Abdomen is softly distended, nontender, with hypoactive bowel sounds x 4. No edema noted of the b/l lower extremities upon exam today. Skin is non-icteric.      1. Generalized abdominal pain  2. Bloating  3. Gastroesophageal reflux disease, unspecified whether esophagitis present  Assessment & Plan:  Improved/Stable    Discussed with patient that maybe she just decreased the omeprazole too quickly and that for the next 2 weeks I would like her to go back on the omeprazole twice daily every other day and daily on the opposite days for 2 weeks and then decrease it down to just once a day.      She is to contact us if she has any issues with the changes.        Orders:  -     omeprazole (PriLOSEC) 40 MG capsule; take 1 capsule twice a day PRIOR TO A MEAL  4. Constipation, unspecified constipation type  Assessment & Plan:  Advised the patient that at this point I feel would be in her best interest to at least start taking the MiraLAX, half a capful on a daily basis and if she feels she needs to she could always titrated up to the full capful if she is not having regular bowel movements.    Advised the patient the goal is to have a relieving, nontrending bowel movement at least every other day.    Encouraged the patient to continue to try to drink at least 64 ounces of water daily.  Orders:  -     polyethylene glycol (GLYCOLAX) 17 GM/SCOOP powder; Take 17 g by mouth daily  5. Screening for colon cancer  We will hold off on any repeat colonoscopies until the recommended surveillance date unless the patient would start to develop red flag symptoms in the future.  The patient was agreeable and  verbalized an understanding.  DUE: 2/15/29    Reviewed GI red flag symptoms, including, but not limited to: chronic nausea, vomiting, diarrhea, chills, fever, and unintentional weight loss and should call or contact our office with any changes or concerns. I reviewed with the patient that if they notice any blood while vomiting or in their stool they should contact or office or go to the nearest emergency room for immediate evaluation. The patient was agreeable and verbalized an understanding.     The patient will schedule a follow up office visit in 6 months, but understands to call or contact our office if there are any issues or concerns in the mean time.  ______________________________________________________________________    SUBJECTIVE: Patient is a 64 y.o. female who was previously seen in our office on 4/3/24 and presents today for follow-up office visit regarding her chronic generalized abdominal pain, GERD symptoms, and constipation.  The patient reports that since her last office visit she feels that her reflux symptoms have been very well-managed and on her own over the past 2 to 3 weeks she has decided to decrease the omeprazole down to just once a day as she knows her long-term goal is to try to keep her on the least amount of PPI or medications possible.  She does feel like she has been having some breakthrough reflux symptoms that she was not experiencing on the twice daily dosing approximately once or twice a week.  However, her overall GERD symptoms are still significantly improved from prior to the omeprazole.  The patient was wondering if maybe she should just go back up on the twice daily dosing of the omeprazole at this point.    With regards to her constipation and diarrhea, she feels that when she gets severely constipated she has what sounds like overflow diarrhea or she is taking too much of the MiraLAX she has diarrhea.  She does feel that the MiraLAX is very helpful for her but has been  trying to take it more as needed, but has been waiting until she has not had a bowel movement for almost 7 days.  She is hesitant to go back on the MiraLAX to a full capful every day because she does not want to have diarrhea again.  She reports that she stopped taking the Questran as she did not find it helpful and she was not having diarrhea until she started the MiraLAX.    The patient denies any nausea, dysphagia, vomiting, decreased appetite, or unplanned weight loss. Water Intake: Adequate amounts per day.    The patient reports that they have a BM every 4 to 7 days and reports that it is sometimes relieving, without any consistent diarrhea, nocturnal BMs, straining, melena or bloody stools. Carr: 4-6. Last BM: Today. Flatus: Yes.    Meds: Omeprazole 40 mg twice daily, Questran twice daily, and MiraLAX as needed daily.  Daily NSAID Use: Denied    Imaging: (None):     Endoscopy History: EGD: (10/4/23): Normal. Path: Peptic Duodenitis     COLONOSCOPY: (2/15/19): Normal. Recommend repeat in 10 years.      DUE: 2/15/29    REVIEW OF SYSTEMS IS OTHERWISE NEGATIVE.      Historical Information   Past Medical History:   Diagnosis Date    Abdominal pain     Atelectasis     Cancer (HCC)     cervical    Disease of thyroid gland     hypothyroidism    GERD (gastroesophageal reflux disease)     Irritable bowel syndrome     Kidney stone     Motion sickness     PONV (postoperative nausea and vomiting)     Wears glasses      Past Surgical History:   Procedure Laterality Date    ACHILLES TENDON REPAIR Right     APPENDECTOMY      ARTHROSCOPY KNEE Right 2018    Procedure: ARTHROSCOPY KNEE; PARTIAL LATERAL MENISCECTOMY; PARTIAL MEDIAL MENISCECTOMY; POSSIBLE DEBRIDEMENT; removal of loose bodies;  Surgeon: Naif Calzada MD;  Location: [facility];  Service: Orthopedics    BICEPS TENDON REPAIR Right     BREAST EXCISIONAL BIOPSY Right 2012    benign    CARPAL TUNNEL RELEASE Left      SECTION      three      CHOLECYSTECTOMY      COLONOSCOPY      EXPLORATORY LAPAROTOMY      KNEE ARTHROSCOPY W/ MENISCAL REPAIR Left     LAPAROSCOPY      NC ARTHRS KNE SURG W/MENISCECTOMY MED/LAT W/SHVG Left 2017    Procedure: ARTHROSCOPY KNEE PARTIAL MEDIAL MENISECTOMY ;  Surgeon: Naif Calzada MD;  Location: [facility];  Service: Orthopedics    NC COLONOSCOPY FLX DX W/COLLJ SPEC WHEN PFRMD N/A 2/15/2019    Procedure: COLONOSCOPY;  Surgeon: Alexsander Freeman MD;  Location: [facility];  Service: Colorectal    NC NEUROPLASTY &/TRANSPOS MEDIAN NRV CARPAL TUNNE Right 2020    Procedure: CARPAL TUNNEL & IN SITU ULNAR NERVE RELEASE;  Surgeon: Jose Gardiner MD;  Location: [facility];  Service: Orthopedics    SHOULDER SURGERY Bilateral     TUBAL LIGATION      UMBILICAL HERNIA REPAIR      with mesh x2     Social History   Social History     Substance and Sexual Activity   Alcohol Use Yes    Comment: rarely     Social History     Substance and Sexual Activity   Drug Use No     Social History     Tobacco Use   Smoking Status Former    Current packs/day: 0.00    Types: Cigarettes    Quit date: 2019    Years since quittin.7   Smokeless Tobacco Never     Family History   Problem Relation Age of Onset    Stroke Mother     Hypertension Mother     Mental illness Father     Heart disease Father     Substance Abuse Brother     Lung cancer Maternal Uncle         60s    Diabetes Paternal Grandmother     No Known Problems Daughter     No Known Problems Paternal Aunt        Meds/Allergies       Current Outpatient Medications:     Ca Carbonate-Mag Hydroxide (ROLAIDS PO)    cholecalciferol (VITAMIN D3) 1,000 units tablet    cholestyramine (QUESTRAN) 4 g packet    naproxen (Naprosyn) 500 mg tablet    omeprazole (PriLOSEC) 40 MG capsule    phentermine (ADIPEX-P) 37.5 MG tablet    polyethylene glycol (GLYCOLAX) 17 GM/SCOOP powder    Premarin vaginal cream    Synthroid 112 MCG tablet    Semaglutide-Weight Management (WEGOVY) 0.5 MG/0.5ML     "Semaglutide-Weight Management (WEGOVY) 1 MG/0.5ML    spironolactone (ALDACTONE) 50 mg tablet    Allergies   Allergen Reactions    Erythromycin Chest Pain    Betadine [Povidone Iodine] Other (See Comments)     Burns skin, blisters    Doxycycline Abdominal Pain    Medical Tape Other (See Comments)     Redness and peeling           Objective     Blood pressure 128/82, pulse 78, temperature 98.5 °F (36.9 °C), temperature source Tympanic, resp. rate 16, height 5' 5\" (1.651 m), weight 84.2 kg (185 lb 9.6 oz), SpO2 96%, not currently breastfeeding. Body mass index is 30.89 kg/m².      PHYSICAL EXAM:      General Appearance:   Alert, cooperative, no distress   HEENT:   Normocephalic, atraumatic, anicteric.     Neck:  Supple, symmetrical, trachea midline   Lungs:   Clear to auscultation bilaterally; no rales, rhonchi or wheezing; respirations unlabored    Heart::   Regular rate and rhythm; no murmur, rub, or gallop.   Abdomen:   Soft, non-tender, non-distended; normal bowel sounds; no masses, no organomegaly    Genitalia:   Deferred    Rectal:   Deferred    Extremities:  No cyanosis, clubbing or edema    Pulses:  2+ and symmetric    Skin:  No jaundice, rashes, or lesions    Lymph nodes:  No palpable cervical lymphadenopathy        Lab Results:   No visits with results within 1 Day(s) from this visit.   Latest known visit with results is:   Office Visit on 05/02/2024   Component Date Value    Ventricular Rate 05/02/2024 66     Atrial Rate 05/02/2024 66     HI Interval 05/02/2024 138     QRSD Interval 05/02/2024 88     QT Interval 05/02/2024 436     QTC Interval 05/02/2024 457     P Axis 05/02/2024 34     QRS Axis 05/02/2024 32     T Wave [place] 05/02/2024 27          Radiology Results:   No results found.   "

## 2024-05-24 NOTE — ASSESSMENT & PLAN NOTE
Advised the patient that at this point I feel would be in her best interest to at least start taking the MiraLAX, half a capful on a daily basis and if she feels she needs to she could always titrated up to the full capful if she is not having regular bowel movements.    Advised the patient the goal is to have a relieving, nontrending bowel movement at least every other day.    Encouraged the patient to continue to try to drink at least 64 ounces of water daily.

## 2024-05-24 NOTE — PATIENT INSTRUCTIONS
Re-start Miralax 1/2 capful daily.   Continue to drink at least 64 oz of water daily.   For the next 2 weeks take Omeprazole 40 mg, 1 pill every other day and 2 pills daily on the opposite days, then after 2 weeks, decrease to just 1 pill daily in AM prior to a meal.   Continue to watch for red flag symptoms.  Schedule a f/u OV in 6 months.     We did review GI red flag symptoms, including, but not limited to: chronic nausea, vomiting, diarrhea, chills, fever, and unintentional weight loss and should call or contact our office with any changes or concerns. I reviewed with the patient that if they notice any blood while vomiting or in their stool they should contact or office or go to the nearest emergency room for immediate evaluation. The patient was agreeable and verbalized an understanding.

## 2024-05-28 ENCOUNTER — APPOINTMENT (OUTPATIENT)
Dept: PHYSICAL THERAPY | Facility: CLINIC | Age: 64
End: 2024-05-28
Payer: COMMERCIAL

## 2024-05-29 DIAGNOSIS — H01.003 BLEPHARITIS OF EYELID OF RIGHT EYE, UNSPECIFIED EYELID, UNSPECIFIED TYPE: Primary | ICD-10-CM

## 2024-05-29 RX ORDER — PREDNISOLONE ACETATE 10 MG/ML
1 SUSPENSION/ DROPS OPHTHALMIC 3 TIMES DAILY
Qty: 5 ML | Refills: 1 | Status: SHIPPED | OUTPATIENT
Start: 2024-05-29

## 2024-05-30 ENCOUNTER — OFFICE VISIT (OUTPATIENT)
Dept: PHYSICAL THERAPY | Facility: CLINIC | Age: 64
End: 2024-05-30
Payer: COMMERCIAL

## 2024-05-30 DIAGNOSIS — M79.671 PAIN OF RIGHT HEEL: Primary | ICD-10-CM

## 2024-05-30 DIAGNOSIS — M21.6X1 ACQUIRED RIGHT HINDFOOT VARUS: ICD-10-CM

## 2024-05-30 DIAGNOSIS — T14.8XXA MUSCLE TEAR: ICD-10-CM

## 2024-05-30 DIAGNOSIS — M72.2 PLANTAR FASCIITIS OF RIGHT FOOT: ICD-10-CM

## 2024-05-30 DIAGNOSIS — Q66.229 METATARSUS ADDUCTUS: ICD-10-CM

## 2024-05-30 DIAGNOSIS — G57.51 TARSAL TUNNEL SYNDROME OF RIGHT SIDE: ICD-10-CM

## 2024-05-30 DIAGNOSIS — G58.9 NERVE ENTRAPMENT: ICD-10-CM

## 2024-05-30 PROCEDURE — 97112 NEUROMUSCULAR REEDUCATION: CPT

## 2024-05-30 PROCEDURE — 97110 THERAPEUTIC EXERCISES: CPT

## 2024-05-30 PROCEDURE — 97530 THERAPEUTIC ACTIVITIES: CPT

## 2024-05-30 NOTE — PROGRESS NOTES
"Daily Note     Today's date: 2024  Patient name: Randi Mathews  : 1960  MRN: [id number]  Referring provider: Betzaida Brown DPM  Dx:   Encounter Diagnosis     ICD-10-CM    1. Pain of right heel  M79.671       2. Plantar fasciitis of right foot  M72.2       3. Muscle tear  T14.8XXA       4. Tarsal tunnel syndrome of right side  G57.51       5. Nerve entrapment  G58.9       6. Acquired right hindfoot varus  M21.6X1       7. Metatarsus adductus  Q66.229           Start Time: 1515  Stop Time: 1600  Total time in clinic (min): 45 minutes    Subjective: Patient reports she is feeling much better since last session.  Patient reports she is going to  her new orthotics in the near future.      Objective: See treatment diary below      Assessment: Tolerated treatment well. Patient would benefit from continued PT.  PT performed treatment with radio pressure wave today to improve mobility and decrease pain in right foot and ankle.  Patient continued Neuro Muscular Re-Ed to improve activation of lower leg musculature and improve stability for gait.  TherAct was continued in order to improve gait mechanics and tolerance to ambulation.  Patient would benefit from skilled PT to address functional deficits and pain.       Plan: Continue per plan of care.  Progress treatment as tolerated.       Manuals    ART R Foot/Heel/Ankle             PROM/Stretch            IASTM          STM/Triggerpoint            JM                           Neuro Re-Ed             Standing Calf/Achilles Stretch 20\" 6x ea stand 6x20'' ea 6x20'' ea \" ea 1/2 roll \" ea 12 roll   Calf Stretch with Strap             No shoe AE Steamboats On airex 2x10 3x10 AE 3/10 3/10 3x10 on airex   Timed SLS AE No Shoe            SL Ecc HR 10x2 10x2 2/10 2/10 10x2   Ankle Pumps             Timed SLS Bosu     \"  \"      Upside down BOSU squat holds             4-way wooden BAPS Board        Thera-stick roll              " "SLS EC Airex          3/20\"                                             Ther Ex             Ankle  Circles             Lateral Walk             4-way wooden BAPS Board             HR/TR   3x10       Seated HR/TR             Seated Ankle inv/ev             Seated Ankle sup/pron             Toe flx/ext curls towel                           Ther Activity             Bike/NuStep             Treadmill  5' inc 3.5   2.2 mph X10' incline walk 10' incline walk x10'  10' 2.5mph 7.5 incline    Forward/backward heel to toe Walk                           Gait Training                                         Modalities             MHP            CP             Radio pressure wave  10'  10'   10'  10'  10'   US                                      "

## 2024-06-03 ENCOUNTER — OFFICE VISIT (OUTPATIENT)
Dept: PHYSICAL THERAPY | Facility: CLINIC | Age: 64
End: 2024-06-03
Payer: COMMERCIAL

## 2024-06-03 DIAGNOSIS — M79.671 PAIN OF RIGHT HEEL: Primary | ICD-10-CM

## 2024-06-03 DIAGNOSIS — G57.51 TARSAL TUNNEL SYNDROME OF RIGHT SIDE: ICD-10-CM

## 2024-06-03 DIAGNOSIS — T14.8XXA MUSCLE TEAR: ICD-10-CM

## 2024-06-03 DIAGNOSIS — Q66.229 METATARSUS ADDUCTUS: ICD-10-CM

## 2024-06-03 DIAGNOSIS — G58.9 NERVE ENTRAPMENT: ICD-10-CM

## 2024-06-03 DIAGNOSIS — M21.6X1 ACQUIRED RIGHT HINDFOOT VARUS: ICD-10-CM

## 2024-06-03 DIAGNOSIS — M72.2 PLANTAR FASCIITIS OF RIGHT FOOT: ICD-10-CM

## 2024-06-03 PROCEDURE — L3010 FOOT LONGITUDINAL ARCH SUPPO: HCPCS | Performed by: PHYSICAL MEDICINE & REHABILITATION

## 2024-06-03 NOTE — PROGRESS NOTES
Daily Note     Today's date: 6/3/2024  Patient name: Randi Mathews  : 1960  MRN: [id number]  Referring provider: Mello Turpin*  Dx:   Encounter Diagnosis     ICD-10-CM    1. Pain of right heel  M79.671       2. Plantar fasciitis of right foot  M72.2       3. Muscle tear  T14.8XXA       4. Tarsal tunnel syndrome of right side  G57.51       5. Nerve entrapment  G58.9       6. Acquired right hindfoot varus  M21.6X1       7. Metatarsus adductus  Q66.229                      Subjective: Pt returns to PT for Orthotics fit/train s/p adjustments made as outlined last orthotics note/visit. NO new sx/complaints.       Objective: See treatment diary below      Assessment: Custom orthotics fitted to patients feet in seated and standing; accurate fit/alignment/ arch support noted B. Noted good support of feet/arches as well in standing on orthotics with no abnormal positioning or alignment observed. Inlay was removed from R/L shoe and pt was instructed how to remove inlays from other shoes/sneakers at home as well. Custom orthotics were fit to pt's shoes and accurate fit was noted after minimal trimming to distal and lateral tip/border to fit inlay/shoe. Pt noted no unusual pain/sx in standing with orthotics in shoes. Pt's gait was assessed with orthotics. Improved hindfoot and midfoot support and control were noted t/o R and L stance phases without unusual pain/sx. Pt noted good comfort in orthotics B. Reviewed and issued weaning/break in protocol with pt; instructed pt to stop wearing orthotics and contact PT if any unusual pain, increased pain/sx, redness/blisters/hot spots etc should arise; also instructed pt to call PT with any questions/concerns regarding orthotics; understanding noted/reported with no questions/concerns after session. Patient will follow up if any future problems arise.         Plan: Pt to wean into orthotics as discussed today; handout given/reviewed. Pt to contact PT if any questions,  concerns, or needs arise regarding the orthotics.

## 2024-06-17 NOTE — PROGRESS NOTES
Assessment/Plan:    Recommended monthly SBE, annual CBE and annual screening mammo. ASCCP guidelines reviewed and pap with cotesting noted to be up to date; this low risk patient was advised she meets criteria to d/c pap screening at age 67. No pap done. DEXA ordered and colonoscopy noted to be up to date. Reviewed diet/activity recommendations Calcium 1200 mg and Vit D 600-1000 IU daily.  Discussed postmenopausal considerations and symptoms to report. Kegel exercises as instructed. RTO in one year for routine annual gyn exam or sooner PRN.        Diagnoses and all orders for this visit:    Encounter for gynecological examination (general) (routine) without abnormal findings    Screening mammogram for breast cancer  -     Mammo screening bilateral w 3d & cad; Future        Subjective:      Patient ID: Randi Mathews is a 64 y.o. female.    This patient presents for routine annual gyn exam.  She denies acute gyn complaints. She denies  bleeding or spotting, VM sx, pelvic pain, dyspareunia, breast concerns, abnormal discharge, bowel/bladder dysfunction, depression/anx.   Sexually active and is monogamous. Using vaginal estrogen cream and is very pleased with improvement.   Pap/HPV up to date and normal, 4/4/23. Mammography normal 4/24/23.  Osteoporosis screening, ordered, not done to date. Colonoscopy 3-4 yrs ago per pt.            The following portions of the patient's history were reviewed and updated as appropriate: allergies, current medications, past family history, past medical history, past social history, past surgical history and problem list.    Review of Systems   Constitutional: Negative.    Respiratory: Negative.     Cardiovascular: Negative.    Gastrointestinal: Negative.    Endocrine: Negative.    Genitourinary:  Negative for dyspareunia, dysuria, frequency, pelvic pain, urgency, vaginal bleeding, vaginal discharge and vaginal pain.   Musculoskeletal: Negative.    Skin: Negative.    Neurological:  "Negative.    Psychiatric/Behavioral: Negative.           Objective:      /70   Ht 5' 5\" (1.651 m)   Wt 83.9 kg (185 lb)   BMI 30.79 kg/m²          Physical Exam  Vitals and nursing note reviewed. Exam conducted with a chaperone present.   Constitutional:       Appearance: Normal appearance. She is well-developed.   HENT:      Head: Normocephalic and atraumatic.   Neck:      Thyroid: No thyroid mass or thyromegaly.   Cardiovascular:      Rate and Rhythm: Normal rate and regular rhythm.      Heart sounds: Normal heart sounds.   Pulmonary:      Effort: Pulmonary effort is normal.      Breath sounds: Normal breath sounds.   Chest:   Breasts:     Breasts are symmetrical.      Right: No inverted nipple, mass, nipple discharge, skin change or tenderness.      Left: No inverted nipple, mass, nipple discharge, skin change or tenderness.   Abdominal:      General: Bowel sounds are normal.      Palpations: Abdomen is soft.      Tenderness: There is no abdominal tenderness.      Hernia: There is no hernia in the left inguinal area or right inguinal area.   Genitourinary:     General: Normal vulva.      Exam position: Supine.      Pubic Area: No rash.       Labia:         Right: No rash, tenderness, lesion or injury.         Left: No rash, tenderness, lesion or injury.       Urethra: No prolapse, urethral pain, urethral swelling or urethral lesion.      Vagina: Normal. No signs of injury and foreign body. No vaginal discharge, erythema, tenderness, bleeding, lesions or prolapsed vaginal walls.      Cervix: No cervical motion tenderness, discharge, friability, lesion, erythema, cervical bleeding or eversion.      Uterus: Not deviated, not enlarged, not fixed, not tender and no uterine prolapse.       Adnexa:         Right: No mass, tenderness or fullness.          Left: No mass, tenderness or fullness.        Rectum: No external hemorrhoid.      Comments: Urethra normal without lesions  No bladder " tenderness  Musculoskeletal:         General: Normal range of motion.      Cervical back: Normal range of motion and neck supple.   Lymphadenopathy:      Lower Body: No right inguinal adenopathy. No left inguinal adenopathy.   Skin:     General: Skin is warm and dry.   Neurological:      Mental Status: She is alert and oriented to person, place, and time.   Psychiatric:         Speech: Speech normal.         Behavior: Behavior normal. Behavior is cooperative.

## 2024-06-18 ENCOUNTER — ANNUAL EXAM (OUTPATIENT)
Dept: GYNECOLOGY | Facility: CLINIC | Age: 64
End: 2024-06-18
Payer: COMMERCIAL

## 2024-06-18 VITALS
WEIGHT: 185 LBS | BODY MASS INDEX: 30.82 KG/M2 | HEIGHT: 65 IN | DIASTOLIC BLOOD PRESSURE: 70 MMHG | SYSTOLIC BLOOD PRESSURE: 121 MMHG

## 2024-06-18 DIAGNOSIS — Z12.31 SCREENING MAMMOGRAM FOR BREAST CANCER: ICD-10-CM

## 2024-06-18 DIAGNOSIS — Z01.419 ENCOUNTER FOR GYNECOLOGICAL EXAMINATION (GENERAL) (ROUTINE) WITHOUT ABNORMAL FINDINGS: Primary | ICD-10-CM

## 2024-06-18 PROCEDURE — S0612 ANNUAL GYNECOLOGICAL EXAMINA: HCPCS | Performed by: OBSTETRICS & GYNECOLOGY

## 2024-06-24 ENCOUNTER — OFFICE VISIT (OUTPATIENT)
Dept: PODIATRY | Facility: CLINIC | Age: 64
End: 2024-06-24
Payer: COMMERCIAL

## 2024-06-24 ENCOUNTER — OFFICE VISIT (OUTPATIENT)
Dept: BARIATRICS | Facility: CLINIC | Age: 64
End: 2024-06-24
Payer: COMMERCIAL

## 2024-06-24 VITALS
OXYGEN SATURATION: 98 % | WEIGHT: 180.4 LBS | HEART RATE: 71 BPM | TEMPERATURE: 97.7 F | BODY MASS INDEX: 30.06 KG/M2 | DIASTOLIC BLOOD PRESSURE: 86 MMHG | SYSTOLIC BLOOD PRESSURE: 138 MMHG | HEIGHT: 65 IN

## 2024-06-24 VITALS
WEIGHT: 180 LBS | BODY MASS INDEX: 29.95 KG/M2 | DIASTOLIC BLOOD PRESSURE: 92 MMHG | HEART RATE: 75 BPM | SYSTOLIC BLOOD PRESSURE: 156 MMHG

## 2024-06-24 DIAGNOSIS — M72.2 PLANTAR FASCIITIS OF RIGHT FOOT: ICD-10-CM

## 2024-06-24 DIAGNOSIS — G57.51 TARSAL TUNNEL SYNDROME OF RIGHT SIDE: ICD-10-CM

## 2024-06-24 DIAGNOSIS — M21.6X1 ACQUIRED RIGHT REARFOOT VARUS: ICD-10-CM

## 2024-06-24 DIAGNOSIS — M24.571 EQUINUS CONTRACTURE OF RIGHT ANKLE: ICD-10-CM

## 2024-06-24 DIAGNOSIS — E66.9 CLASS 1 OBESITY: Primary | ICD-10-CM

## 2024-06-24 DIAGNOSIS — M79.671 PAIN OF RIGHT HEEL: Primary | ICD-10-CM

## 2024-06-24 PROCEDURE — 99213 OFFICE O/P EST LOW 20 MIN: CPT | Performed by: PODIATRIST

## 2024-06-24 PROCEDURE — 99214 OFFICE O/P EST MOD 30 MIN: CPT | Performed by: PHYSICIAN ASSISTANT

## 2024-06-24 PROCEDURE — 20550 NJX 1 TENDON SHEATH/LIGAMENT: CPT | Performed by: PODIATRIST

## 2024-06-24 RX ORDER — PHENTERMINE HYDROCHLORIDE 37.5 MG/1
18.75 TABLET ORAL EVERY MORNING
Qty: 30 TABLET | Refills: 1 | Status: SHIPPED | OUTPATIENT
Start: 2024-06-24 | End: 2024-07-02 | Stop reason: SDUPTHER

## 2024-06-24 RX ORDER — TRIAMCINOLONE ACETONIDE 40 MG/ML
20 INJECTION, SUSPENSION INTRA-ARTICULAR; INTRAMUSCULAR ONCE
Status: COMPLETED | OUTPATIENT
Start: 2024-06-24 | End: 2024-06-24

## 2024-06-24 RX ORDER — LIDOCAINE HYDROCHLORIDE 10 MG/ML
1.5 INJECTION, SOLUTION EPIDURAL; INFILTRATION; INTRACAUDAL; PERINEURAL ONCE
Status: COMPLETED | OUTPATIENT
Start: 2024-06-24 | End: 2024-06-24

## 2024-06-24 RX ADMIN — TRIAMCINOLONE ACETONIDE 20 MG: 40 INJECTION, SUSPENSION INTRA-ARTICULAR; INTRAMUSCULAR at 09:02

## 2024-06-24 RX ADMIN — LIDOCAINE HYDROCHLORIDE 1.5 ML: 10 INJECTION, SOLUTION EPIDURAL; INFILTRATION; INTRACAUDAL; PERINEURAL at 09:03

## 2024-06-24 NOTE — ASSESSMENT & PLAN NOTE
-Patient is pursuing Conservative Program  -Initial weight loss goal of 5-10% weight loss for improved health  -Did not tolerate Qsymia: concentration issues/confusion, dizziness, fatigue.Switched to phentermine (186 lbs, 7/19/21)- had positive response but d/c due to elevated BP and palpitations  -Stopped Contrave- abdominal pain   -Saxenda- cost, but also did not like daily injection   -Screening labs: A1c reviewed 5/4/23, TSH from 12/20/23; CMP reviewed from 11/7/23  -Restarted phentermine (5/4/24, 185). BP within acceptable range, P normal. Will continue to monitor BP closely.   -Dietary recall reviewed, suggestions provided     Initial: 182.2, highest weight 186  Current: 180.3 (-4.7 lbs sivce last visit)  Change: -2  Goal: 150

## 2024-06-24 NOTE — PROGRESS NOTES
Ambulatory Visit  Name: Randi Mathews      : 1960      MRN: [id number]  Encounter Provider: Mello Turpin DPM  Encounter Date: 2024   Encounter department: [facility] [place]    Assessment & Plan   1. Pain of right heel  2. Plantar fasciitis of right foot  -     lidocaine (PF) (XYLOCAINE-MPF) 1 % injection 1.5 mL  -     triamcinolone acetonide (KENALOG-40) 40 mg/mL injection 20 mg  3. Tarsal tunnel syndrome of right side  4. Acquired right rearfoot varus  5. Equinus contracture of right ankle    -She is overall doing very well considering her foot deformity  - Continue exercise, continue stretching  - She still has a substantial amount of equinus which I think is inducing her acute Planter fasciitis symptoms, her nerve symptoms have drastically improved with deep massage and Barney etc.  - She is to continue these techniques  - Will trial a second injection today  - I think that she is still having combination of 2 issues Planter fasciitis and some nerve entrapment  - This is all continue to be severely induced by her equinus which is still present and significant  - She is to continue her exercises and continue all of her forms of conservative care and return in 2 months for repeat assessment, if she is unhappy with her pain reduction in 2 months we will consider surgical intervention with gastroc versus plantar fasciotomy      Treatment options were discussed and the patient wished to proceed with an injection.  A trigger point injection was given to right heel using 0.5cc Kenolog 40 and 2cc 1% Lidocaine pl.  Patient tolerated the injection well with no complication.  Instructed supportive care, icing, and resting.  Consider further diagnostics depending on the progress.  The patient will return in 8 weeks.      History of Present Illness     Randi Mathews is a 64 y.o. female who presents evaluation and management of her right foot. States that she is doing much better, has  been massaging a lot, orthotics, and doing the exercises as directed.     Review of Systems   Constitutional:  Negative for chills and fever.   HENT:  Negative for ear pain and sore throat.    Eyes:  Negative for pain and visual disturbance.   Respiratory:  Negative for cough and shortness of breath.    Cardiovascular:  Negative for chest pain and palpitations.   Gastrointestinal:  Negative for abdominal pain and vomiting.   Genitourinary:  Negative for dysuria and hematuria.   Musculoskeletal:  Negative for arthralgias and back pain.   Skin:  Negative for color change and rash.   Neurological:  Negative for seizures and syncope.   All other systems reviewed and are negative.      Objective     /92 (BP Location: Left arm, Patient Position: Sitting, Cuff Size: Standard)   Pulse 75   Wt 81.6 kg (180 lb) Comment: Patient reported  BMI 29.95 kg/m²     Physical Exam  Vitals reviewed.   Constitutional:       Appearance: Normal appearance. She is normal weight.   HENT:      Head: Normocephalic and atraumatic.      Nose: Nose normal.   Musculoskeletal:      Comments: Continued positive tinels along the tarsal tunnel, pain with palpation of the the central band of the fascia. She still has a significant amount of remaining equinus.    Skin:     Capillary Refill: Capillary refill takes less than 2 seconds.   Neurological:      General: No focal deficit present.      Mental Status: She is alert and oriented to person, place, and time. Mental status is at baseline.       Administrative Statements

## 2024-06-24 NOTE — PROGRESS NOTES
Assessment/Plan:  Class 1 obesity  -Patient is pursuing Conservative Program  -Initial weight loss goal of 5-10% weight loss for improved health  -Did not tolerate Qsymia: concentration issues/confusion, dizziness, fatigue.Switched to phentermine (186 lbs, 7/19/21)- had positive response but d/c due to elevated BP and palpitations  -Stopped Contrave- abdominal pain   -Saxenda- cost, but also did not like daily injection   -Screening labs: A1c reviewed 5/4/23, TSH from 12/20/23; CMP reviewed from 11/7/23  -Restarted phentermine (5/4/24, 185). BP within acceptable range, P normal. Will continue to monitor BP closely.   -Dietary recall reviewed, suggestions provided     Initial: 182.2, highest weight 186  Current: 180.3 (-4.7 lbs sivce last visit)  Change: -2  Goal: 150    Follow up in 1-2 months for vitals and in approximately  4 months  with Non-Surgical Physician/Advanced Practitioner.    Goals:  Food log (ie.) www.[email or web address],sparkpeople.com,[email or web address],[employer].com,etc. baritastic  No sugary beverages. At least 64oz of water daily.  Increase physical activity by 10 minutes daily. Gradually increase physical activity to a goal of 5 days per week for 30 minutes of MODERATE intensity PLUS 2 days per week of FULL BODY resistance training  5-10 servings of fruits and vegetables per day and 25-35 grams of dietary fiber per day, gradually increasing     Diagnoses and all orders for this visit:    Class 1 obesity    Body mass index 30.0-30.9, adult        Subjective:   Chief Complaint   Patient presents with    Follow-up     4 month follow-up weight management.     Patient ID: Randi Mathews  is a 64 y.o. female with excess weight/obesity here to pursue weight management.  Patient is pursuing Conservative Program.     HPI  The patient presents for MWM follow up.     Reports BP is 120s/70s - checks at home and has brought in to compare at her PCP office and is accurate     Tolerating phentermine; finds effects where  "off by the afternoon; will try 1/2 tab bid; refilled today    B: yogurt + fruit OR smoothie  S: fruit  L: protein   S: fruit  D: protein + veg   S: no    Exercise: due to foot- has been low, but able to start walking now   Hydration: reaching goals  Sleep: 8    The following portions of the patient's history were reviewed and updated as appropriate: allergies, current medications, past family history, past medical history, past social history, past surgical history, and problem list.    Review of Systems   Cardiovascular:  Negative for chest pain and palpitations.   Psychiatric/Behavioral: Negative.  Negative for sleep disturbance.      Objective:    /86   Pulse 71   Temp 97.7 °F (36.5 °C)   Ht 5' 5\" (1.651 m)   Wt 81.8 kg (180 lb 6.4 oz)   SpO2 98%   BMI 30.02 kg/m²      Physical Exam  Vitals and nursing note reviewed.     Constitutional   General appearance: Abnormal.  well developed and obese.   Pulmonary   Respiratory effort: No increased work of breathing or signs of respiratory distress.    Abdomen   Abdomen: Abnormal.  The abdomen was obese.  Musculoskeletal   Gait and station: Normal.    Psychiatric   Orientation to person, place and time: Normal.    Affect: appropriate  "

## 2024-07-02 ENCOUNTER — TELEPHONE (OUTPATIENT)
Dept: BARIATRICS | Facility: CLINIC | Age: 64
End: 2024-07-02

## 2024-07-02 DIAGNOSIS — E66.9 CLASS 1 OBESITY: ICD-10-CM

## 2024-07-02 RX ORDER — PHENTERMINE HYDROCHLORIDE 37.5 MG/1
37.5 TABLET ORAL EVERY MORNING
Qty: 30 TABLET | Refills: 1 | Status: SHIPPED | OUTPATIENT
Start: 2024-07-02

## 2024-07-10 ENCOUNTER — OFFICE VISIT (OUTPATIENT)
Dept: FAMILY MEDICINE CLINIC | Facility: CLINIC | Age: 64
End: 2024-07-10
Payer: COMMERCIAL

## 2024-07-10 VITALS
TEMPERATURE: 97.4 F | HEIGHT: 65 IN | SYSTOLIC BLOOD PRESSURE: 132 MMHG | DIASTOLIC BLOOD PRESSURE: 76 MMHG | WEIGHT: 180.2 LBS | HEART RATE: 75 BPM | BODY MASS INDEX: 30.02 KG/M2 | OXYGEN SATURATION: 98 %

## 2024-07-10 DIAGNOSIS — Z00.00 ANNUAL PHYSICAL EXAM: Primary | ICD-10-CM

## 2024-07-10 DIAGNOSIS — E03.9 HYPOTHYROIDISM, UNSPECIFIED TYPE: ICD-10-CM

## 2024-07-10 DIAGNOSIS — Z78.0 POST-MENOPAUSE: ICD-10-CM

## 2024-07-10 DIAGNOSIS — M25.50 ARTHRALGIA, UNSPECIFIED JOINT: ICD-10-CM

## 2024-07-10 PROCEDURE — 99396 PREV VISIT EST AGE 40-64: CPT | Performed by: PHYSICIAN ASSISTANT

## 2024-07-10 RX ORDER — NAPROXEN 500 MG/1
500 TABLET ORAL 2 TIMES DAILY WITH MEALS
Qty: 60 TABLET | Refills: 2 | Status: SHIPPED | OUTPATIENT
Start: 2024-07-10

## 2024-07-10 RX ORDER — NAPROXEN 500 MG/1
500 TABLET ORAL 2 TIMES DAILY WITH MEALS
Qty: 20 TABLET | Refills: 0 | Status: CANCELLED | OUTPATIENT
Start: 2024-07-10

## 2024-07-10 NOTE — PROGRESS NOTES
Adult Annual Physical  Name: Randi Mathews      : 1960      MRN: [id number]  Encounter Provider: Betzaida Francois PA-C  Encounter Date: 7/10/2024   Encounter department: [facility]    Assessment & Plan   1. Annual physical exam  Assessment & Plan:  Routine labs ordered   Up-to-date with GYN visits  Mammogram ordered by GYN-encouraged patient to schedule  Up-to-date with eye exams and dental cleanings  Up-to-date with colon cancer screenings    Orders:  -     Comprehensive metabolic panel; Future  -     TSH, 3rd generation with Free T4 reflex; Future  -     CBC and differential; Future  -     Lipid panel; Future  -     Hemoglobin A1C; Future  2. Hypothyroidism, unspecified type  Assessment & Plan:  Stable.  Continue levothyroxine.  TSH ordered to evaluate  Orders:  -     TSH, 3rd generation with Free T4 reflex; Future  3. Post-menopause  -     DXA bone density spine hip and pelvis; Future; Expected date: 07/10/2024  4. Arthralgia, unspecified joint  -     naproxen (Naprosyn) 500 mg tablet; Take 1 tablet (500 mg total) by mouth 2 (two) times a day with meals    Immunizations and preventive care screenings were discussed with patient today. Appropriate education was printed on patient's after visit summary.    Counseling:  Alcohol/drug use: discussed moderation in alcohol intake, the recommendations for healthy alcohol use, and avoidance of illicit drug use.  Dental Health: discussed importance of regular tooth brushing, flossing, and dental visits.  Injury prevention: discussed safety/seat belts, safety helmets, smoke detectors, carbon dioxide detectors, and smoking near bedding or upholstery.  Sexual health: discussed sexually transmitted diseases, partner selection, use of condoms, avoidance of unintended pregnancy, and contraceptive alternatives.  Exercise: the importance of regular exercise/physical activity was discussed. Recommend exercise 3-5 times per week for at least 30 minutes.           History of Present Illness     Adult Annual Physical:  Patient presents for annual physical. Ade is a very pleasant 64-year-old female who is here today for her annual wellness visit.  She was following with podiatry for tarsal tunnel syndrome, plantar fasciitis, and muscle tear.  She admits that her symptoms have improved significantly since her most recent injection.  She plans to start walking more frequently again.  She is following with weight management, which has been going well.  She is trying to follow a well-balanced diet.  She is doing well on her thyroid medication.  She is due for routine labs.  She is up-to-date with GYN visits.  She is due for her mammogram, which she has a prescription for.  She is up-to-date with colon cancer screenings..     Diet and Physical Activity:  - Diet/Nutrition: well balanced diet.  - Exercise: walking.    Depression Screening:  - PHQ-2 Score: 0    General Health:  - Sleep: sleeps well.  - Hearing: normal hearing bilateral ears.  - Vision: goes for regular eye exams.  - Dental: regular dental visits and brushes teeth twice daily.    /GYN Health:  - Follows with GYN: yes.   - Menopause: postmenopausal.     Review of Systems   Constitutional:  Negative for chills, diaphoresis, fatigue and fever.   HENT:  Negative for congestion, ear pain, postnasal drip, rhinorrhea, sneezing, sore throat and trouble swallowing.    Eyes:  Negative for pain and visual disturbance.   Respiratory:  Negative for apnea, cough, shortness of breath and wheezing.    Cardiovascular:  Negative for chest pain and palpitations.   Gastrointestinal:  Negative for abdominal pain, constipation, diarrhea, nausea and vomiting.   Genitourinary:  Negative for dysuria and hematuria.   Musculoskeletal:  Negative for arthralgias, gait problem and myalgias.   Neurological:  Negative for dizziness, syncope, weakness, light-headedness, numbness and headaches.   Psychiatric/Behavioral:  Negative for suicidal  "ideas. The patient is not nervous/anxious.          Objective     /76   Pulse 75   Temp (!) 97.4 °F (36.3 °C)   Ht 5' 5\" (1.651 m)   Wt 81.7 kg (180 lb 3.2 oz)   SpO2 98%   BMI 29.99 kg/m²     Physical Exam  Vitals and nursing note reviewed.   Constitutional:       General: She is not in acute distress.     Appearance: She is well-developed. She is not diaphoretic.   HENT:      Head: Normocephalic and atraumatic.      Right Ear: Hearing, tympanic membrane, ear canal and external ear normal.      Left Ear: Hearing, tympanic membrane, ear canal and external ear normal.      Nose: Nose normal. No mucosal edema or rhinorrhea.      Mouth/Throat:      Pharynx: No oropharyngeal exudate or posterior oropharyngeal erythema.   Eyes:      Extraocular Movements: Extraocular movements intact.   Cardiovascular:      Rate and Rhythm: Normal rate and regular rhythm.      Heart sounds: Normal heart sounds. No murmur heard.     No friction rub. No gallop.   Pulmonary:      Effort: Pulmonary effort is normal. No respiratory distress.      Breath sounds: Normal breath sounds. No wheezing or rales.   Abdominal:      General: Bowel sounds are normal. There is no distension.      Palpations: Abdomen is soft.      Tenderness: There is no abdominal tenderness. There is no guarding.   Musculoskeletal:         General: Normal range of motion.      Cervical back: Normal range of motion and neck supple.   Lymphadenopathy:      Cervical: No cervical adenopathy.   Skin:     General: Skin is warm and dry.      Findings: No rash.   Neurological:      Mental Status: She is alert and oriented to person, place, and time.      Gait: Gait normal.   Psychiatric:         Behavior: Behavior normal.         Thought Content: Thought content normal.         Judgment: Judgment normal.         "

## 2024-07-10 NOTE — PATIENT INSTRUCTIONS
"Patient Education     Routine physical for adults   The Basics   Written by the doctors and editors at [facility]   What is a physical? -- A physical is a routine visit, or \"check-up,\" with your doctor. You might also hear it called a \"wellness visit\" or \"preventive visit.\"  During each visit, the doctor will:   Ask about your physical and mental health   Ask about your habits, behaviors, and lifestyle   Do an exam   Give you vaccines if needed   Talk to you about any medicines you take   Give advice about your health   Answer your questions  Getting regular check-ups is an important part of taking care of your health. It can help your doctor find and treat any problems you have. But it's also important for preventing health problems.  A routine physical is different from a \"sick visit.\" A sick visit is when you see a doctor because of a health concern or problem. Since physicals are scheduled ahead of time, you can think about what you want to ask the doctor.  How often should I get a physical? -- It depends on your age and health. In general, for people age 21 years and older:   If you are younger than 50 years, you might be able to get a physical every 3 years.   If you are 50 years or older, your doctor might recommend a physical every year.  If you have an ongoing health condition, like diabetes or high blood pressure, your doctor will probably want to see you more often.  What happens during a physical? -- In general, each visit will include:   Physical exam - The doctor or nurse will check your height, weight, heart rate, and blood pressure. They will also look at your eyes and ears. They will ask about how you are feeling and whether you have any symptoms that bother you.   Medicines - It's a good idea to bring a list of all the medicines you take to each doctor visit. Your doctor will talk to you about your medicines and answer any questions. Tell them if you are having any side effects that bother you. You " "should also tell them if you are having trouble paying for any of your medicines.   Habits and behaviors - This includes:   Your diet   Your exercise habits   Whether you smoke, drink alcohol, or use drugs   Whether you are sexually active   Whether you feel safe at home  Your doctor will talk to you about things you can do to improve your health and lower your risk of health problems. They will also offer help and support. For example, if you want to quit smoking, they can give you advice and might prescribe medicines. If you want to improve your diet or get more physical activity, they can help you with this, too.   Lab tests, if needed - The tests you get will depend on your age and situation. For example, your doctor might want to check your:   Cholesterol   Blood sugar   Iron level   Vaccines - The recommended vaccines will depend on your age, health, and what vaccines you already had. Vaccines are very important because they can prevent certain serious or deadly infections.   Discussion of screening - \"Screening\" means checking for diseases or other health problems before they cause symptoms. Your doctor can recommend screening based on your age, risk, and preferences. This might include tests to check for:   Cancer, such as breast, prostate, cervical, ovarian, colorectal, prostate, lung, or skin cancer   Sexually transmitted infections, such as chlamydia and gonorrhea   Mental health conditions like depression and anxiety  Your doctor will talk to you about the different types of screening tests. They can help you decide which screenings to have. They can also explain what the results might mean.   Answering questions - The physical is a good time to ask the doctor or nurse questions about your health. If needed, they can refer you to other doctors or specialists, too.  Adults older than 65 years often need other care, too. As you get older, your doctor will talk to you about:   How to prevent falling at " home   Hearing or vision tests   Memory testing   How to take your medicines safely   Making sure that you have the help and support you need at home  All topics are updated as new evidence becomes available and our peer review process is complete.  This topic retrieved from [employer] on: May 02, 2024.  Topic 486920 Version 1.0  Release: 32.4.3 - C32.122  © 2024 UpToDate, Inc. and/or its affiliates. All rights reserved.  Consumer Information Use and Disclaimer   Disclaimer: This generalized information is a limited summary of diagnosis, treatment, and/or medication information. It is not meant to be comprehensive and should be used as a tool to help the user understand and/or assess potential diagnostic and treatment options. It does NOT include all information about conditions, treatments, medications, side effects, or risks that may apply to a specific patient. It is not intended to be medical advice or a substitute for the medical advice, diagnosis, or treatment of a health care provider based on the health care provider's examination and assessment of a patient's specific and unique circumstances. Patients must speak with a health care provider for complete information about their health, medical questions, and treatment options, including any risks or benefits regarding use of medications. This information does not endorse any treatments or medications as safe, effective, or approved for treating a specific patient. UpToDate, Inc. and its affiliates disclaim any warranty or liability relating to this information or the use thereof.The use of this information is governed by the Terms of Use, available at https://www.woltersTowiuwer.com/en/know/clinical-effectiveness-terms. 2024© UpToDate, Inc. and its affiliates and/or licensors. All rights reserved.  Copyright   © 2024 UpToDate, Inc. and/or its affiliates. All rights reserved.

## 2024-07-10 NOTE — ASSESSMENT & PLAN NOTE
Routine labs ordered   Up-to-date with GYN visits  Mammogram ordered by GYN-encouraged patient to schedule  Up-to-date with eye exams and dental cleanings  Up-to-date with colon cancer screenings

## 2024-07-22 ENCOUNTER — TELEPHONE (OUTPATIENT)
Dept: OBGYN CLINIC | Facility: CLINIC | Age: 64
End: 2024-07-22

## 2024-07-22 DIAGNOSIS — B37.9 YEAST INFECTION: Primary | ICD-10-CM

## 2024-07-22 RX ORDER — FLUCONAZOLE 150 MG/1
150 TABLET ORAL ONCE
Qty: 2 TABLET | Refills: 0 | Status: SHIPPED | OUTPATIENT
Start: 2024-07-22 | End: 2024-07-22

## 2024-07-22 NOTE — TELEPHONE ENCOUNTER
Pls inform pt that I sent in a diflucan for her to take day 1 and 4. If sx persist after a week, she will need an appt to be seen.

## 2024-07-22 NOTE — TELEPHONE ENCOUNTER
Pt called stating since  she has been experiencing burning, itching, and discharge. On , she tried monistat over the counter with no relief. She stated that the monistat that she had at home might have  a few months prior and she wasn't sure how effective it would be.    She informed me that her  recently tried a new powder in his genital area and that she is super sensitive. She informed me she has not had any type of yeast infection in a long time.     Please advise.

## 2024-07-27 ENCOUNTER — LAB (OUTPATIENT)
Dept: LAB | Facility: MEDICAL CENTER | Age: 64
End: 2024-07-27
Payer: COMMERCIAL

## 2024-07-27 DIAGNOSIS — E03.9 HYPOTHYROIDISM, UNSPECIFIED TYPE: ICD-10-CM

## 2024-07-27 DIAGNOSIS — Z00.00 ANNUAL PHYSICAL EXAM: ICD-10-CM

## 2024-07-27 LAB
ALBUMIN SERPL BCG-MCNC: 4.1 G/DL (ref 3.5–5)
ALP SERPL-CCNC: 57 U/L (ref 34–104)
ALT SERPL W P-5'-P-CCNC: 16 U/L (ref 7–52)
ANION GAP SERPL CALCULATED.3IONS-SCNC: 7 MMOL/L (ref 4–13)
AST SERPL W P-5'-P-CCNC: 16 U/L (ref 13–39)
BASOPHILS # BLD AUTO: 0.05 THOUSANDS/ÂΜL (ref 0–0.1)
BASOPHILS NFR BLD AUTO: 1 % (ref 0–1)
BILIRUB SERPL-MCNC: 0.52 MG/DL (ref 0.2–1)
BUN SERPL-MCNC: 12 MG/DL (ref 5–25)
CALCIUM SERPL-MCNC: 9.6 MG/DL (ref 8.4–10.2)
CHLORIDE SERPL-SCNC: 105 MMOL/L (ref 96–108)
CHOLEST SERPL-MCNC: 189 MG/DL
CO2 SERPL-SCNC: 27 MMOL/L (ref 21–32)
CREAT SERPL-MCNC: 0.84 MG/DL (ref 0.6–1.3)
EOSINOPHIL # BLD AUTO: 0.13 THOUSAND/ÂΜL (ref 0–0.61)
EOSINOPHIL NFR BLD AUTO: 3 % (ref 0–6)
ERYTHROCYTE [DISTWIDTH] IN BLOOD BY AUTOMATED COUNT: 11.7 % (ref 11.6–15.1)
EST. AVERAGE GLUCOSE BLD GHB EST-MCNC: 134 MG/DL
GFR SERPL CREATININE-BSD FRML MDRD: 74 ML/MIN/1.73SQ M
GLUCOSE P FAST SERPL-MCNC: 85 MG/DL (ref 65–99)
HBA1C MFR BLD: 6.3 %
HCT VFR BLD AUTO: 41.8 % (ref 34.8–46.1)
HDLC SERPL-MCNC: 67 MG/DL
HGB BLD-MCNC: 13.4 G/DL (ref 11.5–15.4)
IMM GRANULOCYTES # BLD AUTO: 0.03 THOUSAND/UL (ref 0–0.2)
IMM GRANULOCYTES NFR BLD AUTO: 1 % (ref 0–2)
LDLC SERPL CALC-MCNC: 108 MG/DL (ref 0–100)
LYMPHOCYTES # BLD AUTO: 1.8 THOUSANDS/ÂΜL (ref 0.6–4.47)
LYMPHOCYTES NFR BLD AUTO: 37 % (ref 14–44)
MCH RBC QN AUTO: 30.4 PG (ref 26.8–34.3)
MCHC RBC AUTO-ENTMCNC: 32.1 G/DL (ref 31.4–37.4)
MCV RBC AUTO: 95 FL (ref 82–98)
MONOCYTES # BLD AUTO: 0.42 THOUSAND/ÂΜL (ref 0.17–1.22)
MONOCYTES NFR BLD AUTO: 9 % (ref 4–12)
NEUTROPHILS # BLD AUTO: 2.49 THOUSANDS/ÂΜL (ref 1.85–7.62)
NEUTS SEG NFR BLD AUTO: 49 % (ref 43–75)
NONHDLC SERPL-MCNC: 122 MG/DL
NRBC BLD AUTO-RTO: 0 /100 WBCS
PLATELET # BLD AUTO: 400 THOUSANDS/UL (ref 149–390)
PMV BLD AUTO: 10.2 FL (ref 8.9–12.7)
POTASSIUM SERPL-SCNC: 4.2 MMOL/L (ref 3.5–5.3)
PROT SERPL-MCNC: 6.7 G/DL (ref 6.4–8.4)
RBC # BLD AUTO: 4.41 MILLION/UL (ref 3.81–5.12)
SODIUM SERPL-SCNC: 139 MMOL/L (ref 135–147)
T4 FREE SERPL-MCNC: 1.06 NG/DL (ref 0.61–1.12)
TRIGL SERPL-MCNC: 72 MG/DL
TSH SERPL DL<=0.05 MIU/L-ACNC: 5.99 UIU/ML (ref 0.45–4.5)
WBC # BLD AUTO: 4.92 THOUSAND/UL (ref 4.31–10.16)

## 2024-07-27 PROCEDURE — 84443 ASSAY THYROID STIM HORMONE: CPT

## 2024-07-27 PROCEDURE — 84439 ASSAY OF FREE THYROXINE: CPT

## 2024-07-27 PROCEDURE — 36415 COLL VENOUS BLD VENIPUNCTURE: CPT

## 2024-07-27 PROCEDURE — 80053 COMPREHEN METABOLIC PANEL: CPT

## 2024-07-27 PROCEDURE — 85025 COMPLETE CBC W/AUTO DIFF WBC: CPT

## 2024-07-27 PROCEDURE — 83036 HEMOGLOBIN GLYCOSYLATED A1C: CPT

## 2024-07-27 PROCEDURE — 80061 LIPID PANEL: CPT

## 2024-07-29 DIAGNOSIS — E03.9 HYPOTHYROIDISM, UNSPECIFIED TYPE: ICD-10-CM

## 2024-07-29 RX ORDER — LEVOTHYROXINE SODIUM 125 MCG
125 TABLET ORAL DAILY
Qty: 90 TABLET | Refills: 1 | Status: SHIPPED | OUTPATIENT
Start: 2024-07-29

## 2024-07-29 NOTE — RESULT ENCOUNTER NOTE
Please call the patient regarding her abnormal result.  Prediabetic, very mild elevation of lipids, thyroid slightly low would increase the Synthroid from 112 mcg to 125 mcg and recheck TSH in about 2 months

## 2024-08-09 ENCOUNTER — NURSE TRIAGE (OUTPATIENT)
Age: 64
End: 2024-08-09

## 2024-08-09 NOTE — TELEPHONE ENCOUNTER
"Pt called in reporting vaginal itching, creamy discharge with a foul odor that started about 3 weeks ago. Tried monistat 3 and diflucan with no relief. Wrote in [employer] messages to OR[place]O Green who advised pt come in for appt. Pt scheduled for 8/13/24 and is thankful.    Reason for Disposition   Symptoms of a yeast infection (i.e., itchy, white discharge, not bad smelling) and not improved > 3 days following CARE ADVICE    Answer Assessment - Initial Assessment Questions  1. SYMPTOM: \"What's the main symptom you're concerned about?\" (e.g., pain, itching, dryness)      Creamy discharge with foul odor and vaginal itching  2. LOCATION: \"Where is the  symptoms located?\" (e.g., inside/outside, left/right)      vagina  3. ONSET: \"When did the  symptoms  start?\"      About 3 weeks ago  4. PAIN: \"Is there any pain?\" If Yes, ask: \"How bad is it?\" (Scale: 1-10; mild, moderate, severe)      denies  5. ITCHING: \"Is there any itching?\" If Yes, ask: \"How bad is it?\" (Scale: 1-10; mild, moderate, severe)      Yes, mild  6. CAUSE: \"What do you think is causing the discharge?\" \"Have you had the same problem before? What happened then?\"      unsure  7. OTHER SYMPTOMS: \"Do you have any other symptoms?\" (e.g., fever, itching, vaginal bleeding, pain with urination, injury to genital area, vaginal foreign body)      denies  8. PREGNANCY: \"Is there any chance you are pregnant?\" \"When was your last menstrual period?\"      denies    Protocols used: Vaginal Symptoms-ADULT-[place]    "

## 2024-08-12 ENCOUNTER — TELEPHONE (OUTPATIENT)
Dept: GYNECOLOGY | Facility: CLINIC | Age: 64
End: 2024-08-12

## 2024-08-12 NOTE — TELEPHONE ENCOUNTER
LM ON PTS VM VERIFYING THAT SHE IS AWARE HER APPT WAS MADE FOR THE [facility]. AND NOT [facility].

## 2024-08-19 ENCOUNTER — CLINICAL SUPPORT (OUTPATIENT)
Dept: BARIATRICS | Facility: CLINIC | Age: 64
End: 2024-08-19

## 2024-08-19 VITALS
HEIGHT: 65 IN | WEIGHT: 177.2 LBS | OXYGEN SATURATION: 96 % | BODY MASS INDEX: 29.52 KG/M2 | SYSTOLIC BLOOD PRESSURE: 144 MMHG | TEMPERATURE: 97.8 F | DIASTOLIC BLOOD PRESSURE: 78 MMHG | HEART RATE: 68 BPM

## 2024-08-19 DIAGNOSIS — R63.5 ABNORMAL WEIGHT GAIN: Primary | ICD-10-CM

## 2024-08-21 ENCOUNTER — TELEPHONE (OUTPATIENT)
Dept: FAMILY MEDICINE CLINIC | Facility: CLINIC | Age: 64
End: 2024-08-21

## 2024-08-21 VITALS — SYSTOLIC BLOOD PRESSURE: 126 MMHG | DIASTOLIC BLOOD PRESSURE: 78 MMHG

## 2024-08-27 ENCOUNTER — TELEPHONE (OUTPATIENT)
Dept: FAMILY MEDICINE CLINIC | Facility: CLINIC | Age: 64
End: 2024-08-27

## 2024-08-27 VITALS — SYSTOLIC BLOOD PRESSURE: 126 MMHG | DIASTOLIC BLOOD PRESSURE: 82 MMHG

## 2024-09-03 DIAGNOSIS — E66.9 CLASS 1 OBESITY: ICD-10-CM

## 2024-09-05 RX ORDER — PHENTERMINE HYDROCHLORIDE 37.5 MG/1
TABLET ORAL
Qty: 30 TABLET | Refills: 1 | Status: SHIPPED | OUTPATIENT
Start: 2024-09-05

## 2024-09-06 ENCOUNTER — TELEPHONE (OUTPATIENT)
Dept: FAMILY MEDICINE CLINIC | Facility: CLINIC | Age: 64
End: 2024-09-06

## 2024-09-06 VITALS — DIASTOLIC BLOOD PRESSURE: 78 MMHG | SYSTOLIC BLOOD PRESSURE: 122 MMHG

## 2024-10-14 ENCOUNTER — OFFICE VISIT (OUTPATIENT)
Age: 64
End: 2024-10-14
Payer: COMMERCIAL

## 2024-10-14 ENCOUNTER — TELEPHONE (OUTPATIENT)
Dept: FAMILY MEDICINE CLINIC | Facility: CLINIC | Age: 64
End: 2024-10-14

## 2024-10-14 VITALS
TEMPERATURE: 97.2 F | HEART RATE: 84 BPM | DIASTOLIC BLOOD PRESSURE: 74 MMHG | SYSTOLIC BLOOD PRESSURE: 126 MMHG | WEIGHT: 175.1 LBS | OXYGEN SATURATION: 97 % | HEIGHT: 65 IN | BODY MASS INDEX: 29.17 KG/M2

## 2024-10-14 VITALS — DIASTOLIC BLOOD PRESSURE: 80 MMHG | SYSTOLIC BLOOD PRESSURE: 126 MMHG

## 2024-10-14 DIAGNOSIS — E66.3 OVERWEIGHT: Primary | ICD-10-CM

## 2024-10-14 DIAGNOSIS — E66.811 CLASS 1 OBESITY: ICD-10-CM

## 2024-10-14 PROCEDURE — 99214 OFFICE O/P EST MOD 30 MIN: CPT | Performed by: PHYSICIAN ASSISTANT

## 2024-10-14 RX ORDER — PHENTERMINE HYDROCHLORIDE 37.5 MG/1
37.5 TABLET ORAL DAILY
Qty: 30 TABLET | Refills: 1 | Status: SHIPPED | OUTPATIENT
Start: 2024-10-14

## 2024-10-14 NOTE — ASSESSMENT & PLAN NOTE
-Patient is pursuing Conservative Program  -Initial weight loss goal of 5-10% weight loss for improved health  -Did not tolerate Qsymia: concentration issues/confusion, dizziness, fatigue.Switched to phentermine (186 lbs, 7/19/21)  -Stopped Contrave- abdominal pain   -Saxenda- cost, but also did not like daily injection   -Screening labs: A1c reviewed 5/4/23, TSH from 12/20/23; CMP reviewed from 11/7/23  -Restarted phentermine (5/4/24, 185). BP/P stable  -Encouraged strength training   -Dietary recall reviewed, suggestions provided     Initial: 182.2, highest weight 186  Current: 175 (-5.3 lbs sivce last visit)  Change: -11  Goal: 150

## 2024-10-14 NOTE — PATIENT INSTRUCTIONS
1 frozen banana + spinach + frozen pineapple + hemp seeds + PB/PB powder + 1 dates + +/- dino/lemon     Banana, spinach, frozen berries, PB/PB powder, cacao powder + 1 dates + hemp seeds +/- black beans     Banana + 1 cup of strawberries + red beets + 1-2 dates + cacao powder + 1 tsp vanilla extract     Nutribullet  (individual)

## 2024-10-14 NOTE — PROGRESS NOTES
Assessment/Plan:    Overweight  -Patient is pursuing Conservative Program  -Initial weight loss goal of 5-10% weight loss for improved health  -Did not tolerate Qsymia: concentration issues/confusion, dizziness, fatigue.Switched to phentermine (186 lbs, 7/19/21)  -Stopped Contrave- abdominal pain   -Saxenda- cost, but also did not like daily injection   -Screening labs: A1c reviewed 5/4/23, TSH from 12/20/23; CMP reviewed from 11/7/23  -Restarted phentermine (5/4/24, 185). BP/P stable  -Encouraged strength training   -Dietary recall reviewed, suggestions provided     Initial: 182.2, highest weight 186  Current: 175 (-5.3 lbs sivce last visit)  Change: -11  Goal: 150    Follow up in approximately 2 months with Non-Surgical Physician/Advanced Practitioner.    Goals:  Food log (ie.) www.EventMama.com,sparkpeople.com,Luminescentit.com,[employer] X-Change.com,etc. baritastic  No sugary beverages. At least 64oz of water daily.  Increase physical activity by 10 minutes daily. Gradually increase physical activity to a goal of 5 days per week for 30 minutes of MODERATE intensity PLUS 2 days per week of FULL BODY resistance training  5-10 servings of fruits and vegetables per day and 25-35 grams of dietary fiber per day, gradually increasing     Diagnoses and all orders for this visit:    Overweight    Body mass index 29.0-29.9, adult    Class 1 obesity  -     phentermine (ADIPEX-P) 37.5 MG tablet; Take 1 tablet (37.5 mg total) by mouth in the morning        Subjective:   Chief Complaint   Patient presents with    Follow-up     4 month follow-up weight management.     Patient ID: Randi Mathews  is a 64 y.o. female with excess weight/obesity here to pursue weight management.  Patient is pursuing Conservative Program.     HPI  The patient presents for [facility] follow up.     Phentermine- tolerating- taking 1/2 bid; + effects on appetite, has cut portions   Phentermine refilled    B: hard boiled egg/scrambled eggs +/- cheese OR oatmeal OR cereal  "  S: banana  L: leftovers or salads   S: no  D: protein + veg + starch   S: no     Exercise: Walking over lunch; started using rower     The following portions of the patient's history were reviewed and updated as appropriate: allergies, current medications, past family history, past medical history, past social history, past surgical history, and problem list.    Review of Systems   Cardiovascular:  Negative for chest pain and palpitations.   Neurological:  Negative for headaches.   Psychiatric/Behavioral:  Negative for sleep disturbance. The patient is not nervous/anxious.      Objective:    /74   Pulse 84   Temp (!) 97.2 °F (36.2 °C)   Ht 5' 5\" (1.651 m)   Wt 79.4 kg (175 lb 1.6 oz)   SpO2 97%   BMI 29.14 kg/m²      Physical Exam  Vitals and nursing note reviewed.     Constitutional   General appearance: Abnormal.  well developed and obese.  Pulmonary   Respiratory effort: No increased work of breathing or signs of respiratory distress.    Abdomen   Abdomen: Abnormal.  The abdomen was obese.   Musculoskeletal   Gait and station: Normal.    Psychiatric   Orientation to person, place and time: Normal.    Affect: appropriate  "

## 2024-10-21 ENCOUNTER — OFFICE VISIT (OUTPATIENT)
Dept: PODIATRY | Facility: CLINIC | Age: 64
End: 2024-10-21
Payer: COMMERCIAL

## 2024-10-21 VITALS
DIASTOLIC BLOOD PRESSURE: 83 MMHG | WEIGHT: 176 LBS | OXYGEN SATURATION: 98 % | HEIGHT: 65 IN | BODY MASS INDEX: 29.32 KG/M2 | HEART RATE: 64 BPM | SYSTOLIC BLOOD PRESSURE: 137 MMHG | TEMPERATURE: 98.2 F

## 2024-10-21 DIAGNOSIS — M25.571 SINUS TARSI SYNDROME OF RIGHT ANKLE: Primary | ICD-10-CM

## 2024-10-21 DIAGNOSIS — M76.71 PERONEAL TENDINITIS OF RIGHT LOWER EXTREMITY: ICD-10-CM

## 2024-10-21 PROCEDURE — 99213 OFFICE O/P EST LOW 20 MIN: CPT | Performed by: PODIATRIST

## 2024-10-21 PROCEDURE — 20600 DRAIN/INJ JOINT/BURSA W/O US: CPT | Performed by: PODIATRIST

## 2024-10-21 RX ORDER — LIDOCAINE HYDROCHLORIDE 10 MG/ML
1.5 INJECTION, SOLUTION EPIDURAL; INFILTRATION; INTRACAUDAL; PERINEURAL ONCE
Status: COMPLETED | OUTPATIENT
Start: 2024-10-21 | End: 2024-10-21

## 2024-10-21 RX ORDER — DEXAMETHASONE SODIUM PHOSPHATE 4 MG/ML
2 INJECTION, SOLUTION INTRA-ARTICULAR; INTRALESIONAL; INTRAMUSCULAR; INTRAVENOUS; SOFT TISSUE ONCE
Status: COMPLETED | OUTPATIENT
Start: 2024-10-21 | End: 2024-10-21

## 2024-10-21 RX ADMIN — LIDOCAINE HYDROCHLORIDE 1.5 ML: 10 INJECTION, SOLUTION EPIDURAL; INFILTRATION; INTRACAUDAL; PERINEURAL at 15:30

## 2024-10-21 RX ADMIN — DEXAMETHASONE SODIUM PHOSPHATE 2 MG: 4 INJECTION, SOLUTION INTRA-ARTICULAR; INTRALESIONAL; INTRAMUSCULAR; INTRAVENOUS; SOFT TISSUE at 15:29

## 2024-10-21 NOTE — PROGRESS NOTES
Ambulatory Visit  Name: Randi Mathews      : 1960      MRN: [id number]  Encounter Provider: Mello Turpin DPM  Encounter Date: 10/21/2024   Encounter department: [facility] [place]    Assessment & Plan  Sinus tarsi syndrome of right ankle         Peroneal tendinitis of right lower extremity           - new onset sinus tarsi syndrome  - injection of the RSTJ as below  - advised supportive shoes, RICE, nsaids  - RTC 2 mo for repeat assessment vs. XR vs. Further imaging, may benefit from deep heel cup in the future.       Small joint arthrocentesis: R subtalar  Universal Protocol:  Consent: Verbal consent obtained.  Risks and benefits: risks, benefits and alternatives were discussed  Consent given by: patient  Patient understanding: patient states understanding of the procedure being performed  Patient consent: the patient's understanding of the procedure matches consent given  Patient identity confirmed: verbally with patient  Supporting Documentation  Indications: pain, joint swelling and diagnostic evaluation   Procedure Details  Location: foot - R subtalar  Approach: lateral    Patient tolerance: patient tolerated the procedure well with no immediate complications  Dressing:  Sterile dressing applied          History of Present Illness     Randi Mathews is a 64 y.o. female who presents evaluation and management of her right foot, Pain with walking, more she is on it. Swelling. Points to her peroneals and right below the ankle joint. Unsure of what makes this better or worse. Does have CMO and is unsure if it makes it better.       Review of Systems   Constitutional:  Negative for chills and fever.   HENT:  Negative for ear pain and sore throat.    Eyes:  Negative for pain and visual disturbance.   Respiratory:  Negative for cough and shortness of breath.    Cardiovascular:  Negative for chest pain and palpitations.   Gastrointestinal:  Negative for abdominal pain and vomiting.  "  Genitourinary:  Negative for dysuria and hematuria.   Musculoskeletal:  Negative for arthralgias and back pain.   Skin:  Negative for color change and rash.   Neurological:  Negative for seizures and syncope.   All other systems reviewed and are negative.          Objective     /83 (BP Location: Left arm, Patient Position: Sitting, Cuff Size: Standard)   Pulse 64   Temp 98.2 °F (36.8 °C) (Temporal)   Ht 5' 5\" (1.651 m)   Wt 79.8 kg (176 lb)   SpO2 98%   BMI 29.29 kg/m²     Physical Exam  Vitals reviewed.   Constitutional:       Appearance: Normal appearance.   HENT:      Head: Normocephalic and atraumatic.   Skin:     Comments: Decreased ROMof the STJ, pain at EROM. Pain with palpation around the peroneal tubercle.    Neurological:      Mental Status: She is alert.       "

## 2024-11-18 ENCOUNTER — TELEPHONE (OUTPATIENT)
Dept: FAMILY MEDICINE CLINIC | Facility: CLINIC | Age: 64
End: 2024-11-18

## 2024-11-18 VITALS — DIASTOLIC BLOOD PRESSURE: 70 MMHG | SYSTOLIC BLOOD PRESSURE: 124 MMHG

## 2024-11-23 NOTE — PROGRESS NOTES
Assessment/Plan:    Overweight  -Patient is pursuing Conservative Program  -Initial weight loss goal of 5-10% weight loss for improved health  -Did not tolerate Qsymia: concentration issues/confusion, dizziness, fatigue.Switched to phentermine (186 lbs, 7/19/21)  -Stopped Contrave- abdominal pain   -Saxenda- cost, but also did not like daily injection   -Screening labs: A1c reviewed 5/4/23, TSH from 12/20/23; CMP reviewed from 11/7/23  -Restarted phentermine (5/4/24, 185). BP/P stable   -Encouraged strength training   -Dietary recall reviewed, suggestions provided; discussed plan for next time hunting season is (freezer meals ahead of time, more meal prep)      Initial: 182.2, highest weight 186  Current: 172.1 (-3 lbs since last visit)  Change: -13.9  Goal: 150    Follow up in 2 months for weight check and approximately  5 months  with Non-Surgical Physician/Advanced Practitioner.    Goals:  Food log (ie.) www.Echo it.com,sparkpeople.com,loseit.com,Manymoon.com,etc. baritastic  No sugary beverages. At least 64oz of water daily.  Increase physical activity by 10 minutes daily. Gradually increase physical activity to a goal of 5 days per week for 30 minutes of MODERATE intensity PLUS 2 days per week of FULL BODY resistance training  5-10 servings of fruits and vegetables per day and 25-35 grams of dietary fiber per day, gradually increasing     Diagnoses and all orders for this visit:    Overweight    Body mass index 28.0-28.9, adult        Subjective:   Chief Complaint   Patient presents with    Follow-up     Weight management f/u     Patient ID: Randi Mathews  is a 64 y.o. female with excess weight/obesity here to pursue weight management.  Patient is pursuing Conservative Program.     HPI  The patient presents for [facility] follow up.     Has been busy between her dog having puppies, hunting, and getting ready for the holidays.     Reports when taken by office staff her BP is 120s/70s    Food logging:    B:  "oatmeal  S: fruit  L: salads or yogurt   S: no  D: increase in dining out recently   S: no    Exercise: Walking over lunch and as she has been hunting   Hydration: >64 oz of water   Sleep: good except when her puppy wakes her, gets about 6.5-7 hours     The following portions of the patient's history were reviewed and updated as appropriate: allergies, current medications, past family history, past medical history, past social history, past surgical history, and problem list.    Review of Systems   Cardiovascular:  Negative for chest pain and palpitations.   Psychiatric/Behavioral: Negative.  Negative for sleep disturbance.      Objective:    /74 (Patient Position: Sitting, Cuff Size: Large)   Pulse 87   Temp 97.6 °F (36.4 °C) (Temporal)   Ht 5' 5\" (1.651 m)   Wt 78.1 kg (172 lb 1.6 oz)   SpO2 98%   BMI 28.64 kg/m²      Physical Exam  Vitals and nursing note reviewed.     Constitutional   General appearance: Abnormal.  well developed and overweight.   Pulmonary   Respiratory effort: No increased work of breathing or signs of respiratory distress.    Musculoskeletal   Gait and station: Normal.    Psychiatric   Orientation to person, place and time: Normal.    Affect: appropriate  "

## 2024-11-25 ENCOUNTER — OFFICE VISIT (OUTPATIENT)
Age: 64
End: 2024-11-25
Payer: COMMERCIAL

## 2024-11-25 VITALS
OXYGEN SATURATION: 98 % | HEART RATE: 87 BPM | WEIGHT: 172.1 LBS | SYSTOLIC BLOOD PRESSURE: 136 MMHG | DIASTOLIC BLOOD PRESSURE: 74 MMHG | HEIGHT: 65 IN | TEMPERATURE: 97.6 F | BODY MASS INDEX: 28.67 KG/M2

## 2024-11-25 DIAGNOSIS — E66.811 CLASS 1 OBESITY: ICD-10-CM

## 2024-11-25 DIAGNOSIS — E66.3 OVERWEIGHT: Primary | ICD-10-CM

## 2024-11-25 PROCEDURE — 99214 OFFICE O/P EST MOD 30 MIN: CPT | Performed by: PHYSICIAN ASSISTANT

## 2024-11-25 RX ORDER — PHENTERMINE HYDROCHLORIDE 37.5 MG/1
37.5 TABLET ORAL DAILY
Qty: 30 TABLET | Refills: 1 | Status: SHIPPED | OUTPATIENT
Start: 2024-11-25

## 2024-11-25 NOTE — ASSESSMENT & PLAN NOTE
-Patient is pursuing Conservative Program  -Initial weight loss goal of 5-10% weight loss for improved health  -Did not tolerate Qsymia: concentration issues/confusion, dizziness, fatigue.Switched to phentermine (186 lbs, 7/19/21)  -Stopped Contrave- abdominal pain   -Saxenda- cost, but also did not like daily injection   -Screening labs: A1c reviewed 5/4/23, TSH from 12/20/23; CMP reviewed from 11/7/23  -Restarted phentermine (5/4/24, 185). BP/P stable   -Encouraged strength training   -Dietary recall reviewed, suggestions provided; discussed plan for next time hunting season is (freezer meals ahead of time, more meal prep)      Initial: 182.2, highest weight 186  Current: 172.1 (-3 lbs since last visit)  Change: -13.9  Goal: 150

## 2024-12-23 DIAGNOSIS — L23.7 ALLERGIC CONTACT DERMATITIS DUE TO PLANTS, EXCEPT FOOD: Primary | ICD-10-CM

## 2024-12-23 DIAGNOSIS — J06.9 UPPER RESPIRATORY TRACT INFECTION, UNSPECIFIED TYPE: ICD-10-CM

## 2024-12-23 RX ORDER — AZITHROMYCIN 250 MG/1
TABLET, FILM COATED ORAL
Qty: 6 TABLET | Refills: 0 | Status: SHIPPED | OUTPATIENT
Start: 2024-12-23 | End: 2024-12-27

## 2024-12-23 RX ORDER — METHYLPREDNISOLONE ACETATE 40 MG/ML
40 INJECTION, SUSPENSION INTRA-ARTICULAR; INTRALESIONAL; INTRAMUSCULAR; SOFT TISSUE ONCE
Status: COMPLETED | OUTPATIENT
Start: 2024-12-23 | End: 2024-12-23

## 2024-12-23 RX ADMIN — METHYLPREDNISOLONE ACETATE 40 MG: 40 INJECTION, SUSPENSION INTRA-ARTICULAR; INTRALESIONAL; INTRAMUSCULAR; SOFT TISSUE at 07:55

## 2024-12-31 DIAGNOSIS — E03.9 HYPOTHYROIDISM, UNSPECIFIED TYPE: ICD-10-CM

## 2024-12-31 RX ORDER — LEVOTHYROXINE SODIUM 125 MCG
125 TABLET ORAL DAILY
Qty: 90 TABLET | Refills: 1 | Status: SHIPPED | OUTPATIENT
Start: 2024-12-31

## 2025-01-27 ENCOUNTER — CLINICAL SUPPORT (OUTPATIENT)
Age: 65
End: 2025-01-27

## 2025-01-27 VITALS
BODY MASS INDEX: 28.56 KG/M2 | WEIGHT: 171.4 LBS | SYSTOLIC BLOOD PRESSURE: 128 MMHG | DIASTOLIC BLOOD PRESSURE: 74 MMHG | TEMPERATURE: 97.8 F | OXYGEN SATURATION: 98 % | HEART RATE: 76 BPM | HEIGHT: 65 IN

## 2025-01-27 DIAGNOSIS — R63.5 ABNORMAL WEIGHT GAIN: Primary | ICD-10-CM

## 2025-01-27 PROCEDURE — RECHECK

## 2025-01-27 NOTE — PROGRESS NOTES
Patient last visit weight: 172.3  Patient current visit weight: 171.4    If you are taking phentermine or other oral weight loss medications, are you experiencing any of the following symptoms:  Headache: no  Blurred Vision: no  Chest Pain: no  Palpitations: no  Insomnia: no  SPECIFY ORAL MEDICATION AND DOSAGE: phentermine (ADIPEX-P) 37.5 MG tablet     If you are taking an injectable medication,  are you experiencing any of the following symptoms:  Bloating:   Nausea:  Vomiting:   Constipation:   Diarrhea:  SPECIFY INJECTABLE MEDICATION AND CURRENT DOSAGE:  Vitals:    Is BP less than 100/60?  Is BP greater than 140/90?  Is HR greater than 100?  **If yes to any of the above, have patient relax and repeat in 5-10 minutes**    Repeat values:    Is BP less than 100/60?  Is BP greater than 140/90?  Is HR greater than 100?  **If values remain outside of ranges above, please consult provider for next steps**      Date of last injection:    How many injections do you have left:

## 2025-02-03 ENCOUNTER — TELEPHONE (OUTPATIENT)
Dept: FAMILY MEDICINE CLINIC | Facility: CLINIC | Age: 65
End: 2025-02-03

## 2025-02-03 DIAGNOSIS — E66.811 CLASS 1 OBESITY: ICD-10-CM

## 2025-02-03 RX ORDER — PHENTERMINE HYDROCHLORIDE 37.5 MG/1
37.5 TABLET ORAL DAILY
Qty: 30 TABLET | Refills: 1 | Status: SHIPPED | OUTPATIENT
Start: 2025-02-03

## 2025-02-06 RX ORDER — PHENTERMINE HYDROCHLORIDE 37.5 MG/1
37.5 TABLET ORAL DAILY
Qty: 30 TABLET | Refills: 0 | OUTPATIENT
Start: 2025-02-06

## 2025-03-17 ENCOUNTER — CLINICAL SUPPORT (OUTPATIENT)
Age: 65
End: 2025-03-17

## 2025-03-17 VITALS
HEIGHT: 65 IN | DIASTOLIC BLOOD PRESSURE: 80 MMHG | WEIGHT: 172.4 LBS | TEMPERATURE: 97.8 F | SYSTOLIC BLOOD PRESSURE: 130 MMHG | HEART RATE: 81 BPM | BODY MASS INDEX: 28.72 KG/M2 | OXYGEN SATURATION: 97 %

## 2025-03-17 DIAGNOSIS — R63.5 ABNORMAL WEIGHT GAIN: Primary | ICD-10-CM

## 2025-03-17 PROCEDURE — RECHECK

## 2025-03-25 ENCOUNTER — TELEPHONE (OUTPATIENT)
Age: 65
End: 2025-03-25

## 2025-03-25 DIAGNOSIS — R73.03 PREDIABETES: ICD-10-CM

## 2025-03-25 DIAGNOSIS — E66.3 OVERWEIGHT: Primary | ICD-10-CM

## 2025-03-25 DIAGNOSIS — E78.49 OTHER HYPERLIPIDEMIA: ICD-10-CM

## 2025-03-25 RX ORDER — TIRZEPATIDE 5 MG/.5ML
5 INJECTION, SOLUTION SUBCUTANEOUS WEEKLY
Qty: 2 ML | Refills: 0 | Status: SHIPPED | OUTPATIENT
Start: 2025-03-25 | End: 2025-04-22

## 2025-03-25 RX ORDER — TIRZEPATIDE 2.5 MG/.5ML
2.5 INJECTION, SOLUTION SUBCUTANEOUS WEEKLY
Qty: 2 ML | Refills: 0 | Status: SHIPPED | OUTPATIENT
Start: 2025-03-25 | End: 2025-04-22

## 2025-04-03 DIAGNOSIS — E66.811 CLASS 1 OBESITY: ICD-10-CM

## 2025-04-05 RX ORDER — PHENTERMINE HYDROCHLORIDE 37.5 MG/1
37.5 TABLET ORAL DAILY
Qty: 30 TABLET | Refills: 1 | Status: SHIPPED | OUTPATIENT
Start: 2025-04-05

## 2025-04-16 ENCOUNTER — APPOINTMENT (OUTPATIENT)
Dept: RADIOLOGY | Facility: MEDICAL CENTER | Age: 65
End: 2025-04-16
Payer: COMMERCIAL

## 2025-04-16 ENCOUNTER — RESULTS FOLLOW-UP (OUTPATIENT)
Dept: FAMILY MEDICINE CLINIC | Facility: CLINIC | Age: 65
End: 2025-04-16

## 2025-04-16 ENCOUNTER — OFFICE VISIT (OUTPATIENT)
Dept: FAMILY MEDICINE CLINIC | Facility: CLINIC | Age: 65
End: 2025-04-16
Payer: COMMERCIAL

## 2025-04-16 ENCOUNTER — APPOINTMENT (OUTPATIENT)
Dept: LAB | Facility: MEDICAL CENTER | Age: 65
End: 2025-04-16
Attending: PHYSICIAN ASSISTANT
Payer: COMMERCIAL

## 2025-04-16 VITALS
SYSTOLIC BLOOD PRESSURE: 132 MMHG | TEMPERATURE: 96.9 F | HEIGHT: 65 IN | DIASTOLIC BLOOD PRESSURE: 80 MMHG | HEART RATE: 73 BPM | BODY MASS INDEX: 28.66 KG/M2 | OXYGEN SATURATION: 95 % | WEIGHT: 172 LBS

## 2025-04-16 DIAGNOSIS — J22 LOWER RESPIRATORY TRACT INFECTION: Primary | ICD-10-CM

## 2025-04-16 DIAGNOSIS — J22 LOWER RESPIRATORY TRACT INFECTION: ICD-10-CM

## 2025-04-16 DIAGNOSIS — Z20.822 EXPOSURE TO COVID-19 VIRUS: ICD-10-CM

## 2025-04-16 DIAGNOSIS — E03.9 HYPOTHYROIDISM, UNSPECIFIED TYPE: ICD-10-CM

## 2025-04-16 LAB
BASOPHILS # BLD AUTO: 0.04 THOUSANDS/ÂΜL (ref 0–0.1)
BASOPHILS NFR BLD AUTO: 1 % (ref 0–1)
EOSINOPHIL # BLD AUTO: 0.21 THOUSAND/ÂΜL (ref 0–0.61)
EOSINOPHIL NFR BLD AUTO: 4 % (ref 0–6)
ERYTHROCYTE [DISTWIDTH] IN BLOOD BY AUTOMATED COUNT: 11.3 % (ref 11.6–15.1)
HCT VFR BLD AUTO: 41.7 % (ref 34.8–46.1)
HGB BLD-MCNC: 13.7 G/DL (ref 11.5–15.4)
IMM GRANULOCYTES # BLD AUTO: 0.02 THOUSAND/UL (ref 0–0.2)
IMM GRANULOCYTES NFR BLD AUTO: 0 % (ref 0–2)
LYMPHOCYTES # BLD AUTO: 2.29 THOUSANDS/ÂΜL (ref 0.6–4.47)
LYMPHOCYTES NFR BLD AUTO: 40 % (ref 14–44)
MCH RBC QN AUTO: 30.6 PG (ref 26.8–34.3)
MCHC RBC AUTO-ENTMCNC: 32.9 G/DL (ref 31.4–37.4)
MCV RBC AUTO: 93 FL (ref 82–98)
MONOCYTES # BLD AUTO: 0.42 THOUSAND/ÂΜL (ref 0.17–1.22)
MONOCYTES NFR BLD AUTO: 7 % (ref 4–12)
NEUTROPHILS # BLD AUTO: 2.78 THOUSANDS/ÂΜL (ref 1.85–7.62)
NEUTS SEG NFR BLD AUTO: 48 % (ref 43–75)
NRBC BLD AUTO-RTO: 0 /100 WBCS
PLATELET # BLD AUTO: 418 THOUSANDS/UL (ref 149–390)
PMV BLD AUTO: 10.3 FL (ref 8.9–12.7)
RBC # BLD AUTO: 4.47 MILLION/UL (ref 3.81–5.12)
T4 FREE SERPL-MCNC: 0.99 NG/DL (ref 0.61–1.12)
TSH SERPL DL<=0.05 MIU/L-ACNC: 0.36 UIU/ML (ref 0.45–4.5)
WBC # BLD AUTO: 5.76 THOUSAND/UL (ref 4.31–10.16)

## 2025-04-16 PROCEDURE — 87636 SARSCOV2 & INF A&B AMP PRB: CPT | Performed by: FAMILY MEDICINE

## 2025-04-16 PROCEDURE — 84443 ASSAY THYROID STIM HORMONE: CPT

## 2025-04-16 PROCEDURE — 85025 COMPLETE CBC W/AUTO DIFF WBC: CPT

## 2025-04-16 PROCEDURE — 84439 ASSAY OF FREE THYROXINE: CPT

## 2025-04-16 PROCEDURE — 71046 X-RAY EXAM CHEST 2 VIEWS: CPT

## 2025-04-16 PROCEDURE — 99213 OFFICE O/P EST LOW 20 MIN: CPT | Performed by: FAMILY MEDICINE

## 2025-04-16 PROCEDURE — 36415 COLL VENOUS BLD VENIPUNCTURE: CPT

## 2025-04-16 RX ORDER — CEFUROXIME AXETIL 500 MG/1
500 TABLET ORAL EVERY 12 HOURS SCHEDULED
Qty: 14 TABLET | Refills: 0 | Status: SHIPPED | OUTPATIENT
Start: 2025-04-16 | End: 2025-04-23

## 2025-04-16 NOTE — PROGRESS NOTES
"Name: Randi Mathews      : 1960      MRN: [id number]  Encounter Provider: Shaquille oCrtez DO  Encounter Date: 2025   Encounter department: [place] [facility]  :  Assessment & Plan  Lower respiratory tract infection         Exposure to COVID-19 virus    Orders:    Covid/Flu- Office Collect           History of Present Illness   3 days ago patient began with respiratory symptoms headache nasal congestion sore throat cough here today for follow-up visit not so much fever although she did feel hot at times patient's not expectorating any mucus but is coughing a lot sinuses are hyperemic and there is a lot of postnasal drip and her throat is hyperemic    URI   Associated symptoms include coughing, a sore throat and wheezing.     Review of Systems   HENT:  Positive for sore throat.    Respiratory:  Positive for cough, shortness of breath and wheezing.        Objective   /80 (BP Location: Left arm, Patient Position: Sitting, Cuff Size: Large)   Pulse 73   Temp (!) 96.9 °F (36.1 °C) (Temporal)   Ht 5' 5\" (1.651 m)   Wt 78 kg (172 lb)   SpO2 95%   BMI 28.62 kg/m²      Physical Exam  Constitutional:       Appearance: She is ill-appearing.   HENT:      Nose: Congestion and rhinorrhea present.   Cardiovascular:      Rate and Rhythm: Normal rate and regular rhythm.   Pulmonary:      Effort: Pulmonary effort is normal.      Breath sounds: Wheezing and rhonchi present.   Neurological:      Mental Status: She is alert.         "

## 2025-04-17 DIAGNOSIS — J01.01 ACUTE RECURRENT MAXILLARY SINUSITIS: Primary | ICD-10-CM

## 2025-04-17 RX ORDER — METHYLPREDNISOLONE 4 MG/1
TABLET ORAL
Qty: 21 EACH | Refills: 0 | Status: SHIPPED | OUTPATIENT
Start: 2025-04-17

## 2025-04-18 ENCOUNTER — RESULTS FOLLOW-UP (OUTPATIENT)
Dept: FAMILY MEDICINE CLINIC | Facility: CLINIC | Age: 65
End: 2025-04-18

## 2025-04-18 DIAGNOSIS — E03.9 HYPOTHYROIDISM, UNSPECIFIED TYPE: ICD-10-CM

## 2025-04-18 RX ORDER — LEVOTHYROXINE SODIUM 112 MCG
112 TABLET ORAL DAILY
Qty: 90 TABLET | Refills: 1 | Status: SHIPPED | OUTPATIENT
Start: 2025-04-18

## 2025-04-28 ENCOUNTER — OFFICE VISIT (OUTPATIENT)
Age: 65
End: 2025-04-28
Payer: COMMERCIAL

## 2025-04-28 VITALS
HEART RATE: 77 BPM | OXYGEN SATURATION: 96 % | SYSTOLIC BLOOD PRESSURE: 124 MMHG | TEMPERATURE: 97.3 F | BODY MASS INDEX: 29.34 KG/M2 | HEIGHT: 65 IN | WEIGHT: 176.1 LBS | DIASTOLIC BLOOD PRESSURE: 82 MMHG

## 2025-04-28 DIAGNOSIS — E66.3 OVERWEIGHT: Primary | ICD-10-CM

## 2025-04-28 DIAGNOSIS — E78.49 OTHER HYPERLIPIDEMIA: ICD-10-CM

## 2025-04-28 DIAGNOSIS — R73.03 PREDIABETES: ICD-10-CM

## 2025-04-28 PROCEDURE — 99214 OFFICE O/P EST MOD 30 MIN: CPT | Performed by: PHYSICIAN ASSISTANT

## 2025-04-28 RX ORDER — TIRZEPATIDE 2.5 MG/.5ML
2.5 INJECTION, SOLUTION SUBCUTANEOUS WEEKLY
COMMUNITY
Start: 2025-04-17

## 2025-04-28 RX ORDER — TIRZEPATIDE 5 MG/.5ML
5 INJECTION, SOLUTION SUBCUTANEOUS WEEKLY
Qty: 2 ML | Refills: 2 | Status: SHIPPED | OUTPATIENT
Start: 2025-04-28 | End: 2025-07-21

## 2025-04-28 NOTE — PROGRESS NOTES
Assessment/Plan:    Overweight  -Patient is pursuing Conservative Program  -Initial weight loss goal of 5-10% weight loss for improved health  -Did not tolerate Qsymia: concentration issues/confusion, dizziness, fatigue.Switched to phentermine (186 lbs, 7/19/21)  -Stopped Contrave- abdominal pain   -Saxenda- cost, but also did not like daily injection   -Screening labs: A1c reviewed 5/4/23, TSH from 12/20/23; CMP reviewed from 11/7/23  -Restarted phentermine (5/4/24, 185). BP/P stable   -Encouraged strength training   -Dietary recall reviewed, suggestions provided; discussed plan for next time hunting season is (freezer meals ahead of time, more meal prep)   -Zepbound started mid April 2025 (179- pt reported)  Patient denies personal and family history of MEN2 tumors and medullary thyroid/thyroid carcinoma. Patient denies personal history of pancreatitis.        Initial: 182.2, highest weight 186 BMI 31.76 kg/m²   Current: 176.1 (+4 lbs since last visit)  Change: -6.1  Goal: 150    Follow up in approximately 6 months with Non-Surgical Physician/Advanced Practitioner.    Goals:  Food log (ie.) [email or web address],[email or web address],[email or web address].com,etc. baritastic  No sugary beverages. At least 64oz of water daily.  Increase physical activity by 10 minutes daily. Gradually increase physical activity to a goal of 5 days per week for 30 minutes of MODERATE intensity PLUS 2 days per week of FULL BODY resistance training  5-10 servings of fruits and vegetables per day and 25-35 grams of dietary fiber per day, gradually increasing     Diagnoses and all orders for this visit:    Overweight  -     tirzepatide (Zepbound) 5 mg/0.5 mL auto-injector; Inject 0.5 mL (5 mg total) under the skin once a week  -     Basic metabolic panel; Future    Prediabetes  -     tirzepatide (Zepbound) 5 mg/0.5 mL auto-injector; Inject 0.5 mL (5 mg total) under the skin once a week  -     Basic metabolic panel; Future    Other  "hyperlipidemia  -     tirzepatide (Zepbound) 5 mg/0.5 mL auto-injector; Inject 0.5 mL (5 mg total) under the skin once a week  -     Basic metabolic panel; Future    Body mass index 29.0-29.9, adult  -     Basic metabolic panel; Future    Other orders  -     Zepbound 2.5 MG/0.5ML auto-injector; Inject 2.5 mg under the skin once a week        Subjective:   Chief Complaint   Patient presents with    Follow-up     Weight management     Patient ID: Randi Mathews  is a 65 y.o. female with excess weight/obesity here to pursue weight management.  Patient is pursuing Conservative Program.     HPI  The patient presents for [facility] follow up.     Stopped phentermine- was having increased cravings   Zepbound: just took second week- feeling tired, but otherwise denies GI SE  Reports prior to starting Zepbound was having increased  increased cravings     B: oatmeal - has been reducing amount of brown sugar (1 tsp) or smoothie   S: fruit   L: salad/veg +/- protein   S: no  D: protein + veg + starch - trying to avoid cooking with oil; prior to 2 weeks ago was dining out more frequently   S: no    Exercise: walking   Hydration: 16 oz of water + diet green tea   Sleep: 7 hours    The following portions of the patient's history were reviewed and updated as appropriate: allergies, current medications, past family history, past medical history, past social history, past surgical history, and problem list.    Review of Systems   Constitutional:  Positive for fatigue.     Objective:    /82   Pulse 77   Temp (!) 97.3 °F (36.3 °C)   Ht 5' 5\" (1.651 m)   Wt 79.9 kg (176 lb 1.6 oz)   SpO2 96%   BMI 29.30 kg/m²      Physical Exam  Vitals and nursing note reviewed.     Constitutional   General appearance: Abnormal.  well developed and overweight.   Pulmonary   Respiratory effort: No increased work of breathing or signs of respiratory distress.    Musculoskeletal   Gait and station: Normal.    Psychiatric   Orientation to person, place " and time: Normal.    Affect: appropriate

## 2025-04-28 NOTE — PATIENT INSTRUCTIONS
Can consider adding protein shake over lunch since you sometimes don't have protein OR consider doing the smoothie for breakfast and oatmeal for lunch     Try adding strength training 2x/week- grow with ziggy or [id number]

## 2025-04-28 NOTE — ASSESSMENT & PLAN NOTE
-Patient is pursuing Conservative Program  -Initial weight loss goal of 5-10% weight loss for improved health  -Did not tolerate Qsymia: concentration issues/confusion, dizziness, fatigue.Switched to phentermine (186 lbs, 7/19/21)  -Stopped Contrave- abdominal pain   -Saxenda- cost, but also did not like daily injection   -Screening labs: A1c reviewed 5/4/23, TSH from 12/20/23; CMP reviewed from 11/7/23  -Restarted phentermine (5/4/24, 185). BP/P stable   -Encouraged strength training   -Dietary recall reviewed, suggestions provided; discussed plan for next time hunting season is (freezer meals ahead of time, more meal prep)   -Zepbound started mid April 2025 (179- pt reported)  Patient denies personal and family history of MEN2 tumors and medullary thyroid/thyroid carcinoma. Patient denies personal history of pancreatitis.        Initial: 182.2, highest weight 186 BMI 31.76 kg/m²   Current: 176.1 (+4 lbs since last visit)  Change: -6.1  Goal: 150

## 2025-04-30 ENCOUNTER — OFFICE VISIT (OUTPATIENT)
Dept: GASTROENTEROLOGY | Facility: CLINIC | Age: 65
End: 2025-04-30
Payer: COMMERCIAL

## 2025-04-30 VITALS
SYSTOLIC BLOOD PRESSURE: 123 MMHG | HEIGHT: 65 IN | BODY MASS INDEX: 29.46 KG/M2 | OXYGEN SATURATION: 97 % | HEART RATE: 73 BPM | TEMPERATURE: 97.9 F | DIASTOLIC BLOOD PRESSURE: 81 MMHG | WEIGHT: 176.8 LBS

## 2025-04-30 DIAGNOSIS — K21.9 GASTROESOPHAGEAL REFLUX DISEASE, UNSPECIFIED WHETHER ESOPHAGITIS PRESENT: ICD-10-CM

## 2025-04-30 DIAGNOSIS — R10.84 GENERALIZED ABDOMINAL PAIN: ICD-10-CM

## 2025-04-30 DIAGNOSIS — K59.00 CONSTIPATION, UNSPECIFIED CONSTIPATION TYPE: Primary | ICD-10-CM

## 2025-04-30 DIAGNOSIS — R14.0 BLOATING: ICD-10-CM

## 2025-04-30 DIAGNOSIS — Z12.11 SCREENING FOR COLON CANCER: ICD-10-CM

## 2025-04-30 PROCEDURE — 99214 OFFICE O/P EST MOD 30 MIN: CPT | Performed by: NURSE PRACTITIONER

## 2025-04-30 RX ORDER — OMEPRAZOLE 40 MG/1
CAPSULE, DELAYED RELEASE ORAL
Qty: 90 CAPSULE | Refills: 3 | Status: SHIPPED | OUTPATIENT
Start: 2025-04-30

## 2025-04-30 NOTE — PATIENT INSTRUCTIONS
Stop miralax because of bloating.   Start Colace 100 mg, 1 pill, 2 x daily.   Work on drinking at least 1-2 bottles, if not 2-4 bottles of water daily.   If you have NOT had a BM in 2 days, take senokot 1-2 pills at bed time.   Continue Omeprazole 40 mg, daily as prescribed.  Continue to avoid acidic or trigger foods as much as possible.   Continue to watch for red flag symptoms.   Schedule a f/u OV in 1 year.     We did review GI red flag symptoms, including, but not limited to: chronic nausea, vomiting, diarrhea, chills, fever, and unintentional weight loss and should call or contact our office with any changes or concerns. I reviewed with the patient that if they notice any blood while vomiting or in their stool they should contact or office or go to the nearest emergency room for immediate evaluation. The patient was agreeable and verbalized an understanding.

## 2025-04-30 NOTE — PROGRESS NOTES
Name: Randi Mathews      : 1960      MRN: [id number]  Encounter Provider: ORLANDO Grimes  Encounter Date: 2025   Encounter department: [facility] [place]  :  Assessment & Plan  Constipation, unspecified constipation type  Slightly Improved    Since the patient feels the MiraLAX causes more bloating especially if she takes it with any consistency, I advised her to stop taking the MiraLAX daily and if she would have a point of not having a bowel movement longer than 2 days she can try it for a few days to see if it helps.  The patient was agreeable and verbalized an understanding.    We discussed the following bowel regimen to try to help with her chronic constipation and bloating:  Start Colace 100 mg, 1 pill twice daily.  Continue to work on drinking at least 1-2 bottles, if not 2-4 bottles of water daily.  If you have not had a bowel movement in 2 days can take Senokot 1 to 2 pills at bedtime or MiraLAX as discussed.       Generalized abdominal pain  Above.       Bloating  See above.       Gastroesophageal reflux disease, unspecified whether esophagitis present  Stable    Continue omeprazole as prescribed for now, with the hope that at her next office visit we can decrease it down to 20 mg daily as discussed.  Continue to avoid acidic or trigger foods is much as possible.  Orders:  •  omeprazole (PriLOSEC) 40 MG capsule; Take 1 PO QD in AM prior to a meal.    Screening for colon cancer  We will hold off on any repeat colonoscopies until the recommended surveillance date unless the patient would start to develop red flag symptoms in the future.  The patient was agreeable and verbalized an understanding.  DUE: 2/15/29    Reviewed GI red flag symptoms with patient today.         The patient is to schedule a follow up office visit in 1 year, but understands to call or contact our offices with any issues before then or as needed.     History of Present Illness   Randi  Bisi is a 65 y.o. female who presents today for a follow-up visit on her chronic constipation, generalized abdominal pain, bloating, and GERD symptoms.  The patient reports that since her last office visit she has been trying to take the MiraLAX consistently as discussed, however, finds that if she takes the MiraLAX consistently that actually seems to cause more bloating although it does help her bowel movements, it is the bloating, abdominal distention, and abdominal pain that is the most uncomfortable part.  She reports that she does find that if she drinks more water and does not take the MiraLAX consistently that actually seems to help more than anything, but is still having intermittent bouts of constipation.    She does report that since her last office visit because her GERD symptoms have significantly improved if not almost completely resolved with the omeprazole 40 mg twice daily, she did successfully titrate herself down to just omeprazole 40 mg daily in the a.m. and is not having any kind of breakthrough symptoms.    The patient currently denies any reflux, nausea, dysphagia, vomiting, decreased appetite, or unplanned weight loss. Water Intake: 1-2 bottles per day.    The patient currently reports that they have a BM every 1 to 2 days and reports that it is sometimes relieving, without any consistent diarrhea, constipation, straining, melena or bloody stools.     GI Meds: MiraLAX as needed and omeprazole 40 mg daily.  Daily NSAID Use: Denied    Tobacco: Denied  ETOH: Very occasionally  Marijuana: Denied  Illicit Drug Use: Deneid    Imaging: (None):     Endoscopy History: EGD: (10/4/23): Normal. Path: Peptic Duodenitis     COLONOSCOPY: (2/15/19): Normal. Recommend repeat in 10 years.      DUE: 2/15/29    HPI  History obtained from: patient  Review of Systems A complete review of systems is negative other than that noted above in the HPI.      Current Outpatient Medications   Medication Sig Dispense Refill  "  • cholecalciferol (VITAMIN D3) 1,000 units tablet Take 5,000 Units by mouth daily     • naproxen (Naprosyn) 500 mg tablet Take 1 tablet (500 mg total) by mouth 2 (two) times a day with meals 60 tablet 2   • omeprazole (PriLOSEC) 40 MG capsule Take 1 PO QD in AM prior to a meal. 90 capsule 3   • Premarin vaginal cream insert 0.5 gram vaginally TWICE A WEEK 30 g 2   • Synthroid 112 MCG tablet Take 1 tablet (112 mcg total) by mouth daily 90 tablet 1   • tirzepatide (Zepbound) 5 mg/0.5 mL auto-injector Inject 0.5 mL (5 mg total) under the skin once a week 2 mL 2   • Zepbound 2.5 MG/0.5ML auto-injector Inject 2.5 mg under the skin once a week     • Ca Carbonate-Mag Hydroxide (ROLAIDS PO) Take by mouth as needed       No current facility-administered medications for this visit.     Objective   /81   Pulse 73   Temp 97.9 °F (36.6 °C) (Temporal)   Ht 5' 5\" (1.651 m)   Wt 80.2 kg (176 lb 12.8 oz)   SpO2 97%   BMI 29.42 kg/m²     Physical Exam   Abdomen is soft, nondistended, without any significant tenderness noted on exam, with hypoactive bowel sounds x 4.  No edema noted of the b/l lower extremities upon exam today. Skin is non-icteric.     Lab Results: I personally reviewed relevant lab results.       Results for orders placed during the hospital encounter of 10/04/23    EGD    Impression  The esophagus, stomach and duodenum appeared normal. Performed random biopsy to rule out celiac disease, eosinophilic esophagitis and H. pylori.      RECOMMENDATION:  Await pathology results    Continue PPI as prescribed  Follow-up in GI office  If symptoms persist despite high-dose PPI therapy consider Bravo testing or 24-hour pH monitoring off PPI        Diana M Jaiyeola, MD        "

## 2025-04-30 NOTE — ASSESSMENT & PLAN NOTE
Stable    Continue omeprazole as prescribed for now, with the hope that at her next office visit we can decrease it down to 20 mg daily as discussed.  Continue to avoid acidic or trigger foods is much as possible.  Orders:  •  omeprazole (PriLOSEC) 40 MG capsule; Take 1 PO QD in AM prior to a meal.

## 2025-05-02 PROBLEM — R49.0 RASPY VOICE: Status: RESOLVED | Noted: 2023-08-24 | Resolved: 2025-05-02

## 2025-05-02 PROBLEM — R10.30 LOWER ABDOMINAL PAIN: Status: RESOLVED | Noted: 2019-01-24 | Resolved: 2025-05-02

## 2025-05-02 NOTE — ASSESSMENT & PLAN NOTE
Slightly Improved    Since the patient feels the MiraLAX causes more bloating especially if she takes it with any consistency, I advised her to stop taking the MiraLAX daily and if she would have a point of not having a bowel movement longer than 2 days she can try it for a few days to see if it helps.  The patient was agreeable and verbalized an understanding.    We discussed the following bowel regimen to try to help with her chronic constipation and bloating:  Start Colace 100 mg, 1 pill twice daily.  Continue to work on drinking at least 1-2 bottles, if not 2-4 bottles of water daily.  If you have not had a bowel movement in 2 days can take Senokot 1 to 2 pills at bedtime or MiraLAX as discussed.

## 2025-05-08 ENCOUNTER — TELEPHONE (OUTPATIENT)
Dept: FAMILY MEDICINE CLINIC | Facility: CLINIC | Age: 65
End: 2025-05-08

## 2025-05-08 DIAGNOSIS — M54.50 ACUTE BILATERAL LOW BACK PAIN WITHOUT SCIATICA: Primary | ICD-10-CM

## 2025-05-08 RX ORDER — METHOCARBAMOL 500 MG/1
500 TABLET, FILM COATED ORAL 3 TIMES DAILY PRN
Qty: 30 TABLET | Refills: 0 | Status: SHIPPED | OUTPATIENT
Start: 2025-05-08

## 2025-05-08 NOTE — TELEPHONE ENCOUNTER
She is having back pain, from sitting all day yesterday, she is requesting a muscle relaxer.  She is taking ibuprofen, Voltaren gel and doing stretches.

## 2025-05-08 NOTE — TELEPHONE ENCOUNTER
I sent a prescription for Robaxin to the pharmacy. She should also try ice, heat, stretching, and core exercises. If no relief, would recommend referral to PT.

## 2025-07-07 ENCOUNTER — TELEPHONE (OUTPATIENT)
Dept: BARIATRICS | Facility: CLINIC | Age: 65
End: 2025-07-07

## 2025-07-07 DIAGNOSIS — E66.3 OVERWEIGHT: ICD-10-CM

## 2025-07-07 DIAGNOSIS — E78.49 OTHER HYPERLIPIDEMIA: ICD-10-CM

## 2025-07-07 DIAGNOSIS — R73.03 PREDIABETES: ICD-10-CM

## 2025-07-07 RX ORDER — TIRZEPATIDE 7.5 MG/.5ML
7.5 INJECTION, SOLUTION SUBCUTANEOUS WEEKLY
Qty: 2 ML | Refills: 0 | Status: SHIPPED | OUTPATIENT
Start: 2025-07-07 | End: 2025-08-04

## 2025-07-07 RX ORDER — TIRZEPATIDE 10 MG/.5ML
10 INJECTION, SOLUTION SUBCUTANEOUS WEEKLY
Qty: 2 ML | Refills: 1 | Status: SHIPPED | OUTPATIENT
Start: 2025-07-07 | End: 2025-09-01

## 2025-07-15 ENCOUNTER — ANNUAL EXAM (OUTPATIENT)
Dept: GYNECOLOGY | Facility: CLINIC | Age: 65
End: 2025-07-15
Payer: COMMERCIAL

## 2025-07-15 VITALS
HEIGHT: 65 IN | WEIGHT: 167 LBS | DIASTOLIC BLOOD PRESSURE: 60 MMHG | BODY MASS INDEX: 27.82 KG/M2 | SYSTOLIC BLOOD PRESSURE: 112 MMHG

## 2025-07-15 DIAGNOSIS — Z01.419 ENCOUNTER FOR GYNECOLOGICAL EXAMINATION (GENERAL) (ROUTINE) WITHOUT ABNORMAL FINDINGS: Primary | ICD-10-CM

## 2025-07-15 DIAGNOSIS — Z12.4 ENCOUNTER FOR PAPANICOLAOU SMEAR FOR CERVICAL CANCER SCREENING: ICD-10-CM

## 2025-07-15 DIAGNOSIS — Z78.0 MENOPAUSE: ICD-10-CM

## 2025-07-15 DIAGNOSIS — Z12.31 SCREENING MAMMOGRAM FOR BREAST CANCER: ICD-10-CM

## 2025-07-15 DIAGNOSIS — Z13.820 OSTEOPOROSIS SCREENING: ICD-10-CM

## 2025-07-15 PROCEDURE — S0612 ANNUAL GYNECOLOGICAL EXAMINA: HCPCS | Performed by: OBSTETRICS & GYNECOLOGY

## 2025-07-15 PROCEDURE — G0476 HPV COMBO ASSAY CA SCREEN: HCPCS | Performed by: OBSTETRICS & GYNECOLOGY

## 2025-07-15 PROCEDURE — G0145 SCR C/V CYTO,THINLAYER,RESCR: HCPCS | Performed by: OBSTETRICS & GYNECOLOGY

## 2025-07-16 LAB
HPV HR 12 DNA CVX QL NAA+PROBE: NEGATIVE
HPV16 DNA CVX QL NAA+PROBE: NEGATIVE
HPV18 DNA CVX QL NAA+PROBE: NEGATIVE

## 2025-07-22 LAB
LAB AP GYN PRIMARY INTERPRETATION: NORMAL
Lab: NORMAL

## 2025-07-28 PROCEDURE — 88305 TISSUE EXAM BY PATHOLOGIST: CPT | Performed by: STUDENT IN AN ORGANIZED HEALTH CARE EDUCATION/TRAINING PROGRAM

## 2025-07-28 PROCEDURE — 88342 IMHCHEM/IMCYTCHM 1ST ANTB: CPT | Performed by: STUDENT IN AN ORGANIZED HEALTH CARE EDUCATION/TRAINING PROGRAM

## 2025-07-29 ENCOUNTER — LAB REQUISITION (OUTPATIENT)
Dept: LAB | Facility: HOSPITAL | Age: 65
End: 2025-07-29
Payer: COMMERCIAL

## 2025-07-29 DIAGNOSIS — D48.5 NEOPLASM OF UNCERTAIN BEHAVIOR OF SKIN: ICD-10-CM

## 2025-08-02 PROCEDURE — 88305 TISSUE EXAM BY PATHOLOGIST: CPT | Performed by: STUDENT IN AN ORGANIZED HEALTH CARE EDUCATION/TRAINING PROGRAM

## 2025-08-02 PROCEDURE — 88342 IMHCHEM/IMCYTCHM 1ST ANTB: CPT | Performed by: STUDENT IN AN ORGANIZED HEALTH CARE EDUCATION/TRAINING PROGRAM

## (undated) DEVICE — GLOVE SRG BIOGEL 8

## (undated) DEVICE — 3M™ STERI-STRIP™ REINFORCED ADHESIVE SKIN CLOSURES, R1547, 1/2 IN X 4 IN (12 MM X 100 MM), 6 STRIPS/ENVELOPE: Brand: 3M™ STERI-STRIP™

## (undated) DEVICE — INTENDED FOR TISSUE SEPARATION, AND OTHER PROCEDURES THAT REQUIRE A SHARP SURGICAL BLADE TO PUNCTURE OR CUT.: Brand: BARD-PARKER ® CARBON RIB-BACK BLADES

## (undated) DEVICE — GLOVE SRG BIOGEL 6.5

## (undated) DEVICE — SKIN MARKER DUAL TIP WITH RULER CAP, FLEXIBLE RULER AND LABELS: Brand: DEVON

## (undated) DEVICE — CAST PADDING 4 IN SYNTHETIC NON-STRL

## (undated) DEVICE — STOCKINETTE REGULAR

## (undated) DEVICE — COBAN 6 IN STERILE

## (undated) DEVICE — CUFF TOURNIQUET 24 X 4 IN QUICK CONNECT DISP 1BLA

## (undated) DEVICE — PACK UNIVERSAL ARTHRSCOPY PBDS

## (undated) DEVICE — 3M™ STERI-DRAPE™ U-DRAPE 1015: Brand: STERI-DRAPE™

## (undated) DEVICE — SUT ETHILON 3-0 PS-1 18 IN 1663G

## (undated) DEVICE — STERILE BETHLEHEM PLASTIC HAND: Brand: CARDINAL HEALTH

## (undated) DEVICE — PREP SURGICAL PURPREP 26ML

## (undated) DEVICE — IMPERVIOUS STOCKINETTE: Brand: DEROYAL

## (undated) DEVICE — ARTHROSCOPY FLOOR MAT

## (undated) DEVICE — ACE WRAP 6 IN UNSTERILE

## (undated) DEVICE — BIAS CUT STOCKINETTE 3 IN

## (undated) DEVICE — GLOVE INDICATOR PI UNDERGLOVE SZ 7 BLUE

## (undated) DEVICE — OCCLUSIVE GAUZE STRIP,3% BISMUTH TRIBROMOPHENATE IN PETROLATUM BLEND: Brand: XEROFORM

## (undated) DEVICE — CHLORAPREP HI-LITE 26ML ORANGE

## (undated) DEVICE — NEEDLE 25G X 1 1/2

## (undated) DEVICE — NEEDLE 18 G X 1 1/2

## (undated) DEVICE — SUT ETHILON 5-0 PS-3 18 IN 1668H

## (undated) DEVICE — TIBURON EXTREMITY SHEET: Brand: CONVERTORS

## (undated) DEVICE — GAUZE SPONGES,USP TYPE VII GAUZE, 12 PLY: Brand: CURITY

## (undated) DEVICE — PUDDLE VAC

## (undated) DEVICE — ACE WRAP 4 IN UNSTERILE

## (undated) DEVICE — KNIFE LIGHT 10,PK: Brand: KNIFELIGHT

## (undated) DEVICE — SUT ETHILON 3-0 PS-1 18 IN 1663H

## (undated) DEVICE — PADDING CAST 6IN COTTON STRL

## (undated) DEVICE — GAUZE SPONGES,16 PLY: Brand: CURITY

## (undated) DEVICE — GLOVE SRG BIOGEL 7.5

## (undated) DEVICE — CUFF TOURNIQUET 18 X 4 IN QUICK CONNECT DISP 1 BLADDER

## (undated) DEVICE — ARM SLING: Brand: DEROYAL

## (undated) DEVICE — TUBING ARTHROSCOPIC WAVE  MAIN PUMP

## (undated) DEVICE — GLOVE INDICATOR PI UNDERGLOVE SZ 8 BLUE

## (undated) DEVICE — LIGHT GLOVE GREEN

## (undated) DEVICE — ABDOMINAL PAD: Brand: DERMACEA

## (undated) DEVICE — ACE WRAP 6 IN STERILE

## (undated) DEVICE — SYRINGE 50ML LL

## (undated) DEVICE — TUBING SUCTION 5MM X 12 FT

## (undated) DEVICE — MAT FLOOR STEP DRI 24 X 36 IN N-STRL

## (undated) DEVICE — U-DRAPE: Brand: CONVERTORS

## (undated) DEVICE — TAPE ELASTOPLAST 1 IN

## (undated) DEVICE — PADDING CAST 4 IN  COTTON STRL

## (undated) DEVICE — PADDING CAST 3IN COTTON STRL

## (undated) DEVICE — BLADE SHAVER TORPEDO 4MM 13CM  COOLCUT